# Patient Record
Sex: MALE | Race: BLACK OR AFRICAN AMERICAN | Employment: OTHER | URBAN - METROPOLITAN AREA
[De-identification: names, ages, dates, MRNs, and addresses within clinical notes are randomized per-mention and may not be internally consistent; named-entity substitution may affect disease eponyms.]

---

## 2017-02-13 ENCOUNTER — OFFICE VISIT (OUTPATIENT)
Dept: HEMATOLOGY | Age: 67
End: 2017-02-13

## 2017-02-13 VITALS
HEIGHT: 71 IN | WEIGHT: 238.4 LBS | SYSTOLIC BLOOD PRESSURE: 138 MMHG | HEART RATE: 82 BPM | OXYGEN SATURATION: 94 % | RESPIRATION RATE: 19 BRPM | DIASTOLIC BLOOD PRESSURE: 87 MMHG | TEMPERATURE: 98.6 F | BODY MASS INDEX: 33.38 KG/M2

## 2017-02-13 DIAGNOSIS — B18.2 CHRONIC HEPATITIS C WITHOUT HEPATIC COMA (HCC): Primary | ICD-10-CM

## 2017-02-13 NOTE — MR AVS SNAPSHOT
Visit Information Date & Time Provider Department Dept. Phone Encounter #  
 2/13/2017  3:00 PM Cristela Gutierrez NP [facility] [id number] Follow-up Instructions Return in about 1 year (around 2/13/2018) for Fibroscan. Your Appointments 2/16/2017  9:10 AM  
ROUTINE CARE with Pat Sam MD  
[facility] Internal Medicine [facility]) Appt Note: follow up [address] [facility] [address]  
[phone]  
  
   
 [address] [facility] [address]  
  
    
 2/17/2017  9:15 AM  
ROUTINE CARE with Luis Pavon [address] [place] [address]) Appt Note: 4 month follow up for htn prostate cancer [facility] [address] [facility]  
[phone]  
  
   
 [facility] [address] [facility] [address] [address]  
  
    
 2/21/2017  9:15 AM  
3 MONTH with Prema Cody MD  
[facility]) Appt Note: $0CP REM  
 [address] [facility]  
[phone] [address] [facility] Upcoming Health Maintenance Date Due DTaP/Tdap/Td series (1 - Tdap) 4/18/1970 GLAUCOMA SCREENING Q2Y 4/18/2014 Pneumococcal 65+ High/Highest Risk (2 of 2 - PCV13) 9/3/2015 MEDICARE YEARLY EXAM 9/14/2017 COLONOSCOPY 8/31/2020 Allergies as of 2/13/2017  Review Complete On: 2/13/2017 By: Cristela Gutierrez NP No Known Allergies Current Immunizations  Reviewed on 9/5/2014 Name Date H1N1 FLU VACCINE 2/22/2010 Influenza Vaccine Split 2/22/2010 Pneumococcal Polysaccharide (PPSV-23) 9/3/2014 12:06 PM  
  
 Not reviewed this visit You Were Diagnosed With   
  
 Codes Comments Chronic hepatitis C without hepatic coma (HCC)    -  Primary ICD-10-CM: B18.2 ICD-9-CM: 070.54 Vitals BP Pulse Temp Resp Height(growth percentile) Weight(growth percentile) 138/87 (BP 1 Location: Left arm, BP Patient Position: Sitting) 82 98.6 °F (37 °C) (Tympanic) 19 5' 11\" (1.803 m) 238 lb 6.4 oz (108.1 kg) SpO2 BMI Smoking Status 94% 33.25 kg/m2 Current Every Day Smoker BMI and BSA Data Body Mass Index Body Surface Area  
 33.25 kg/m 2 2.33 m 2 Preferred Pharmacy Pharmacy Name Phone [phone] [facility] [phone] Your Updated Medication List  
  
   
This list is accurate as of: 2/13/17  3:12 PM.  Always use your most recent med list.  
  
  
  
  
 ALEVE 220 mg tablet Generic drug:  naproxen sodium Take 220 mg by mouth as needed. aspirin delayed-release 81 mg tablet Take  by mouth daily. lisinopril 20 mg tablet Commonly known as:  Marilynne Shows Take 1 Tab by mouth daily. MULTIVITAMIN PO Take  by mouth daily. OMEGA 3 PO  
take  by mouth. rosuvastatin 5 mg tablet Commonly known as:  CRESTOR Take 1 Tab by mouth nightly. VITAMIN D3 2,000 unit Tab Generic drug:  cholecalciferol (vitamin D3) Take  by mouth. We Performed the Following CBC W/O DIFF [32224 CPT(R)] HCV RNA BY COLETTE QL,RFLX TO QT [52198 CPT(R)] METABOLIC PANEL, COMPREHENSIVE [68374 CPT(R)] Follow-up Instructions Return in about 1 year (around 2/13/2018) for Fibroscan. Introducing [facility]! Dheeraj Jean introduces [employer] patient portal. Now you can access parts of your medical record, email your doctor's office, and request medication refills online. 1. In your internet browser, go to [email or web address] 2. Click on the First Time User? Click Here link in the Sign In box. You will see the New Member Sign Up page. 3. Enter your [employer] Access Code exactly as it appears below.  You will not need to use this code after youve completed the sign-up process. If you do not sign up before the expiration date, you must request a new code. · [employer] Access Code: [email or web address] Expires: 5/14/2017  3:12 PM 
 
4. Enter the last four digits of your Social Security Number (xxxx) and Date of Birth (mm/dd/yyyy) as indicated and click Submit. You will be taken to the next sign-up page. 5. Create a [employer] ID. This will be your [employer] login ID and cannot be changed, so think of one that is secure and easy to remember. 6. Create a [employer] password. You can change your password at any time. 7. Enter your Password Reset Question and Answer. This can be used at a later time if you forget your password. 8. Enter your e-mail address. You will receive e-mail notification when new information is available in [address] 9. Click Sign Up. You can now view and download portions of your medical record. 10. Click the Download Summary menu link to download a portable copy of your medical information. If you have questions, please visit the Frequently Asked Questions section of the [employer] website. Remember, [employer] is NOT to be used for urgent needs. For medical emergencies, dial 911. Now available from your iPhone and Android! Please provide this summary of care documentation to your next provider. Your primary care clinician is listed as Omero LOYOLA. If you have any questions after today's visit, please call [phone].

## 2017-02-14 LAB
ALBUMIN SERPL-MCNC: 4.5 G/DL (ref 3.6–4.8)
ALBUMIN/GLOB SERPL: 1.4 {RATIO} (ref 1.1–2.5)
ALP SERPL-CCNC: 64 IU/L (ref 39–117)
ALT SERPL-CCNC: 16 IU/L (ref 0–44)
AST SERPL-CCNC: 21 IU/L (ref 0–40)
BILIRUB SERPL-MCNC: 0.8 MG/DL (ref 0–1.2)
BUN SERPL-MCNC: 15 MG/DL (ref 8–27)
BUN/CREAT SERPL: 19 (ref 10–22)
CALCIUM SERPL-MCNC: 9.9 MG/DL (ref 8.6–10.2)
CHLORIDE SERPL-SCNC: 98 MMOL/L (ref 96–106)
CO2 SERPL-SCNC: 23 MMOL/L (ref 18–29)
CREAT SERPL-MCNC: 0.77 MG/DL (ref 0.76–1.27)
ERYTHROCYTE [DISTWIDTH] IN BLOOD BY AUTOMATED COUNT: 14.3 % (ref 12.3–15.4)
GLOBULIN SER CALC-MCNC: 3.3 G/DL (ref 1.5–4.5)
GLUCOSE SERPL-MCNC: 83 MG/DL (ref 65–99)
HCT VFR BLD AUTO: 48.7 % (ref 37.5–51)
HGB BLD-MCNC: 17 G/DL (ref 12.6–17.7)
MCH RBC QN AUTO: 33.7 PG (ref 26.6–33)
MCHC RBC AUTO-ENTMCNC: 34.9 G/DL (ref 31.5–35.7)
MCV RBC AUTO: 96 FL (ref 79–97)
PLATELET # BLD AUTO: 190 X10E3/UL (ref 150–379)
POTASSIUM SERPL-SCNC: 4.3 MMOL/L (ref 3.5–5.2)
PROT SERPL-MCNC: 7.8 G/DL (ref 6–8.5)
RBC # BLD AUTO: 5.05 X10E6/UL (ref 4.14–5.8)
SODIUM SERPL-SCNC: 140 MMOL/L (ref 134–144)
WBC # BLD AUTO: 6 X10E3/UL (ref 3.4–10.8)

## 2017-02-14 NOTE — PROGRESS NOTES
93 Larissa Patten MD, Debby Fraire, YADIRA Wilson MD, MD Kaylin Snow, ELLEN Sullivan NP        [address] [facility], [address] Bharathi Gallegos  22.     [phone]     FAX: [address], [address] [facility], [address]     [phone]     FAX: [phone]     Patient Care Team:  Vira Jacques MD as PCP - Sriram Davis MD (Infectious Diseases)  Cecile Vick MD as Surgeon (General Surgery)  Mortimer Peaches, MD (Gastroenterology)  David Wen MD (Hepatology)  Cristela Comer NP (Nurse Practitioner)  Cherelle Bonilla MD (Cardiology)  Wolf Reyes MD (Cardiology)     Patient Active Problem List   Diagnosis Code    Chronic hepatitis C ([facility] B18.2    HIV positive ([facility] Z21    Weight loss, non-intentional R63.4    Prostate cancer ([facility] C61    Substance abuse F19.10    HTN (hypertension) I10    Colon polyp K63.5    Advanced care planning/counseling discussion Z71.89    Tobacco use Z72.0    PAD (peripheral artery disease) ([facility] I73.9    Dyslipidemia E78.5       Katerina Conroy returns to the [address] for management of chronic HCV and HIV co-infection. The active problem list, all pertinent past medical history, medications, radiologic findings and laboratory findings related to the liver disorder were reviewed with the patient. Ultrasound of the liver was performed in 12/2014. This suggests chronic liver disease. A 0.8 x 0.7 lesion was identified in the right lobe. MRI of the liver was performed in 1/2015 to further evaluate this area. This suggested changes consistent with chronic liver disease. No liver mass lesion was identified. The patient underwent a liver biopsy in 3/2015.  This demonstrates severe hepatitis with bridging fibrosis. Patient has completed 12 weeks of HCV treatment with Harvoni (5/29/2015-8/25/2015). He is a sustained virologic responder to treatment, or cured. Patient is co-infected with HIV. He is currently not on anti-retroviral therapy because his virus remains suppressed. The patient complains of weight gain and fatigue today but otherwise feels well. He has been diagnosed with prostate cancer and is currently undergoing radiation therapy for 7 weeks. He is concerned that this may be harming his liver and would like to discuss this today. The patient completes all daily activities without any functional limitations. The patient has not experienced arthralgias, myalgias, problems concentrating, swelling of the abdomen, swelling of the lower extremities, hematemesis, or hematochezia. ALLERGIES  No Known Allergies    MEDICATIONS  Current Outpatient Prescriptions   Medication Sig    rosuvastatin (CRESTOR) 5 mg tablet Take 1 Tab by mouth nightly.  lisinopril (PRINIVIL, ZESTRIL) 20 mg tablet Take 1 Tab by mouth daily.  naproxen sodium (ALEVE) 220 mg tablet Take 220 mg by mouth as needed.  cholecalciferol, vitamin D3, (VITAMIN D3) 2,000 unit tab Take  by mouth.  aspirin delayed-release 81 mg tablet Take  by mouth daily.  MULTIVITAMINS (MULTIVITAMIN PO) Take  by mouth daily.  OMEGA-3 FATTY ACIDS (OMEGA 3 PO) take  by mouth. No current facility-administered medications for this visit. REVIEW OF SYSTEMS NOT RELATED TO LIVER DISEASE:  Constitution systems: Negative for fever, chills, weight gain, weight loss. Eyes: Negative for diplopia, visual changes, eye pain. ENT: Negative for sore throat, painful swallowing. Respiratory: Negative for cough, hemoptysis, dyspnea. Cardiology: Negative for chest pain, palpitations. GI:  Negative for constipation or diarrhea. : Negative for urinary frequency, dysuria and hematuria.    Skin: Negative for rash.  Hematology: Negative for easy bruising, bleeding from gums or nose. Musculo-skelatal: Negative for back pain, muscle pain, weakness. Neurologic: Negative for headaches, dizziness, vertigo, memory problems. Psychology: Negative for anxiety, depression. FAMILY HISTORY:  The father  of alcohol cirrhosis. The mother  of CVA. There are no other persons in the family with HCV or liver disease. SOCIAL HISTORY:  The patient is . The spouse has been tested for HCV and is positive. She was treated and achieved SVR. The patient has 3 children, and 7 grandchildren. The patient stopped using tobacco products in 2014. The patient has never consumed significant amounts of alcohol. The patient used to work in [employer]. PHYSICAL EXAMINATION:  Visit Vitals    /87 (BP 1 Location: Left arm, BP Patient Position: Sitting)    Pulse 82    Temp 98.6 °F (37 °C) (Tympanic)    Resp 19    Ht 5' 11\" (1.803 m)    Wt 238 lb 6.4 oz (108.1 kg)    SpO2 94%    BMI 33.25 kg/m2     General: No acute distress. Eyes: Sclera anicteric. ENT: No oral lesions. Thyroid normal.  Nodes: No adenopathy. Skin: No spider angiomata. No jaundice. No palmar erythema. Respiratory: Lungs clear to auscultation. Cardiovascular: Regular heart rate. No murmurs. No JVD. Abdomen: Soft non-tender. Liver size normal to percussion/palpation. Spleen not palpable. No obvious ascites. Extremities: No edema. No muscle wasting. No gross arthritic changes. Neurologic: Alert and oriented. Cranial nerves grossly intact. No asterixsis.     LABORATORY STUDIES:  [address] & Units 2017 10/4/2016 2016   WBC 3.4 - 10.8 x10E3/uL 6.0 6.2    ANC 1.4 - 7.0 x10E3/uL  3.4    HGB 12.6 - 17.7 g/dL 17.0 16.6     - 379 x10E3/uL 190 225    INR 0.8 - 1.2      AST 0 - 40 IU/L 21 21 18   ALT 0 - 44 IU/L 16 16 15   Alk Phos 39 - 117 IU/L 64 78 71   Bili, Total 0.0 - 1.2 mg/dL 0.8 0.7 0.5   Bili, Direct 0.00 - 0.40 mg/dL      Albumin 3.6 - 4.8 g/dL 4.5 4.9 (H) 4.5   BUN 8 - 27 mg/dL 15 10 10   Creat 0.76 - 1.27 mg/dL 0.77 0.84 0.77   Na 134 - 144 mmol/L 140 143 140   K 3.5 - 5.2 mmol/L 4.3 4.2 4.5   Cl 96 - 106 mmol/L 98 100 100   CO2 18 - 29 mmol/L 23 26 25   Glucose 65 - 99 mg/dL 83 94 90   Magnesium 1.6 - 2.4 mg/dL      Ammonia <32 UMOL/L      Virology Latest Ref Rng & Units   9/28/2016   HCV RNA, COLETTE, QL Negative   Negative     A CMP, CBC and HCV RNA were ordered today. Will review results when available. SEROLOGIES:  Serologies Latest Ref Rng 10/10/2014 9/1/2014 8/27/2014   Hep A Ab, Total Negative Positive (A)     Hep B Surface Ag Negative Negative     Hep B Core Ab, Total Negative Positive (A)     Hep C Genotype See Note 1a     HCV RT-PCR, Quant  [id number]  [id number]   HCV Log10    6.830   Ferritin 30 - 400 ng/mL 172     Iron % Saturation 15 - 55 % 22     C-ANCA Neg:<1:20 titer  <1:20    P-ANCA Neg:<1:20 titer  <1:20    ANCA Neg:<1:20 titer  <1:20      LIVER HISTOLOGY:  3/2015. Slides reviewed by MLS. Severe hepatitis with bridging fibrosis and possible cirrhosis. Knodell score (3,3,3,3), Bro score 4, Metavir score 3.  2/2017. FibroScan performed at [facility]. EkPa was 10.0. Suggested fibrosis level is F3. ENDOSCOPIC PROCEDURES:  Not available or performed    RADIOLOGY:  12/2014. Ultrasound of liver. Echogenic consistent with chronic liver disease. 0.8x0.7 cm lesion right lobe. No dilated bile ducts. No ascites. 1/2015. Dynamic MRI liver. Changes consistent with chronic liver disease. No liver mass lesions. No dilated bile ducts. No bile duct strictures. No ascites. OTHER TESTING:  Not available or performed    ASSESSMENT AND PLAN:  Chronic HCV with bridging fibrosis. This was confirmed with FibroScan today. Results were discussed in detail with patient. He has preserved liver function.  Will continue to monitor patient regularly. HIV co-infection. He has low levels of HIV RNA. He is not taking anti-HIV medications. This is being regularly monitored by his ID physician. HCV treatment. Completed 12 weeks of Harvoni treatment in August 2015. He is a sustained virologic responder to this treatment, or cured from this infection. Fibroscan. Patient's liver biopsy in March 2015 demonstrated bridging fibrosis. He has been HCV negative for over a year. FibroScan today demonstrated bridging fibrosis with no progression or regression. Will have patient return every year to reassess his fibrosis with a Fibroscan. Patient verbalized understanding of this plan. Prostate cancer. Currently receiving radiation therapy. Explained to patient that his liver disease is stable and has not worsened with his prostate treatment. He can continue any necessary treatment. The patient was directed to continue all current medications at the current dosages. There are no contraindications for the patient to take any medications that are necessary for treatment of other medical issues. The patient was counseled regarding alcohol consumption. The patient was counseled regarding use of illicit drugs. Vaccination for viral hepatitis A and B is not required. The patient has serologic evidence of prior exposure or vaccination with immunity. All of the above issues were discussed with the patient. All questions were answered. The patient expressed a clear understanding of the above. Follow-up Juan Antonio Levin 32 in 1 year with a FibroScan.     Kianna Robins NP  [facility] [place] [address]  Ph: [phone]  Fax: [phone]  Email: [email or web address]

## 2017-02-15 LAB — HCV RNA SERPL QL NAA+PROBE: NEGATIVE

## 2017-02-16 ENCOUNTER — OFFICE VISIT (OUTPATIENT)
Dept: INTERNAL MEDICINE CLINIC | Age: 67
End: 2017-02-16

## 2017-02-16 VITALS
BODY MASS INDEX: 33.6 KG/M2 | SYSTOLIC BLOOD PRESSURE: 116 MMHG | WEIGHT: 240 LBS | HEART RATE: 82 BPM | DIASTOLIC BLOOD PRESSURE: 77 MMHG | OXYGEN SATURATION: 96 % | HEIGHT: 71 IN | TEMPERATURE: 98.2 F

## 2017-02-16 DIAGNOSIS — B20 HIV DISEASE (HCC): Primary | ICD-10-CM

## 2017-02-16 RX ORDER — AZITHROMYCIN 250 MG/1
250 TABLET, FILM COATED ORAL SEE ADMIN INSTRUCTIONS
Qty: 6 TAB | Refills: 0 | Status: SHIPPED | OUTPATIENT
Start: 2017-02-16 | End: 2017-02-21

## 2017-02-16 NOTE — PATIENT INSTRUCTIONS
You are here for a follow up for HIV- will check your VL and cd4 today and depending on the results we will decide on treatment

## 2017-02-16 NOTE — MR AVS SNAPSHOT
Visit Information Date & Time Provider Department Dept. Phone Encounter #  
 2/16/2017  9:10 AM Galen Karla, [address] [facility] Internal Medicine [phone] [id number] Your Appointments 2/17/2017  9:15 AM  
ROUTINE CARE with Ken Bravo, [address] [place] [address],[address]) Appt Note: 4 month follow up for htn prostate cancer [address] [facility]  
[phone]  
  
   
 [address] [facility]  [address] [address]  
  
    
 2/21/2017  9:15 AM  
3 MONTH with John Aragon MD  
[facility] [address] [facility]) Appt Note: $0CP REM  
 [address] [facility]  
[phone] [address] Upcoming Health Maintenance Date Due DTaP/Tdap/Td series (1 - Tdap) 4/18/1970 GLAUCOMA SCREENING Q2Y 4/18/2014 Pneumococcal 65+ High/Highest Risk (2 of 2 - PCV13) 9/3/2015 MEDICARE YEARLY EXAM 9/14/2017 COLONOSCOPY 8/31/2020 Allergies as of 2/16/2017  Review Complete On: 2/16/2017 By: Khurram Bhatt LPN No Known Allergies Current Immunizations  Reviewed on 9/5/2014 Name Date H1N1 FLU VACCINE 2/22/2010 Influenza Vaccine Split 2/22/2010 Pneumococcal Polysaccharide (PPSV-23) 9/3/2014 12:06 PM  
  
 Not reviewed this visit You Were Diagnosed With   
  
 Codes Comments HIV disease ([facility]    -  Primary ICD-10-CM: B20 
ICD-9-CM: 364 Vitals BP Pulse Temp Height(growth percentile) Weight(growth percentile) SpO2  
 116/77 82 98.2 °F (36.8 °C) (Oral) 5' 11\" (1.803 m) 240 lb (108.9 kg) 96% BMI Smoking Status 33.47 kg/m2 Current Every Day Smoker BMI and BSA Data Body Mass Index Body Surface Area  
 33.47 kg/m 2 2.34 m 2 Preferred Pharmacy Pharmacy Name Phone [phone] [facility] [phone] Your Updated Medication List  
  
   
This list is accurate as of: 2/16/17  9:30 AM.  Always use your most recent med list.  
  
  
  
  
 ALEVE 220 mg tablet Generic drug:  naproxen sodium Take 220 mg by mouth as needed. aspirin delayed-release 81 mg tablet Take  by mouth daily. azithromycin 250 mg tablet Commonly known as:  Rosibel Sloane Take 1 Tab by mouth See Admin Instructions for 5 days. lisinopril 20 mg tablet Commonly known as:  Aundra Nyasia Take 1 Tab by mouth daily. MULTIVITAMIN PO Take  by mouth daily. OMEGA 3 PO  
take  by mouth. rosuvastatin 5 mg tablet Commonly known as:  CRESTOR Take 1 Tab by mouth nightly. VITAMIN D3 2,000 unit Tab Generic drug:  cholecalciferol (vitamin D3) Take  by mouth. Prescriptions Sent to Pharmacy Refills  
 azithromycin (ZITHROMAX) 250 mg tablet 0 Sig: Take 1 Tab by mouth See Admin Instructions for 5 days. Class: Normal  
 Pharmacy: [place] [address], [facility] #: [phone] Route: Oral  
  
We Performed the Following HIV-1 RNA QT BY PCR [37016 CPT(R)] LYMPHOCYTES, T-CELL SUBSETS PLUS CBC [[id number] Custom] Patient Instructions You are here for a follow up for HIV- will check your VL and cd4 today and depending on the results we will decide on treatment Please provide this summary of care documentation to your next provider. Your primary care clinician is listed as Evy LOYOLA. If you have any questions after today's visit, please call [phone].

## 2017-02-16 NOTE — PROGRESS NOTES
ID follow up    NAME:  Julian Garcia                       :                          MRN:   [id number]   Date/Time:  2017 8:51 AM  Subjective: Follow up visit- last seen oct 2016  Srikanth Reed is a 71 y.o. with a history of HEPC , HIV , IVDA   HIV was diagnosed in  and he is HAARt naive     LAst Vl 30 and CD4 802  from 10/4/2016   Has been treated for hepatitis c by Cristela whitfield and   with harvoni for three months (2015-2015) and he has sustained virological response. HAs been diagnosed with progressing prostate cancer-He had a repeat biopsy in 2016 which showed 3 areas in the left side with adenocarcinoma- has received radiation therapy   He is followed by .   He is followed by cardiology and diagnosed with peripheral arterial disease and will be getting angio some time in the future   He has gained 40 pounds iint he recent months   He stopped IVDA 1/12 year ago - came off the methadone 6 months ago    He now c/o cough with yellow sputum- smoker    Past Medical History   Diagnosis Date    BPH     Cancer ([address] 2009     prostate biopsy revealed cancer    Erectile dysfunction     Essential hypertension, benign     Hepatitis C 2009    HIV positive ([address] 2009    Ill-defined condition      PNEUMONIA    Memory disorder     Prostate CA ([facility] 2010      Past Surgical History   Procedure Laterality Date    Colonoscopy,remv lesn,snare  2015          Hx hernia repair       Left Inguinal Hernia Repair    Hx skin biopsy  11/16/15     left forearm/ upper arm biopsy       History   SH-smoker 4 /day  No alcohol  No cocaine in 1 1/2 year  No heroin 10 yrs  Meds reviewed     REVIEW OF SYSTEMS:     Const: negative fever, negative chills, has 40 pound weight gain  Eyes:  negative diplopia or visual changes, negative eye pain  ENT:  negative coryza, negative sore throat  : Thin stream, hesitancy  Skin:  negative for rash and pruritus  Heme: negative for easy bruising and gum/nose bleeding  MS: negative for myalgias, arthralgias, back pain and muscle weakness  Neurolo:negative for headaches, dizziness, vertigo, has some memory problems       Objective:   VITALS:    Visit Vitals    /77    Pulse 82    Temp 98.2 °F (36.8 °C) (Oral)    Ht 5' 11\" (1.803 m)    Wt 240 lb (108.9 kg)    SpO2 96%    BMI 33.47 kg/m2       PHYSICAL EXAM:   General:    Looks well  Head:   Normocephalic, without obvious abnormality, atraumatic. Eyes:   Conjunctivae clear, anicteric sclerae. Pupils are equal  Nose:  Nares normal. No drainage or sinus tenderness. Oral cavity- edentulous   Dentures top  Neck:  Supple, symmetrical,  no adenopathy, thyroid: non tender    no carotid bruit and no JVD. Back:    No CVA tenderness. Lungs:   Clear to auscultation bilaterally. No Wheezing or Rhonchi. No rales. Heart:   Regular rate and rhythm,  no murmur, rub or gallop. Abdomen:   Soft, non-tender,not distended. Bowel sounds normal. No masses  Extremities: Extremities normal, atraumatic, no cyanosis. No edema.  No clubbing  Skin:     Old scars   Lymph: Cervical, supraclavicular normal.  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 and very high Cd4-last Vl was 30   Will get labs today and decide whether he needs HAART  2) Hepc - Treated with Arcadio Huntley and has SVR  3) IVDA- heavy cocaine user- none since 1 year  4) probable COPD- due to smoking- s/p spirometry and followed by his PCP  5) Memory loss-  much better now - off cocaine and has been treated for HEPC which could have made it better-   6) Safe sex reinforced,   7) HTn- started on lisinopril- diet and exercise advised  8) Weight gain of 40 pounds- exercise and diet advised  9) peripheral arterial disease- followed by cardiology- started on statin  10) bronchitis in a smoker- will give z sigrid  LAbs ( HIV/Cd4)  today  Follow up depends on the lab results    Discussed the management with him

## 2017-02-16 NOTE — PROGRESS NOTES
Chief Complaint   Patient presents with    Follow Up Chronic Condition     HIV     Reviewed record  In preparation for visit and have obtained necessary documentation. 1. Have you been to the ER, urgent care clinic since your last visit? Hospitalized since your last visit?no      2. Have you seen or consulted any other health care providers outside of the [address] [place] [address] since your last visit? Include any pap smears or colon screening.   Urologist.    Used 2 patient I. D. 's

## 2017-02-17 ENCOUNTER — OFFICE VISIT (OUTPATIENT)
Dept: INTERNAL MEDICINE CLINIC | Age: 67
End: 2017-02-17

## 2017-02-17 VITALS
TEMPERATURE: 97.4 F | OXYGEN SATURATION: 95 % | SYSTOLIC BLOOD PRESSURE: 120 MMHG | HEIGHT: 71 IN | DIASTOLIC BLOOD PRESSURE: 79 MMHG | WEIGHT: 238 LBS | HEART RATE: 79 BPM | BODY MASS INDEX: 33.32 KG/M2

## 2017-02-17 DIAGNOSIS — I73.9 PAD (PERIPHERAL ARTERY DISEASE) (HCC): Primary | ICD-10-CM

## 2017-02-17 DIAGNOSIS — I10 ESSENTIAL HYPERTENSION: ICD-10-CM

## 2017-02-17 DIAGNOSIS — E78.00 PURE HYPERCHOLESTEROLEMIA: ICD-10-CM

## 2017-02-17 DIAGNOSIS — Z23 ENCOUNTER FOR IMMUNIZATION: ICD-10-CM

## 2017-02-17 LAB
BASOPHILS # BLD AUTO: 0 X10E3/UL (ref 0–0.2)
BASOPHILS NFR BLD AUTO: 0 %
CD3+CD4+ CELLS # BLD: 366 /UL (ref 359–1519)
CD3+CD4+ CELLS NFR BLD: 33.3 % (ref 30.8–58.5)
CD3+CD4+ CELLS/CD3+CD8+ CLL BLD: 1.02 % (ref 0.92–3.72)
CD3+CD8+ CELLS # BLD: 360 /UL (ref 109–897)
CD3+CD8+ CELLS NFR BLD: 32.7 % (ref 12–35.5)
EOSINOPHIL # BLD AUTO: 0.2 X10E3/UL (ref 0–0.4)
EOSINOPHIL NFR BLD AUTO: 2 %
ERYTHROCYTE [DISTWIDTH] IN BLOOD BY AUTOMATED COUNT: 14.3 % (ref 12.3–15.4)
HCT VFR BLD AUTO: 49 % (ref 37.5–51)
HGB BLD-MCNC: 16.9 G/DL (ref 12.6–17.7)
HIV1 RNA # SERPL NAA+PROBE: <20 COPIES/ML
HIV1 RNA SERPL NAA+PROBE-LOG#: NORMAL LOG10COPY/ML
IMM GRANULOCYTES # BLD: 0 X10E3/UL (ref 0–0.1)
IMM GRANULOCYTES NFR BLD: 0 %
LYMPHOCYTES # BLD AUTO: 1.1 X10E3/UL (ref 0.7–3.1)
LYMPHOCYTES NFR BLD AUTO: 16 %
MCH RBC QN AUTO: 33.2 PG (ref 26.6–33)
MCHC RBC AUTO-ENTMCNC: 34.5 G/DL (ref 31.5–35.7)
MCV RBC AUTO: 96 FL (ref 79–97)
MONOCYTES # BLD AUTO: 0.8 X10E3/UL (ref 0.1–0.9)
MONOCYTES NFR BLD AUTO: 12 %
NEUTROPHILS # BLD AUTO: 4.9 X10E3/UL (ref 1.4–7)
NEUTROPHILS NFR BLD AUTO: 70 %
PLATELET # BLD AUTO: 195 X10E3/UL (ref 150–379)
RBC # BLD AUTO: 5.09 X10E6/UL (ref 4.14–5.8)
WBC # BLD AUTO: 7 X10E3/UL (ref 3.4–10.8)

## 2017-02-17 NOTE — MR AVS SNAPSHOT
Visit Information Date & Time Provider Department Dept. Phone Encounter #  
 2/17/2017  9:15 AM Adelina Russell, [address] [phone] [id number] Follow-up Instructions Return in about 6 months (around 8/17/2017) for htn pad prostate cancer. Your Appointments 2/17/2017  9:15 AM  
ROUTINE CARE with Adelina Russell, [address] [facility] Appt Note: 4 month follow up for htn prostate cancer [facility] [address]  
[phone]  
  
   
 [facility] [address] [facility]  [address] [address]  
  
    
 2/21/2017  9:15 AM  
3 MONTH with Jose L Pace MD  
[facility]) Appt Note: $0CP REM  
 [address] [facility] [address]  
[phone] [address] [facility] [address]  
  
    
 8/17/2017  9:10 AM  
ROUTINE CARE with Patric Ramirez MD  
[facility]) Appt Note: 6 month f/u  
 [address] [facility] [address]  
[phone]  
  
   
 [address] [place] [address] [address] Upcoming Health Maintenance Date Due DTaP/Tdap/Td series (1 - Tdap) 4/18/1970 GLAUCOMA SCREENING Q2Y 4/18/2014 Pneumococcal 65+ High/Highest Risk (2 of 2 - PCV13) 9/3/2015 MEDICARE YEARLY EXAM 9/14/2017 COLONOSCOPY 8/31/2020 Allergies as of 2/17/2017  Review Complete On: 2/17/2017 By: Bryan Martínez LPN No Known Allergies Current Immunizations  Reviewed on 9/5/2014 Name Date H1N1 FLU VACCINE 2/22/2010 Influenza Vaccine Split 2/22/2010 Pneumococcal Conjugate (PCV-13)  Incomplete Pneumococcal Polysaccharide (PPSV-23) 9/3/2014 12:06 PM  
  
 Not reviewed this visit You Were Diagnosed With   
  
 Codes Comments PAD (peripheral artery disease) (HCC)    -  Primary ICD-10-CM: I73.9 ICD-9-CM: 443.9 Essential hypertension     ICD-10-CM: I10 
ICD-9-CM: 401.9 Pure hypercholesterolemia     ICD-10-CM: E78.00 ICD-9-CM: 272.0 Encounter for immunization     ICD-10-CM: K74 ICD-9-CM: V03.89 Vitals BP Pulse Temp Height(growth percentile) Weight(growth percentile) SpO2  
 120/79 (BP 1 Location: Left arm, BP Patient Position: Sitting) 79 97.4 °F (36.3 °C) (Oral) 5' 11\" (1.803 m) 238 lb (108 kg) 95% BMI Smoking Status 33.19 kg/m2 Current Every Day Smoker BMI and BSA Data Body Mass Index Body Surface Area  
 33.19 kg/m 2 2.33 m 2 Preferred Pharmacy Pharmacy Name Phone [phone] [facility] [phone] Your Updated Medication List  
  
   
This list is accurate as of: 2/17/17  9:13 AM.  Always use your most recent med list.  
  
  
  
  
 ALEVE 220 mg tablet Generic drug:  naproxen sodium Take 220 mg by mouth as needed. aspirin delayed-release 81 mg tablet Take  by mouth daily. azithromycin 250 mg tablet Commonly known as:  Sally Fine Take 1 Tab by mouth See Admin Instructions for 5 days. lisinopril 20 mg tablet Commonly known as:  Arlen Hicks Take 1 Tab by mouth daily. MULTIVITAMIN PO Take  by mouth daily. OMEGA 3 PO  
take  by mouth. rosuvastatin 5 mg tablet Commonly known as:  CRESTOR Take 1 Tab by mouth nightly. VITAMIN D3 2,000 unit Tab Generic drug:  cholecalciferol (vitamin D3) Take  by mouth. We Performed the Following LIPID PANEL [73369 CPT(R)] PNEUMOCOCCAL CONJ VACCINE 13 VALENT IM [id number] CPT(R)] REFERRAL TO CARDIOLOGY [QSI05 Custom] Comments:  
 Please evaluate patient for f/u PAD Follow-up Instructions Return in about 6 months (around 8/17/2017) for htn pad prostate cancer. Referral Information Referral ID Referred By Referred To [id number] Michelle Camacho MD   
   [address] [place] [address], [address] Phone: [phone] Fax: [phone] Visits Status Start Date End Date 1 New Request 2/17/17 2/17/18 If your referral has a status of pending review or denied, additional information will be sent to support the outcome of this decision. Please provide this summary of care documentation to your next provider. Your primary care clinician is listed as Mani LOYOLA. If you have any questions after today's visit, please call [phone].

## 2017-02-17 NOTE — PROGRESS NOTES
HISTORY OF PRESENT ILLNESS  Katerina Conroy is a 78 y.o. male. HPI     F/u HTN prostate cancer, HIV -HAART naive/ undetectable viral load, s/p tx hep C with SVR  Completed XRT for prostate cancer-Dr Rigo Mays. Has f/u  and Rad/onc  Saw [place] for CP--neg stress echo, no echo, did get GERALD c/w PAD right SFA 75% stenosis. Pt reports left>right  sxs per pt with tightness  Has claudication at about 5 blocks  Saw ID yesterday-lab pending viral load and Cd4  Smokes 1/2 ppd tobacco      Patient Active Problem List    Diagnosis Date Noted    Tobacco use 12/06/2016    PAD (peripheral artery disease) ([facility] [address] 12/06/2016    Dyslipidemia 12/06/2016    Advanced care planning/counseling discussion 09/13/2016    Colon polyp 09/18/2015    HTN (hypertension) 01/27/2011    Substance abuse 12/25/2010    Prostate cancer ([facility] 02/22/2010    Chronic hepatitis C ([facility] 06/30/2009    HIV positive ([facility] 06/30/2009    Weight loss, non-intentional 06/30/2009     Current Outpatient Prescriptions   Medication Sig Dispense Refill    azithromycin (ZITHROMAX) 250 mg tablet Take 1 Tab by mouth See Admin Instructions for 5 days. 6 Tab 0    rosuvastatin (CRESTOR) 5 mg tablet Take 1 Tab by mouth nightly. 30 Tab 4    lisinopril (PRINIVIL, ZESTRIL) 20 mg tablet Take 1 Tab by mouth daily. 30 Tab 6    cholecalciferol, vitamin D3, (VITAMIN D3) 2,000 unit tab Take  by mouth.  aspirin delayed-release 81 mg tablet Take  by mouth daily.  MULTIVITAMINS (MULTIVITAMIN PO) Take  by mouth daily.  OMEGA-3 FATTY ACIDS (OMEGA 3 PO) take  by mouth.  naproxen sodium (ALEVE) 220 mg tablet Take 220 mg by mouth as needed.        No Known Allergies   Lab Results  Component Value Date/Time   Glucose 83 02/13/2017 02:59 PM   Glucose (POC) 95 09/04/2014 12:18 PM   LDL, calculated 170 10/04/2016 10:08 AM   Creatinine 0.77 02/13/2017 02:59 PM      Lab Results  Component Value Date/Time   Cholesterol, total 232 10/04/2016 10:08 AM   HDL Cholesterol 45 10/04/2016 10:08 AM   LDL, calculated 170 10/04/2016 10:08 AM   Triglyceride 83 10/04/2016 10:08 AM   CHOL/HDL Ratio 4.0 08/24/2014 05:06 AM       Lab Results  Component Value Date/Time   GFR est  02/13/2017 02:59 PM   GFR est non-AA 94 02/13/2017 02:59 PM   Creatinine 0.77 02/13/2017 02:59 PM   BUN 15 02/13/2017 02:59 PM   Sodium 140 02/13/2017 02:59 PM   Potassium 4.3 02/13/2017 02:59 PM   Chloride 98 02/13/2017 02:59 PM   CO2 23 02/13/2017 02:59 PM         ROS    Physical Exam   Constitutional: He appears well-developed and well-nourished. No distress. Appears stated age   HENT:   Head: Normocephalic. Mouth/Throat: Oropharynx is clear and moist.   Cardiovascular: Normal rate, regular rhythm and normal heart sounds. Exam reveals no gallop and no friction rub. No murmur heard. Pulmonary/Chest: Effort normal and breath sounds normal. No respiratory distress. He has no wheezes. He has no rales. He exhibits no tenderness. Abdominal: Soft. Musculoskeletal: He exhibits no edema. Neurological: He is alert. Psychiatric: He has a normal mood and affect. Nursing note and vitals reviewed. ASSESSMENT and PLAN  Baron Hashimoto was seen today for hypertension and prostate cancer.     Diagnoses and all orders for this visit:    PAD (peripheral artery disease) ([facility] [facility]  -     REFERRAL TO CARDIOLOGY    Essential hypertension    Hep C   Treated with SVR    HIV   Has been undetectable, labs pending    Smoker   Needs to quit, mot yet motivated to quit   Pt will try Vapor    HLD   On crestor now, check lipids, goal LDL < 100    Preventive   prevnar 13 next visit if available  RTC 6 months  Follow-up Disposition: Not on File

## 2017-02-18 LAB
CHOLEST SERPL-MCNC: 155 MG/DL (ref 100–199)
HDLC SERPL-MCNC: 40 MG/DL
LDLC SERPL CALC-MCNC: 100 MG/DL (ref 0–99)
TRIGL SERPL-MCNC: 74 MG/DL (ref 0–149)
VLDLC SERPL CALC-MCNC: 15 MG/DL (ref 5–40)

## 2017-02-21 ENCOUNTER — OFFICE VISIT (OUTPATIENT)
Dept: CARDIOLOGY CLINIC | Age: 67
End: 2017-02-21

## 2017-02-21 VITALS
HEART RATE: 75 BPM | WEIGHT: 240 LBS | HEIGHT: 71 IN | DIASTOLIC BLOOD PRESSURE: 78 MMHG | SYSTOLIC BLOOD PRESSURE: 128 MMHG | RESPIRATION RATE: 18 BRPM | BODY MASS INDEX: 33.6 KG/M2 | OXYGEN SATURATION: 95 %

## 2017-02-21 DIAGNOSIS — E78.5 DYSLIPIDEMIA: ICD-10-CM

## 2017-02-21 DIAGNOSIS — I10 ESSENTIAL HYPERTENSION: Primary | ICD-10-CM

## 2017-02-21 DIAGNOSIS — Z72.0 TOBACCO USE: ICD-10-CM

## 2017-02-21 DIAGNOSIS — I73.9 PAD (PERIPHERAL ARTERY DISEASE) (HCC): ICD-10-CM

## 2017-02-21 NOTE — PROGRESS NOTES
Valery Morales NP  Subjective/HPI:     Sharon Gordon is a 78 y.o. male is here for routine f/u. The patient denies chest pain/ shortness of breath, orthopnea, PND, LE edema, palpitations, syncope, presyncope or fatigue. Patient reports previous symptoms of dyspnea on exertion and chest pain have resolved. Tolerating increase lisinopril well without cough or side effect. PCP Provider  Blanche Soto MD  Past Medical History   Diagnosis Date    BPH     Cancer [facility]) 11/2009     prostate biopsy revealed cancer    Erectile dysfunction     Essential hypertension, benign     Hepatitis C 6/30/2009    HIV positive ([facility] 6/30/2009    Ill-defined condition 9-2014     PNEUMONIA    Memory disorder     Prostate CA ([facility] 2/22/2010      Past Surgical History   Procedure Laterality Date    Colonoscopy,jarrod jameson,vianey  8/31/2015          Hx hernia repair       Left Inguinal Hernia Repair    Hx skin biopsy  11/16/15     left forearm/ upper arm biopsy      No Known Allergies   Family History   Problem Relation Age of Onset    Hypertension Mother     Mental Retardation Mother     Depression Mother     Anxiety Mother     Liver Disease Father     Lung Disease Father     Diabetes Maternal Grandmother     Hypertension Other     Stroke Other     Anxiety Other     Depression Other       Current Outpatient Prescriptions   Medication Sig    azithromycin (ZITHROMAX) 250 mg tablet Take 1 Tab by mouth See Admin Instructions for 5 days.  rosuvastatin (CRESTOR) 5 mg tablet Take 1 Tab by mouth nightly.  lisinopril (PRINIVIL, ZESTRIL) 20 mg tablet Take 1 Tab by mouth daily.  naproxen sodium (ALEVE) 220 mg tablet Take 220 mg by mouth as needed.  cholecalciferol, vitamin D3, (VITAMIN D3) 2,000 unit tab Take  by mouth.  aspirin delayed-release 81 mg tablet Take  by mouth daily.  MULTIVITAMINS (MULTIVITAMIN PO) Take  by mouth daily.  OMEGA-3 FATTY ACIDS (OMEGA 3 PO) take  by mouth.      No current facility-administered medications for this visit. Vitals:    02/21/17 0847 02/21/17 0852 02/21/17 0913   BP: 140/90 130/90 128/78   Pulse: 75     Resp: 18     SpO2: 95%     Weight: 240 lb (108.9 kg)     Height: 5' 11\" (1.803 m)       Social History     Social History    Marital status: LEGALLY      Spouse name: N/A    Number of children: N/A    Years of education: N/A     Occupational History    Not on file. Social History Main Topics    Smoking status: Current Every Day Smoker     Packs/day: 0.50     Years: 45.00     Types: Cigarettes    Smokeless tobacco: Never Used    Alcohol use No    Drug use: No      Comment: denies current use of heroin    Sexual activity: Not Currently     Other Topics Concern    Not on file     Social History Narrative       I have reviewed the nurses notes, vitals, problem list, allergy list, medical history, family, social history and medications. Review of Symptoms:    General: Pt denies excessive weight gain or loss. Pt is able to conduct ADL's  HEENT: Denies blurred vision, headaches, epistaxis and difficulty swallowing. Respiratory: Denies shortness of breath, CAMACHO, wheezing or stridor. Cardiovascular: Denies precordial pain, palpitations, edema or PND  Gastrointestinal: Denies poor appetite, indigestion, abdominal pain or blood in stool  Musculoskeletal: + left leg pain with walking (has seen Dr Raghav Flower and recommended angiography)   Neurologic: Denies tremor, paresthesias, or sensory motor disturbance  Skin: Denies rash, itching or texture change. Physical Exam:      General: Well developed, in no acute distress, cooperative and alert  HEENT: No carotid bruits, no JVD, trach is midline. Neck Supple, PEERL, EOM intact. Heart:  Normal S1/S2 negative S3 or S4.  Regular, no murmur, gallop or rub.   Respiratory: Clear bilaterally x 4, no wheezing or rales  Abdomen:   Soft, non-tender, no masses, bowel sounds are active.   Extremities:  No edema, normal cap refill, no cyanosis, atraumatic. Neuro: A&Ox3, speech clear, gait stable. Skin: Skin color is normal. No rashes or lesions. Non diaphoretic  Vascular: 2+ pulses symmetric in all extremities    Cardiographics    ECG: Sinus  Results for orders placed or performed during the hospital encounter of 08/23/14   EKG, 12 LEAD, INITIAL   Result Value Ref Range    Ventricular Rate 116 BPM    Atrial Rate 116 BPM    P-R Interval 176 ms    QRS Duration 110 ms    Q-T Interval 324 ms    QTC Calculation (Bezet) 450 ms    Calculated P Axis 46 degrees    Calculated R Axis 45 degrees    Calculated T Axis 49 degrees    Diagnosis       Sinus tachycardia  Nonspecific ST abnormality  When compared with ECG of 23-AUG-2014 19:49,  No significant change was found  Confirmed by Amber Dalal M.D., Teresa Crain ([id number]) on 8/29/2014 11:38:53 AM         Cardiology Labs:  Lab Results   Component Value Date/Time    Cholesterol, total 155 02/17/2017 09:20 AM    HDL Cholesterol 40 02/17/2017 09:20 AM    LDL, calculated 100 02/17/2017 09:20 AM    Triglyceride 74 02/17/2017 09:20 AM    CHOL/HDL Ratio 4.0 08/24/2014 05:06 AM       Lab Results   Component Value Date/Time    Sodium 140 02/13/2017 02:59 PM    Potassium 4.3 02/13/2017 02:59 PM    Chloride 98 02/13/2017 02:59 PM    CO2 23 02/13/2017 02:59 PM    Anion gap 4 09/08/2014 04:13 AM    Glucose 83 02/13/2017 02:59 PM    BUN 15 02/13/2017 02:59 PM    Creatinine 0.77 02/13/2017 02:59 PM    BUN/Creatinine ratio 19 02/13/2017 02:59 PM    GFR est  02/13/2017 02:59 PM    GFR est non-AA 94 02/13/2017 02:59 PM    Calcium 9.9 02/13/2017 02:59 PM    Bilirubin, total 0.8 02/13/2017 02:59 PM    AST (SGOT) 21 02/13/2017 02:59 PM    Alk.  phosphatase 64 02/13/2017 02:59 PM    Protein, total 7.8 02/13/2017 02:59 PM    Albumin 4.5 02/13/2017 02:59 PM    Globulin 5.8 09/07/2014 12:32 AM    A-G Ratio 1.4 02/13/2017 02:59 PM    ALT (SGPT) 16 02/13/2017 02:59 PM           Assessment:     Assessment:     Luis Lam was seen today for follow-up. Diagnoses and all orders for this visit:    Essential hypertension    Dyslipidemia  -     AMB POC EKG ROUTINE W/ 12 LEADS, INTER & REP    Tobacco use    PAD (peripheral artery disease) ([facility]        ICD-10-CM ICD-9-CM    1. Essential hypertension I10 401.9    2. Dyslipidemia E78.5 272.4 AMB POC EKG ROUTINE W/ 12 LEADS, INTER & REP   3. Tobacco use Z72.0 305.1    4. PAD (peripheral artery disease) ([facility]) I73.9 443.9      Orders Placed This Encounter    AMB POC EKG ROUTINE W/ 12 LEADS, INTER & REP     Order Specific Question:   Reason for Exam:     Answer:   routine        Plan:     Patient presents doing well and is stable from cardiac stand point. Continue current care and f/u in 12 months. 1.  Hypertension: Controlled continue increased dose of lisinopril  2. Hyperlipidemia controlled on statin therapy will repeat lipid panel and complete metabolic panel  3. Peripheral arterial disease: Has mild left leg pain, seen by Dr. Mario Michael and had discussed angiography and potential stenting patient prefers to hold off at this time. Louise Meza NP      [facility]    2/21/2017         Agree with note as outlined by  NP. I confirm findings in history and physical exam. No additional findings noted. Agree with plan as outlined above.      Yuriy Ford MD

## 2017-03-13 ENCOUNTER — OFFICE VISIT (OUTPATIENT)
Dept: INTERNAL MEDICINE CLINIC | Age: 67
End: 2017-03-13

## 2017-03-13 VITALS
WEIGHT: 236 LBS | SYSTOLIC BLOOD PRESSURE: 150 MMHG | BODY MASS INDEX: 33.04 KG/M2 | TEMPERATURE: 98.2 F | DIASTOLIC BLOOD PRESSURE: 78 MMHG | OXYGEN SATURATION: 98 % | HEART RATE: 69 BPM | HEIGHT: 71 IN

## 2017-03-13 DIAGNOSIS — K40.90 UNILATERAL INGUINAL HERNIA WITHOUT OBSTRUCTION OR GANGRENE, RECURRENCE NOT SPECIFIED: Primary | ICD-10-CM

## 2017-03-13 NOTE — PROGRESS NOTES
HISTORY OF PRESENT ILLNESS  Claire Dunn is a 78 y.o. male. HPI     C/o right groin lump x 1 week x 1 week-quarter sized  More noticeable when supine  Walking ok  No pain per pt  Had prior right  IHR.-1990's at [facility]        Patient Active Problem List    Diagnosis Date Noted    Tobacco use 12/06/2016    PAD (peripheral artery disease) ([facility] 12/06/2016    Dyslipidemia 12/06/2016    Advanced care planning/counseling discussion 09/13/2016    Colon polyp 09/18/2015    HTN (hypertension) 01/27/2011    Substance abuse 12/25/2010    Prostate cancer ([facility] 02/22/2010    [facility] 06/30/2009    HIV positive ([facility] 06/30/2009    Weight loss, non-intentional 06/30/2009     Current Outpatient Prescriptions   Medication Sig Dispense Refill    rosuvastatin (CRESTOR) 5 mg tablet Take 1 Tab by mouth nightly. 30 Tab 4    lisinopril (PRINIVIL, ZESTRIL) 20 mg tablet Take 1 Tab by mouth daily. 30 Tab 6    cholecalciferol, vitamin D3, (VITAMIN D3) 2,000 unit tab Take  by mouth.  aspirin delayed-release 81 mg tablet Take  by mouth daily.  MULTIVITAMINS (MULTIVITAMIN PO) Take  by mouth daily.  OMEGA-3 FATTY ACIDS (OMEGA 3 PO) take  by mouth.  naproxen sodium (ALEVE) 220 mg tablet Take 220 mg by mouth as needed. No Known Allergies        ROS    Physical Exam   Constitutional: He appears well-developed and well-nourished. No distress. Appears stated age   HENT:   Head: Normocephalic. Cardiovascular: Normal rate, regular rhythm and normal heart sounds. Pulmonary/Chest: Effort normal and breath sounds normal.   Abdominal: Soft. Right groin tenderness and swelling   Musculoskeletal: He exhibits no edema. Neurological: He is alert. Psychiatric: He has a normal mood and affect. Nursing note and vitals reviewed. ASSESSMENT and PLAN  Eamon Chow was seen today for pain lower quad.     Diagnoses and all orders for this visit:    Unilateral inguinal hernia without obstruction or gangrene, recurrence not specified  -     REFERRAL TO GENERAL SURGERY  -     US SCROTUM/TESTICLES; Future   ?  Recurrent hernia   Avoid heavy lifting or straining    Follow-up Disposition: Not on File

## 2017-03-13 NOTE — MR AVS SNAPSHOT
Visit Information Date & Time Provider Department Dept. Phone Encounter #  
 3/13/2017  1:30 PM Claudio Jorgito, [address] [phone] [id number] Your Appointments 8/17/2017  9:10 AM  
ROUTINE CARE with Prieto Gallardo MD  
[facility] Internal Medicine [facility]) Appt Note: 6 month f/u  
 [address] [facility] [address] [facility] [address]  
[phone]  
  
   
 [address] [address]  
  
    
 8/24/2017  9:30 AM  
ROUTINE CARE with Claudio Bull, [address] [facility] Appt Note: 6 month follow up for htn prostate cance; 6 month follow up for htn prostate cancer//r/s from 8/17/17  
 [address] [facility] [address] [address]  
[address] Upcoming Health Maintenance Date Due DTaP/Tdap/Td series (1 - Tdap) 4/18/1970 GLAUCOMA SCREENING Q2Y 4/18/2014 Pneumococcal 65+ High/Highest Risk (2 of 2 - PCV13) 9/3/2015 MEDICARE YEARLY EXAM 9/14/2017 COLONOSCOPY 8/31/2020 Allergies as of 3/13/2017  Review Complete On: 3/13/2017 By: Jean-Paul Rea LPN No Known Allergies Current Immunizations  Reviewed on 9/5/2014 Name Date H1N1 FLU VACCINE 2/22/2010 Influenza Vaccine Split 2/22/2010 Pneumococcal Polysaccharide (PPSV-23) 9/3/2014 12:06 PM  
  
 Not reviewed this visit You Were Diagnosed With   
  
 Codes Comments Unilateral inguinal hernia without obstruction or gangrene, recurrence not specified    -  Primary ICD-10-CM: K40.90 ICD-9-CM: 550.90 Vitals BP Pulse Temp Height(growth percentile) Weight(growth percentile) SpO2  
 150/78 (BP 1 Location: Left arm, BP Patient Position: Sitting) 69 98.2 °F (36.8 °C) (Oral) 5' 11\" (1.803 m) 236 lb (107 kg) 98% BMI Smoking Status 32.92 kg/m2 Current Every Day Smoker BMI and BSA Data Body Mass Index Body Surface Area  
 32.92 kg/m 2 2.32 m 2 Preferred Pharmacy Pharmacy Name Phone [facility] [address] [phone] Your Updated Medication List  
  
   
This list is accurate as of: 3/13/17  1:53 PM.  Always use your most recent med list.  
  
  
  
  
 ALEVE 220 mg tablet Generic drug:  naproxen sodium Take 220 mg by mouth as needed. aspirin delayed-release 81 mg tablet Take  by mouth daily. lisinopril 20 mg tablet Commonly known as:  Natacha November Take 1 Tab by mouth daily. MULTIVITAMIN PO Take  by mouth daily. OMEGA 3 PO  
take  by mouth. rosuvastatin 5 mg tablet Commonly known as:  CRESTOR Take 1 Tab by mouth nightly. VITAMIN D3 2,000 unit Tab Generic drug:  cholecalciferol (vitamin D3) Take  by mouth. We Performed the Following REFERRAL TO GENERAL SURGERY [REF27 Custom] Comments:  
 Please evaluate patient for rigth groin swelling-? Hernia To-Do List   
 03/14/2017 Imaging:  US SCROTUM/TESTICLES Referral Information Referral ID Referred By Referred To  
  
 [id number] [facility], [address] Pati Shah MD   
   [address] [place] [address] Phone: [phone] Fax: [phone] Visits Status Start Date End Date 1 New Request 3/13/17 3/13/18 If your referral has a status of pending review or denied, additional information will be sent to support the outcome of this decision. Please provide this summary of care documentation to your next provider. Your primary care clinician is listed as Antonio LOYOLA. If you have any questions after today's visit, please call [phone].

## 2017-03-17 ENCOUNTER — HOSPITAL ENCOUNTER (OUTPATIENT)
Dept: ULTRASOUND IMAGING | Age: 67
Discharge: HOME | End: 2017-03-17
Attending: INTERNAL MEDICINE
Payer: COMMERCIAL

## 2017-03-17 DIAGNOSIS — K40.90 UNILATERAL INGUINAL HERNIA WITHOUT OBSTRUCTION OR GANGRENE, RECURRENCE NOT SPECIFIED: ICD-10-CM

## 2017-03-17 PROCEDURE — 76882 US LMTD JT/FCL EVL NVASC XTR: CPT

## 2017-03-17 PROCEDURE — 76870 US EXAM SCROTUM: CPT

## 2017-03-27 ENCOUNTER — OFFICE VISIT (OUTPATIENT)
Dept: SURGERY | Age: 67
End: 2017-03-27

## 2017-03-27 VITALS
HEIGHT: 71 IN | RESPIRATION RATE: 20 BRPM | HEART RATE: 70 BPM | OXYGEN SATURATION: 97 % | DIASTOLIC BLOOD PRESSURE: 80 MMHG | BODY MASS INDEX: 32.69 KG/M2 | SYSTOLIC BLOOD PRESSURE: 137 MMHG | WEIGHT: 233.5 LBS

## 2017-03-27 DIAGNOSIS — K40.91 RECURRENT RIGHT INGUINAL HERNIA: Primary | ICD-10-CM

## 2017-03-27 NOTE — PROGRESS NOTES
HISTORY OF PRESENT ILLNESS  Srikanth Marin is a 78 y.o. male. HPI Comments:     History of RIH repair  Currently with [facility]    Finished radiation for prostate CA last month    Right groin discomfort  No nausea    BMs constipation every 3-4 days        ____________________________________________________________________________  Patient presents with:  Possible Hernia: Pt seen at the uest of Dr. Samra Ortega to evaluate [facility]. /80 (BP 1 Location: Right arm, BP Patient Position: Sitting)  Pulse 70  Resp 20  Ht 5' 11\" (1.803 m)  Wt 105.9 kg (233 lb 8 oz)  SpO2 97%  BMI 32.57 kg/m2  Past Medical History:  No date: BPH  11/2009: Cancer ([facility] [address] [facility]      Comment: prostate biopsy revealed cancer  No date: Erectile dysfunction  No date: Essential hypertension, benign  6/30/2009: Hepatitis C  6/30/2009: HIV positive ([facility] [facility]  No date: Hypercholesterolemia  9-2014: Ill-defined condition      Comment: PNEUMONIA  No date: Memory disorder  2/22/2010: Prostate CA ([facility]  Past Surgical History:  8/31/2015: Maryln Landau      Comment:    No date: HX HERNIA REPAIR      Comment: Left Inguinal Hernia Repair  11/16/15: HX SKIN BIOPSY      Comment: left forearm/ upper arm biopsy   Social History    Marital status: LEGALLY    Spouse name:                       Years of education:                 Number of children:               Social History Main Topics    Smoking status: Current Every Day Smoker                                                     Packs/day: 0.50      Years: 45.00          Types: Cigarettes    Smokeless status: Never Used                        Alcohol use: No              Drug use:  Yes                Special: Heroin, Prescription       Comment: denies current use of heroin    Sexual activity: Not Currently          Review of patient's family history indicates:    Hypertension                   Mother                    Mental Retardation             Mother                    Depression Mother                    Anxiety                        Mother                    Liver Disease                  Father                    Lung Disease                   Father                    Diabetes                       Maternal Grandmother      Hypertension                   Other                     Stroke                         Other                     Anxiety                        Other                     Depression                     Other                     Current Outpatient Prescriptions:  rosuvastatin (CRESTOR) 5 mg tablet, Take 1 Tab by mouth nightly. lisinopril (PRINIVIL, ZESTRIL) 20 mg tablet, Take 1 Tab by mouth daily. naproxen sodium (ALEVE) 220 mg tablet, Take 220 mg by mouth as needed. cholecalciferol, vitamin D3, (VITAMIN D3) 2,000 unit tab, Take  by mouth. aspirin delayed-release 81 mg tablet, Take  by mouth daily. MULTIVITAMINS (MULTIVITAMIN PO), Take  by mouth daily. OMEGA-3 FATTY ACIDS (OMEGA 3 PO), take  by mouth. No current facility-administered medications for this visit. Allergies: No Known Allergies  _____________________________________________________________________________          Possible Hernia   The history is provided by the patient and relative. This is a recurrent problem. The current episode started more than 2 days ago. The problem occurs constantly. The problem has been gradually worsening. Pertinent negatives include no chest pain, no abdominal pain, no headaches and no shortness of breath. The symptoms are aggravated by exertion. The symptoms are relieved by rest. The treatment provided no relief. Review of Systems   Constitutional: Negative for chills, fever and weight loss. HENT: Negative for ear pain. Eyes: Negative for pain. Respiratory: Negative for shortness of breath. Cardiovascular: Negative for chest pain. Gastrointestinal: Negative for abdominal pain and blood in stool.    Genitourinary: Negative for hematuria. Musculoskeletal: Negative for joint pain. Skin: Negative for rash. Neurological: Negative for dizziness, focal weakness, seizures and headaches. Endo/Heme/Allergies: Does not bruise/bleed easily. Psychiatric/Behavioral: The patient does not have insomnia. Physical Exam   Abdominal: A hernia is present. Hernia confirmed positive in the right inguinal area. Hernia confirmed negative in the left inguinal area. Genitourinary:         Lymphadenopathy:        Right: No inguinal adenopathy present. Left: No inguinal adenopathy present. ASSESSMENT and PLAN    ICD-10-CM ICD-9-CM    1. Recurrent right inguinal hernia K40.91 550.91        Nurys Zapien. is having symptoms. I have recommended to him that we proceed with surgery. I had an extensive discussion with him regarding the risks, benefits, and alternatives of proceeding with a Laparoscopic right possible bilateral Inguinal Hernia Repair with Mesh, Robot Assisted. Risks,benefits, and alternatives were discussed including the risk of anesthesia, bleeding, infection, including mesh infection, chronic orchialgia, neuralgia, other pain syndromes, testicular ischemia, conversion to open, injury to bowel, and recurrence were discussed. I reviewed with Patrick Macario Sr. his increased risk of bleeding secondary to Asprin use. I have asked him to discontinue the Asprin for 2 days prior to surgery to reduce this risk. He is in agreement to proceed. All questions were answered. Patrick Macario Sr. will undergo preoperative evaluation and will proceed provided he is medically stable. We will schedule him at his earliest convenience.

## 2017-03-27 NOTE — LETTER
3/27/2017 3:38 PM 
 
Mr. Karolyn East. 
[facility] [id number] Surgery Instruction Sheet You have been scheduled for surgery on surgery 5/2/17  At 7;30am  at [address] [place] [address]. Please report to the Surgery Center at 6;00am, this is approximately 2 hours prior to your surgery time. The Surgery Center is located on the [address] [place] [address] side of the [facility], just next to the Emergency Room. Reserved parking is available and  parking if lot is full. You will need to have a Pre-op Visit prior to your surgery. Report to the Surgery Center on 4/25/17 at 11:00am.  Bring a list of medications and your insurance cards with you. You may eat/drink prior to this visit. Call your physician immediately if you notice a change in your health between the time you saw your physician and the day of surgery. If you take a blood thinner, please let us know. Call your ordering Doctor to make sure you can stop taking it prior to your surgery. STOP YOUR ASPIRIN 2 DAYS PRIOR TO SURGERY. DO NOT TAKE  IBUPROFEN, ADVIL, MOTRIN, ALEVE, EXCEDRIN, BC POWDER, GOODIES, FISH OIL OR ANY MEDICATION CONTAINING ASPIRIN 5 DAYS PRIOR TO YOUR SURGERY. MAY TAKE TYLENOL. Eat a light dinner the evening before your surgery. DO NOT EAT OR DRINK ANYTHING AFTER MIDNIGHT THE NIGHT BEFORE YOUR SURGERY. This includes water, chewing gum, lifesavers, etc.  The Pre op nurse will check with you about any medication that you may need to take the morning of surgery. Shower with a new bar of anti-bacterial soap (Dial, Safeguard) or solution given to you by Pre-op, the night before surgery. Do not use lotion, powder, deodorant on the skin after showering.   Wear loose, comfortable clothing the day of surgery and bring a container to store your contacts, eyeglasses, dentures, hearing aid, etc.  Do not bring money, valuables, jewelry, etc. to the hospital.   
 
 If you are having outpatient surgery, someone must come with you the morning of surgery to drive you home. You can not drive for 24 hours after any anesthesia. Sometimes it is necessary to stay overnight and leave the next morning. This is still considered outpatient for most insurance deductibles. Someone will still need to drive you home. If you have questions or concerns, please feel free to call /Leonardo at [phone]. If you need to cancel your surgery, please call as soon as possible. Sincerely, Daisha Turner MD

## 2017-03-27 NOTE — MR AVS SNAPSHOT
Visit Information Date & Time Provider Department Dept. Phone Encounter #  
 3/27/2017  3:10 PM Emre Castaneda MD [facility] [id number] Your Appointments 5/18/2017  1:00 PM  
POST OP 10 MIN with Emre Castaneda MD  
[facility] [address] Rufino Coreas [facility] [place] ([facility]) Appt Note: post/op davinci RIHR with mesh on 5/2/17.  
 [address], [address], [address] [address]  
[address], [address], [address] [address]  
  
    
 8/17/2017  9:10 AM  
ROUTINE CARE with Elina Oakes MD  
[facility] Internal Medicine [facility]) Appt Note: 6 month f/u  
 [address] [facility] [address]  
[phone]  
  
   
 [address] [address]  
  
    
 8/24/2017  9:30 AM  
ROUTINE CARE with Tacos Carson, [address] [place] [address] Avenue,[address]Th Floor [facility]) Appt Note: 6 month follow up for htn prostate cance; 6 month follow up for htn prostate cancer//r/s from 8/17/17  
 [address] [address]  
[address] [facility] [address] [place] [address] Upcoming Health Maintenance Date Due DTaP/Tdap/Td series (1 - Tdap) 4/18/1970 GLAUCOMA SCREENING Q2Y 4/18/2014 Pneumococcal 65+ High/Highest Risk (2 of 2 - PCV13) 9/3/2015 MEDICARE YEARLY EXAM 9/14/2017 COLONOSCOPY 8/31/2020 Allergies as of 3/27/2017  Review Complete On: 3/27/2017 By: Rosemarie Barroso LPN No Known Allergies Current Immunizations  Reviewed on 9/5/2014 Name Date H1N1 FLU VACCINE 2/22/2010 Influenza Vaccine Split 2/22/2010 Pneumococcal Polysaccharide (PPSV-23) 9/3/2014 12:06 PM  
  
 Not reviewed this visit Vitals  BP Pulse Resp Height(growth percentile) Weight(growth percentile) SpO2  
 137/80 (BP 1 Location: Right arm, BP Patient Position: Sitting) 70 20 5' 11\" (1.803 m) 233 lb 8 oz (105.9 kg) 97% BMI Smoking Status 32.57 kg/m2 Current Every Day Smoker BMI and BSA Data Body Mass Index Body Surface Area 32.57 kg/m 2 2.3 m 2 Preferred Pharmacy Pharmacy Name Phone [phone] [phone] [facility] [phone] Your Updated Medication List  
  
   
This list is accurate as of: 3/27/17  3:42 PM.  Always use your most recent med list.  
  
  
  
  
 ALEVE 220 mg tablet Generic drug:  naproxen sodium Take 220 mg by mouth as needed. aspirin delayed-release 81 mg tablet Take  by mouth daily. lisinopril 20 mg tablet Commonly known as:  Dacia Reasons Take 1 Tab by mouth daily. MULTIVITAMIN PO Take  by mouth daily. OMEGA 3 PO  
take  by mouth. rosuvastatin 5 mg tablet Commonly known as:  CRESTOR Take 1 Tab by mouth nightly. VITAMIN D3 2,000 unit Tab Generic drug:  cholecalciferol (vitamin D3) Take  by mouth. To-Do List   
 04/25/2017 11:00 AM  
  Appointment with [facility] ROOM P3 at [address] [place] [address] ([phone]) Please provide this summary of care documentation to your next provider. Your primary care clinician is listed as Chuyita LOYOLA. If you have any questions after today's visit, please call [phone].

## 2017-04-25 ENCOUNTER — TELEPHONE (OUTPATIENT)
Dept: SURGERY | Age: 67
End: 2017-04-25

## 2017-04-25 ENCOUNTER — HOSPITAL ENCOUNTER (OUTPATIENT)
Dept: GENERAL RADIOLOGY | Age: 67
Discharge: HOME | End: 2017-04-25
Attending: SURGERY
Payer: COMMERCIAL

## 2017-04-25 ENCOUNTER — HOSPITAL ENCOUNTER (OUTPATIENT)
Dept: PREADMISSION TESTING | Age: 67
Discharge: HOME | End: 2017-04-25
Attending: SURGERY
Payer: COMMERCIAL

## 2017-04-25 VITALS
HEART RATE: 78 BPM | WEIGHT: 231.92 LBS | OXYGEN SATURATION: 95 % | BODY MASS INDEX: 32.47 KG/M2 | DIASTOLIC BLOOD PRESSURE: 65 MMHG | RESPIRATION RATE: 20 BRPM | TEMPERATURE: 98.5 F | HEIGHT: 71 IN | SYSTOLIC BLOOD PRESSURE: 135 MMHG

## 2017-04-25 LAB
ANION GAP BLD CALC-SCNC: 7 MMOL/L (ref 5–15)
APPEARANCE UR: ABNORMAL
BACTERIA URNS QL MICRO: NEGATIVE /HPF
BILIRUB UR QL: NEGATIVE
BUN SERPL-MCNC: 8 MG/DL (ref 6–20)
BUN/CREAT SERPL: 10 (ref 12–20)
CALCIUM SERPL-MCNC: 9.1 MG/DL (ref 8.5–10.1)
CHLORIDE SERPL-SCNC: 106 MMOL/L (ref 97–108)
CO2 SERPL-SCNC: 28 MMOL/L (ref 21–32)
COLOR UR: ABNORMAL
CREAT SERPL-MCNC: 0.79 MG/DL (ref 0.7–1.3)
EPITH CASTS URNS QL MICRO: ABNORMAL /LPF
ERYTHROCYTE [DISTWIDTH] IN BLOOD BY AUTOMATED COUNT: 13.1 % (ref 11.5–14.5)
GLUCOSE SERPL-MCNC: 95 MG/DL (ref 65–100)
GLUCOSE UR STRIP.AUTO-MCNC: NEGATIVE MG/DL
HCT VFR BLD AUTO: 42.6 % (ref 36.6–50.3)
HGB BLD-MCNC: 14.9 G/DL (ref 12.1–17)
HGB UR QL STRIP: NEGATIVE
HYALINE CASTS URNS QL MICRO: ABNORMAL /LPF (ref 0–5)
KETONES UR QL STRIP.AUTO: NEGATIVE MG/DL
LEUKOCYTE ESTERASE UR QL STRIP.AUTO: NEGATIVE
MCH RBC QN AUTO: 32.3 PG (ref 26–34)
MCHC RBC AUTO-ENTMCNC: 35 G/DL (ref 30–36.5)
MCV RBC AUTO: 92.4 FL (ref 80–99)
NITRITE UR QL STRIP.AUTO: NEGATIVE
PH UR STRIP: 6 [PH] (ref 5–8)
PLATELET # BLD AUTO: 179 K/UL (ref 150–400)
POTASSIUM SERPL-SCNC: 3.2 MMOL/L (ref 3.5–5.1)
PROT UR STRIP-MCNC: NEGATIVE MG/DL
RBC # BLD AUTO: 4.61 M/UL (ref 4.1–5.7)
RBC #/AREA URNS HPF: ABNORMAL /HPF (ref 0–5)
SODIUM SERPL-SCNC: 141 MMOL/L (ref 136–145)
SP GR UR REFRACTOMETRY: 1.02 (ref 1–1.03)
UA: UC IF INDICATED,UAUC: ABNORMAL
UROBILINOGEN UR QL STRIP.AUTO: 1 EU/DL (ref 0.2–1)
WBC # BLD AUTO: 5.4 K/UL (ref 4.1–11.1)
WBC URNS QL MICRO: ABNORMAL /HPF (ref 0–4)

## 2017-04-25 PROCEDURE — 36415 COLL VENOUS BLD VENIPUNCTURE: CPT | Performed by: SURGERY

## 2017-04-25 PROCEDURE — 81001 URINALYSIS AUTO W/SCOPE: CPT | Performed by: SURGERY

## 2017-04-25 PROCEDURE — 85027 COMPLETE CBC AUTOMATED: CPT | Performed by: SURGERY

## 2017-04-25 PROCEDURE — 71020 XR CHEST PA LAT: CPT

## 2017-04-25 PROCEDURE — 80048 BASIC METABOLIC PNL TOTAL CA: CPT | Performed by: SURGERY

## 2017-04-25 RX ORDER — CEFAZOLIN SODIUM IN 0.9 % NACL 2 G/100 ML
2 PLASTIC BAG, INJECTION (ML) INTRAVENOUS ONCE
Status: CANCELLED | OUTPATIENT
Start: 2017-05-02 | End: 2017-05-02

## 2017-04-25 RX ORDER — SODIUM CHLORIDE, SODIUM LACTATE, POTASSIUM CHLORIDE, CALCIUM CHLORIDE 600; 310; 30; 20 MG/100ML; MG/100ML; MG/100ML; MG/100ML
25 INJECTION, SOLUTION INTRAVENOUS CONTINUOUS
Status: CANCELLED | OUTPATIENT
Start: 2017-05-02

## 2017-04-25 NOTE — PERIOP NOTES
[facility]  Preoperative Instructions        Surgery Date 05/02/17          Time of Arrival 0545  Contact # [phone] cell    1. On the day of your surgery, please report to the Surgical Services Registration Desk and sign in at your designated time. The Surgery Center is located to the right of the Emergency Room. 2. You must have someone with you to drive you home. You should not drive a car for 24 hours following surgery. Please make arrangements for a friend or family member to stay with you for the first 24 hours after your surgery. 3. Do not have anything to eat or drink (including water, gum, mints, coffee, juice) after midnight       . This may not apply to medications prescribed by your physician. Please note special instructions, if applicable. If you are currently taking Plavix, Coumadin, or other blood-thinning agents, contact your surgeon for instructions. 4. We recommend you do not drink any alcoholic beverages for 24 hours before and after your surgery. 5. Have a list of all current medications, including vitamins, herbal supplements and any other over the counter medications. Stop all Aspirin and non-steroidal anti-inflammatory drugs (I.e. Advil, Aleve), as directed by your surgeon's office. Stop all vitamins and herbal supplements seven days prior to your surgery. 6. Wear comfortable clothes. Wear glasses instead of contacts. Do not bring any money or jewelry. Please bring picture ID, insurance card, and any prearranged co-payment or hospital payment. Do not wear make-up, particularly mascara the morning of your surgery. Do not wear nail polish, particularly if you are having foot /hand surgery. Wear your hair loose or down, no ponytails, buns, magi pins or clips. All body piercings must be removed.   Please shower with antibacterial soap for three consecutive days before and on the morning of surgery, but do not apply any lotions, powders or deodorants after the shower on the day of surgery. Please use a fresh towels after each shower. Please sleep in clean clothes and change bed linens the night before surgery. Please do not shave for 48 hours prior to surgery. Shaving of the face is acceptable. 7. You should understand that if you do not follow these instructions your surgery may be cancelled. If your physical condition changes (I.e. fever, cold or flu) please contact your surgeon as soon as possible. 8. It is important that you be on time. If a situation occurs where you may be late, please call [phone] (OR Holding Area). 9. If you have any questions and or problems, please call [phone] (Pre-admission Testing). 10. Your surgery time may be subject to change. You will receive a phone call the evening prior if your time changes. 11.  If having outpatient surgery, you must have someone to drive you here, stay with you during the duration of your stay, and to drive you home at time of discharge. Special Instructions:    MEDICATIONS TO TAKE THE MORNING OF SURGERY WITH A SIP OF WATER:none      I understand a pre-operative phone call will be made to verify my surgery time. In the event that I am not available, I give permission for a message to be left on my answering service and/or with another person?   yes         ___________________      __________   _________    (Signature of Patient)             (Witness)                (Date and Time)

## 2017-04-26 ENCOUNTER — TELEPHONE (OUTPATIENT)
Dept: SURGERY | Age: 67
End: 2017-04-26

## 2017-04-26 ENCOUNTER — TELEPHONE (OUTPATIENT)
Dept: INTERNAL MEDICINE CLINIC | Age: 67
End: 2017-04-26

## 2017-04-26 RX ORDER — POTASSIUM CHLORIDE 20 MEQ/1
20 TABLET, EXTENDED RELEASE ORAL DAILY
Qty: 5 TAB | Refills: 0 | Status: SHIPPED | OUTPATIENT
Start: 2017-04-26 | End: 2017-05-09 | Stop reason: ALTCHOICE

## 2017-04-26 NOTE — TELEPHONE ENCOUNTER
Pt's sister(Siria Gaston) stated she left message yesterday requesting a Rx for pt's cough and also potassium level were low from his pre op blood work results, and would like to know if the doctor thinks the pt needs to be seen.  Best contact number [phone]      Message received & copied from [facility]

## 2017-04-26 NOTE — TELEPHONE ENCOUNTER
Sister, Emerson (HIPPA) states patient had CXR d/t complaints of chest congestion/cough. Denies pain, f/c, SOB. Cough is productive & worse at night. Only taking Claritin D for sxs. Surgeon suggested having Dr. Rodolfo Rainey look at CXR to decide if patient should take abx prior to surgery. I advised Emerson the impresson on xray was normal, however I will still run this past Dr. Rodolfo Rainey. Recommended Mucinex & Delsym qhs for cough until Dr. Rodolfo Rainey advises.

## 2017-04-26 NOTE — PERIOP NOTES
Received call from Tyrel Vogt in Dr Yahaira Ledesma and Dr Yahaira Ledesma has reviewed CXR and okay and script for potassium called into pt's pharmacy.

## 2017-04-26 NOTE — TELEPHONE ENCOUNTER
The patient's sister Yeni Angela is requesting a call back to discuss the doctor looking at the patient's x-ray results and prescribing an antibiotic.  [phone]         Message received & copied from [facility] after closing on 4/26/17

## 2017-04-26 NOTE — TELEPHONE ENCOUNTER
Informed pt potassium 3. 2. Medication called to [address]. Take as directed. Will recheck am of surgery. Important to start ASAP.

## 2017-04-27 RX ORDER — LEVOFLOXACIN 500 MG/1
500 TABLET, FILM COATED ORAL DAILY
Qty: 7 TAB | Refills: 0 | Status: SHIPPED | OUTPATIENT
Start: 2017-04-27 | End: 2017-05-09 | Stop reason: ALTCHOICE

## 2017-05-02 ENCOUNTER — ANESTHESIA EVENT (OUTPATIENT)
Dept: SURGERY | Age: 67
End: 2017-05-02
Payer: COMMERCIAL

## 2017-05-02 ENCOUNTER — ANESTHESIA (OUTPATIENT)
Dept: SURGERY | Age: 67
End: 2017-05-02
Payer: COMMERCIAL

## 2017-05-02 ENCOUNTER — HOSPITAL ENCOUNTER (OUTPATIENT)
Age: 67
Setting detail: OUTPATIENT SURGERY
Discharge: HOME | End: 2017-05-02
Attending: SURGERY | Admitting: SURGERY
Payer: COMMERCIAL

## 2017-05-02 VITALS
HEIGHT: 71 IN | DIASTOLIC BLOOD PRESSURE: 79 MMHG | RESPIRATION RATE: 16 BRPM | SYSTOLIC BLOOD PRESSURE: 145 MMHG | HEART RATE: 53 BPM | TEMPERATURE: 97.9 F | WEIGHT: 229.94 LBS | OXYGEN SATURATION: 95 % | BODY MASS INDEX: 32.19 KG/M2

## 2017-05-02 LAB
ANION GAP BLD CALC-SCNC: 18 MMOL/L (ref 5–15)
BUN BLD-MCNC: 14 MG/DL (ref 9–20)
CA-I BLD-MCNC: 1.23 MMOL/L (ref 1.12–1.32)
CHLORIDE BLD-SCNC: 102 MMOL/L (ref 98–107)
CO2 BLD-SCNC: 26 MMOL/L (ref 21–32)
CREAT BLD-MCNC: 0.8 MG/DL (ref 0.6–1.3)
GLUCOSE BLD-MCNC: 98 MG/DL (ref 65–100)
HCT VFR BLD CALC: 47 % (ref 36.6–50.3)
HGB BLD-MCNC: 16 GM/DL (ref 12.1–17)
POTASSIUM BLD-SCNC: 4 MMOL/L (ref 3.5–5.1)
SERVICE CMNT-IMP: ABNORMAL
SODIUM BLD-SCNC: 141 MMOL/L (ref 136–145)

## 2017-05-02 PROCEDURE — 74011250636 HC RX REV CODE- 250/636

## 2017-05-02 PROCEDURE — 77030018836 HC SOL IRR NACL ICUM -A: Performed by: SURGERY

## 2017-05-02 PROCEDURE — 77030010507 HC ADH SKN DERMBND J&J -B: Performed by: SURGERY

## 2017-05-02 PROCEDURE — 77030033067 HC SUT PDO STRATFX SPIR J&J -B: Performed by: SURGERY

## 2017-05-02 PROCEDURE — 77030016151 HC PROTCTR LNS DFOG COVD -B: Performed by: SURGERY

## 2017-05-02 PROCEDURE — 74011250636 HC RX REV CODE- 250/636: Performed by: SURGERY

## 2017-05-02 PROCEDURE — 77030002933 HC SUT MCRYL J&J -A: Performed by: SURGERY

## 2017-05-02 PROCEDURE — 77030019908 HC STETH ESOPH SIMS -A: Performed by: ANESTHESIOLOGY

## 2017-05-02 PROCEDURE — C1781 MESH (IMPLANTABLE): HCPCS | Performed by: SURGERY

## 2017-05-02 PROCEDURE — 76210000022 HC REC RM PH II 1.5 TO 2 HR: Performed by: SURGERY

## 2017-05-02 PROCEDURE — 74011250636 HC RX REV CODE- 250/636: Performed by: ANESTHESIOLOGY

## 2017-05-02 PROCEDURE — 76010000933 HC OR TIME 0.5 TO 1HR INTENSV - TIER 2: Performed by: SURGERY

## 2017-05-02 PROCEDURE — 77030008684 HC TU ET CUF COVD -B: Performed by: ANESTHESIOLOGY

## 2017-05-02 PROCEDURE — 77030018673: Performed by: SURGERY

## 2017-05-02 PROCEDURE — 77030008517 HC TBNG INSUF ENDO STOR -B: Performed by: SURGERY

## 2017-05-02 PROCEDURE — 77030035488 HC SEAL UNIV DISP INTU -C: Performed by: SURGERY

## 2017-05-02 PROCEDURE — 77030032490 HC SLV COMPR SCD KNE COVD -B: Performed by: SURGERY

## 2017-05-02 PROCEDURE — 77030020782 HC GWN BAIR PAWS FLX 3M -B

## 2017-05-02 PROCEDURE — 74011000250 HC RX REV CODE- 250: Performed by: SURGERY

## 2017-05-02 PROCEDURE — 76060000032 HC ANESTHESIA 0.5 TO 1 HR: Performed by: SURGERY

## 2017-05-02 PROCEDURE — 74011000250 HC RX REV CODE- 250

## 2017-05-02 PROCEDURE — 76210000016 HC OR PH I REC 1 TO 1.5 HR: Performed by: SURGERY

## 2017-05-02 PROCEDURE — 74011250637 HC RX REV CODE- 250/637

## 2017-05-02 PROCEDURE — 80047 BASIC METABLC PNL IONIZED CA: CPT

## 2017-05-02 PROCEDURE — 77030011640 HC PAD GRND REM COVD -A: Performed by: SURGERY

## 2017-05-02 PROCEDURE — 77030035277 HC OBTRTR BLDELSS DISP INTU -B: Performed by: SURGERY

## 2017-05-02 DEVICE — MESH SLF-FLT PROGRIP RT -- PROGRIP: Type: IMPLANTABLE DEVICE | Site: GROIN | Status: FUNCTIONAL

## 2017-05-02 RX ORDER — MIDAZOLAM HYDROCHLORIDE 1 MG/ML
0.5 INJECTION, SOLUTION INTRAMUSCULAR; INTRAVENOUS
Status: DISCONTINUED | OUTPATIENT
Start: 2017-05-02 | End: 2017-05-02 | Stop reason: HOSPADM

## 2017-05-02 RX ORDER — FENTANYL CITRATE 50 UG/ML
50 INJECTION, SOLUTION INTRAMUSCULAR; INTRAVENOUS AS NEEDED
Status: DISCONTINUED | OUTPATIENT
Start: 2017-05-02 | End: 2017-05-02 | Stop reason: HOSPADM

## 2017-05-02 RX ORDER — ONDANSETRON 2 MG/ML
INJECTION INTRAMUSCULAR; INTRAVENOUS AS NEEDED
Status: DISCONTINUED | OUTPATIENT
Start: 2017-05-02 | End: 2017-05-02 | Stop reason: HOSPADM

## 2017-05-02 RX ORDER — HYDROMORPHONE HYDROCHLORIDE 1 MG/ML
0.5 INJECTION, SOLUTION INTRAMUSCULAR; INTRAVENOUS; SUBCUTANEOUS
Status: DISCONTINUED | OUTPATIENT
Start: 2017-05-02 | End: 2017-05-02 | Stop reason: HOSPADM

## 2017-05-02 RX ORDER — CEFAZOLIN SODIUM IN 0.9 % NACL 2 G/100 ML
2 PLASTIC BAG, INJECTION (ML) INTRAVENOUS ONCE
Status: COMPLETED | OUTPATIENT
Start: 2017-05-02 | End: 2017-05-02

## 2017-05-02 RX ORDER — OXYCODONE AND ACETAMINOPHEN 5; 325 MG/1; MG/1
TABLET ORAL
Status: COMPLETED
Start: 2017-05-02 | End: 2017-05-02

## 2017-05-02 RX ORDER — SODIUM CHLORIDE, SODIUM LACTATE, POTASSIUM CHLORIDE, CALCIUM CHLORIDE 600; 310; 30; 20 MG/100ML; MG/100ML; MG/100ML; MG/100ML
25 INJECTION, SOLUTION INTRAVENOUS CONTINUOUS
Status: DISCONTINUED | OUTPATIENT
Start: 2017-05-02 | End: 2017-05-02 | Stop reason: HOSPADM

## 2017-05-02 RX ORDER — GLYCOPYRROLATE 0.2 MG/ML
INJECTION INTRAMUSCULAR; INTRAVENOUS AS NEEDED
Status: DISCONTINUED | OUTPATIENT
Start: 2017-05-02 | End: 2017-05-02 | Stop reason: HOSPADM

## 2017-05-02 RX ORDER — FENTANYL CITRATE 50 UG/ML
25 INJECTION, SOLUTION INTRAMUSCULAR; INTRAVENOUS
Status: DISCONTINUED | OUTPATIENT
Start: 2017-05-02 | End: 2017-05-02 | Stop reason: HOSPADM

## 2017-05-02 RX ORDER — LIDOCAINE HYDROCHLORIDE 20 MG/ML
INJECTION, SOLUTION EPIDURAL; INFILTRATION; INTRACAUDAL; PERINEURAL AS NEEDED
Status: DISCONTINUED | OUTPATIENT
Start: 2017-05-02 | End: 2017-05-02 | Stop reason: HOSPADM

## 2017-05-02 RX ORDER — MIDAZOLAM HYDROCHLORIDE 1 MG/ML
1 INJECTION, SOLUTION INTRAMUSCULAR; INTRAVENOUS AS NEEDED
Status: DISCONTINUED | OUTPATIENT
Start: 2017-05-02 | End: 2017-05-02 | Stop reason: HOSPADM

## 2017-05-02 RX ORDER — MIDAZOLAM HYDROCHLORIDE 1 MG/ML
INJECTION, SOLUTION INTRAMUSCULAR; INTRAVENOUS AS NEEDED
Status: DISCONTINUED | OUTPATIENT
Start: 2017-05-02 | End: 2017-05-02 | Stop reason: HOSPADM

## 2017-05-02 RX ORDER — FENTANYL CITRATE 50 UG/ML
INJECTION, SOLUTION INTRAMUSCULAR; INTRAVENOUS AS NEEDED
Status: DISCONTINUED | OUTPATIENT
Start: 2017-05-02 | End: 2017-05-02 | Stop reason: HOSPADM

## 2017-05-02 RX ORDER — DIPHENHYDRAMINE HYDROCHLORIDE 50 MG/ML
12.5 INJECTION, SOLUTION INTRAMUSCULAR; INTRAVENOUS AS NEEDED
Status: DISCONTINUED | OUTPATIENT
Start: 2017-05-02 | End: 2017-05-02 | Stop reason: HOSPADM

## 2017-05-02 RX ORDER — PROPOFOL 10 MG/ML
INJECTION, EMULSION INTRAVENOUS AS NEEDED
Status: DISCONTINUED | OUTPATIENT
Start: 2017-05-02 | End: 2017-05-02 | Stop reason: HOSPADM

## 2017-05-02 RX ORDER — ROCURONIUM BROMIDE 10 MG/ML
INJECTION, SOLUTION INTRAVENOUS AS NEEDED
Status: DISCONTINUED | OUTPATIENT
Start: 2017-05-02 | End: 2017-05-02 | Stop reason: HOSPADM

## 2017-05-02 RX ORDER — SUCCINYLCHOLINE CHLORIDE 20 MG/ML
INJECTION INTRAMUSCULAR; INTRAVENOUS AS NEEDED
Status: DISCONTINUED | OUTPATIENT
Start: 2017-05-02 | End: 2017-05-02 | Stop reason: HOSPADM

## 2017-05-02 RX ORDER — HYDROCODONE BITARTRATE AND ACETAMINOPHEN 5; 325 MG/1; MG/1
1 TABLET ORAL AS NEEDED
Status: DISCONTINUED | OUTPATIENT
Start: 2017-05-02 | End: 2017-05-02 | Stop reason: HOSPADM

## 2017-05-02 RX ORDER — KETOROLAC TROMETHAMINE 30 MG/ML
INJECTION, SOLUTION INTRAMUSCULAR; INTRAVENOUS AS NEEDED
Status: DISCONTINUED | OUTPATIENT
Start: 2017-05-02 | End: 2017-05-02 | Stop reason: HOSPADM

## 2017-05-02 RX ORDER — DEXAMETHASONE SODIUM PHOSPHATE 4 MG/ML
INJECTION, SOLUTION INTRA-ARTICULAR; INTRALESIONAL; INTRAMUSCULAR; INTRAVENOUS; SOFT TISSUE AS NEEDED
Status: DISCONTINUED | OUTPATIENT
Start: 2017-05-02 | End: 2017-05-02 | Stop reason: HOSPADM

## 2017-05-02 RX ORDER — LIDOCAINE HYDROCHLORIDE 10 MG/ML
0.1 INJECTION, SOLUTION EPIDURAL; INFILTRATION; INTRACAUDAL; PERINEURAL AS NEEDED
Status: DISCONTINUED | OUTPATIENT
Start: 2017-05-02 | End: 2017-05-02 | Stop reason: HOSPADM

## 2017-05-02 RX ORDER — SODIUM CHLORIDE, SODIUM LACTATE, POTASSIUM CHLORIDE, CALCIUM CHLORIDE 600; 310; 30; 20 MG/100ML; MG/100ML; MG/100ML; MG/100ML
100 INJECTION, SOLUTION INTRAVENOUS CONTINUOUS
Status: DISCONTINUED | OUTPATIENT
Start: 2017-05-02 | End: 2017-05-02 | Stop reason: HOSPADM

## 2017-05-02 RX ORDER — BUPIVACAINE HYDROCHLORIDE AND EPINEPHRINE 2.5; 5 MG/ML; UG/ML
INJECTION, SOLUTION EPIDURAL; INFILTRATION; INTRACAUDAL; PERINEURAL AS NEEDED
Status: DISCONTINUED | OUTPATIENT
Start: 2017-05-02 | End: 2017-05-02 | Stop reason: HOSPADM

## 2017-05-02 RX ORDER — SODIUM CHLORIDE, SODIUM LACTATE, POTASSIUM CHLORIDE, CALCIUM CHLORIDE 600; 310; 30; 20 MG/100ML; MG/100ML; MG/100ML; MG/100ML
INJECTION, SOLUTION INTRAVENOUS
Status: DISCONTINUED | OUTPATIENT
Start: 2017-05-02 | End: 2017-05-02 | Stop reason: HOSPADM

## 2017-05-02 RX ORDER — ONDANSETRON 2 MG/ML
4 INJECTION INTRAMUSCULAR; INTRAVENOUS AS NEEDED
Status: DISCONTINUED | OUTPATIENT
Start: 2017-05-02 | End: 2017-05-02 | Stop reason: HOSPADM

## 2017-05-02 RX ORDER — OXYCODONE AND ACETAMINOPHEN 5; 325 MG/1; MG/1
1-2 TABLET ORAL
Qty: 40 TAB | Refills: 0 | Status: SHIPPED | OUTPATIENT
Start: 2017-05-02 | End: 2017-05-12 | Stop reason: SDUPTHER

## 2017-05-02 RX ORDER — DOCUSATE SODIUM 100 MG/1
100 CAPSULE, LIQUID FILLED ORAL 2 TIMES DAILY
Qty: 30 CAP | Refills: 2 | Status: SHIPPED | OUTPATIENT
Start: 2017-05-02 | End: 2017-05-16

## 2017-05-02 RX ORDER — NEOSTIGMINE METHYLSULFATE 1 MG/ML
INJECTION INTRAVENOUS AS NEEDED
Status: DISCONTINUED | OUTPATIENT
Start: 2017-05-02 | End: 2017-05-02 | Stop reason: HOSPADM

## 2017-05-02 RX ORDER — OXYCODONE AND ACETAMINOPHEN 5; 325 MG/1; MG/1
1 TABLET ORAL ONCE
Status: COMPLETED | OUTPATIENT
Start: 2017-05-02 | End: 2017-05-02

## 2017-05-02 RX ADMIN — CEFAZOLIN 2 G: 10 INJECTION, POWDER, FOR SOLUTION INTRAVENOUS; PARENTERAL at 08:10

## 2017-05-02 RX ADMIN — FENTANYL CITRATE 25 MCG: 50 INJECTION, SOLUTION INTRAMUSCULAR; INTRAVENOUS at 09:35

## 2017-05-02 RX ADMIN — DEXAMETHASONE SODIUM PHOSPHATE 6 MG: 4 INJECTION, SOLUTION INTRA-ARTICULAR; INTRALESIONAL; INTRAMUSCULAR; INTRAVENOUS; SOFT TISSUE at 08:04

## 2017-05-02 RX ADMIN — LIDOCAINE HYDROCHLORIDE 60 MG: 20 INJECTION, SOLUTION EPIDURAL; INFILTRATION; INTRACAUDAL; PERINEURAL at 08:04

## 2017-05-02 RX ADMIN — OXYCODONE AND ACETAMINOPHEN 1 TABLET: 5; 325 TABLET ORAL at 11:43

## 2017-05-02 RX ADMIN — GLYCOPYRROLATE 0.2 MG: 0.2 INJECTION INTRAMUSCULAR; INTRAVENOUS at 08:23

## 2017-05-02 RX ADMIN — ROCURONIUM BROMIDE 10 MG: 10 INJECTION, SOLUTION INTRAVENOUS at 08:04

## 2017-05-02 RX ADMIN — OXYCODONE HYDROCHLORIDE AND ACETAMINOPHEN 1 TABLET: 5; 325 TABLET ORAL at 11:43

## 2017-05-02 RX ADMIN — FENTANYL CITRATE 25 MCG: 50 INJECTION, SOLUTION INTRAMUSCULAR; INTRAVENOUS at 09:25

## 2017-05-02 RX ADMIN — SUCCINYLCHOLINE CHLORIDE 120 MG: 20 INJECTION INTRAMUSCULAR; INTRAVENOUS at 08:04

## 2017-05-02 RX ADMIN — NEOSTIGMINE METHYLSULFATE 3 MG: 1 INJECTION INTRAVENOUS at 08:44

## 2017-05-02 RX ADMIN — KETOROLAC TROMETHAMINE 30 MG: 30 INJECTION, SOLUTION INTRAMUSCULAR; INTRAVENOUS at 08:45

## 2017-05-02 RX ADMIN — SODIUM CHLORIDE, SODIUM LACTATE, POTASSIUM CHLORIDE, CALCIUM CHLORIDE: 600; 310; 30; 20 INJECTION, SOLUTION INTRAVENOUS at 07:48

## 2017-05-02 RX ADMIN — FENTANYL CITRATE 50 MCG: 50 INJECTION, SOLUTION INTRAMUSCULAR; INTRAVENOUS at 08:57

## 2017-05-02 RX ADMIN — GLYCOPYRROLATE 0.4 MG: 0.2 INJECTION INTRAMUSCULAR; INTRAVENOUS at 08:44

## 2017-05-02 RX ADMIN — FENTANYL CITRATE 100 MCG: 50 INJECTION, SOLUTION INTRAMUSCULAR; INTRAVENOUS at 08:04

## 2017-05-02 RX ADMIN — FENTANYL CITRATE 25 MCG: 50 INJECTION, SOLUTION INTRAMUSCULAR; INTRAVENOUS at 09:30

## 2017-05-02 RX ADMIN — MIDAZOLAM HYDROCHLORIDE 2 MG: 1 INJECTION, SOLUTION INTRAMUSCULAR; INTRAVENOUS at 07:55

## 2017-05-02 RX ADMIN — ONDANSETRON 4 MG: 2 INJECTION INTRAMUSCULAR; INTRAVENOUS at 08:04

## 2017-05-02 RX ADMIN — SODIUM CHLORIDE, SODIUM LACTATE, POTASSIUM CHLORIDE, AND CALCIUM CHLORIDE 25 ML/HR: 600; 310; 30; 20 INJECTION, SOLUTION INTRAVENOUS at 07:53

## 2017-05-02 RX ADMIN — PROPOFOL 150 MG: 10 INJECTION, EMULSION INTRAVENOUS at 08:04

## 2017-05-02 RX ADMIN — FENTANYL CITRATE 50 MCG: 50 INJECTION, SOLUTION INTRAMUSCULAR; INTRAVENOUS at 08:35

## 2017-05-02 RX ADMIN — ROCURONIUM BROMIDE 20 MG: 10 INJECTION, SOLUTION INTRAVENOUS at 08:10

## 2017-05-02 RX ADMIN — FENTANYL CITRATE 50 MCG: 50 INJECTION, SOLUTION INTRAMUSCULAR; INTRAVENOUS at 07:55

## 2017-05-02 RX ADMIN — GLYCOPYRROLATE 0.2 MG: 0.2 INJECTION INTRAMUSCULAR; INTRAVENOUS at 08:22

## 2017-05-02 RX ADMIN — FENTANYL CITRATE 25 MCG: 50 INJECTION, SOLUTION INTRAMUSCULAR; INTRAVENOUS at 09:20

## 2017-05-02 NOTE — PERIOP NOTES
Pt had multiple attempts at IV sticks unsuccessful by [facility], Matt Low, CRNA unable to find access, Dr. Edd Bains with anesthesia able to find access in [facility] and draw blood for POC CHEM 8.     OR nurse Nohelia Coronado in pt chart, unable to reconcile pt medication record on PTA medications notified nurse Nohelia Coronado who stated she would get out of pts chart, ten minutes later Nohelia Coroando is still in chart and I am unable to chart, notified Dr. Edd Bains due to they were attempting to take pt to OR, nurse Nohelia Coronado called Pre Op charge nurse phone and spoke to [place] and notified him that she is completely out of chart now. Went back in and now I am able to reconcile medications and finish charting on pt.  Pt sister back to room for a visit with pt prior to going to OR

## 2017-05-02 NOTE — IP AVS SNAPSHOT
Current Discharge Medication List  
  
START taking these medications Dose & Instructions Dispensing Information Comments Morning Noon Evening Bedtime  
 docusate sodium 100 mg capsule Commonly known as:  Alexandra Gutiérrez Your last dose was: Your next dose is:    
   
   
 Dose:  100 mg Take 1 Cap by mouth two (2) times a day for 14 days. Stop taking if you have diarrhea Quantity:  30 Cap Refills:  2  
     
   
   
   
  
 oxyCODONE-acetaminophen 5-325 mg per tablet Commonly known as:  PERCOCET Your last dose was: Your next dose is:    
   
   
 Dose:  1-2 Tab Take 1-2 Tabs by mouth every four (4) hours as needed for Pain. Max Daily Amount: 12 Tabs. Quantity:  40 Tab Refills:  0 CONTINUE these medications which have NOT CHANGED Dose & Instructions Dispensing Information Comments Morning Noon Evening Bedtime ALEVE 220 mg tablet Generic drug:  naproxen sodium Your last dose was: Your next dose is:    
   
   
 Dose:  220 mg Take 220 mg by mouth as needed. Refills:  0  
     
   
   
   
  
 aspirin delayed-release 81 mg tablet Your last dose was: Your next dose is: Take  by mouth daily. Refills:  0  
     
   
   
   
  
 levoFLOXacin 500 mg tablet Commonly known as:  Celeste Horta Your last dose was: Your next dose is:    
   
   
 Dose:  500 mg Take 1 Tab by mouth daily. Quantity:  7 Tab Refills:  0  
     
   
   
   
  
 lisinopril 20 mg tablet Commonly known as:  Dulcie Mcburney Your last dose was: Your next dose is:    
   
   
 Dose:  20 mg Take 1 Tab by mouth daily. Quantity:  30 Tab Refills:  6 MULTIVITAMIN PO Your last dose was: Your next dose is: Take  by mouth daily. Refills:  0 OMEGA 3 PO Your last dose was: Your next dose is:    
   
   
 Dose:  1 Tab Take 1 Tab by mouth daily. Refills:  0  
     
   
   
   
  
 potassium chloride 20 mEq tablet Commonly known as:  K-DUR, KLOR-CON Your last dose was: Your next dose is:    
   
   
 Dose:  20 mEq Take 1 Tab by mouth daily. Quantity:  5 Tab Refills:  0  
     
   
   
   
  
 rosuvastatin 5 mg tablet Commonly known as:  CRESTOR Your last dose was: Your next dose is:    
   
   
 Dose:  5 mg Take 1 Tab by mouth nightly. Quantity:  30 Tab Refills:  4 VITAMIN D3 2,000 unit Tab Generic drug:  cholecalciferol (vitamin D3) Your last dose was: Your next dose is: Take  by mouth. Refills:  0 Where to Get Your Medications These medications were sent to [address] [place] [address], [address] [place] [address] Phone:  [phone]  
  docusate sodium 100 mg capsule Information on where to get these meds will be given to you by the nurse or doctor. ! Ask your nurse or doctor about these medications  
  oxyCODONE-acetaminophen 5-325 mg per tablet

## 2017-05-02 NOTE — H&P
Pre-Op History and Physical    Subjective:     Aldo Euceda Sr. is a 75 y.o. male who is here for Procedure(s):  [address], POSSIBLE BILATERAL INGUINAL HERNIA REPAIR WITH MESH    Past Medical History:   Diagnosis Date    BPH     Cancer ([facility] [address] 11/2009    prostate biopsy revealed cancer    Erectile dysfunction     Essential hypertension, benign     Hepatitis C 6/30/2009    HIV positive ([facility] 6/30/2009    Hypercholesterolemia     Ill-defined condition 9-2014    PNEUMONIA/bronchitis hx    Memory disorder     Prostate CA ([facility] 2/22/2010      Past Surgical History:   Procedure Laterality Date    COLONOSCOPY,REMV LESN,SNARE  8/31/2015         HX HEENT      gum surgery-for denture    HX HERNIA REPAIR       Inguinal Hernia Repair    HX SKIN BIOPSY  11/16/15    left forearm/ upper arm biopsy      Family History   Problem Relation Age of Onset    Hypertension Mother     Mental Retardation Mother     Depression Mother     Anxiety Mother     Liver Disease Father     Lung Disease Father     Diabetes Maternal Grandmother     Hypertension Other     Stroke Other     Anxiety Other     Depression Other       Social History   Substance Use Topics    Smoking status: Current Every Day Smoker     Packs/day: 0.50     Years: 45.00     Types: Cigarettes    Smokeless tobacco: Never Used    Alcohol use No       Prior to Admission medications    Medication Sig Start Date End Date Taking? Authorizing Provider   levoFLOXacin (LEVAQUIN) 500 mg tablet Take 1 Tab by mouth daily. 4/27/17   Karlee Chance MD   potassium chloride (K-DUR, KLOR-CON) 20 mEq tablet Take 1 Tab by mouth daily. 4/26/17   Maureen Khan MD   rosuvastatin (CRESTOR) 5 mg tablet Take 1 Tab by mouth nightly. 12/6/16   Robbie Chung MD   lisinopril (PRINIVIL, ZESTRIL) 20 mg tablet Take 1 Tab by mouth daily.  11/18/16   Norma Jo NP   naproxen sodium (ALEVE) 220 mg tablet Take 220 mg by mouth as needed. Historical Provider   cholecalciferol, vitamin D3, (VITAMIN D3) 2,000 unit tab Take  by mouth. Historical Provider   aspirin delayed-release 81 mg tablet Take  by mouth daily. Historical Provider   MULTIVITAMINS (MULTIVITAMIN PO) Take  by mouth daily. 2/22/10   Historical Provider   OMEGA-3 FATTY ACIDS (OMEGA 3 PO) Take 1 Tab by mouth daily. Historical Provider     No Known Allergies     Review of Systems:  A comprehensive review of systems was negative except for that written in the History of Present Illness. Objective: Intake and Output:            Physical Exam:   Lungs: clear to auscultation bilaterally  Heart: regular rate and rhythm  Abdomen: soft, non-tender. No masses,  no organomegaly        Data Review:   No results found for this or any previous visit (from the past 24 hour(s)). Assessment:     Active Problems:    * No active hospital problems. *      Plan:     I had an extensive discussion with Farnaz Bull SrTolu regarding the risks, benefits, and alternatives of proceeding with a  Procedure(s):  DAVINIC LAPAROSCOPIC RIGHT INGUINAL HERNIA REPAIR WITH MESH, POSSIBLE BILATERAL INGUINAL HERNIA REPAIR WITH MESH. Risks, benefits, and alternatives of surgery including the risk of anesthesia, bleeding, infection, injury to bowel, recurrence, chronic pain, and the lack of symptomatic improvement were discussed and Farnaz Bull Sr. is in agreement to proceed. Site marked. Some difficulty getting IV access this morning. Labs pending.           Signed By: Román Stallworth MD     May 2, 2017

## 2017-05-02 NOTE — ANESTHESIA POSTPROCEDURE EVALUATION
Post-Anesthesia Evaluation and Assessment    Patient: Valerie Lopez Sr. MRN: [id number]  SSN: [id number]    YOB: 1950  Age: 75 y.o. Sex: male       Cardiovascular Function/Vital Signs  Visit Vitals    /69    Pulse (!) 54    Temp 36.4 °C (97.5 °F)    Resp 14    Ht 5' 11\" (1.803 m)    Wt 104.3 kg (229 lb 15 oz)    SpO2 92%    BMI 32.07 kg/m2       Patient is status post general anesthesia for Procedure(s):  [address] Nausea/Vomiting: None    Postoperative hydration reviewed and adequate. Pain:  Pain Scale 1: Numeric (0 - 10) (05/02/17 1000)  Pain Intensity 1: 2 (05/02/17 1000)   Managed    Neurological Status:   Neuro (WDL): Exceptions to WDL (05/02/17 0857)  Neuro  Neurologic State: Drowsy (05/02/17 0857)  Orientation Level: Oriented to person (05/02/17 0857)  Cognition: Follows commands (05/02/17 0857)  Speech: Clear (05/02/17 0857)  LUE Motor Response: Purposeful (05/02/17 0857)  LLE Motor Response: Purposeful (05/02/17 0857)  RUE Motor Response: Purposeful (05/02/17 0857)  RLE Motor Response: Purposeful (05/02/17 0857)   At baseline    Mental Status and Level of Consciousness: Arousable    Pulmonary Status:   O2 Device: Room air (05/02/17 1000)   Adequate oxygenation and airway patent    Complications related to anesthesia: None    Post-anesthesia assessment completed.  No concerns    Signed By: Tressa Chua MD     May 2, 2017

## 2017-05-02 NOTE — DISCHARGE INSTRUCTIONS
Dr. Pratik Velazquez Discharge Instructions for:  Gennaro Browning MRN: [id number] : 1950    Admitted: 2017  Discharged: 2017     ** ok to restart Aspirin on 2017 **    What to do at [address]: Resume your usual diet    Recommended activity: Lift less than 10 lbs for 4 weeks. Follow-up with Dr. Sb Perez in 2-3 weeks. Call [phone] for an appointment. Inguinal Hernia Repair: What to Expect at [address] are likely to have pain for the next few days. You may also feel like you have the flu, and you may have a low fever and feel tired and nauseated. This is common. You should feel better after a few days and will probably feel much better in 7 days. For several weeks you may feel twinges or pulling in the hernia repair when you move. You may have some bruising on the scrotum and along the penis. This is normal.    Men will need to wear a jockstrap or briefs, not boxers, for scrotal support for several days after a groin (inguinal) hernia repair. Crystalsoldex bicycle shorts may provide good support. This care sheet gives you a general idea about how long it will take for you to recover. But each person recovers at a different pace. Follow the steps below to get better as quickly as possible. How can you care for yourself at home? Activity  Rest when you feel tired. Getting enough sleep will help you recover. Sleep with your head up by using three or four pillows. You can also try to sleep with your head up in a recliner chair. Try to walk each day. Start by walking a little more than you did the day before. Bit by bit, increase the amount you walk. Walking boosts blood flow and helps prevent pneumonia and constipation. Put ice or a cold pack on the area of your hernia repair for 10 to 15 minutes at a time. Try to do this every 1 to 2 hours for the first 24 hours (when you are awake) or until the swelling goes down.  Put a thin cloth between the ice and your skin. Avoid strenuous activities, such as biking, jogging, weight lifting, or aerobic exercise, until your doctor says it is okay. Avoid lifting anything that would make you strain. This may include heavy grocery bags and milk containers, a heavy briefcase or backpack, cat litter or dog food bags, a child, or a vacuum . You may drive when you are no longer taking pain medicine and can quickly move your foot from the gas pedal to the brake. You must also be able to sit comfortably for a long period of time, even if you do not plan to go far. You might get caught in traffic. Most people are able to return to work within 1 to 2 weeks after surgery. You may shower. Pat the cut (incision) dry. Do not take a bath for 2 weeks. You can have sex again when it feels comfortable to do so. Diet  You can eat your normal diet. If your stomach is upset, try bland, low-fat foods like plain rice, broiled chicken, toast, and yogurt. Drink plenty of fluids (unless your doctor tells you not to). You may notice that your bowel movements are not regular right after your surgery. This is common. Avoid constipation and straining with bowel movements. You may want to take a fiber supplement every day. If you have not had a bowel movement after a couple of days, ask your doctor about taking a mild laxative. Medicines  Take pain medicines exactly as directed. If the doctor gave you a prescription medicine for pain, take it as prescribed. If you are not taking a prescription pain medicine, take an over-the-counter medicine such as acetaminophen (Tylenol), ibuprofen (Advil, Motrin), or naproxen (Aleve). Read and follow all instructions on the label. Do not take two or more pain medicines at the same time unless the doctor told you to. Many pain medicines have acetaminophen, which is Tylenol. Too much acetaminophen (Tylenol) can be harmful. If your doctor prescribed antibiotics, take them as directed.  Do not stop taking them just because you feel better. You need to take the full course of antibiotics. If you think your pain medicine is making you sick to your stomach: Take your medicine after meals (unless your doctor has told you not to). Ask your doctor for a different pain medicine. Incision care  If you have strips of tape on the cut (incision) the doctor made, leave the tape on for a week or until it falls off. If you have staples closing the cut, you will need to visit your doctor in 1 to 2 weeks to have them removed. Wash the area daily with warm, soapy water and pat it dry. Follow-up care is a key part of your treatment and safety. Be sure to make and go to all appointments, and call your doctor if you are having problems. Its also a good idea to know your test results and keep a list of the medicines you take. When should you call for help? Call 911 anytime you think you may need emergency care. For example, call if:  You pass out (lose consciousness). You have sudden chest pain and shortness of breath, or you cough up blood. You have severe pain in your belly. Call your doctor now or seek immediate medical care if:  You are sick to your stomach and cannot keep fluids down. You have signs of a blood clot, such as:  Pain in your calf, back of the knee, thigh, or groin. Redness and swelling in your leg or groin. You have trouble passing urine or stool, especially if you have mild pain or swelling in your lower belly. You have hot flashes, sweating, flushing, or a fast or pounding heartbeat. Bright red blood has soaked through the bandage over your incision. Watch closely for any changes in your health, and be sure to contact your doctor if:  Your swelling is getting worse. Your swelling is not going down.       DISCHARGE SUMMARY from Nurse    The following personal items are in your possession at time of discharge:    Dental Appliances: Uppers, Lowers  Visual Aid: Glasses, At home     Home Medications: None  Jewelry: None  Clothing: Other (comment) (street clothes)  Other Valuables: Wallet, Keys  Personal Items Sent to Safe: declined          PATIENT INSTRUCTIONS:    After general anesthesia or intravenous sedation, for 24 hours or while taking prescription Narcotics:  · Limit your activities  · Do not drive and operate hazardous machinery  · Do not make important personal or business decisions  · Do  not drink alcoholic beverages  · If you have not urinated within 8 hours after discharge, please contact your surgeon on call. Report the following to your surgeon:  · Excessive pain, swelling, redness or odor of or around the surgical area  · Temperature over 100.5  · Nausea and vomiting lasting longer than 4 hours or if unable to take medications  · Any signs of decreased circulation or nerve impairment to extremity: change in color, persistent  numbness, tingling, coldness or increase pain  · Any questions        What to do at Home:    *  Please give a list of your current medications to your Primary Care Provider. *  Please update this list whenever your medications are discontinued, doses are      changed, or new medications (including over-the-counter products) are added. *  Please carry medication information at all times in case of emergency situations. These are general instructions for a healthy lifestyle:    No smoking/ No tobacco products/ Avoid exposure to second hand smoke    Surgeon General's Warning:  Quitting smoking now greatly reduces serious risk to your health.     Obesity, smoking, and sedentary lifestyle greatly increases your risk for illness    A healthy diet, regular physical exercise & weight monitoring are important for maintaining a healthy lifestyle    You may be retaining fluid if you have a history of heart failure or if you experience any of the following symptoms:  Weight gain of 3 pounds or more overnight or 5 pounds in a week, increased swelling in our hands or feet or shortness of breath while lying flat in bed. Please call your doctor as soon as you notice any of these symptoms; do not wait until your next office visit. Recognize signs and symptoms of STROKE:    F-face looks uneven    A-arms unable to move or move unevenly    S-speech slurred or non-existent    T-time-call 911 as soon as signs and symptoms begin-DO NOT go       Back to bed or wait to see if you get better-TIME IS BRAIN. Warning Signs of HEART ATTACK     Call 911 if you have these symptoms:   Chest discomfort. Most heart attacks involve discomfort in the center of the chest that lasts more than a few minutes, or that goes away and comes back. It can feel like uncomfortable pressure, squeezing, fullness, or pain.  Discomfort in other areas of the upper body. Symptoms can include pain or discomfort in one or both arms, the back, neck, jaw, or stomach.  Shortness of breath with or without chest discomfort.  Other signs may include breaking out in a cold sweat, nausea, or lightheadedness. Don't wait more than five minutes to call 911 - MINUTES MATTER! Fast action can save your life. Calling 911 is almost always the fastest way to get lifesaving treatment. Emergency Medical Services staff can begin treatment when they arrive -- up to an hour sooner than if someone gets to the hospital by car. The discharge information has been reviewed with the patient and caregiver. The patient and caregiver verbalized understanding. Discharge medications reviewed with the patient and caregiver and appropriate educational materials and side effects teaching were provided. A common side effect of anesthesia following surgery is nausea and/or vomiting. In order to decrease symptoms, it is wise to avoid foods that are high in fat, greasy foods, milk products, and spicy foods for the first 24 hours.     Acceptable foods for the first 24 hours following surgery include but are not limited to:     soup   broth    toast    crackers    applesauce    bananas    mashed potatoes,   soft or scrambled eggs   oatmeal    jello    It is important to eat when taking your pain medication. This will help to prevent nausea. If possible, please try to time your meals with your medications. It is very important to stay hydrated following surgery. Sip fluids frequently while awake. Avoid acidic drinks such as citrus juices and soda for 24 hours. Carbonated beverages may cause bloating and gas. Acceptable fluids include:    - water (flavor packets may add variety)  - coffee or tea (in moderation)  - Gatorade  - Tank-aid  - apple juice  - cranberry juice    You are encouraged to cough and deep breathe every hour when awake. This will help to prevent respiratory complications following anesthesia. You may want to hug a pillow when coughing and sneezing to add additional support to the surgical area and to decrease discomfort if you had abdominal or chest surgery. If you are discharged home with support stockings, you may remove them after 24 hours. Support stockings are used to help prevent blood clots in the legs following surgery. Please take time to review all of your Home Care Instructions and Medication Information sheets provided in your discharge packet. If you have any questions, please contact your surgeons office. Thank you. How to Care for Your Wound After Its Treated With  DERMABOND* Topical Skin Adhesive  DERMABOND* Topical Skin Adhesive (2-octyl cyanoacrylate) is a sterile, liquid skin adhesive  that holds wound edges together. The film will usually remain in place for 5 to 10 days, then  naturally fall off your skin.   The following will answer some of your questions and provide instructions for proper care for your  wound while it is healing:    CHECK WOUND APPEARANCE   Some swelling, redness, and pain are common with all wounds and normally will go away as the  wound heals. If swelling, redness, or pain increases or if the wound feels warm to the touch,  contact a doctor. Also contact a doctor if the wound edges reopen or separate. REPLACE BANDAGES   If your wound is bandaged, keep the bandage dry.  Replace the dressing daily until the adhesive film has fallen off or if the  bandage should become wet, unless otherwise instructed by your  physician.  When changing the dressing, do not place tape directly over the  DERMABOND adhesive film, because removing the tape later may also  remove the film. AVOID TOPICAL MEDICATIONS   Do not apply liquid or ointment medications or any other product to your wound while the  DERMABOND adhesive film is in place. These may loosen the film before your wound is healed. KEEP WOUND DRY AND PROTECTED   You may occasionally and briefly wet your wound in the shower or bath. Do not soak or scrub  your wound, do not swim, and avoid periods of heavy perspiration until the DERMABOND  adhesive has naturally fallen off. After showering or bathing, gently blot your wound dry with a  soft towel. If a protective dressing is being used, apply a fresh, dry bandage, being sure to keep  the tape off the DERMABOND adhesive film.  Apply a clean, dry bandage over the wound if necessary to protect it.  Protect your wound from injury until the skin has had sufficient time to heal.   Do not scratch, rub, or pick at the DERMABOND adhesive film. This may loosen the film before  your wound is healed.  Protect the wound from prolonged exposure to sunlight or tanning lamps while the film is in  place. If you have any questions or concerns about this product, please consult your doctor. *Trademark ©[employer] 2002    Oxycodone/Acetaminophen (By mouth)   Acetaminophen (c-adpo-g-MIN-oh-fen), Oxycodone Hydrochloride (ja-d-NZB-done keesha-droe-KLOR-radames)  Treats moderate to moderately severe pain. This medicine is a narcotic pain reliever.    Brand Name(s): Endocet, Percocet, Primlev, Roxicet, Xartemis XR   There may be other brand names for this medicine. When This Medicine Should Not Be Used: This medicine is not right for everyone. Do not use it if you had an allergic reaction to acetaminophen or oxycodone, or if you have serious breathing problems or paralytic ileus. How to Use This Medicine:   Capsule, Liquid, Tablet, Long Acting Tablet  · Your doctor will tell you how much medicine to use. Do not use more than directed. · An overdose can be dangerous. Follow directions carefully so you do not get too much medicine at one time. · Oral liquid: Measure the oral liquid medicine with a marked measuring spoon, oral syringe, or medicine cup. · Swallow the extended-release tablet whole. Do not crush, break, or chew it. Do not lick or wet the tablet before placing it in your mouth. Do not give this medicine through a feeding tube. · This medicine should come with a Medication Guide. Ask your pharmacist for a copy if you do not have one. · Missed dose: If you miss a dose of this medicine, skip the missed dose and go back to your regular dosing schedule. Do not double doses. · Store the medicine in a closed container at room temperature, away from heat, moisture, and direct light. Ask your pharmacist about the best way to dispose of medicine you do not use. Drugs and Foods to Avoid:   Ask your doctor or pharmacist before using any other medicine, including over-the-counter medicines, vitamins, and herbal products. · Do not use Xartemis XR if you are using or have used an MAO inhibitor in the past 14 days. · Some medicines can affect how this medicine works.  Tell your doctor if you are using any of the following:   ¨ Carbamazepine, erythromycin, ketoconazole, lamotrigine, mirtazapine, naltrexone, phenytoin, propranolol, rifampin, ritonavir, tramadol, trazodone, or zidovudine  ¨ Birth control pills  ¨ Diuretic (water pill)  ¨ Medicine to treat depression  ¨ Phenothiazine medicine  ¨ Triptan medicine to treat migraine headaches  · Do not drink alcohol while you are using this medicine. Acetaminophen can damage your liver, and alcohol can increase this risk. Do not take acetaminophen without asking your doctor if you have 3 or more drinks of alcohol every day. · Tell your doctor if you use anything else that makes you sleepy. Some examples are allergy medicine, narcotic pain medicine, and alcohol. Tell your doctor if you are using buprenorphine, butorphanol, nalbuphine, pentazocine, a benzodiazepine, or a muscle relaxer. Warnings While Using This Medicine:   · Tell your doctor if you are pregnant or breastfeeding, or if you have kidney disease, liver disease, heart disease, low blood pressure, breathing problems or lung disease (such as asthma, COPD), thyroid problems, [place] disease, pancreas or gallbladder problems, prostate problems, trouble urinating, or a stomach problems, or a history of head injury or brain damage, seizures, or alcohol or drug abuse. Tell your doctor if you are allergic to codeine. · This medicine may cause the following problems:  ¨ High risk of overdose, which can lead to death  ¨ Respiratory depression (serious breathing problem that can be life-threatening)  ¨ Liver problems  ¨ Serious skin reactions  ¨ Serotonin syndrome (when used with certain medicines)  · This medicine may make you dizzy or drowsy. Do not drive or do anything that could be dangerous until you know how this medicine affects you. Sit or lie down if you feel dizzy. Stand up carefully. · This medicine contains acetaminophen. Read the labels of all other medicines you are using to see if they also contain acetaminophen, or ask your doctor or pharmacist. Deepak Kumar not use more than 4 grams (4,000 milligrams) total of acetaminophen in one day. · This medicine can be habit-forming. Do not use more than your prescribed dose.  Call your doctor if you think your medicine is not working. · Do not stop using this medicine suddenly. Your doctor will need to slowly decrease your dose before you stop it completely. · This medicine could cause infertility. Talk with your doctor before using this medicine if you plan to have children. · This medicine may cause constipation, especially with long-term use. Ask your doctor if you should use a laxative to prevent and treat constipation. · Keep all medicine out of the reach of children. Never share your medicine with anyone. Possible Side Effects While Using This Medicine:   Call your doctor right away if you notice any of these side effects:  · Allergic reaction: Itching or hives, swelling in your face or hands, swelling or tingling in your mouth or throat, chest tightness, trouble breathing  · Anxiety, restlessness, fast heartbeat, fever, muscle spasms, twitching, diarrhea, seeing or hearing things that are not there  · Blistering, peeling, red skin rash  · Blue lips, fingernails, or skin  · Dark urine or pale stools, loss of appetite, stomach pain, yellow skin or eyes  · Extreme weakness, shallow breathing, uneven heartbeat, seizures, sweating, or cold or clammy skin  · Severe confusion, lightheadedness, dizziness, or fainting  · Severe constipation, nausea, or vomiting  · Trouble breathing or slow breathing  If you notice these less serious side effects, talk with your doctor:   · Headache  · Mild constipation, nausea, or vomiting  · Mild sleepiness or drowsiness  If you notice other side effects that you think are caused by this medicine, tell your doctor. Call your doctor for medical advice about side effects. You may report side effects to FDA at 5-652-FDA-5391  © 2017 [address] [employer] Information is for End User's use only and may not be sold, redistributed or otherwise used for commercial purposes. The above information is an  only.  It is not intended as medical advice for individual conditions or treatments. Talk to your doctor, nurse or pharmacist before following any medical regimen to see if it is safe and effective for you.

## 2017-05-02 NOTE — PERIOP NOTES
For dc home. Vswnl. Reports pain 7/10 at 1143. Med w/ 1 percocet per anesthesia orders at [phone]. Reporting pain 4/10 at this time which is tolerable per pt.  dsgs to abd d&i. Voided 125 cc concentrated, clear urine. Went over [employer] instructions w/pt and sister including Rx and f/up. Verbalized understanding. dc'd home.

## 2017-05-02 NOTE — IP AVS SNAPSHOT
[address] [facility] 
[phone] Patient: Stevan Li Sr. MRN: [id number] ZDR:[id number] You are allergic to the following No active allergies Recent Documentation Height Weight BMI Smoking Status 1.803 m 104.3 kg 32.07 kg/m2 Current Every Day Smoker Emergency Contacts Name Discharge Info Relation Home Work Mobile Siria Garcia  Other Relative [6] [phone] About your hospitalization You were admitted on:  May 2, 2017 You last received care in the:  [facility] PACU You were discharged on:  May 2, 2017 Unit phone number:  [phone] Why you were hospitalized Your primary diagnosis was:  Not on File Providers Seen During Your Hospitalizations Provider Role Specialty Primary office phone Graham Montalvo MD Attending Provider General Surgery [phone] Your Primary Care Physician (PCP) Primary Care Physician Office Phone [facility] [phone] Follow-up Information Follow up With Details Comments Contact Info Tom Fraser MD   [address] [facility] 
[phone] Your Appointments Thursday May 18, 2017  1:00 PM EDT  
POST OP 10 MIN with Graham Montalvo MD  
[facility] [address]. Rufino Coreas [place] [address], [address] [place] [address]  
[phone] Current Discharge Medication List  
  
START taking these medications Dose & Instructions Dispensing Information Comments Morning Noon Evening Bedtime  
 docusate sodium 100 mg capsule Commonly known as:  Gianluca Cost Your last dose was: Your next dose is:    
   
   
 Dose:  100 mg Take 1 Cap by mouth two (2) times a day for 14 days. Stop taking if you have diarrhea Quantity:  30 Cap Refills:  2  
     
   
   
   
  
 oxyCODONE-acetaminophen 5-325 mg per tablet Commonly known as:  PERCOCET Your last dose was: Your next dose is:    
   
   
 Dose:  1-2 Tab Take 1-2 Tabs by mouth every four (4) hours as needed for Pain. Max Daily Amount: 12 Tabs. Quantity:  40 Tab Refills:  0 CONTINUE these medications which have NOT CHANGED Dose & Instructions Dispensing Information Comments Morning Noon Evening Bedtime ALEVE 220 mg tablet Generic drug:  naproxen sodium Your last dose was: Your next dose is:    
   
   
 Dose:  220 mg Take 220 mg by mouth as needed. Refills:  0  
     
   
   
   
  
 aspirin delayed-release 81 mg tablet Your last dose was: Your next dose is: Take  by mouth daily. Refills:  0  
     
   
   
   
  
 levoFLOXacin 500 mg tablet Commonly known as:  Gerardine Bliss Your last dose was: Your next dose is:    
   
   
 Dose:  500 mg Take 1 Tab by mouth daily. Quantity:  7 Tab Refills:  0  
     
   
   
   
  
 lisinopril 20 mg tablet Commonly known as:  Sydna Lies Your last dose was: Your next dose is:    
   
   
 Dose:  20 mg Take 1 Tab by mouth daily. Quantity:  30 Tab Refills:  6 MULTIVITAMIN PO Your last dose was: Your next dose is: Take  by mouth daily. Refills:  0 OMEGA 3 PO Your last dose was: Your next dose is:    
   
   
 Dose:  1 Tab Take 1 Tab by mouth daily. Refills:  0  
     
   
   
   
  
 potassium chloride 20 mEq tablet Commonly known as:  K-DUR, KLOR-CON Your last dose was: Your next dose is:    
   
   
 Dose:  20 mEq Take 1 Tab by mouth daily. Quantity:  5 Tab Refills:  0  
     
   
   
   
  
 rosuvastatin 5 mg tablet Commonly known as:  CRESTOR  
   
 Your last dose was: Your next dose is:    
   
   
 Dose:  5 mg Take 1 Tab by mouth nightly. Quantity:  30 Tab Refills:  4 VITAMIN D3 2,000 unit Tab Generic drug:  cholecalciferol (vitamin D3) Your last dose was: Your next dose is: Take  by mouth. Refills:  0 Where to Get Your Medications These medications were sent to [address] [place] [address], [address], [place] [address] Phone:  [phone]  
  docusate sodium 100 mg capsule Information on where to get these meds will be given to you by the nurse or doctor. ! Ask your nurse or doctor about these medications  
  oxyCODONE-acetaminophen 5-325 mg per tablet Discharge Instructions Dr. Johanna Grubbs Discharge Instructions for:  Ariel Sandoval MRN: [id number] : 1950 Admitted: 2017  Discharged: 2017 ** ok to restart Aspirin on 2017 ** What to do at [facility] Recommended diet: Resume your usual diet Recommended activity: Lift less than 10 lbs for 4 weeks. Follow-up with Dr. Rhianna Reyna in 2-3 weeks. Call [phone] for an appointment. Inguinal Hernia Repair: What to Expect at Home Your Recovery You are likely to have pain for the next few days. You may also feel like you have the flu, and you may have a low fever and feel tired and nauseated. This is common. You should feel better after a few days and will probably feel much better in 7 days. For several weeks you may feel twinges or pulling in the hernia repair when you move. You may have some bruising on the scrotum and along the penis. This is normal. 
 
Men will need to wear a jockstrap or briefs, not boxers, for scrotal support for several days after a groin (inguinal) hernia repair. [employer] bicycle shorts may provide good support. This care sheet gives you a general idea about how long it will take for you to recover. But each person recovers at a different pace. Follow the steps below to get better as quickly as possible. How can you care for yourself at home? Activity Rest when you feel tired. Getting enough sleep will help you recover. Sleep with your head up by using three or four pillows. You can also try to sleep with your head up in a recliner chair. Try to walk each day. Start by walking a little more than you did the day before. Bit by bit, increase the amount you walk. Walking boosts blood flow and helps prevent pneumonia and constipation. Put ice or a cold pack on the area of your hernia repair for 10 to 15 minutes at a time. Try to do this every 1 to 2 hours for the first 24 hours (when you are awake) or until the swelling goes down. Put a thin cloth between the ice and your skin. Avoid strenuous activities, such as biking, jogging, weight lifting, or aerobic exercise, until your doctor says it is okay. Avoid lifting anything that would make you strain. This may include heavy grocery bags and milk containers, a heavy briefcase or backpack, cat litter or dog food bags, a child, or a vacuum . You may drive when you are no longer taking pain medicine and can quickly move your foot from the gas pedal to the brake. You must also be able to sit comfortably for a long period of time, even if you do not plan to go far. You might get caught in traffic. Most people are able to return to work within 1 to 2 weeks after surgery. You may shower. Pat the cut (incision) dry. Do not take a bath for 2 weeks. You can have sex again when it feels comfortable to do so. Diet You can eat your normal diet. If your stomach is upset, try bland, low-fat foods like plain rice, broiled chicken, toast, and yogurt. Drink plenty of fluids (unless your doctor tells you not to). You may notice that your bowel movements are not regular right after your surgery. This is common. Avoid constipation and straining with bowel movements. You may want to take a fiber supplement every day. If you have not had a bowel movement after a couple of days, ask your doctor about taking a mild laxative. Medicines Take pain medicines exactly as directed. If the doctor gave you a prescription medicine for pain, take it as prescribed. If you are not taking a prescription pain medicine, take an over-the-counter medicine such as acetaminophen (Tylenol), ibuprofen (Advil, Motrin), or naproxen (Aleve). Read and follow all instructions on the label. Do not take two or more pain medicines at the same time unless the doctor told you to. Many pain medicines have acetaminophen, which is Tylenol. Too much acetaminophen (Tylenol) can be harmful. If your doctor prescribed antibiotics, take them as directed. Do not stop taking them just because you feel better. You need to take the full course of antibiotics. If you think your pain medicine is making you sick to your stomach: Take your medicine after meals (unless your doctor has told you not to). Ask your doctor for a different pain medicine. Incision care If you have strips of tape on the cut (incision) the doctor made, leave the tape on for a week or until it falls off. If you have staples closing the cut, you will need to visit your doctor in 1 to 2 weeks to have them removed. Wash the area daily with warm, soapy water and pat it dry. Follow-up care is a key part of your treatment and safety. Be sure to make and go to all appointments, and call your doctor if you are having problems. Its also a good idea to know your test results and keep a list of the medicines you take. When should you call for help? Call 911 anytime you think you may need emergency care. For example, call if: You pass out (lose consciousness). You have sudden chest pain and shortness of breath, or you cough up blood. You have severe pain in your belly. Call your doctor now or seek immediate medical care if: 
You are sick to your stomach and cannot keep fluids down. You have signs of a blood clot, such as: 
Pain in your calf, back of the knee, thigh, or groin. Redness and swelling in your leg or groin. You have trouble passing urine or stool, especially if you have mild pain or swelling in your lower belly. You have hot flashes, sweating, flushing, or a fast or pounding heartbeat. Bright red blood has soaked through the bandage over your incision. Watch closely for any changes in your health, and be sure to contact your doctor if: 
Your swelling is getting worse. Your swelling is not going down. DISCHARGE SUMMARY from Nurse The following personal items are in your possession at time of discharge: 
 
Dental Appliances: Uppers, Lowers Visual Aid: Glasses, At home Home Medications: None Jewelry: None Clothing: Other (comment) (street clothes) Other Valuables: Jewel Valenzuela Personal Items Sent to Safe: declined PATIENT INSTRUCTIONS: 
 
After general anesthesia or intravenous sedation, for 24 hours or while taking prescription Narcotics: · Limit your activities · Do not drive and operate hazardous machinery · Do not make important personal or business decisions · Do  not drink alcoholic beverages · If you have not urinated within 8 hours after discharge, please contact your surgeon on call. Report the following to your surgeon: 
· Excessive pain, swelling, redness or odor of or around the surgical area · Temperature over 100.5 · Nausea and vomiting lasting longer than 4 hours or if unable to take medications · Any signs of decreased circulation or nerve impairment to extremity: change in color, persistent  numbness, tingling, coldness or increase pain · Any questions What to do at Home: *  Please give a list of your current medications to your Primary Care Provider. *  Please update this list whenever your medications are discontinued, doses are 
    changed, or new medications (including over-the-counter products) are added. *  Please carry medication information at all times in case of emergency situations. These are general instructions for a healthy lifestyle: No smoking/ No tobacco products/ Avoid exposure to second hand smoke Surgeon General's Warning:  Quitting smoking now greatly reduces serious risk to your health. Obesity, smoking, and sedentary lifestyle greatly increases your risk for illness A healthy diet, regular physical exercise & weight monitoring are important for maintaining a healthy lifestyle You may be retaining fluid if you have a history of heart failure or if you experience any of the following symptoms:  Weight gain of 3 pounds or more overnight or 5 pounds in a week, increased swelling in our hands or feet or shortness of breath while lying flat in bed. Please call your doctor as soon as you notice any of these symptoms; do not wait until your next office visit. Recognize signs and symptoms of STROKE: 
 
F-face looks uneven A-arms unable to move or move unevenly S-speech slurred or non-existent T-time-call 911 as soon as signs and symptoms begin-DO NOT go Back to bed or wait to see if you get better-TIME IS BRAIN. Warning Signs of HEART ATTACK Call 911 if you have these symptoms: 
? Chest discomfort. Most heart attacks involve discomfort in the center of the chest that lasts more than a few minutes, or that goes away and comes back. It can feel like uncomfortable pressure, squeezing, fullness, or pain. ? Discomfort in other areas of the upper body. Symptoms can include pain or discomfort in one or both arms, the back, neck, jaw, or stomach. ? Shortness of breath with or without chest discomfort. ? Other signs may include breaking out in a cold sweat, nausea, or lightheadedness. Don't wait more than five minutes to call [address]! Fast action can save your life. Calling 911 is almost always the fastest way to get lifesaving treatment. Emergency Medical Services staff can begin treatment when they arrive  up to an hour sooner than if someone gets to the hospital by car. The discharge information has been reviewed with the patient and caregiver. The patient and caregiver verbalized understanding. Discharge medications reviewed with the patient and caregiver and appropriate educational materials and side effects teaching were provided. A common side effect of anesthesia following surgery is nausea and/or vomiting. In order to decrease symptoms, it is wise to avoid foods that are high in fat, greasy foods, milk products, and spicy foods for the first 24 hours. Acceptable foods for the first 24 hours following surgery include but are not limited to: 
 
? soup 
? broth 
?  toast  
? crackers ? applesauce 
? bananas  
? mashed potatoes, 
? soft or scrambled eggs 
? oatmeal 
?  jello It is important to eat when taking your pain medication. This will help to prevent nausea. If possible, please try to time your meals with your medications. It is very important to stay hydrated following surgery. Sip fluids frequently while awake. Avoid acidic drinks such as citrus juices and soda for 24 hours. Carbonated beverages may cause bloating and gas. Acceptable fluids include: 
 
? water (flavor packets may add variety) ? coffee or tea (in moderation) ? Gatorade ? Lesta Mitten ? apple juice 
? cranberry juice You are encouraged to cough and deep breathe every hour when awake. This will help to prevent respiratory complications following anesthesia.  You may want to hug a pillow when coughing and sneezing to add additional support to the surgical area and to decrease discomfort if you had abdominal or chest surgery. If you are discharged home with support stockings, you may remove them after 24 hours. Support stockings are used to help prevent blood clots in the legs following surgery. Please take time to review all of your Home Care Instructions and Medication Information sheets provided in your discharge packet. If you have any questions, please contact your surgeons office. Thank you. How to Care for Your Wound After Its Treated With DERMABOND* Topical Skin Adhesive DERMABOND* Topical Skin Adhesive (2-octyl cyanoacrylate) is a sterile, liquid skin adhesive 
that holds wound edges together. The film will usually remain in place for 5 to 10 days, then 
naturally fall off your skin. The following will answer some of your questions and provide instructions for proper care for your 
wound while it is healing: CHECK WOUND APPEARANCE 
 Some swelling, redness, and pain are common with all wounds and normally will go away as the 
wound heals. If swelling, redness, or pain increases or if the wound feels warm to the touch, 
contact a doctor. Also contact a doctor if the wound edges reopen or separate. REPLACE BANDAGES 
 If your wound is bandaged, keep the bandage dry.  Replace the dressing daily until the adhesive film has fallen off or if the 
bandage should become wet, unless otherwise instructed by your 
physician.  When changing the dressing, do not place tape directly over the DERMABOND adhesive film, because removing the tape later may also 
remove the film. AVOID TOPICAL MEDICATIONS  Do not apply liquid or ointment medications or any other product to your wound while the DERMABOND adhesive film is in place. These may loosen the film before your wound is healed. KEEP WOUND DRY AND PROTECTED  You may occasionally and briefly wet your wound in the shower or bath.  Do not soak or scrub 
your wound, do not swim, and avoid periods of heavy perspiration until the DERMABOND 
adhesive has naturally fallen off. After showering or bathing, gently blot your wound dry with a 
soft towel. If a protective dressing is being used, apply a fresh, dry bandage, being sure to keep 
the tape off the DERMABOND adhesive film.  Apply a clean, dry bandage over the wound if necessary to protect it.  Protect your wound from injury until the skin has had sufficient time to heal. 
 Do not scratch, rub, or pick at the DERMABOND adhesive film. This may loosen the film before 
your wound is healed.  Protect the wound from prolonged exposure to sunlight or tanning lamps while the film is in 
place. If you have any questions or concerns about this product, please consult your doctor. *[employer] ©[employer] 2002 Oxycodone/Acetaminophen (By mouth) Acetaminophen (a-wcbc-x-MIN-oh-fen), Oxycodone Hydrochloride (xw-w-OCF-done keesha-droe-KLOR-radames) Treats moderate to moderately severe pain. This medicine is a narcotic pain reliever. Brand Name(s): Endocet, Percocet, Primlev, Roxicet, Xartemis XR There may be other brand names for this medicine. When This Medicine Should Not Be Used: This medicine is not right for everyone. Do not use it if you had an allergic reaction to acetaminophen or oxycodone, or if you have serious breathing problems or paralytic ileus. How to Use This Medicine:  
Capsule, Liquid, Tablet, Long Acting Tablet · Your doctor will tell you how much medicine to use. Do not use more than directed. · An overdose can be dangerous. Follow directions carefully so you do not get too much medicine at one time. · Oral liquid: Measure the oral liquid medicine with a marked measuring spoon, oral syringe, or medicine cup. · Swallow the extended-release tablet whole. Do not crush, break, or chew it. Do not lick or wet the tablet before placing it in your mouth. Do not give this medicine through a feeding tube. · This medicine should come with a Medication Guide. Ask your pharmacist for a copy if you do not have one. · Missed dose: If you miss a dose of this medicine, skip the missed dose and go back to your regular dosing schedule. Do not double doses. · Store the medicine in a closed container at room temperature, away from heat, moisture, and direct light. Ask your pharmacist about the best way to dispose of medicine you do not use. Drugs and Foods to Avoid: Ask your doctor or pharmacist before using any other medicine, including over-the-counter medicines, vitamins, and herbal products. · Do not use Xartemis XR if you are using or have used an MAO inhibitor in the past 14 days. · Some medicines can affect how this medicine works. Tell your doctor if you are using any of the following: ¨ Carbamazepine, erythromycin, ketoconazole, lamotrigine, mirtazapine, naltrexone, phenytoin, propranolol, rifampin, ritonavir, tramadol, trazodone, or zidovudine ¨ Birth control pills ¨ Diuretic (water pill) ¨ Medicine to treat depression ¨ Phenothiazine medicine ¨ Triptan medicine to treat migraine headaches · Do not drink alcohol while you are using this medicine. Acetaminophen can damage your liver, and alcohol can increase this risk. Do not take acetaminophen without asking your doctor if you have 3 or more drinks of alcohol every day. · Tell your doctor if you use anything else that makes you sleepy. Some examples are allergy medicine, narcotic pain medicine, and alcohol. Tell your doctor if you are using buprenorphine, butorphanol, nalbuphine, pentazocine, a benzodiazepine, or a muscle relaxer. Warnings While Using This Medicine: · Tell your doctor if you are pregnant or breastfeeding, or if you have kidney disease, liver disease, heart disease, low blood pressure, breathing problems or lung disease (such as asthma, COPD), thyroid problems, Jackson disease, pancreas or gallbladder problems, prostate problems, trouble urinating, or a stomach problems, or a history of head injury or brain damage, seizures, or alcohol or drug abuse. Tell your doctor if you are allergic to codeine. · This medicine may cause the following problems: 
¨ High risk of overdose, which can lead to death ¨ Respiratory depression (serious breathing problem that can be life-threatening) ¨ Liver problems ¨ Serious skin reactions ¨ Serotonin syndrome (when used with certain medicines) · This medicine may make you dizzy or drowsy. Do not drive or do anything that could be dangerous until you know how this medicine affects you. Sit or lie down if you feel dizzy. Stand up carefully. · This medicine contains acetaminophen. Read the labels of all other medicines you are using to see if they also contain acetaminophen, or ask your doctor or pharmacist. Britney Flanneryevelyn not use more than 4 grams (4,000 milligrams) total of acetaminophen in one day. · This medicine can be habit-forming. Do not use more than your prescribed dose. Call your doctor if you think your medicine is not working. · Do not stop using this medicine suddenly. Your doctor will need to slowly decrease your dose before you stop it completely. · This medicine could cause infertility. Talk with your doctor before using this medicine if you plan to have children. · This medicine may cause constipation, especially with long-term use. Ask your doctor if you should use a laxative to prevent and treat constipation. · Keep all medicine out of the reach of children. Never share your medicine with anyone. Possible Side Effects While Using This Medicine:  
Call your doctor right away if you notice any of these side effects: · Allergic reaction: Itching or hives, swelling in your face or hands, swelling or tingling in your mouth or throat, chest tightness, trouble breathing · Anxiety, restlessness, fast heartbeat, fever, muscle spasms, twitching, diarrhea, seeing or hearing things that are not there · Blistering, peeling, red skin rash · Blue lips, fingernails, or skin · Dark urine or pale stools, loss of appetite, stomach pain, yellow skin or eyes · Extreme weakness, shallow breathing, uneven heartbeat, seizures, sweating, or cold or clammy skin · Severe confusion, lightheadedness, dizziness, or fainting · Severe constipation, nausea, or vomiting · Trouble breathing or slow breathing If you notice these less serious side effects, talk with your doctor:  
· Headache · Mild constipation, nausea, or vomiting · Mild sleepiness or drowsiness If you notice other side effects that you think are caused by this medicine, tell your doctor. Call your doctor for medical advice about side effects. You may report side effects to FDA at 9-819-FDA-1177 © 2017 [address]  Information is for End User's use only and may not be sold, redistributed or otherwise used for commercial purposes. The above information is an  only. It is not intended as medical advice for individual conditions or treatments. Talk to your doctor, nurse or pharmacist before following any medical regimen to see if it is safe and effective for you. Discharge Orders None General Information Please provide this summary of care documentation to your next provider. Patient Signature:  ____________________________________________________________ Date:  ____________________________________________________________  
  
Rambo Nathan Provider Signature:  ____________________________________________________________ Date:  ____________________________________________________________

## 2017-05-02 NOTE — OP NOTES
OPERATIVE REPORT      PREOPERATIVE DIAGNOSES:    Right recurrent inguinal hernia. POSTOPERATIVE DIAGNOSES:   moderate RIGHT direct inguinal hernia      OPERATIVE PROCEDURE:  1. Laparoscopic repair of recurrent right inguinal hernia with mesh, robot assisted. SURGEON:  Elana Booth MD    ANESTHESIA:  General plus local.    SPECIMEN:  None. COMPLICATIONS:  None. ESTIMATED BLOOD LOSS:  Minimal.    INDICATION FOR PROCEDURE: Darian Arreaga Sr. is a 75 y.o. male presented to the office with groin pain. On exam, he was noted to have a hernia. He is brought to the operating room for repair. Risks, benefits and alternatives were discussed. Consent form was placed in the chart. DESCRIPTION OF PROCEDURE: The patient was brought to operating room number 9, with consent form signed and on the chart and the site of surgery marked. After satisfactory induction of general anesthesia, the patient was kept in the supine position, bilateral arms tucked to his sides, with appropriate padding. The patient's abdomen was prepped and draped sterilely, including use of Ioban. After appropriate time-out was held, the skin was infused with local anesthetic, an incision was made on the Left abdomen and an 8 mm optical entry trocar was placed intra-abdominally under visualization, and pneumoperitoneum was achieved. The abdomen was explored, with findings noted above. An 8 mm robotic port was placed in the Right side and an 8 mm robotic port placed at the umbilicus. The patient was placed in Trendelenburg position, the robot was docked and robotic instruments were inserted. The abdomen was explored, with findings noted as above. The peritoneum was incised, and the peritoneal flap was created on the Right. This was carried down towards Ollie's ligament medially and the abdominal side wall laterally. Appropriate landmarks were identified. The incarcerated fat and bladder were reduced from the hernia.   The cord structures were dissected free of the peritoneum, and the peritoneum was taken down beyond the splaying of the cord. Once dissection was completed, a Right-sided self-fixating mesh was  inserted and set into position. It was noted to be lying appropriately and in good position. Hemostasis was ensured. A 2-0 barbed absorbable suture was used to reapproximate the peritoneum. Once completed, the ports were removed sequentially under visualization, and the pneumoperitoneum was allowed to escape. All skin incisions were closed with subcuticular Monocryl and Dermabond. The patient remained stable during my presence in the operating room.       Chad Dunham MD

## 2017-05-02 NOTE — PERIOP NOTES
Handoff Report from Operating Room to PACU    Report received from Cecelia Hayden RN and KASSI Stevens CRNA regarding Rd Noun Sr. .      Surgeon(s):  Deirdre Islas MD  And Procedure(s) (LRB):  DAVINCI LAPAROSCOPIC RIGHT INGUINAL HERNIA REPAIR WITH MESH (Right)  confirmed   with dressings discussed. Anesthesia type, drugs, patient history, complications, estimated blood loss, vital signs, intake and output, and last pain medication, lines, reversal medications and temperature were reviewed.

## 2017-05-02 NOTE — ANESTHESIA PREPROCEDURE EVALUATION
Anesthetic History   No history of anesthetic complications            Review of Systems / Medical History  Patient summary reviewed, nursing notes reviewed and pertinent labs reviewed    Pulmonary          Smoker         Neuro/Psych   Within defined limits           Cardiovascular    Hypertension          Hyperlipidemia    Exercise tolerance: >4 METS  Comments: Normal stress echo 11/2016   GI/Hepatic/Renal           Liver disease     Endo/Other  Within defined limits           Other Findings   Comments: HIV +  Hep C+           Physical Exam    Airway  Mallampati: II  TM Distance: 4 - 6 cm  Neck ROM: normal range of motion   Mouth opening: Normal     Cardiovascular  Regular rate and rhythm,  S1 and S2 normal,  no murmur, click, rub, or gallop             Dental    Dentition: Full lower dentures, Edentulous and Full upper dentures     Pulmonary  Breath sounds clear to auscultation               Abdominal  GI exam deferred       Other Findings            Anesthetic Plan    ASA: 3  Anesthesia type: general    Monitoring Plan: BIS      Induction: Intravenous  Anesthetic plan and risks discussed with: Patient

## 2017-05-09 ENCOUNTER — OFFICE VISIT (OUTPATIENT)
Dept: INTERNAL MEDICINE CLINIC | Age: 67
End: 2017-05-09

## 2017-05-09 VITALS
HEIGHT: 71 IN | OXYGEN SATURATION: 97 % | WEIGHT: 231 LBS | TEMPERATURE: 97 F | BODY MASS INDEX: 32.34 KG/M2 | DIASTOLIC BLOOD PRESSURE: 72 MMHG | HEART RATE: 76 BPM | SYSTOLIC BLOOD PRESSURE: 144 MMHG

## 2017-05-09 DIAGNOSIS — E78.00 PURE HYPERCHOLESTEROLEMIA: ICD-10-CM

## 2017-05-09 DIAGNOSIS — B36.9 FUNGAL RASH OF TORSO: Primary | ICD-10-CM

## 2017-05-09 DIAGNOSIS — I73.9 PAD (PERIPHERAL ARTERY DISEASE) (HCC): ICD-10-CM

## 2017-05-09 RX ORDER — ROSUVASTATIN CALCIUM 5 MG/1
5 TABLET, COATED ORAL
Qty: 30 TAB | Refills: 6 | Status: SHIPPED | OUTPATIENT
Start: 2017-05-09 | End: 2018-04-13 | Stop reason: SDUPTHER

## 2017-05-09 RX ORDER — LANOLIN ALCOHOL/MO/W.PET/CERES
500 CREAM (GRAM) TOPICAL DAILY
COMMUNITY
End: 2019-08-13

## 2017-05-09 RX ORDER — NYSTATIN 100000 U/G
CREAM TOPICAL 2 TIMES DAILY
Qty: 15 G | Refills: 1 | Status: SHIPPED | OUTPATIENT
Start: 2017-05-09 | End: 2018-09-25

## 2017-05-09 NOTE — PROGRESS NOTES
HISTORY OF PRESENT ILLNESS  Farrah Rascon is a 75 y.o. male. HPI   C/o rash in right axilla x 1 month, not itchy  Has appeared to decrease in area last few days  Pt stopped crestor thinking it may be a drug rash  Had PAD with abnl GERALD left leg and has claudication sxs at about 4 blocks  Had recent right inguinal hernia repair    Patient Active Problem List    Diagnosis Date Noted    Tobacco use 12/06/2016    PAD (peripheral artery disease) ([facility] 12/06/2016    Dyslipidemia 12/06/2016    Advanced care planning/counseling discussion 09/13/2016    Colon polyp 09/18/2015    HTN (hypertension) 01/27/2011    Substance abuse 12/25/2010    Prostate cancer ([facility] 02/22/2010    Chronic hepatitis C ([facility] 06/30/2009    HIV positive ([facility] 06/30/2009    Weight loss, non-intentional 06/30/2009     Current Outpatient Prescriptions   Medication Sig Dispense Refill    cyanocobalamin (VITAMIN B-12) 500 mcg tablet Take 500 mcg by mouth daily.  nystatin (MYCOSTATIN) topical cream Apply  to affected area two (2) times a day. Appy to right axilla rash 15 g 1    rosuvastatin (CRESTOR) 5 mg tablet Take 1 Tab by mouth nightly. 30 Tab 6    oxyCODONE-acetaminophen (PERCOCET) 5-325 mg per tablet Take 1-2 Tabs by mouth every four (4) hours as needed for Pain. Max Daily Amount: 12 Tabs. 40 Tab 0    docusate sodium (COLACE) 100 mg capsule Take 1 Cap by mouth two (2) times a day for 14 days. Stop taking if you have diarrhea 30 Cap 2    lisinopril (PRINIVIL, ZESTRIL) 20 mg tablet Take 1 Tab by mouth daily. 30 Tab 6    cholecalciferol, vitamin D3, (VITAMIN D3) 2,000 unit tab Take  by mouth.  aspirin delayed-release 81 mg tablet Take  by mouth daily.  MULTIVITAMINS (MULTIVITAMIN PO) Take  by mouth daily.  OMEGA-3 FATTY ACIDS (OMEGA 3 PO) Take 1 Tab by mouth daily.  naproxen sodium (ALEVE) 220 mg tablet Take 220 mg by mouth as needed.        No Known Allergies        ROS    Physical Exam   Constitutional: He appears well-developed and well-nourished. No distress. Appears stated age   HENT:   Head: Normocephalic. Cardiovascular: Normal rate, regular rhythm and normal heart sounds. Pulmonary/Chest: Effort normal and breath sounds normal.   Abdominal: Soft. Musculoskeletal: He exhibits no edema. Neurological: He is alert. Skin:   Dark macular rash right axilla   Psychiatric: He has a normal mood and affect. Nursing note and vitals reviewed. ASSESSMENT and PLAN  Leila Mendoza was seen today for rash. Diagnoses and all orders for this visit:    Fungal rash of torso   Right axilla--apply nystatin bid, try to keep the area dry  PAD (peripheral artery disease) (HCC)   Stable cladication left leg at 4 blocks  Pure hypercholesterolemia   Restart crestor  Other orders  -     nystatin (MYCOSTATIN) topical cream; Apply  to affected area two (2) times a day. Appy to right axilla rash  -     rosuvastatin (CRESTOR) 5 mg tablet; Take 1 Tab by mouth nightly. Follow-up Disposition:  Return in about 3 months (around 8/9/2017) for f/u HLD htn'.

## 2017-05-09 NOTE — MR AVS SNAPSHOT
Visit Information Date & Time Provider Department Dept. Phone Encounter #  
 5/9/2017  3:00 PM Lul Pantoja, [address] [phone] [id number] Follow-up Instructions Return in about 3 months (around 8/9/2017) for f/u HLD htn'. Your Appointments 5/18/2017  1:00 PM  
POST OP 10 MIN with Rony Dia MD  
[facility] Dr[facility] Rufino Coreas [facility] ([address]) Appt Note: post/op davinci RIHR with mesh on 5/2/17.  
 [address] [facility], [address] [facility], [address] [address]  
[address] [facility], [address] [facility] [address] [address]  
  
    
 8/17/2017  9:10 AM  
ROUTINE CARE with Caron Vail MD  
[facility] Internal Medicine [address]) Appt Note: 6 month f/u  
 [address] [facility] [address]  
[phone]  
  
   
 [address] [facility] [address] [address]  
  
    
 8/24/2017  9:30 AM  
ROUTINE CARE with Lul Pantoja, [address] [place] [address],4Th Floor [address] [facility]) Appt Note: 6 month follow up for htn prostate cance; 6 month follow up for htn prostate cancer//r/s from 8/17/17  
 [facility] [address] [address]  
[address] Upcoming Health Maintenance Date Due DTaP/Tdap/Td series (1 - Tdap) 4/18/1970 GLAUCOMA SCREENING Q2Y 4/18/2014 Pneumococcal 65+ High/Highest Risk (2 of 2 - PCV13) 9/3/2015 INFLUENZA AGE 9 TO ADULT 8/1/2017 MEDICARE YEARLY EXAM 9/14/2017 COLONOSCOPY 8/31/2020 Allergies as of 5/9/2017  Review Complete On: 5/9/2017 By: Janeen Mac LPN No Known Allergies Current Immunizations  Reviewed on 9/5/2014 Name Date H1N1 FLU VACCINE 2/22/2010 Influenza Vaccine Split 2/22/2010 Pneumococcal Polysaccharide (PPSV-23) 9/3/2014 12:06 PM  
  
 Not reviewed this visit You Were Diagnosed With   
  
 Codes Comments Fungal rash of torso    -  Primary ICD-10-CM: B36.9 ICD-9-CM: 111.9 Vitals BP Pulse Temp Height(growth percentile) Weight(growth percentile) SpO2  
 144/72 (BP 1 Location: Left arm, BP Patient Position: Sitting) 76 97 °F (36.1 °C) (Oral) 5' 11\" (1.803 m) 231 lb (104.8 kg) 97% BMI Smoking Status 32.22 kg/m2 Current Every Day Smoker BMI and BSA Data Body Mass Index Body Surface Area  
 32.22 kg/m 2 2.29 m 2 Preferred Pharmacy Pharmacy Name Phone [id number] - [address] [phone] Your Updated Medication List  
  
   
This list is accurate as of: 5/9/17  3:25 PM.  Always use your most recent med list.  
  
  
  
  
 ALEVE 220 mg tablet Generic drug:  naproxen sodium Take 220 mg by mouth as needed. aspirin delayed-release 81 mg tablet Take  by mouth daily. docusate sodium 100 mg capsule Commonly known as:  Leticia Bloomingdale Take 1 Cap by mouth two (2) times a day for 14 days. Stop taking if you have diarrhea  
  
 lisinopril 20 mg tablet Commonly known as:  Rexanne  Take 1 Tab by mouth daily. MULTIVITAMIN PO Take  by mouth daily. nystatin topical cream  
Commonly known as:  MYCOSTATIN Apply  to affected area two (2) times a day. Appy to right axilla rash OMEGA 3 PO Take 1 Tab by mouth daily. oxyCODONE-acetaminophen 5-325 mg per tablet Commonly known as:  PERCOCET Take 1-2 Tabs by mouth every four (4) hours as needed for Pain. Max Daily Amount: 12 Tabs. rosuvastatin 5 mg tablet Commonly known as:  CRESTOR Take 1 Tab by mouth nightly. VITAMIN B-12 500 mcg tablet Generic drug:  cyanocobalamin Take 500 mcg by mouth daily. VITAMIN D3 2,000 unit Tab Generic drug:  cholecalciferol (vitamin D3) Take  by mouth. Prescriptions Sent to Pharmacy  Refills  
 nystatin (MYCOSTATIN) topical cream 1  
 Sig: Apply  to affected area two (2) times a day. Appy to right axilla rash Class: Normal  
 Pharmacy: [address] Ph #: [phone] Route: Topical  
 rosuvastatin (CRESTOR) 5 mg tablet 6 Sig: Take 1 Tab by mouth nightly. Class: Normal  
 Pharmacy: [address] Ph #: [phone] Route: Oral  
  
Follow-up Instructions Return in about 3 months (around 8/9/2017) for f/u HLD htn'. Please provide this summary of care documentation to your next provider. Your primary care clinician is listed as Martin LOYOLA. If you have any questions after today's visit, please call [phone].

## 2017-05-12 ENCOUNTER — TELEPHONE (OUTPATIENT)
Dept: SURGERY | Age: 67
End: 2017-05-12

## 2017-05-12 RX ORDER — OXYCODONE AND ACETAMINOPHEN 5; 325 MG/1; MG/1
1 TABLET ORAL
Qty: 30 TAB | Refills: 0 | Status: SHIPPED | OUTPATIENT
Start: 2017-05-12 | End: 2017-05-18 | Stop reason: ALTCHOICE

## 2017-05-18 ENCOUNTER — OFFICE VISIT (OUTPATIENT)
Dept: SURGERY | Age: 67
End: 2017-05-18

## 2017-05-18 VITALS
WEIGHT: 226.5 LBS | BODY MASS INDEX: 31.71 KG/M2 | OXYGEN SATURATION: 97 % | HEART RATE: 79 BPM | HEIGHT: 71 IN | RESPIRATION RATE: 20 BRPM | DIASTOLIC BLOOD PRESSURE: 78 MMHG | SYSTOLIC BLOOD PRESSURE: 138 MMHG

## 2017-05-18 DIAGNOSIS — Z09 POSTOPERATIVE EXAMINATION: Primary | ICD-10-CM

## 2017-05-18 RX ORDER — DOCUSATE SODIUM 100 MG/1
100 CAPSULE, LIQUID FILLED ORAL 2 TIMES DAILY
Qty: 30 CAP | Refills: 2 | Status: SHIPPED | OUTPATIENT
Start: 2017-05-18 | End: 2017-06-01

## 2017-05-18 RX ORDER — DOCUSATE SODIUM 100 MG/1
100 CAPSULE, LIQUID FILLED ORAL
COMMUNITY
End: 2019-08-13

## 2017-05-18 RX ORDER — OXYCODONE AND ACETAMINOPHEN 5; 325 MG/1; MG/1
1 TABLET ORAL
Qty: 30 TAB | Refills: 0 | Status: SHIPPED | OUTPATIENT
Start: 2017-05-18 | End: 2017-08-30

## 2017-05-18 NOTE — PROGRESS NOTES
Chief Complaint   Patient presents with    Surgical Follow-up     Post/op Victoria danielchristianoeliezer [facility] with mesh on 5/2/17. Tolerating PO  Pain controlled  Taking moderate pain meds  some relief of preoperative symptoms  Does ok with BMs with stool softener, but stopped them    Was doing well until he mowed the lawn and lifted lawn bags  Sore right groin        Physical Exam:   Abdominal exam: soft  non-distended  Non- tender.   Wound: clean, dry, no drainage    Doing well  Don't mow the grass if it hurts  This will be the last refill of percocet  Cont the stool softeners and fiber daily    Continue restricted activity for a total of 4 weeks  Follow-up: prn Gordy Rubinstein MD FACS

## 2017-05-18 NOTE — MR AVS SNAPSHOT
Visit Information Date & Time Provider Department Dept. Phone Encounter #  
 5/18/2017  1:00 PM Yani Rivera MD [facility] [id number] Your Appointments 8/17/2017  9:10 AM  
ROUTINE CARE with Anisa Bautista MD  
[facility] Internal Medicine [address]) Appt Note: 6 month f/u  
 [address] [facility] [address]  
[phone]  
  
   
 [address] [facility] [address] [place] [address]  
  
    
 8/24/2017  9:30 AM  
ROUTINE CARE with Colton Harvey, [address]) Appt Note: 6 month follow up for htn prostate cance; 6 month follow up for htn prostate cancer//r/s from 8/17/17  
 [address] [address]  
[address] [place] [address] Upcoming Health Maintenance Date Due DTaP/Tdap/Td series (1 - Tdap) 4/18/1970 GLAUCOMA SCREENING Q2Y 4/18/2014 Pneumococcal 65+ High/Highest Risk (2 of 2 - PCV13) 9/3/2015 INFLUENZA AGE 9 TO ADULT 8/1/2017 MEDICARE YEARLY EXAM 9/14/2017 COLONOSCOPY 8/31/2020 Allergies as of 5/18/2017  Review Complete On: 5/18/2017 By: Yani Rivera MD  
 No Known Allergies Current Immunizations  Reviewed on 9/5/2014 Name Date H1N1 FLU VACCINE 2/22/2010 Influenza Vaccine Split 2/22/2010 Pneumococcal Polysaccharide (PPSV-23) 9/3/2014 12:06 PM  
  
 Not reviewed this visit Vitals BP Pulse Resp Height(growth percentile) Weight(growth percentile) SpO2  
 138/78 (BP 1 Location: Right arm, BP Patient Position: Sitting) 79 20 5' 11\" (1.803 m) 226 lb 8 oz (102.7 kg) 97% BMI Smoking Status 31.59 kg/m2 Current Every Day Smoker BMI and BSA Data Body Mass Index Body Surface Area  
 31.59 kg/m 2 2.27 m 2 Preferred Pharmacy Pharmacy Name Phone  [facility] - [facility] [address] [phone] Your Updated Medication List  
  
   
This list is accurate as of: 5/18/17  1:50 PM.  Always use your most recent med list.  
  
  
  
  
 ALEVE 220 mg tablet Generic drug:  naproxen sodium Take 220 mg by mouth as needed. aspirin delayed-release 81 mg tablet Take  by mouth daily. * COLACE 100 mg capsule Generic drug:  docusate sodium Take 100 mg by mouth two (2) times a day. * docusate sodium 100 mg capsule Commonly known as:  Major Carl Take 1 Cap by mouth two (2) times a day for 14 days. Stop taking if you have diarrhea  
  
 lisinopril 20 mg tablet Commonly known as:  Shirlie Holes Take 1 Tab by mouth daily. MULTIVITAMIN PO Take  by mouth daily. nystatin topical cream  
Commonly known as:  MYCOSTATIN Apply  to affected area two (2) times a day. Appy to right axilla rash OMEGA 3 PO Take 1 Tab by mouth daily. oxyCODONE-acetaminophen 5-325 mg per tablet Commonly known as:  PERCOCET Take 1 Tab by mouth every four (4) hours as needed for Pain. Max Daily Amount: 6 Tabs. rosuvastatin 5 mg tablet Commonly known as:  CRESTOR Take 1 Tab by mouth nightly. VITAMIN B-12 500 mcg tablet Generic drug:  cyanocobalamin Take 500 mcg by mouth daily. VITAMIN D3 2,000 unit Tab Generic drug:  cholecalciferol (vitamin D3) Take  by mouth. * Notice: This list has 2 medication(s) that are the same as other medications prescribed for you. Read the directions carefully, and ask your doctor or other care provider to review them with you. Prescriptions Printed Refills  
 oxyCODONE-acetaminophen (PERCOCET) 5-325 mg per tablet 0 Sig: Take 1 Tab by mouth every four (4) hours as needed for Pain. Max Daily Amount: 6 Tabs. Class: Print Route: Oral  
  
Prescriptions Sent to Pharmacy  Refills  
 docusate sodium (COLACE) 100 mg capsule 2  
 Sig: Take 1 Cap by mouth two (2) times a day for 14 days. Stop taking if you have diarrhea Class: Normal  
 Pharmacy: [facility], [address] [facility] #: [phone] Route: Oral  
  
 Please provide this summary of care documentation to your next provider. Your primary care clinician is listed as Bernardino LOYOLA. If you have any questions after today's visit, please call [phone].

## 2017-08-30 ENCOUNTER — OFFICE VISIT (OUTPATIENT)
Dept: INTERNAL MEDICINE CLINIC | Age: 67
End: 2017-08-30

## 2017-08-30 VITALS
SYSTOLIC BLOOD PRESSURE: 120 MMHG | DIASTOLIC BLOOD PRESSURE: 70 MMHG | HEART RATE: 61 BPM | BODY MASS INDEX: 30.38 KG/M2 | TEMPERATURE: 97.8 F | HEIGHT: 71 IN | WEIGHT: 217 LBS | OXYGEN SATURATION: 97 %

## 2017-08-30 DIAGNOSIS — Z23 ENCOUNTER FOR IMMUNIZATION: Primary | ICD-10-CM

## 2017-08-30 DIAGNOSIS — E78.5 DYSLIPIDEMIA: ICD-10-CM

## 2017-08-30 DIAGNOSIS — C61 PROSTATE CANCER (HCC): ICD-10-CM

## 2017-08-30 DIAGNOSIS — I10 ESSENTIAL HYPERTENSION: ICD-10-CM

## 2017-08-30 NOTE — MR AVS SNAPSHOT
Visit Information Date & Time Provider Department Dept. Phone Encounter #  
 8/30/2017  9:45 AM Nasreen Pool, [address] [phone] [id number] Follow-up Instructions Return in about 6 months (around 2/28/2018) for htn hld labs. Your Appointments 9/7/2017 11:40 AM  
ROUTINE CARE with Bhavna Hager MD  
[facility] Internal Medicine [facility]) Appt Note: follow up [address] [facility] [address]  
[phone]  
  
   
 [address] [facility] [address] [facility] Upcoming Health Maintenance Date Due DTaP/Tdap/Td series (1 - Tdap) 4/18/1970 GLAUCOMA SCREENING Q2Y 4/18/2014 Pneumococcal 65+ High/Highest Risk (2 of 2 - PCV13) 9/3/2015 INFLUENZA AGE 9 TO ADULT 8/1/2017 MEDICARE YEARLY EXAM 9/14/2017 COLONOSCOPY 8/31/2020 Allergies as of 8/30/2017  Review Complete On: 8/30/2017 By: Dimitris Rojas LPN No Known Allergies Current Immunizations  Reviewed on 9/5/2014 Name Date H1N1 FLU VACCINE 2/22/2010 Influenza Vaccine Split 2/22/2010 Pneumococcal Polysaccharide (PPSV-23) 9/3/2014 12:06 PM  
  
 Not reviewed this visit Vitals BP Pulse Temp Height(growth percentile) Weight(growth percentile) SpO2  
 120/70 (BP 1 Location: Left arm, BP Patient Position: Sitting) 61 97.8 °F (36.6 °C) (Oral) 5' 11\" (1.803 m) 217 lb (98.4 kg) 97% BMI Smoking Status 30.27 kg/m2 Current Every Day Smoker BMI and BSA Data Body Mass Index Body Surface Area  
 30.27 kg/m 2 2.22 m 2 Preferred Pharmacy Pharmacy Name Phone [facility] - [address] [facility] [phone] Your Updated Medication List  
  
   
This list is accurate as of: 8/30/17 10:36 AM.  Always use your most recent med list.  
  
  
  
  
 ALEVE 220 mg tablet Generic drug:  naproxen sodium Take 220 mg by mouth as needed. aspirin delayed-release 81 mg tablet Take  by mouth daily. COLACE 100 mg capsule Generic drug:  docusate sodium Take 100 mg by mouth two (2) times a day. lisinopril 20 mg tablet Commonly known as:  Mechele Livings Take 1 Tab by mouth daily. MULTIVITAMIN PO Take  by mouth daily. nystatin topical cream  
Commonly known as:  MYCOSTATIN Apply  to affected area two (2) times a day. Appy to right axilla rash OMEGA 3 PO Take 1 Tab by mouth daily. rosuvastatin 5 mg tablet Commonly known as:  CRESTOR Take 1 Tab by mouth nightly. VITAMIN B-12 500 mcg tablet Generic drug:  cyanocobalamin Take 500 mcg by mouth daily. VITAMIN D3 2,000 unit Tab Generic drug:  cholecalciferol (vitamin D3) Take  by mouth. Follow-up Instructions Return in about 6 months (around 2/28/2018) for htn hld labs. Please provide this summary of care documentation to your next provider. Your primary care clinician is listed as Orlin LOYOLA. If you have any questions after today's visit, please call [phone].

## 2017-08-30 NOTE — PROGRESS NOTES
HISTORY OF PRESENT ILLNESS  Ksenia Ivory is a 75 y.o. male. HPI     F/u HTN and HLD  Restarted saroj CROWE MD --and Rad/onc  S/p rad therapy x 7 weeks ended this year--Dr Waldo Silverman  PSA dropped 8 to about 0.2 per pt  S/p treatment for Hep c -will get f/u [facility] specialist  Working on construction about 6 hrs per day  Had PAD with abnl GERALD left leg and has claudication sxs at about 4 blocks  Had recent right inguinal hernia repair    Patient Active Problem List    Diagnosis Date Noted    Tobacco use 12/06/2016    PAD (peripheral artery disease) ([facility] 12/06/2016    Dyslipidemia 12/06/2016    Advanced care planning/counseling discussion 09/13/2016    Colon polyp 09/18/2015    HTN (hypertension) 01/27/2011    Substance abuse 12/25/2010    [facility] 02/22/2010    Chronic hepatitis C ([facility] 06/30/2009    HIV positive ([facility] 06/30/2009    Weight loss, non-intentional 06/30/2009     Current Outpatient Prescriptions   Medication Sig Dispense Refill    cyanocobalamin (VITAMIN B-12) 500 mcg tablet Take 500 mcg by mouth daily.  rosuvastatin (CRESTOR) 5 mg tablet Take 1 Tab by mouth nightly. 30 Tab 6    lisinopril (PRINIVIL, ZESTRIL) 20 mg tablet Take 1 Tab by mouth daily. 30 Tab 6    cholecalciferol, vitamin D3, (VITAMIN D3) 2,000 unit tab Take  by mouth.  aspirin delayed-release 81 mg tablet Take  by mouth daily.  MULTIVITAMINS (MULTIVITAMIN PO) Take  by mouth daily.  OMEGA-3 FATTY ACIDS (OMEGA 3 PO) Take 1 Tab by mouth daily.  docusate sodium (COLACE) 100 mg capsule Take 100 mg by mouth two (2) times a day.  oxyCODONE-acetaminophen (PERCOCET) 5-325 mg per tablet Take 1 Tab by mouth every four (4) hours as needed for Pain. Max Daily Amount: 6 Tabs. 30 Tab 0    nystatin (MYCOSTATIN) topical cream Apply  to affected area two (2) times a day. Appy to right axilla rash 15 g 1    naproxen sodium (ALEVE) 220 mg tablet Take 220 mg by mouth as needed.        No Known Allergies   Lab Results  Component Value Date/Time   WBC 5.4 04/25/2017 02:27 PM   HGB 14.9 04/25/2017 02:27 PM   Hemoglobin (POC) 16.0 05/02/2017 07:50 AM   HCT 42.6 04/25/2017 02:27 PM   Hematocrit (POC) 47 05/02/2017 07:50 AM   PLATELET 145 72/58/4135 02:27 PM   MCV 92.4 04/25/2017 02:27 PM     Lab Results  Component Value Date/Time   Glucose 95 04/25/2017 02:27 PM   Glucose (POC) 98 05/02/2017 07:50 AM   Glucose (POC) 95 09/04/2014 12:18 PM   LDL, calculated 100 02/17/2017 09:20 AM   Creatinine (POC) 0.8 05/02/2017 07:50 AM   Creatinine 0.79 04/25/2017 02:27 PM      Lab Results  Component Value Date/Time   Cholesterol, total 155 02/17/2017 09:20 AM   HDL Cholesterol 40 02/17/2017 09:20 AM   LDL, calculated 100 02/17/2017 09:20 AM   Triglyceride 74 02/17/2017 09:20 AM   CHOL/HDL Ratio 4.0 08/24/2014 05:06 AM   Lab Results  Component Value Date/Time   GFR est non-AA >60 04/25/2017 02:27 PM   GFRNA, POC >60 05/02/2017 07:50 AM   GFR est AA >60 04/25/2017 02:27 PM   GFRAA, POC >60 05/02/2017 07:50 AM   Creatinine 0.79 04/25/2017 02:27 PM   Creatinine (POC) 0.8 05/02/2017 07:50 AM   BUN 8 04/25/2017 02:27 PM   BUN (POC) 14 05/02/2017 07:50 AM   Sodium 141 04/25/2017 02:27 PM   Sodium (POC) 141 05/02/2017 07:50 AM   Potassium 3.2 04/25/2017 02:27 PM   Potassium (POC) 4.0 05/02/2017 07:50 AM   Chloride 106 04/25/2017 02:27 PM   Chloride (POC) 102 05/02/2017 07:50 AM   CO2 28 04/25/2017 02:27 PM   Magnesium 1.7 09/07/2014 12:32 AM   Phosphorus 3.3 09/03/2014 04:47 AM              ROS    Physical Exam   Constitutional: He appears well-developed and well-nourished. No distress. Appears stated age   HENT:   Head: Normocephalic. Cardiovascular: Normal rate, regular rhythm and normal heart sounds. Exam reveals no gallop and no friction rub. No murmur heard. Pulmonary/Chest: Effort normal and breath sounds normal. He has no wheezes. He has no rales. He exhibits no tenderness. Abdominal: Soft.    Musculoskeletal: He exhibits no edema. Neurological: He is alert. Psychiatric: He has a normal mood and affect. Nursing note and vitals reviewed. ASSESSMENT and PLAN  Diagnoses and all orders for this visit:    1. Encounter for immunization  -     Pneumococcal conjugate 13 valent, IM (PREVNAR-13)    2. Prostate cancer ([facility]   Good response to treatment so far--f/u  and rad/onc  3. Essential hypertension   controlled  4. Dyslipidemia   Continue statin tx  5. HIV   F/u ID MD next month  Follow-up Disposition:  Return in about 6 months (around 2/28/2018) for htn hld labs.

## 2017-08-30 NOTE — PROGRESS NOTES
Patient is here today for follow up 6 month visit  for hypertension and prostate cancer. No new   Complaints.

## 2017-10-06 DIAGNOSIS — I10 ESSENTIAL HYPERTENSION: ICD-10-CM

## 2017-10-06 RX ORDER — LISINOPRIL 20 MG/1
20 TABLET ORAL DAILY
Qty: 90 TAB | Refills: 3 | Status: SHIPPED | OUTPATIENT
Start: 2017-10-06 | End: 2017-10-06 | Stop reason: SDUPTHER

## 2017-10-06 RX ORDER — LISINOPRIL 20 MG/1
20 TABLET ORAL DAILY
Qty: 90 TAB | Refills: 3 | Status: SHIPPED | OUTPATIENT
Start: 2017-10-06 | End: 2020-01-05

## 2017-10-06 NOTE — TELEPHONE ENCOUNTER
Elodia Kay states pt is out of medication and they already gave him emergency doses. She states she thought Po Joyce was requesting. Can this be done today?  #[phone]

## 2017-10-06 NOTE — TELEPHONE ENCOUNTER
Sonam from [address] [place] [address] is requesting refill on lisinopril 20 mg for 90 day supply sent to [phone].                  Message copied/pasted from [facility]

## 2017-10-13 ENCOUNTER — OFFICE VISIT (OUTPATIENT)
Dept: INTERNAL MEDICINE CLINIC | Age: 67
End: 2017-10-13

## 2017-10-13 VITALS
TEMPERATURE: 97.8 F | DIASTOLIC BLOOD PRESSURE: 61 MMHG | HEART RATE: 68 BPM | OXYGEN SATURATION: 98 % | WEIGHT: 212 LBS | SYSTOLIC BLOOD PRESSURE: 97 MMHG | HEIGHT: 71 IN | BODY MASS INDEX: 29.68 KG/M2

## 2017-10-13 DIAGNOSIS — J01.90 ACUTE NON-RECURRENT SINUSITIS, UNSPECIFIED LOCATION: Primary | ICD-10-CM

## 2017-10-13 DIAGNOSIS — B18.2 CHRONIC HEPATITIS C WITHOUT HEPATIC COMA (HCC): ICD-10-CM

## 2017-10-13 RX ORDER — CODEINE PHOSPHATE AND GUAIFENESIN 10; 100 MG/5ML; MG/5ML
5 SOLUTION ORAL
Qty: 100 ML | Refills: 0 | Status: SHIPPED | OUTPATIENT
Start: 2017-10-13 | End: 2018-03-06 | Stop reason: ALTCHOICE

## 2017-10-13 RX ORDER — AMOXICILLIN AND CLAVULANATE POTASSIUM 875; 125 MG/1; MG/1
1 TABLET, FILM COATED ORAL EVERY 12 HOURS
Qty: 14 TAB | Refills: 0 | Status: SHIPPED | OUTPATIENT
Start: 2017-10-13 | End: 2018-03-06 | Stop reason: ALTCHOICE

## 2017-10-13 NOTE — MR AVS SNAPSHOT
Visit Information Date & Time Provider Department Dept. Phone Encounter #  
 10/13/2017  8:30 AM Nevaeh Adair [address] [phone] [id number] Your Appointments 10/16/2017  9:30 AM  
Follow Up with LELEN Avila Ace ([facility]) Appt Note: f/up  lw 10/12/17 [address] [facility] [phone] [facility] [address]  
[phone]  
  
   
 [address] [facility] [address] [facility] [address]  
  
    
 10/19/2017  9:10 AM  
ROUTINE CARE with Peter Brooks MD  
[facility]) Appt Note: Routine visit [address] [facility] [address] [facility] [address]  
[phone]  
  
   
 [address] [place] [address] [address]  
  
    
 3/2/2018  9:45 AM  
ROUTINE CARE with Radha Lentz [facility] [address] [facility]) Appt Note: 6 month follow up  
 [address] [address]  
[address] [place] [address] Upcoming Health Maintenance Date Due DTaP/Tdap/Td series (1 - Tdap) 4/18/1970 GLAUCOMA SCREENING Q2Y 4/18/2014 INFLUENZA AGE 9 TO ADULT 8/1/2017 MEDICARE YEARLY EXAM 9/14/2017 COLONOSCOPY 8/31/2020 Allergies as of 10/13/2017  Review Complete On: 10/13/2017 By: Nevaeh Adair MD  
 No Known Allergies Current Immunizations  Reviewed on 9/5/2014 Name Date H1N1 FLU VACCINE 2/22/2010 Influenza Vaccine Split 2/22/2010 Pneumococcal Conjugate (PCV-13) 8/30/2017 Pneumococcal Polysaccharide (PPSV-23) 9/3/2014 12:06 PM  
  
 Not reviewed this visit You Were Diagnosed With   
  
 Codes Comments Acute non-recurrent sinusitis, unspecified location    -  Primary ICD-10-CM: J01.90 ICD-9-CM: 461.9 Chronic hepatitis C without hepatic coma (HCC)     ICD-10-CM: B18.2 ICD-9-CM: 070.54 Vitals BP Pulse Temp Height(growth percentile) Weight(growth percentile) SpO2 97/61 (BP 1 Location: Left arm, BP Patient Position: Sitting) 68 97.8 °F (36.6 °C) (Oral) 5' 11\" (1.803 m) 212 lb (96.2 kg) 98% BMI Smoking Status 29.57 kg/m2 Current Every Day Smoker Vitals History BMI and BSA Data Body Mass Index Body Surface Area  
 29.57 kg/m 2 2.2 m 2 Preferred Pharmacy Pharmacy Name [address] Your Updated Medication List  
  
   
This list is accurate as of: 10/13/17  9:08 AM.  Always use your most recent med list.  
  
  
  
  
 ALEVE 220 mg tablet Generic drug:  naproxen sodium Take 220 mg by mouth as needed. amoxicillin-clavulanate 875-125 mg per tablet Commonly known as:  AUGMENTIN Take 1 Tab by mouth every twelve (12) hours. aspirin delayed-release 81 mg tablet Take  by mouth daily. COLACE 100 mg capsule Generic drug:  docusate sodium Take 100 mg by mouth two (2) times a day. guaiFENesin-codeine 100-10 mg/5 mL solution Commonly known as:  ROBITUSSIN AC Take 5 mL by mouth three (3) times daily as needed for Cough. Max Daily Amount: 15 mL. lisinopril 20 mg tablet Commonly known as:  Milad Benavides Take 1 Tab by mouth daily. MULTIVITAMIN PO Take  by mouth daily. nystatin topical cream  
Commonly known as:  MYCOSTATIN Apply  to affected area two (2) times a day. Appy to right axilla rash OMEGA 3 PO Take 1 Tab by mouth daily. rosuvastatin 5 mg tablet Commonly known as:  CRESTOR Take 1 Tab by mouth nightly. VITAMIN B-12 500 mcg tablet Generic drug:  cyanocobalamin Take 500 mcg by mouth daily. VITAMIN D3 2,000 unit Tab Generic drug:  cholecalciferol (vitamin D3) Take  by mouth. Prescriptions Printed  Refills  
 guaiFENesin-codeine (ROBITUSSIN AC) 100-10 mg/5 mL solution 0  
 Sig: Take 5 mL by mouth three (3) times daily as needed for Cough. Max Daily Amount: 15 mL. Class: Print Route: Oral  
  
Prescriptions Sent to Pharmacy Refills  
 amoxicillin-clavulanate (AUGMENTIN) 875-125 mg per tablet 0 Sig: Take 1 Tab by mouth every twelve (12) hours. Class: Normal  
 Pharmacy: [address], [address] [place] [address] Ph #: [phone] Route: Oral  
  
 Please provide this summary of care documentation to your next provider. Your primary care clinician is listed as Melody LOYOLA. If you have any questions after today's visit, please call [phone].

## 2017-10-13 NOTE — PROGRESS NOTES
HISTORY OF PRESENT ILLNESS  Emma Brewer is a 75 y.o. male. HPI   C/o feeling sick x 1 wek  Sinus congestion with thick drainage  Now coughing up yellow phlegm  No sore throat or ear pain      Patient Active Problem List   Diagnosis Code    Chronic hepatitis C ([facility] B18.2    HIV positive ([facility] Z21    Weight loss, non-intentional R63.4    Prostate cancer ([facility] C61    Substance abuse F19.10    HTN (hypertension) I10    Colon polyp K63.5    Advanced care planning/counseling discussion Z71.89    Tobacco use Z72.0    PAD (peripheral artery disease) ([facility] I73.9    Dyslipidemia E78.5     Current Outpatient Prescriptions   Medication Sig Dispense Refill    amoxicillin-clavulanate (AUGMENTIN) 875-125 mg per tablet Take 1 Tab by mouth every twelve (12) hours. 14 Tab 0    guaiFENesin-codeine (ROBITUSSIN AC) 100-10 mg/5 mL solution Take 5 mL by mouth three (3) times daily as needed for Cough. Max Daily Amount: 15 mL. 100 mL 0    lisinopril (PRINIVIL, ZESTRIL) 20 mg tablet Take 1 Tab by mouth daily. 90 Tab 3    cyanocobalamin (VITAMIN B-12) 500 mcg tablet Take 500 mcg by mouth daily.  rosuvastatin (CRESTOR) 5 mg tablet Take 1 Tab by mouth nightly. 30 Tab 6    naproxen sodium (ALEVE) 220 mg tablet Take 220 mg by mouth as needed.  cholecalciferol, vitamin D3, (VITAMIN D3) 2,000 unit tab Take  by mouth.  aspirin delayed-release 81 mg tablet Take  by mouth daily.  MULTIVITAMINS (MULTIVITAMIN PO) Take  by mouth daily.  OMEGA-3 FATTY ACIDS (OMEGA 3 PO) Take 1 Tab by mouth daily.  docusate sodium (COLACE) 100 mg capsule Take 100 mg by mouth two (2) times a day.  nystatin (MYCOSTATIN) topical cream Apply  to affected area two (2) times a day.  Appy to right axilla rash 15 g 1     No Known Allergies   Lab Results  Component Value Date/Time   Glucose 95 04/25/2017 02:27 PM   Glucose (POC) 98 05/02/2017 07:50 AM   Glucose (POC) 95 09/04/2014 12:18 PM   LDL, calculated 100 02/17/2017 09:20 AM   Creatinine (POC) 0.8 05/02/2017 07:50 AM   Creatinine 0.79 04/25/2017 02:27 PM      Lab Results  Component Value Date/Time   Cholesterol, total 155 02/17/2017 09:20 AM   HDL Cholesterol 40 02/17/2017 09:20 AM   LDL, calculated 100 02/17/2017 09:20 AM   Triglyceride 74 02/17/2017 09:20 AM   CHOL/HDL Ratio 4.0 08/24/2014 05:06 AM     Lab Results  Component Value Date/Time   GFR est non-AA >60 04/25/2017 02:27 PM   GFRNA, POC >60 05/02/2017 07:50 AM   GFR est AA >60 04/25/2017 02:27 PM   GFRAA, POC >60 05/02/2017 07:50 AM   Creatinine 0.79 04/25/2017 02:27 PM   Creatinine (POC) 0.8 05/02/2017 07:50 AM   BUN 8 04/25/2017 02:27 PM   BUN (POC) 14 05/02/2017 07:50 AM   Sodium 141 04/25/2017 02:27 PM   Sodium (POC) 141 05/02/2017 07:50 AM   Potassium 3.2 04/25/2017 02:27 PM   Potassium (POC) 4.0 05/02/2017 07:50 AM   Chloride 106 04/25/2017 02:27 PM   Chloride (POC) 102 05/02/2017 07:50 AM   CO2 28 04/25/2017 02:27 PM   Magnesium 1.7 09/07/2014 12:32 AM   Phosphorus 3.3 09/03/2014 04:47 AM          ROS    Physical Exam   Constitutional: He appears well-developed and well-nourished. No distress. Appears stated age, voice is hoarse and deep   HENT:   Head: Normocephalic. Slight TTP paranasal area   Cardiovascular: Normal rate, regular rhythm and normal heart sounds. Exam reveals no gallop and no friction rub. No murmur heard. Pulmonary/Chest: Effort normal and breath sounds normal. No respiratory distress. He has no wheezes. He has no rales. He exhibits no tenderness. Abdominal: Soft. Musculoskeletal: He exhibits no edema. Neurological: He is alert. Psychiatric: He has a normal mood and affect. Nursing note and vitals reviewed. ASSESSMENT and PLAN  Diagnoses and all orders for this visit:    1. Acute non-recurrent sinusitis, unspecified location   augmentin x 7d    Khurram ac for cough 100ml  Other orders  -     amoxicillin-clavulanate (AUGMENTIN) 875-125 mg per tablet;  Take 1 Tab by mouth every twelve (12) hours. -     guaiFENesin-codeine (ROBITUSSIN AC) 100-10 mg/5 mL solution; Take 5 mL by mouth three (3) times daily as needed for Cough. Max Daily Amount: 15 mL.       Follow-up Disposition: Not on File

## 2017-10-16 ENCOUNTER — OFFICE VISIT (OUTPATIENT)
Dept: HEMATOLOGY | Age: 67
End: 2017-10-16

## 2017-10-16 VITALS
TEMPERATURE: 98.5 F | OXYGEN SATURATION: 98 % | DIASTOLIC BLOOD PRESSURE: 76 MMHG | HEART RATE: 75 BPM | BODY MASS INDEX: 30.07 KG/M2 | SYSTOLIC BLOOD PRESSURE: 129 MMHG | WEIGHT: 215.6 LBS

## 2017-10-16 DIAGNOSIS — B18.2 CHRONIC HEPATITIS C WITHOUT HEPATIC COMA (HCC): Primary | ICD-10-CM

## 2017-10-16 LAB
ERYTHROCYTE [DISTWIDTH] IN BLOOD BY AUTOMATED COUNT: 12.9 % (ref 12.3–15.4)
HCT VFR BLD AUTO: 43.7 % (ref 37.5–51)
HGB BLD-MCNC: 15.3 G/DL (ref 12.6–17.7)
MCH RBC QN AUTO: 32.5 PG (ref 26.6–33)
MCHC RBC AUTO-ENTMCNC: 35 G/DL (ref 31.5–35.7)
MCV RBC AUTO: 93 FL (ref 79–97)
PLATELET # BLD AUTO: 224 X10E3/UL (ref 150–379)
RBC # BLD AUTO: 4.71 X10E6/UL (ref 4.14–5.8)
WBC # BLD AUTO: 5.3 X10E3/UL (ref 3.4–10.8)

## 2017-10-16 NOTE — PROGRESS NOTES
[address] MD, Gaby [place], [place] [place], [place]       Akhil Joyce, YADIRA Headley, [facility]   ELLEN Botello NP        at [address], [address] [facility], [address]     [phone]     FAX: [phone]    at [facility], [address] [place] [address], [address]     [phone]     FAX: [phone]     Patient Care Team:  Setph Kendrick MD as PCP - Caesar Yepez MD (Infectious Diseases)  Bib Valenzuela MD as Surgeon (General Surgery)  Jessee López MD (Gastroenterology)  Compa Rosario MD (Hepatology)  April Bing Khan NP (Nurse Practitioner)  [address], MD (Cardiology)  Milady Moralez MD (Cardiology)     Patient Active Problem List   Diagnosis Code    Chronic hepatitis C ([facility] B18.2    HIV positive ([facility] Z21    Weight loss, non-intentional R63.4    Prostate cancer ([address] C61    Substance abuse F19.10    HTN (hypertension) I10    Colon polyp K63.5    Advanced care planning/counseling discussion Z71.89    Tobacco use Z72.0    PAD (peripheral artery disease) ([address] I73.9    Dyslipidemia E78.5       Prieto Shikha. returns to the [address] for management of chronic HCV and HIV co-infection. The active problem list, all pertinent past medical history, medications, radiologic findings and laboratory findings related to the liver disorder were reviewed with the patient. Ultrasound of the liver was performed in 12/2014. This suggests chronic liver disease. A 0.8 x 0.7 lesion was identified in the right lobe. MRI of the liver was performed in 1/2015 to further evaluate this area suggested changes consistent with chronic liver disease. No liver mass lesion was identified. The patient underwent a liver biopsy in 3/2015.  This demonstrates severe hepatitis with bridging fibrosis. Patient has completed 12 weeks of HCV treatment with Harvoni (5/29/2015-8/25/2015). He is a sustained virologic responder to treatment, or cured. Patient is co-infected with HIV. He is currently not on anti-retroviral therapy because his virus remains suppressed. Patient has no new physical complaints today. Patient recently completed radiation therapy for prostate cancer. He tolerated this well. The patient completes all daily activities without any functional limitations. The patient has not experienced arthralgias, myalgias, problems concentrating, swelling of the abdomen, swelling of the lower extremities, hematemesis, or hematochezia. ALLERGIES  No Known Allergies    MEDICATIONS  Current Outpatient Prescriptions   Medication Sig    amoxicillin-clavulanate (AUGMENTIN) 875-125 mg per tablet Take 1 Tab by mouth every twelve (12) hours.  guaiFENesin-codeine (ROBITUSSIN AC) 100-10 mg/5 mL solution Take 5 mL by mouth three (3) times daily as needed for Cough. Max Daily Amount: 15 mL.  lisinopril (PRINIVIL, ZESTRIL) 20 mg tablet Take 1 Tab by mouth daily.  docusate sodium (COLACE) 100 mg capsule Take 100 mg by mouth two (2) times a day.  cyanocobalamin (VITAMIN B-12) 500 mcg tablet Take 500 mcg by mouth daily.  nystatin (MYCOSTATIN) topical cream Apply  to affected area two (2) times a day. Appy to right axilla rash    rosuvastatin (CRESTOR) 5 mg tablet Take 1 Tab by mouth nightly.  naproxen sodium (ALEVE) 220 mg tablet Take 220 mg by mouth as needed.  cholecalciferol, vitamin D3, (VITAMIN D3) 2,000 unit tab Take  by mouth.  aspirin delayed-release 81 mg tablet Take  by mouth daily.  MULTIVITAMINS (MULTIVITAMIN PO) Take  by mouth daily.  OMEGA-3 FATTY ACIDS (OMEGA 3 PO) Take 1 Tab by mouth daily. No current facility-administered medications for this visit.       REVIEW OF SYSTEMS NOT RELATED TO LIVER DISEASE:  Constitution systems: Negative for fever, chills, weight gain, weight loss. Eyes: Negative for diplopia, visual changes, eye pain. ENT: Negative for sore throat, painful swallowing. Respiratory: Negative for cough, hemoptysis, dyspnea. Cardiology: Negative for chest pain, palpitations. GI:  Negative for constipation or diarrhea. : Negative for urinary frequency, dysuria and hematuria. Skin: Negative for rash. Hematology: Negative for easy bruising, bleeding from gums or nose. Musculo-skelatal: Negative for back pain, muscle pain, weakness. Neurologic: Negative for headaches, dizziness, vertigo, memory problems. Psychology: Negative for anxiety, depression. FAMILY HISTORY:  The father  of alcohol cirrhosis. The mother  of CVA. There are no other persons in the family with HCV or liver disease. SOCIAL HISTORY:  The patient is . The spouse has been tested for HCV and is positive. She was treated and achieved SVR. The patient has 3 children, and 7 grandchildren. The patient stopped using tobacco products in 2014. The patient has never consumed significant amounts of alcohol. The patient used to work in [employer]. PHYSICAL EXAMINATION:  Visit Vitals    /76    Pulse 75    Temp 98.5 °F (36.9 °C) (Oral)    Wt 215 lb 9.6 oz (97.8 kg)    SpO2 98%    BMI 30.07 kg/m2     General: No acute distress. Eyes: Sclera anicteric. ENT: No oral lesions. Thyroid normal.  Nodes: No adenopathy. Skin: No spider angiomata. No jaundice. No palmar erythema. Respiratory: Lungs clear to auscultation. Cardiovascular: Regular heart rate. No murmurs. No JVD. Abdomen: Soft non-tender. Liver size normal to percussion/palpation. Spleen not palpable. No obvious ascites. Extremities: No edema. No muscle wasting. No gross arthritic changes. Neurologic: Alert and oriented. Cranial nerves grossly intact. No asterixsis.     LABORATORY STUDIES:  [facility] [place] of [address] [place] [address] & Units 10/16/2017 5/2/2017 4/25/2017   WBC 3.4 - 10.8 x10E3/uL 5.3  5.4   ANC 1.4 - 7.0 x10E3/uL      HGB 12.6 - 17.7 g/dL 15.3  14.9   HGB 12.1 - 17.0 GM/DL  16.0    HGB (iSTAT) 12.1 - 17.0 GM/DL  16.0     - 379 x10E3/uL 224  179   INR 0.8 - 1.2      AST 0 - 40 IU/L 28     ALT 0 - 44 IU/L 22     Alk Phos 39 - 117 IU/L 67     Bili, Total 0.0 - 1.2 mg/dL 0.7     Bili, Direct 0.00 - 0.40 mg/dL      Albumin 3.6 - 4.8 g/dL 4.6     BUN 8 - 27 mg/dL 14  8   BUN (iSTAT) 9 - 20 MG/DL  14    Creat 0.76 - 1.27 mg/dL 0.78  0.79   Creat (iSTAT) 0.6 - 1.3 MG/DL  0.8    Na 134 - 144 mmol/L 140  141   K 3.5 - 5.2 mmol/L 4.1  3.2 (L)   Cl 96 - 106 mmol/L 99  106   CO2 18 - 29 mmol/L 22  28   Glucose 65 - 99 mg/dL 91  95     A CMP, CBC and HCV RNA were ordered today. Will review results when available. SEROLOGIES:  Serologies Latest Ref Rng 10/10/2014 9/1/2014 8/27/2014   Hep A Ab, Total Negative Positive (A)     Hep B Surface Ag Negative Negative     Hep B Core Ab, Total Negative Positive (A)     Hep C Genotype See Note 1a     HCV RT-PCR, Quant  [id number]  [id number]   HCV Log10    6.830   Ferritin 30 - 400 ng/mL 172     Iron % Saturation 15 - 55 % 22     C-ANCA Neg:<1:20 titer  <1:20    P-ANCA Neg:<1:20 titer  <1:20    ANCA Neg:<1:20 titer  <1:20      LIVER HISTOLOGY:  3/2015. Slides reviewed by MLS. Severe hepatitis with bridging fibrosis and possible cirrhosis. Knodell score (3,3,3,3), Bro score 4, Metavir score 3.  2/2017. [facility]. EkPa was 10.0. Suggested fibrosis level is F3. ENDOSCOPIC PROCEDURES:  Not available or performed    RADIOLOGY:  12/2014. Ultrasound of liver. Echogenic consistent with chronic liver disease. 0.8x0.7 cm lesion right lobe. No dilated bile ducts. No ascites. 1/2015. Dynamic MRI liver. Changes consistent with chronic liver disease. No liver mass lesions. No dilated bile ducts. No bile duct strictures. No ascites.     OTHER TESTING:  Not available or performed    ASSESSMENT AND PLAN:  Chronic HCV with bridging fibrosis. He has preserved liver function. Will continue to monitor patient regularly. HIV co-infection. He has low levels of HIV RNA. He is not taking anti-HIV medications. This is being regularly monitored by his ID physician. HCV treatment. Completed 12 weeks of Harvoni treatment in August 2015. He is a sustained virologic responder to this treatment, or cured from this infection. Fibroscan. Patient's liver biopsy in March 2015 demonstrated bridging fibrosis. He has been HCV negative for over a year. FibroScan today demonstrated bridging fibrosis with no progression or regression. Will have patient return every year to reassess his fibrosis with a Fibroscan. Patient verbalized understanding of this plan. [address] surveillance. Ordered an abdominal US to rule out [address] since he has advanced liver fibrosis. Will review results when available. The patient was directed to continue all current medications at the current dosages. There are no contraindications for the patient to take any medications that are necessary for treatment of other medical issues. The patient was counseled regarding alcohol consumption. The patient was counseled regarding use of illicit drugs. Vaccination for viral hepatitis A and B is not required. The patient has serologic evidence of prior exposure or vaccination with immunity. All of the above issues were discussed with the patient. All questions were answered. The patient expressed a clear understanding of the above. Follow-up [address] in 1 year with a FibroScan.     Amy Evans NP  Liver [place] [address]  Ph: [phone]  Fax: [phone]  Email: [email or web address]

## 2017-10-16 NOTE — MR AVS SNAPSHOT
Visit Information Date & Time Provider Department Dept. Phone Encounter #  
 10/16/2017  9:30 AM Cristela Martinez, [address] [facility] 219 [id number] Your Appointments 10/19/2017  9:10 AM  
ROUTINE CARE with Des Dotson MD  
[facility] Internal Medicine [address]) Appt Note: Routine visit [address] [place] [address]  
[phone]  
  
   
 [address] [address]  
  
    
 3/2/2018  9:45 AM  
ROUTINE CARE with Omero Inna, [address] [facility] [address]) Appt Note: 6 month follow up  
 [facility] [address] [address]  
[address] [facility] [address] Upcoming Health Maintenance Date Due DTaP/Tdap/Td series (1 - Tdap) 4/18/1970 GLAUCOMA SCREENING Q2Y 4/18/2014 INFLUENZA AGE 9 TO ADULT 8/1/2017 MEDICARE YEARLY EXAM 9/14/2017 COLONOSCOPY 8/31/2020 Allergies as of 10/16/2017  Review Complete On: 10/16/2017 By: Cristela Levin NP No Known Allergies Current Immunizations  Reviewed on 9/5/2014 Name Date H1N1 FLU VACCINE 2/22/2010 Influenza Vaccine Split 2/22/2010 Pneumococcal Conjugate (PCV-13) 8/30/2017 Pneumococcal Polysaccharide (PPSV-23) 9/3/2014 12:06 PM  
  
 Not reviewed this visit You Were Diagnosed With   
  
 Codes Comments Chronic hepatitis C without hepatic coma (HCC)    -  Primary ICD-10-CM: B18.2 ICD-9-CM: 070.54 Vitals BP Pulse Temp Weight(growth percentile) SpO2 BMI  
 129/76 75 98.5 °F (36.9 °C) (Oral) 215 lb 9.6 oz (97.8 kg) 98% 30.07 kg/m2 Smoking Status Current Every Day Smoker BMI and BSA Data Body Mass Index Body Surface Area 30.07 kg/m 2 2.21 m 2 Preferred Pharmacy Pharmacy Name Phone [address] Your Updated Medication List  
  
   
This list is accurate as of: 10/16/17  9:33 AM.  Always use your most recent med list.  
  
  
  
  
 ALEVE 220 mg tablet Generic drug:  naproxen sodium Take 220 mg by mouth as needed. amoxicillin-clavulanate 875-125 mg per tablet Commonly known as:  AUGMENTIN Take 1 Tab by mouth every twelve (12) hours. aspirin delayed-release 81 mg tablet Take  by mouth daily. COLACE 100 mg capsule Generic drug:  docusate sodium Take 100 mg by mouth two (2) times a day. guaiFENesin-codeine 100-10 mg/5 mL solution Commonly known as:  ROBITUSSIN AC Take 5 mL by mouth three (3) times daily as needed for Cough. Max Daily Amount: 15 mL. lisinopril 20 mg tablet Commonly known as:  Lesa [place] Take 1 Tab by mouth daily. MULTIVITAMIN PO Take  by mouth daily. nystatin topical cream  
Commonly known as:  MYCOSTATIN Apply  to affected area two (2) times a day. Appy to right axilla rash OMEGA 3 PO Take 1 Tab by mouth daily. rosuvastatin 5 mg tablet Commonly known as:  CRESTOR Take 1 Tab by mouth nightly. VITAMIN B-12 500 mcg tablet Generic drug:  cyanocobalamin Take 500 mcg by mouth daily. VITAMIN D3 2,000 unit Tab Generic drug:  cholecalciferol (vitamin D3) Take  by mouth. We Performed the Following CBC W/O DIFF [85489 CPT(R)] HCV RNA BY COLETTE QL,RFLX TO QT [02672 CPT(R)] METABOLIC PANEL, COMPREHENSIVE [54586 CPT(R)] To-Do List   
 10/16/2017 Imaging:  US ABD LTD Patient Instructions FIBROSCAN PATIENT INFORMATION What is Fibroscan:? 
 
Fibroscan is an ultrasound device that measures liver stiffness by sending a pulse of vibrations through the liver.   This translated into an immediate result that can help your healthcare team determine the level of damage to the liver as well as monitor the condition of various liver diseases over time. Fibroscan is helpful in the evaluation of the following conditions: 
 
Chronic Hepatitis C Chronic Hepatitis B Fatty Liver Disease Alcohol Liver Disease Chronic Cholestatic Liver Diseases What happens During the Scan? Patients receiving this exam lie flat on an examination table and raise the right arm above the head. The skin over the right lower rib cage is exposed and the examiner locates the correct area to be scanned. The prove of the scanner is placed directly on the patient and triggered to start. This fells like a gentle flick against the skin and should not be uncomfortable. At least ten (10) readings are taken and the average is calculated to score the amount of liver stiffness or scar tissue. The exam should take 10-20 minutes. What do I need to do to prepare for the scan? Please do not eat or drink anything 2-4 hours  Before your Fibroscan. You should continue taking any prescribed medication and can take small sips of water or clear fluid to do so,  But avoid drinking large amounts of fluid. Please dress comfortably in clothes that will allow for easy access to the right side of the abdomen. Women are discouraged from wearing a dress on the day of the exam. 
 
Are there any special precautions? Patients who are pregnant or have an implantable device (for example, pacemaker or defibrillator) should not have this exam performed. Patients with a significant amount of fat tissue in the area the probe is pressed may be unable to have test performed. Please provide this summary of care documentation to your next provider. Your primary care clinician is listed as Oscar LOYOLA. If you have any questions after today's visit, please call [phone].

## 2017-10-17 LAB
ALBUMIN SERPL-MCNC: 4.6 G/DL (ref 3.6–4.8)
ALBUMIN/GLOB SERPL: 1.3 {RATIO} (ref 1.2–2.2)
ALP SERPL-CCNC: 67 IU/L (ref 39–117)
ALT SERPL-CCNC: 22 IU/L (ref 0–44)
AST SERPL-CCNC: 28 IU/L (ref 0–40)
BILIRUB SERPL-MCNC: 0.7 MG/DL (ref 0–1.2)
BUN SERPL-MCNC: 14 MG/DL (ref 8–27)
BUN/CREAT SERPL: 18 (ref 10–24)
CALCIUM SERPL-MCNC: 9.5 MG/DL (ref 8.6–10.2)
CHLORIDE SERPL-SCNC: 99 MMOL/L (ref 96–106)
CO2 SERPL-SCNC: 22 MMOL/L (ref 18–29)
CREAT SERPL-MCNC: 0.78 MG/DL (ref 0.76–1.27)
GLOBULIN SER CALC-MCNC: 3.5 G/DL (ref 1.5–4.5)
GLUCOSE SERPL-MCNC: 91 MG/DL (ref 65–99)
HCV RNA SERPL QL NAA+PROBE: NEGATIVE
POTASSIUM SERPL-SCNC: 4.1 MMOL/L (ref 3.5–5.2)
PROT SERPL-MCNC: 8.1 G/DL (ref 6–8.5)
SODIUM SERPL-SCNC: 140 MMOL/L (ref 134–144)

## 2017-10-19 ENCOUNTER — HOSPITAL ENCOUNTER (OUTPATIENT)
Dept: LAB | Age: 67
Discharge: HOME | End: 2017-10-19
Payer: MEDICARE

## 2017-10-19 ENCOUNTER — OFFICE VISIT (OUTPATIENT)
Dept: INTERNAL MEDICINE CLINIC | Age: 67
End: 2017-10-19

## 2017-10-19 VITALS
OXYGEN SATURATION: 98 % | TEMPERATURE: 96.1 F | RESPIRATION RATE: 20 BRPM | SYSTOLIC BLOOD PRESSURE: 111 MMHG | WEIGHT: 212 LBS | BODY MASS INDEX: 29.68 KG/M2 | HEART RATE: 67 BPM | HEIGHT: 71 IN | DIASTOLIC BLOOD PRESSURE: 68 MMHG

## 2017-10-19 DIAGNOSIS — B20 HIV (HUMAN IMMUNODEFICIENCY VIRUS INFECTION) (HCC): Primary | ICD-10-CM

## 2017-10-19 PROCEDURE — 36415 COLL VENOUS BLD VENIPUNCTURE: CPT

## 2017-10-19 PROCEDURE — 87536 HIV-1 QUANT&REVRSE TRNSCRPJ: CPT

## 2017-10-19 PROCEDURE — 86592 SYPHILIS TEST NON-TREP QUAL: CPT

## 2017-10-19 PROCEDURE — 86361 T CELL ABSOLUTE COUNT: CPT

## 2017-10-19 NOTE — PROGRESS NOTES
Chief Complaint   Patient presents with    HIV     F/U     1. Have you been to the ER, urgent care clinic since your last visit? Hospitalized since your last visit? Yes--[facility] hernia surgery    2. Have you seen or consulted any other health care providers outside of the [address] [place] [address] since your last visit? Include any pap smears or colon screening. No       Works in construction part time.

## 2017-10-19 NOTE — PROGRESS NOTES
ID follow up    NAME:  Minerva Durán Sr.                      :   1950                       MRN:   [id number]   Date/Time:  10/19/2017 8:51 AM  Subjective: Follow up visit- last 2017  Renee Hinds is a 71 y.o. with a history of HEPC , HIV , IVDA   HIV was diagnosed in  and he is HAARt naive   Last seen 2017  LAst Vl 30 and CD4 366 ( 33%)   from 2017  Drop in Absolute number of cd4 from 800- 38% to 366 ( 33%)  ? Due to radiation  ? Progression - HIV Vl though is still undetectable    Had   Clari Oiler with mesh on 17. Treated for hepatitis c by April nahid and   with harvoni for three months (2015-2015) and he has sustained virological response. HAs been diagnosed with progressing prostate cancer-He had a repeat biopsy in 2016 which showed 3 areas in the left side with adenocarcinoma- has received radiation therapy   He is followed by .   He is followed by cardiology and diagnosed with peripheral arterial disease and will be getting angio some time in the future     He stopped IVDA  year ago - came off the methadone 12 months ago    He now c/o cough with yellow sputum- smoker and has been given augmentin by his PCP    Past Medical History:   Diagnosis Date    BPH     Cancer ([address] 2009    prostate biopsy revealed cancer    Erectile dysfunction     Essential hypertension, benign     Hepatitis C 2009    HIV positive ([address] 2009    Hypercholesterolemia     Ill-defined condition     PNEUMONIA/bronchitis hx    Memory disorder     Prostate CA ([address] 2010      Past Surgical History:   Procedure Laterality Date    COLONOSCOPY,REMV LESN,SNARE  2015         HX HEENT      gum surgery-for denture    HX HERNIA REPAIR       Inguinal Hernia Repair    HX HERNIA REPAIR  2017    Davinci recurrent RIHR with mesh    HX SKIN BIOPSY  11/16/15    left forearm/ upper arm biopsy       History   SH-smoker 4 /day  No alcohol  No cocaine in 1 1/2 year  No heroin 10 yrs  Meds reviewed     REVIEW OF SYSTEMS:     Const: negative fever, negative chills, has 40 pound weight gain  Eyes:  negative diplopia or visual changes, negative eye pain  ENT:  negative coryza, negative sore throat  : Thin stream, hesitancy  Skin:  negative for rash and pruritus  Heme: negative for easy bruising and gum/nose bleeding  MS: negative for myalgias, arthralgias, back pain and muscle weakness  Neurolo:negative for headaches, dizziness, vertigo, has some memory problems       Objective:   VITALS:    Visit Vitals    /68 (BP 1 Location: Right arm, BP Patient Position: Sitting)    Pulse 67    Temp 96.1 °F (35.6 °C) (Oral)    Resp 20    Ht 5' 11\" (1.803 m)    Wt 212 lb (96.2 kg)    SpO2 98%    BMI 29.57 kg/m2       PHYSICAL EXAM:   General:    Looks well  Head:   Normocephalic, without obvious abnormality, atraumatic. Eyes:   Conjunctivae clear, anicteric sclerae. Pupils are equal  Nose:  Nares normal. No drainage or sinus tenderness. Oral cavity- edentulous   Dentures top  Neck:  Supple, symmetrical,  no adenopathy, thyroid: non tender    no carotid bruit and no JVD. Back:    No CVA tenderness. Lungs:   Clear to auscultation bilaterally. No Wheezing or Rhonchi. No rales. Heart:   Regular rate and rhythm,  no murmur, rub or gallop. Abdomen:   Soft, non-tender,not distended. Bowel sounds normal. No masses  Extremities: Extremities normal, atraumatic, no cyanosis. No edema.  No clubbing  Skin:     Old scars   Lymph: Cervical, supraclavicular normal.  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 but   Cd4 dropping 366 ( 33%)  Will get labs today and decide whether to start HAART    Prostate C - followed by urology- s/p radiation     Hepc - Treated with [employer] and has SVR     IVDA- heavy cocaine user- none since 2 years     COPD- smoker- s/p spirometry and followed by his PCP     Memory loss-  much better now - off cocaine and has been treated for HEPC which could have made it better-     , HTn- on lisinopril- diet and exercise advised     peripheral arterial disease- followed by cardiology- started on statin  LAbs ( HIV/Cd4)  today   Safe sex reinforced  Follow up depends on the lab results    Discussed the management with him

## 2017-10-21 LAB
BASOPHILS # BLD AUTO: 0 X10E3/UL (ref 0–0.2)
BASOPHILS NFR BLD AUTO: 0 %
CD3+CD4+ CELLS # BLD: 522 /UL (ref 359–1519)
CD3+CD4+ CELLS NFR BLD: 34.8 % (ref 30.8–58.5)
EOSINOPHIL # BLD AUTO: 0.1 X10E3/UL (ref 0–0.4)
EOSINOPHIL NFR BLD AUTO: 3 %
ERYTHROCYTE [DISTWIDTH] IN BLOOD BY AUTOMATED COUNT: 13.3 % (ref 12.3–15.4)
HCT VFR BLD AUTO: 44.4 % (ref 37.5–51)
HGB BLD-MCNC: 15.3 G/DL (ref 12.6–17.7)
HIV1 RNA # SERPL NAA+PROBE: <20 COPIES/ML
HIV1 RNA SERPL NAA+PROBE-LOG#: NORMAL LOG10COPY/ML
IMM GRANULOCYTES # BLD: 0 X10E3/UL (ref 0–0.1)
IMM GRANULOCYTES NFR BLD: 0 %
LYMPHOCYTES # BLD AUTO: 1.5 X10E3/UL (ref 0.7–3.1)
LYMPHOCYTES NFR BLD AUTO: 29 %
MCH RBC QN AUTO: 32.2 PG (ref 26.6–33)
MCHC RBC AUTO-ENTMCNC: 34.5 G/DL (ref 31.5–35.7)
MCV RBC AUTO: 94 FL (ref 79–97)
MONOCYTES # BLD AUTO: 0.6 X10E3/UL (ref 0.1–0.9)
MONOCYTES NFR BLD AUTO: 11 %
NEUTROPHILS # BLD AUTO: 3.1 X10E3/UL (ref 1.4–7)
NEUTROPHILS NFR BLD AUTO: 57 %
PLATELET # BLD AUTO: 213 X10E3/UL (ref 150–379)
RBC # BLD AUTO: 4.75 X10E6/UL (ref 4.14–5.8)
RPR SER QL: NON REACTIVE
WBC # BLD AUTO: 5.4 X10E3/UL (ref 3.4–10.8)

## 2017-11-06 ENCOUNTER — HOSPITAL ENCOUNTER (OUTPATIENT)
Dept: ULTRASOUND IMAGING | Age: 67
Discharge: HOME | End: 2017-11-06
Attending: NURSE PRACTITIONER
Payer: MEDICARE

## 2017-11-06 DIAGNOSIS — B18.2 CHRONIC HEPATITIS C WITHOUT HEPATIC COMA (HCC): ICD-10-CM

## 2017-11-06 PROCEDURE — 76705 ECHO EXAM OF ABDOMEN: CPT

## 2018-02-15 ENCOUNTER — OFFICE VISIT (OUTPATIENT)
Dept: HEMATOLOGY | Age: 68
End: 2018-02-15

## 2018-02-15 VITALS
BODY MASS INDEX: 29.68 KG/M2 | WEIGHT: 212 LBS | OXYGEN SATURATION: 99 % | RESPIRATION RATE: 22 BRPM | HEART RATE: 80 BPM | HEIGHT: 71 IN | TEMPERATURE: 97 F | SYSTOLIC BLOOD PRESSURE: 135 MMHG | DIASTOLIC BLOOD PRESSURE: 74 MMHG

## 2018-02-15 DIAGNOSIS — B18.2 CHRONIC HEPATITIS C WITHOUT HEPATIC COMA (HCC): Primary | ICD-10-CM

## 2018-02-15 NOTE — PROGRESS NOTES
[address] E[address] Ricci MD, [address], [facility], [place]       ELLEN Smith PA-C Solon Quin, EMILYP-BC   ELLEN Garcia NP        at [place] [id number] [facility], [address]     [phone]     FAX: [phone]    at [address], [address]     [phone]     FAX: [phone]     Patient Care Team:  Matthew Palacios MD as PCP - Pernell Morales MD (Infectious Diseases)  Melissa Ochoa MD as Surgeon (General Surgery)  Burak Draper MD (Gastroenterology)  Samia Keys MD (Hepatology)  Cristela Plaza NP (Nurse Practitioner)  Thierry Castañeda MD (Cardiology)  Buddy Chowdary MD (Cardiology)     Patient Active Problem List   Diagnosis Code    Chronic hepatitis C ([facility] [place] [facility] B18.2    HIV positive ([facility] Z21    Weight loss, non-intentional R63.4    Prostate cancer ([facility] C61    Substance abuse F19.10    HTN (hypertension) I10    Colon polyp K63.5    Advanced care planning/counseling discussion Z71.89    Tobacco use Z72.0    PAD (peripheral artery disease) ([facility] [facility] I73.9    Dyslipidemia E78.5       Raymon Nohemi. returns to the [address] for management of chronic HCV and HIV co-infection. The active problem list, all pertinent past medical history, medications, radiologic findings and laboratory findings related to the liver disorder were reviewed with the patient. Ultrasound of the liver was performed in 12/2014. This suggests chronic liver disease. A 0.8 x 0.7 lesion was identified in the right lobe. MRI of the liver was performed in 1/2015 to further evaluate this area suggested changes consistent with chronic liver disease. No liver mass lesion was identified. The patient underwent a liver biopsy in 3/2015.  This demonstrates severe hepatitis with bridging fibrosis. Patient presents to the clinic today for a Fibroscan to reassess liver fibrosis or scarring after successful HCV treatment. Patient has completed 12 weeks of HCV treatment with Harvoni (5/29/2015-8/25/2015). He is a sustained virologic responder to treatment, or cured. Patient is co-infected with HIV. He is currently not on anti-retroviral therapy because his virus remains suppressed. Patient has no new physical complaints today. He continues to have ongoing fatigue but otherwise feels well. The patient completes all daily activities without any functional limitations. The patient has not experienced arthralgias, myalgias, problems concentrating, swelling of the abdomen, swelling of the lower extremities, hematemesis, or hematochezia. ALLERGIES  No Known Allergies    MEDICATIONS  Current Outpatient Prescriptions   Medication Sig    amoxicillin-clavulanate (AUGMENTIN) 875-125 mg per tablet Take 1 Tab by mouth every twelve (12) hours.  guaiFENesin-codeine (ROBITUSSIN AC) 100-10 mg/5 mL solution Take 5 mL by mouth three (3) times daily as needed for Cough. Max Daily Amount: 15 mL.  lisinopril (PRINIVIL, ZESTRIL) 20 mg tablet Take 1 Tab by mouth daily.  docusate sodium (COLACE) 100 mg capsule Take 100 mg by mouth two (2) times a day.  cyanocobalamin (VITAMIN B-12) 500 mcg tablet Take 500 mcg by mouth daily.  nystatin (MYCOSTATIN) topical cream Apply  to affected area two (2) times a day. Appy to right axilla rash    rosuvastatin (CRESTOR) 5 mg tablet Take 1 Tab by mouth nightly.  naproxen sodium (ALEVE) 220 mg tablet Take 220 mg by mouth as needed.  cholecalciferol, vitamin D3, (VITAMIN D3) 2,000 unit tab Take  by mouth.  aspirin delayed-release 81 mg tablet Take  by mouth daily.  MULTIVITAMINS (MULTIVITAMIN PO) Take  by mouth daily.  OMEGA-3 FATTY ACIDS (OMEGA 3 PO) Take 1 Tab by mouth daily. No current facility-administered medications for this visit. REVIEW OF SYSTEMS NOT RELATED TO LIVER DISEASE:  Constitution systems: Negative for fever, chills, weight gain, weight loss. Eyes: Negative for diplopia, visual changes, eye pain. ENT: Negative for sore throat, painful swallowing. Respiratory: Negative for cough, hemoptysis, dyspnea. Cardiology: Negative for chest pain, palpitations. GI:  Negative for constipation or diarrhea. : Negative for urinary frequency, dysuria and hematuria. Skin: Negative for rash. Hematology: Negative for easy bruising, bleeding from gums or nose. Musculo-skelatal: Negative for back pain, muscle pain, weakness. Neurologic: Negative for headaches, dizziness, vertigo, memory problems. Psychology: Negative for anxiety, depression. FAMILY HISTORY:  The father  of alcohol cirrhosis. The mother  of CVA. There are no other persons in the family with HCV or liver disease. SOCIAL HISTORY:  The patient is . The spouse has been tested for HCV and is positive. She was treated and achieved SVR. The patient has 3 children, and 7 grandchildren. The patient stopped using tobacco products in 2014. The patient has never consumed significant amounts of alcohol. The patient used to work in [employer]. PHYSICAL EXAMINATION:  Visit Vitals    /74    Pulse 80    Temp 97 °F (36.1 °C)    Resp 22    Ht 5' 11\" (1.803 m)    Wt 212 lb (96.2 kg)    SpO2 99%    BMI 29.57 kg/m2     General: No acute distress. Eyes: Sclera anicteric. ENT: No oral lesions. Thyroid normal.  Nodes: No adenopathy. Skin: No spider angiomata. No jaundice. No palmar erythema. Respiratory: Lungs clear to auscultation. Cardiovascular: Regular heart rate. No murmurs. No JVD. Abdomen: Soft non-tender. Liver size normal to percussion/palpation. Spleen not palpable. No obvious ascites. Extremities: No edema. No muscle wasting. No gross arthritic changes. Neurologic: Alert and oriented.  Cranial nerves grossly intact. No asterixsis. LABORATORY STUDIES:  [facility] [place] [facility] 10/16/2017 5/2/2017 4/25/2017   WBC 3.4 - 10.8 x10E3/uL 5.3  5.4   ANC 1.4 - 7.0 x10E3/uL      HGB 12.6 - 17.7 g/dL 15.3  14.9   HGB 12.1 - 17.0 GM/DL  16.0    HGB (iSTAT) 12.1 - 17.0 GM/DL  16.0     - 379 x10E3/uL 224  179   INR 0.8 - 1.2      AST 0 - 40 IU/L 28     ALT 0 - 44 IU/L 22     Alk Phos 39 - 117 IU/L 67     Bili, Total 0.0 - 1.2 mg/dL 0.7     Bili, Direct 0.00 - 0.40 mg/dL      Albumin 3.6 - 4.8 g/dL 4.6     BUN 8 - 27 mg/dL 14  8   BUN (iSTAT) 9 - 20 MG/DL  14    Creat 0.76 - 1.27 mg/dL 0.78  0.79   Creat (iSTAT) 0.6 - 1.3 MG/DL  0.8    Na 134 - 144 mmol/L 140  141   K 3.5 - 5.2 mmol/L 4.1  3.2 (L)   Cl 96 - 106 mmol/L 99  106   CO2 18 - 29 mmol/L 22  28   Glucose 65 - 99 mg/dL 91  95     A CMP, CBC and HCV RNA were ordered today. Will review results when available. SEROLOGIES:  Serologies Latest Ref Rn 10/10/2014 9/1/2014 8/27/2014   Hep A Ab, Total Negative Positive (A)     Hep B Surface Ag Negative Negative     Hep B Core Ab, Total Negative Positive (A)     Hep C Genotype See Note 1a     HCV RT-PCR, Quant  [id number]  [id number]   HCV Log10    6.830   Ferritin 30 - 400 ng/mL 172     Iron % Saturation 15 - 55 % 22     C-ANCA Neg:<1:20 titer  <1:20    P-ANCA Neg:<1:20 titer  <1:20    ANCA Neg:<1:20 titer  <1:20      LIVER HISTOLOGY:  3/2015. Slides reviewed by MLS. Severe hepatitis with bridging fibrosis and possible cirrhosis. Knodell score (3,3,3,3), Bro score 4, Metavir score 3.  2/2017. FibroScan performed at [address]. EkPa was 10.0. Suggested fibrosis level is F3.  2/2018. FibroScan performed at [address]. EkPa was 11.7. Suggested fibrosis level is F3. ENDOSCOPIC PROCEDURES:  Not available or performed    RADIOLOGY:  12/2014. Ultrasound of liver. Echogenic consistent with chronic liver disease. 0.8x0.7 cm lesion right lobe.  No dilated bile ducts. No ascites. 1/2015. Dynamic MRI liver. Changes consistent with chronic liver disease. No liver mass lesions. No dilated bile ducts. No bile duct strictures. No ascites. 11/2017. Ultrasound of liver. Cirrhotic morphology of the liver is again demonstrated. 12 mm echogenic nodule right hepatic lobe. This does not appear to reside in the same location as the prior smaller hyperechoic nodule. This may represent a hemangioma. OTHER TESTING:  Not available or performed    ASSESSMENT AND PLAN:  Chronic HCV with bridging fibrosis. This was confirmed with FibroScan today. Results were discussed in detail with patient. He has preserved liver function. Will continue to monitor patient regularly. HIV co-infection. He has low levels of HIV RNA. He is not taking anti-HIV medications. This is being regularly monitored by his ID physician. HCV treatment. Completed 12 weeks of Harvoni treatment in August 2015. He is cured. Fibroscan. Patient's liver biopsy in March 2015 demonstrated bridging fibrosis. He has been HCV negative for over a year. FibroScan today demonstrated bridging fibrosis with no progression or regression. Will have patient return every year to reassess his fibrosis with a Fibroscan. Patient verbalized understanding of this plan. [address] surveillance. Currently up to date. Ordered another US to rule out [address] in May 2018. The patient was directed to continue all current medications at the current dosages. There are no contraindications for the patient to take any medications that are necessary for treatment of other medical issues. The patient was counseled regarding alcohol consumption. The patient was counseled regarding use of illicit drugs. Vaccination for viral hepatitis A and B is not required. The patient has serologic evidence of prior exposure or vaccination with immunity. All of the above issues were discussed with the patient. All questions were answered.  The patient expressed a clear understanding of the above. Follow-up Juan [address] Levin [address] in 1 year with a FibroScan.     Aixa Dugan NP  Liver [place] [address]  Ph: [phone]  Fax: [phone]  Email: [email or web address]

## 2018-02-15 NOTE — MR AVS SNAPSHOT
[address] [facility] [phone] [address] [place] [address] 
[phone] Patient: Mahamed Zambrano Sr. MRN: [id number] VFL:1/71/3041 Visit Information Date & Time Provider Department Dept. Phone Encounter #  
 2/15/2018  9:00 AM April CHANTELLE Garzon, [address] [facility] [id number] Your Appointments 3/2/2018  9:45 AM  
ROUTINE CARE with Krishna Herrera, [address] [place] [address],[address] Floor [facility] Appt Note: 6 month follow up  
 [address] [address]  
[address] [place] [address] [address] [place] [address] Upcoming Health Maintenance Date Due DTaP/Tdap/Td series (1 - Tdap) 4/18/1970 GLAUCOMA SCREENING Q2Y 4/18/2014 Influenza Age 5 to Adult 8/1/2017 MEDICARE YEARLY EXAM 9/14/2017 COLONOSCOPY 8/31/2020 Allergies as of 2/15/2018  Review Complete On: 10/19/2017 By: Jarvis Heard LPN No Known Allergies Current Immunizations  Reviewed on 9/5/2014 Name Date H1N1 FLU VACCINE 2/22/2010 Influenza Vaccine Split 2/22/2010 Pneumococcal Conjugate (PCV-13) 8/30/2017 Pneumococcal Polysaccharide (PPSV-23) 9/3/2014 12:06 PM  
  
 Not reviewed this visit You Were Diagnosed With   
  
 Codes Comments Chronic hepatitis C without hepatic coma (HCC)    -  Primary ICD-10-CM: B18.2 ICD-9-CM: 070.54 Vitals BP Pulse Temp Resp Height(growth percentile) Weight(growth percentile) 135/74 80 97 °F (36.1 °C) 22 5' 11\" (1.803 m) 212 lb (96.2 kg) SpO2 BMI Smoking Status 99% 29.57 kg/m2 Current Every Day Smoker Vitals History BMI and BSA Data Body Mass Index Body Surface Area  
 29.57 kg/m 2 2.2 m 2 Preferred Pharmacy Pharmacy Name Phone [facility], [address] [place] [address] [address] [facility] [phone] Your Updated Medication List  
  
   
 This list is accurate as of: 2/15/18  9:25 AM.  Always use your most recent med list.  
  
  
  
  
 ALEVE 220 mg tablet Generic drug:  naproxen sodium Take 220 mg by mouth as needed. amoxicillin-clavulanate 875-125 mg per tablet Commonly known as:  AUGMENTIN Take 1 Tab by mouth every twelve (12) hours. aspirin delayed-release 81 mg tablet Take  by mouth daily. COLACE 100 mg capsule Generic drug:  docusate sodium Take 100 mg by mouth two (2) times a day. guaiFENesin-codeine 100-10 mg/5 mL solution Commonly known as:  ROBITUSSIN AC Take 5 mL by mouth three (3) times daily as needed for Cough. Max Daily Amount: 15 mL. lisinopril 20 mg tablet Commonly known as:  Rennis Douse Take 1 Tab by mouth daily. MULTIVITAMIN PO Take  by mouth daily. nystatin topical cream  
Commonly known as:  MYCOSTATIN Apply  to affected area two (2) times a day. Appy to right axilla rash OMEGA 3 PO Take 1 Tab by mouth daily. rosuvastatin 5 mg tablet Commonly known as:  CRESTOR Take 1 Tab by mouth nightly. VITAMIN B-12 500 mcg tablet Generic drug:  cyanocobalamin Take 500 mcg by mouth daily. VITAMIN D3 2,000 unit Tab Generic drug:  cholecalciferol (vitamin D3) Take  by mouth. We Performed the Following LIVER ELASTOGRAPHY W/O IMAG W/I&R [61277 CPT(R)] To-Do List   
 02/15/2018 Imaging:  US ABD LTD   
  
 02/13/2019 10:30 AM  
  Appointment with Cristela Plaza NP at [place] [address] ([phone]) Please provide this summary of care documentation to your next provider. Your primary care clinician is listed as Alise LOYOLA. If you have any questions after today's visit, please call [phone].

## 2018-02-15 NOTE — PROGRESS NOTES
1. Have you been to the ER, urgent care clinic since your last visit? Hospitalized since your last visit?no    2. Have you seen or consulted any other health care providers outside of the [address] [place] [address] since your last visit? Include any pap smears or colon screening.  No

## 2018-02-27 ENCOUNTER — HOSPITAL ENCOUNTER (OUTPATIENT)
Dept: ULTRASOUND IMAGING | Age: 68
Discharge: HOME | End: 2018-02-27
Attending: NURSE PRACTITIONER
Payer: MEDICARE

## 2018-02-27 DIAGNOSIS — B18.2 CHRONIC HEPATITIS C WITHOUT HEPATIC COMA (HCC): Primary | ICD-10-CM

## 2018-02-27 DIAGNOSIS — R16.0 LIVER MASS: ICD-10-CM

## 2018-02-27 DIAGNOSIS — B18.2 CHRONIC HEPATITIS C WITHOUT HEPATIC COMA (HCC): ICD-10-CM

## 2018-02-27 PROCEDURE — 76705 ECHO EXAM OF ABDOMEN: CPT

## 2018-03-06 ENCOUNTER — OFFICE VISIT (OUTPATIENT)
Dept: INTERNAL MEDICINE CLINIC | Age: 68
End: 2018-03-06

## 2018-03-06 VITALS
HEART RATE: 64 BPM | DIASTOLIC BLOOD PRESSURE: 70 MMHG | TEMPERATURE: 97.7 F | OXYGEN SATURATION: 98 % | BODY MASS INDEX: 30.24 KG/M2 | WEIGHT: 216 LBS | SYSTOLIC BLOOD PRESSURE: 121 MMHG | HEIGHT: 71 IN

## 2018-03-06 DIAGNOSIS — F40.240 CLAUSTROPHOBIA: Primary | ICD-10-CM

## 2018-03-06 DIAGNOSIS — I73.9 PAD (PERIPHERAL ARTERY DISEASE) (HCC): ICD-10-CM

## 2018-03-06 DIAGNOSIS — Z23 ENCOUNTER FOR IMMUNIZATION: ICD-10-CM

## 2018-03-06 DIAGNOSIS — Z00.00 MEDICARE ANNUAL WELLNESS VISIT, SUBSEQUENT: ICD-10-CM

## 2018-03-06 DIAGNOSIS — F17.200 SMOKER: ICD-10-CM

## 2018-03-06 DIAGNOSIS — C61 PROSTATE CANCER (HCC): ICD-10-CM

## 2018-03-06 DIAGNOSIS — I10 ESSENTIAL HYPERTENSION: Primary | ICD-10-CM

## 2018-03-06 DIAGNOSIS — G47.00 INSOMNIA, UNSPECIFIED TYPE: ICD-10-CM

## 2018-03-06 DIAGNOSIS — E78.5 DYSLIPIDEMIA: ICD-10-CM

## 2018-03-06 DIAGNOSIS — Z21 HIV POSITIVE (HCC): ICD-10-CM

## 2018-03-06 RX ORDER — VARENICLINE TARTRATE 1 MG/1
1 TABLET, FILM COATED ORAL 2 TIMES DAILY
Qty: 60 TAB | Refills: 1 | Status: SHIPPED | OUTPATIENT
Start: 2018-03-06 | End: 2018-06-04

## 2018-03-06 RX ORDER — DIAZEPAM 5 MG/1
TABLET ORAL
Qty: 2 TAB | Refills: 0 | OUTPATIENT
Start: 2018-03-06 | End: 2018-04-02 | Stop reason: SDUPTHER

## 2018-03-06 RX ORDER — VARENICLINE TARTRATE 25 MG
0.5 KIT ORAL
Qty: 1 DOSE PACK | Refills: 0 | Status: SHIPPED | OUTPATIENT
Start: 2018-03-06 | End: 2018-09-04

## 2018-03-06 NOTE — MR AVS SNAPSHOT
[address] [place] [address] 
[phone] Patient: Mandy Dorsey Sr. MRN: [id number] TBP:1/64/7573 Visit Information Date & Time Provider Department Dept. Phone Encounter #  
 3/6/2018  9:45 AM Mickey De La Rosa, [address] [place] [address],[address] [phone] [id number] Upcoming Health Maintenance Date Due DTaP/Tdap/Td series (1 - Tdap) 4/18/1970 GLAUCOMA SCREENING Q2Y 4/18/2014 Influenza Age 5 to Adult 8/1/2017 MEDICARE YEARLY EXAM 9/14/2017 COLONOSCOPY 8/31/2020 Allergies as of 3/6/2018  Review Complete On: 3/6/2018 By: Maria Davalos LPN No Known Allergies Current Immunizations  Reviewed on 9/5/2014 Name Date H1N1 FLU VACCINE 2/22/2010 Influenza High Dose Vaccine PF  Incomplete Influenza Vaccine Split 2/22/2010 Pneumococcal Conjugate (PCV-13) 8/30/2017 Pneumococcal Polysaccharide (PPSV-23) 9/3/2014 12:06 PM  
  
 Not reviewed this visit You Were Diagnosed With   
  
 Codes Comments Essential hypertension    -  Primary ICD-10-CM: I10 
ICD-9-CM: 401.9 Dyslipidemia     ICD-10-CM: E78.5 ICD-9-CM: 272.4 PAD (peripheral artery disease) (HCC)     ICD-10-CM: I73.9 ICD-9-CM: 443.9 HIV positive ([facility]     ICD-10-CM: M41 ICD-9-CM: V08 Encounter for immunization     ICD-10-CM: W13 ICD-9-CM: V03.89 Prostate cancer [facility])     ICD-10-CM: R23 ICD-9-CM: 134 Smoker     ICD-10-CM: G02.550 ICD-9-CM: 305.1 Vitals BP Pulse Temp Height(growth percentile) Weight(growth percentile) SpO2  
 121/70 (BP 1 Location: Left arm, BP Patient Position: Sitting) 64 97.7 °F (36.5 °C) (Oral) 5' 11\" (1.803 m) 216 lb (98 kg) 98% BMI Smoking Status 30.13 kg/m2 Current Every Day Smoker BMI and BSA Data Body Mass Index Body Surface Area  
 30.13 kg/m 2 2.22 m 2 Preferred Pharmacy Pharmacy Name Phone [facility], [address] [place] [address] [address] [facility] [phone] Your Updated Medication List  
  
   
This list is accurate as of 3/6/18 11:07 AM.  Always use your most recent med list.  
  
  
  
  
 ALEVE 220 mg tablet Generic drug:  naproxen sodium Take 220 mg by mouth as needed. aspirin delayed-release 81 mg tablet Take  by mouth daily. COLACE 100 mg capsule Generic drug:  docusate sodium Take 100 mg by mouth two (2) times a day. diazePAM 5 mg tablet Commonly known as:  VALIUM Take 1-2 tabs by mouth 30-45 minutes before MRI  
  
 lisinopril 20 mg tablet Commonly known as:  Leonila Currie Take 1 Tab by mouth daily. MULTIVITAMIN PO Take  by mouth daily. nystatin topical cream  
Commonly known as:  MYCOSTATIN Apply  to affected area two (2) times a day. Appy to right axilla rash OMEGA 3 PO Take 1 Tab by mouth daily. rosuvastatin 5 mg tablet Commonly known as:  CRESTOR Take 1 Tab by mouth nightly. * varenicline 0.5 mg (11)- 1 mg (42) Dspk Commonly known as:  CHANTIX STARTER CHRISTOPHER Take 0.5 mg by mouth two (2) times daily (after meals). * varenicline 1 mg tablet Commonly known as:  Trevon Castañeda Take 1 Tab by mouth two (2) times a day for 90 days. VITAMIN B-12 500 mcg tablet Generic drug:  cyanocobalamin Take 500 mcg by mouth daily. VITAMIN D3 2,000 unit Tab Generic drug:  cholecalciferol (vitamin D3) Take  by mouth. * Notice: This list has 2 medication(s) that are the same as other medications prescribed for you. Read the directions carefully, and ask your doctor or other care provider to review them with you. Prescriptions Sent to Pharmacy Refills  
 varenicline (CHANTIX STARTER CHRISTOPHER) 0.5 mg (11)- 1 mg (42) DsPk 0 Sig: Take 0.5 mg by mouth two (2) times daily (after meals).   
 Class: Normal  
 Pharmacy: [facility] [id number], [address] Ph #: [phone] Route: Oral  
 varenicline (CHANTIX) 1 mg tablet 1 Sig: Take 1 Tab by mouth two (2) times a day for 90 days. Class: Normal  
 Pharmacy: [facility] [id number], [address] Ph #: [phone] Route: Oral  
  
We Performed the Following INFLUENZA VIRUS VACCINE, HIGH DOSE SEASONAL, PRESERVATIVE FREE [78872 CPT(R)] LIPID PANEL [00323 CPT(R)] IA IMMUNIZ ADMIN,1 SINGLE/COMB VAC/TOXOID [id number] CPT(R)] REFERRAL TO UROLOGY [NYZ309 Custom] To-Do List   
 03/17/2018 8:15 AM  
  Appointment with [facility] MRI 1 at [facility] MRI Department ([phone]) **NPO Nothing to eat or drink 4-6 hours prior to your exam.**  1. Please bring a list or a bag of your current medications to your appointment 2. Please be sure to remove ALL hair clips, pins, extensions, etc., prior to arriving for your MRI procedure. 3. If you have any medical implants or devices, please bring associated medical card with you. 4. Bring any non [facility] films or CDs pertaining to the area being imaged with you on the day of appointment. 5. A written order with a valid diagnosis and Physicians signature is required for all scheduled tests. 6. Check in at registration 30min before your appointment time unless you were instructed to do otherwise. 02/13/2019 10:30 AM  
  Appointment with Cristela Ennis NP at [place] [address] ([phone]) Referral Information Referral ID Referred By Referred To  
  
 [id number] [facility] [address]   
   [place], [address] Visits Status Start Date End Date 1 New Request 3/6/18 3/6/19 If your referral has a status of pending review or denied, additional information will be sent to support the outcome of this decision. Patient Instructions Medicare Wellness Visit, Male The best way to live healthy is to have a healthy lifestyle by eating a well-balanced diet, exercising regularly, limiting alcohol and stopping smoking. Regular physical exams and screening tests are another way to keep healthy. Preventive exams provided by your health care provider can find health problems before they become diseases or illnesses. Preventive services including immunizations, screening tests, monitoring and exams can help you take care of your own health. All people over age 72 should have a pneumovax  and and a prevnar shot to prevent pneumonia. These are once in a lifetime unless you and your provider decide differently. All people over 65 should have a yearly flu shot and a tetanus vaccine every 10 years. Screening for diabetes mellitus with a blood sugar test should be done every year. Glaucoma is a disease of the eye due to increased ocular pressure that can lead to blindness and it should be done every year by an eye professional. 
 
Cardiovascular screening tests that check for elevated lipids (fatty part of blood) which can lead to heart disease and strokes should be done every 5 years. Colorectal screening that evaluates for blood or polyps in your colon should be done yearly as a stool test or every five years as a flexible sigmoidoscope or every 10 years as a colonoscopy up to age 76. Men up to age 76 may need a screening blood test for prostate cancer at certain intervals, depending on their personal and family history. This decision is between the patient and his provider. If you have been a smoker or had family history of abdominal aortic aneurysms, you and your provider may decide to schedule an ultrasound test of your aorta. Hepatitis C screening is also recommended for anyone born between [birth year] through [address]. A shingles vaccine is also recommended once in a lifetime after age 61. Your Medicare Wellness Exam is recommended annually. Here is a list of your current Health Maintenance items with a due date: 
Health Maintenance Due Topic Date Due  
 DTaP/Tdap/Td  (1 - Tdap) 04/18/1970  Glaucoma Screening   04/18/2014  Flu Vaccine  08/01/2017 [facility] Annual Well Visit  09/14/2017 Introducing [facility]! Dear Chuy [place]: Thank you for requesting a [employer] account. Our records indicate that you have previously registered for a [employer] account but its currently inactive. Please call our [employer] support line at [phone]. Additional Information If you have questions, please visit the Frequently Asked Questions section of the [employer] website at [email or web address] Remember, [employer] is NOT to be used for urgent needs. For medical emergencies, dial 911. Now available from your iPhone and Android! Please provide this summary of care documentation to your next provider. Your primary care clinician is listed as Aundrea LOYOLA. If you have any questions after today's visit, please call [phone].

## 2018-03-06 NOTE — PROGRESS NOTES
HISTORY OF PRESENT ILLNESS  Pooja Puentes is a 75 y.o. male. HPI     F/u HTN and HLD, hx prostate cancer and PAD and medicare wellness  Sees ID and Hepatologist  cd4 56 HIV RNA< 21 , hep c RNA neg  Has enlarging liver lesion in US-MRI ordered  Smoker 1/2 ppd , wants to try chantix again  No methadone, heroin or cocaine per pt  Works in construction  Takes naps in the day, awakes often at night , some nocturia  Needs f/u AMRITA DASH for hx prostate cancer    Last visit:  Restarted crestor  Anshul CROWE MD --and Rad/onc  S/p rad therapy x 7 weeks ended this year--Dr Rossana Carpenter  PSA dropped 8 to about 0.2 per pt  S/p treatment for Hep c -will get f/u [facility] specialist  Working on construction about 6 hrs per day  Had PAD with abnl GERALD left leg and has claudication sxs at about 4 blocks  Had recent right inguinal hernia repair   hiv suppressed -no treatmnet  Hep c -COLETTE negative last year    Patient Active Problem List    Diagnosis Date Noted    Tobacco use 12/06/2016    PAD [facility]peripheral artery disease) ([facility] 12/06/2016    Dyslipidemia 12/06/2016    Advanced care planning/counseling discussion 09/13/2016    Colon polyp 09/18/2015    HTN (hypertension) 01/27/2011    Substance abuse 12/25/2010    Prostate cancer ([facility] 02/22/2010    [facility] 06/30/2009    HIV positive ([facility] 06/30/2009    Weight loss, non-intentional 06/30/2009     Current Outpatient Prescriptions   Medication Sig Dispense Refill    amoxicillin-clavulanate (AUGMENTIN) 875-125 mg per tablet Take 1 Tab by mouth every twelve (12) hours. 14 Tab 0    guaiFENesin-codeine (ROBITUSSIN AC) 100-10 mg/5 mL solution Take 5 mL by mouth three (3) times daily as needed for Cough. Max Daily Amount: 15 mL. 100 mL 0    lisinopril (PRINIVIL, ZESTRIL) 20 mg tablet Take 1 Tab by mouth daily. 90 Tab 3    docusate sodium (COLACE) 100 mg capsule Take 100 mg by mouth two (2) times a day.       cyanocobalamin (VITAMIN B-12) 500 mcg tablet Take 500 mcg by mouth daily.      nystatin (MYCOSTATIN) topical cream Apply  to affected area two (2) times a day. Appy to right axilla rash 15 g 1    rosuvastatin (CRESTOR) 5 mg tablet Take 1 Tab by mouth nightly. 30 Tab 6    naproxen sodium (ALEVE) 220 mg tablet Take 220 mg by mouth as needed.  cholecalciferol, vitamin D3, (VITAMIN D3) 2,000 unit tab Take  by mouth.  aspirin delayed-release 81 mg tablet Take  by mouth daily.  MULTIVITAMINS (MULTIVITAMIN PO) Take  by mouth daily.  OMEGA-3 FATTY ACIDS (OMEGA 3 PO) Take 1 Tab by mouth daily. No Known Allergies   Lab Results  Component Value Date/Time   Glucose 91 10/16/2017 12:00 AM   Glucose (POC) 98 05/02/2017 07:50 AM   Glucose (POC) 95 09/04/2014 12:18 PM   LDL, calculated 100 (H) 02/17/2017 09:20 AM   Creatinine (POC) 0.8 05/02/2017 07:50 AM   Creatinine 0.78 10/16/2017 12:00 AM      Lab Results  Component Value Date/Time   Cholesterol, total 155 02/17/2017 09:20 AM   HDL Cholesterol 40 02/17/2017 09:20 AM   LDL, calculated 100 (H) 02/17/2017 09:20 AM   Triglyceride 74 02/17/2017 09:20 AM   CHOL/HDL Ratio 4.0 08/24/2014 05:06 AM     Lab Results  Component Value Date/Time   GFR est non-AA 93 10/16/2017 12:00 AM   GFRNA, POC >60 05/02/2017 07:50 AM   GFR est  10/16/2017 12:00 AM   GFRAA, POC >60 05/02/2017 07:50 AM   Creatinine 0.78 10/16/2017 12:00 AM   Creatinine (POC) 0.8 05/02/2017 07:50 AM   BUN 14 10/16/2017 12:00 AM   BUN (POC) 14 05/02/2017 07:50 AM   Sodium 140 10/16/2017 12:00 AM   Sodium (POC) 141 05/02/2017 07:50 AM   Potassium 4.1 10/16/2017 12:00 AM   Potassium (POC) 4.0 05/02/2017 07:50 AM   Chloride 99 10/16/2017 12:00 AM   Chloride (POC) 102 05/02/2017 07:50 AM   CO2 22 10/16/2017 12:00 AM   Magnesium 1.7 09/07/2014 12:32 AM   Phosphorus 3.3 09/03/2014 04:47 AM        ROS    Physical Exam   Constitutional: He appears well-developed and well-nourished. No distress. Appears stated age   HENT:   Head: Normocephalic.    Cardiovascular: Normal rate, regular rhythm and normal heart sounds. Exam reveals no gallop and no friction rub. No murmur heard. Pulmonary/Chest: Effort normal and breath sounds normal. No respiratory distress. He has no wheezes. He has no rales. He exhibits no tenderness. Abdominal: Soft. Musculoskeletal: He exhibits no edema. Neurological: He is alert. Psychiatric: He has a normal mood and affect. Nursing note and vitals reviewed. ASSESSMENT and PLAN  Diagnoses and all orders for this visit:    1. Essential hypertension    2. Dyslipidemia  -     LIPID PANEL    3. PAD (peripheral artery disease) ([facility]    4. HIV positive ([facility]    5. Encounter for immunization  -     ME IMMUNIZ ADMIN,1 SINGLE/COMB VAC/TOXOID  -     Influenza virus vaccine (FLUZONE HIGH-DOSE) 65 years and older    6. Prostate cancer ([facility]  -     REFERRAL TO UROLOGY    7. Smoker    8. Insomnia, unspecified type    9. Medicare annual wellness visit, subsequent    Other orders  -     varenicline (CHANTIX STARTER CHRISTOPHER) 0.5 mg (11)- 1 mg (42) DsPk; Take 0.5 mg by mouth two (2) times daily (after meals). -     varenicline (CHANTIX) 1 mg tablet; Take 1 Tab by mouth two (2) times a day for 90 days. Follow-up Disposition:  Return in about 6 months (around 9/6/2018) for htn hld smoker. This is a Subsequent Medicare Annual Wellness Exam (AWV) (Performed 12 months after IPPE or effective date of Medicare Part B enrollment)    I have reviewed the patient's medical history in detail and updated the computerized patient record.      History     Past Medical History:   Diagnosis Date    BPH     Cancer ([facility] 11/2009    prostate biopsy revealed cancer    Erectile dysfunction     Essential hypertension, benign     Hepatitis C 6/30/2009    HIV positive ([facility] 6/30/2009    Hypercholesterolemia     Ill-defined condition 9-2014    PNEUMONIA/bronchitis hx    Memory disorder     Prostate CA ([facility] 2/22/2010      Past Surgical History:   Procedure Laterality Date    Therman Genre  8/31/2015         HX HEENT      gum surgery-for denture    HX HERNIA REPAIR       Inguinal Hernia Repair    HX HERNIA REPAIR  05/02/2017    Davinci recurrent RIHR with mesh    HX SKIN BIOPSY  11/16/15    left forearm/ upper arm biopsy      Current Outpatient Prescriptions   Medication Sig Dispense Refill    varenicline (CHANTIX STARTER CHRISTOPHER) 0.5 mg (11)- 1 mg (42) DsPk Take 0.5 mg by mouth two (2) times daily (after meals). 1 Dose Pack 0    varenicline (CHANTIX) 1 mg tablet Take 1 Tab by mouth two (2) times a day for 90 days. 60 Tab 1    lisinopril (PRINIVIL, ZESTRIL) 20 mg tablet Take 1 Tab by mouth daily. 90 Tab 3    docusate sodium (COLACE) 100 mg capsule Take 100 mg by mouth two (2) times a day.  cyanocobalamin (VITAMIN B-12) 500 mcg tablet Take 500 mcg by mouth daily.  rosuvastatin (CRESTOR) 5 mg tablet Take 1 Tab by mouth nightly. 30 Tab 6    aspirin delayed-release 81 mg tablet Take  by mouth daily.  MULTIVITAMINS (MULTIVITAMIN PO) Take  by mouth daily.  OMEGA-3 FATTY ACIDS (OMEGA 3 PO) Take 1 Tab by mouth daily.  diazePAM (VALIUM) 5 mg tablet Take 1-2 tabs by mouth 30-45 minutes before MRI 2 Tab 0    nystatin (MYCOSTATIN) topical cream Apply  to affected area two (2) times a day. Appy to right axilla rash 15 g 1    naproxen sodium (ALEVE) 220 mg tablet Take 220 mg by mouth as needed.  cholecalciferol, vitamin D3, (VITAMIN D3) 2,000 unit tab Take  by mouth.        No Known Allergies  Family History   Problem Relation Age of Onset    Hypertension Mother     Mental Retardation Mother     Depression Mother     Anxiety Mother     Liver Disease Father     Lung Disease Father     Diabetes Maternal Grandmother     Hypertension Other     Stroke Other     Anxiety Other     Depression Other      Social History   Substance Use Topics    Smoking status: Current Every Day Smoker     Packs/day: 0.50     Years: 45.00 Types: Cigarettes    Smokeless tobacco: Never Used    Alcohol use No     Patient Active Problem List   Diagnosis Code    Chronic hepatitis C ([facility] B18.2    HIV positive ([facility] Z21    Weight loss, non-intentional R63.4    Prostate cancer ([facility] C61    Substance abuse F19.10    HTN (hypertension) I10    Colon polyp K63.5    Advanced care planning/counseling discussion Z71.89    Tobacco use Z72.0    PAD (peripheral artery disease) ([facility] I73.9    Dyslipidemia E78.5       Depression Risk Factor Screening:     PHQ over the last two weeks 3/6/2018   PHQ Not Done -   Little interest or pleasure in doing things Not at all   Feeling down, depressed or hopeless Not at all   Total Score PHQ 2 0     Alcohol Risk Factor Screening: You do not drink alcohol or very rarely. Functional Ability and Level of Safety:   Hearing Loss  Hearing is good. Activities of Daily Living  The home contains: no safety equipment. Patient does total self care    Fall Risk  Fall Risk Assessment, last 12 mths 3/6/2018   Able to walk? Yes   Fall in past 12 months? No       Abuse Screen  Patient is not abused    Cognitive Screening   Evaluation of Cognitive Function:  Has your family/caregiver stated any concerns about your memory: no  Normal    Patient Care Team   Patient Care Team:  Josep Duncan MD as PCP - Rosita oCrnelius MD (Infectious Diseases)  Jeanna Ybarra MD as Surgeon (General Surgery)  Maryam Thomas MD (Gastroenterology)  Eliana Yang MD (Hepatology)  Cristela Harvey, ELLEN (Nurse Practitioner)  Paulo Ramirez MD (Cardiology)  Kaylee Esquivel MD (Cardiology)    Assessment/Plan   Education and counseling provided:  Are appropriate based on today's review and evaluation  End-of-Life planning (with patient's consent)  Screening for glaucoma  tdap     Diagnoses and all orders for this visit:    1. Essential hypertension   Good control  2.  Dyslipidemia  -     LIPID PANEL   Continue statin  3. PAD (peripheral artery disease) ([facility]    4. HIV positive ([facility]   undetectable RNA-Dr Montemayor  5. Encounter for immunization  -     [place] IMMUNIZ ADMIN,1 SINGLE/COMB VAC/TOXOID  -     Influenza virus vaccine (FLUZONE HIGH-DOSE) 65 years and older    6. Prostate cancer ([facility]  -     REFERRAL TO UROLOGY    7. Smoker   rx chantix  8. Sleep disturbance   Try otc melatonin,  stop napping-discusssed    9. Liver lesion   MRI per hepatoligist  Other orders  -     varenicline (CHANTIX STARTER CHRISTOPHER) 0.5 mg (11)- 1 mg (42) DsPk; Take 0.5 mg by mouth two (2) times daily (after meals). -     varenicline (CHANTIX) 1 mg tablet; Take 1 Tab by mouth two (2) times a day for 90 days.         Health Maintenance Due   Topic Date Due    DTaP/Tdap/Td series (1 - Tdap) 04/18/1970    GLAUCOMA SCREENING Q2Y  04/18/2014    Influenza Age 5 to Adult  08/01/2017    MEDICARE YEARLY EXAM  09/14/2017

## 2018-03-06 NOTE — PATIENT INSTRUCTIONS

## 2018-03-20 ENCOUNTER — APPOINTMENT (OUTPATIENT)
Dept: INTERNAL MEDICINE CLINIC | Age: 68
End: 2018-03-20

## 2018-03-21 LAB
CHOLEST SERPL-MCNC: 175 MG/DL (ref 100–199)
HDLC SERPL-MCNC: 44 MG/DL
LDLC SERPL CALC-MCNC: 116 MG/DL (ref 0–99)
TRIGL SERPL-MCNC: 77 MG/DL (ref 0–149)
VLDLC SERPL CALC-MCNC: 15 MG/DL (ref 5–40)

## 2018-04-01 ENCOUNTER — HOSPITAL ENCOUNTER (OUTPATIENT)
Dept: MRI IMAGING | Age: 68
Discharge: HOME | End: 2018-04-01
Attending: NURSE PRACTITIONER

## 2018-04-01 DIAGNOSIS — B18.2 CHRONIC HEPATITIS C WITHOUT HEPATIC COMA (HCC): ICD-10-CM

## 2018-04-01 DIAGNOSIS — R16.0 LIVER MASS: ICD-10-CM

## 2018-04-02 DIAGNOSIS — F40.240 CLAUSTROPHOBIA: ICD-10-CM

## 2018-04-02 RX ORDER — DIAZEPAM 5 MG/1
TABLET ORAL
Qty: 2 TAB | Refills: 0 | OUTPATIENT
Start: 2018-04-02 | End: 2018-07-18 | Stop reason: SDUPTHER

## 2018-04-02 NOTE — TELEPHONE ENCOUNTER
Patient called in stating that he went for his MRI on 4/1/18, but they could not locate a vein to insert the dye and there was no nurse on staff. They have rescheduled his MRI for 4/4/18. Patient states that he needs a refill of:   Requested Prescriptions     Pending Prescriptions Disp Refills    diazePAM (VALIUM) 5 mg tablet 2 Tab 0     Sig: Take 1-2 tabs by mouth 30-45 minutes before MRI     For the MRI on 4/4/18 called in to the [facility] listed in his chart.

## 2018-04-04 ENCOUNTER — HOSPITAL ENCOUNTER (OUTPATIENT)
Dept: MRI IMAGING | Age: 68
Discharge: HOME | End: 2018-04-04
Attending: NURSE PRACTITIONER
Payer: MEDICAID

## 2018-04-04 VITALS — WEIGHT: 202 LBS | BODY MASS INDEX: 28.17 KG/M2

## 2018-04-04 LAB — CREAT BLD-MCNC: 0.6 MG/DL (ref 0.6–1.3)

## 2018-04-04 PROCEDURE — 82565 ASSAY OF CREATININE: CPT

## 2018-04-04 PROCEDURE — 74011000258 HC RX REV CODE- 258: Performed by: NURSE PRACTITIONER

## 2018-04-04 PROCEDURE — A9577 INJ MULTIHANCE: HCPCS | Performed by: NURSE PRACTITIONER

## 2018-04-04 PROCEDURE — 77030021566 MRI ABD W MRCP W WO CONT

## 2018-04-04 PROCEDURE — 74011250636 HC RX REV CODE- 250/636: Performed by: NURSE PRACTITIONER

## 2018-04-04 RX ADMIN — SODIUM CHLORIDE 100 ML: 900 INJECTION, SOLUTION INTRAVENOUS at 13:00

## 2018-04-04 RX ADMIN — GADOBENATE DIMEGLUMINE 20 ML: 529 INJECTION, SOLUTION INTRAVENOUS at 13:00

## 2018-04-13 ENCOUNTER — DOCUMENTATION ONLY (OUTPATIENT)
Dept: HEMATOLOGY | Age: 68
End: 2018-04-13

## 2018-04-13 RX ORDER — ROSUVASTATIN CALCIUM 5 MG/1
5 TABLET, COATED ORAL
Qty: 90 TAB | Refills: 3 | Status: SHIPPED | OUTPATIENT
Start: 2018-04-13 | End: 2019-05-08 | Stop reason: SDUPTHER

## 2018-04-13 NOTE — PROGRESS NOTES
Received a VM from pt's sister inquiring about MRI results. From the read and US, it appears new [facility] Spoke with April and she said she needs to review with MLS. Called pt back and advised him she knows the MRI has been completed but needs to review it with Dr. Miriam Greene and he has not been available this week. Advised she will call him as soon as she meets with him to discuss. Johanne Vasquez NP  9:48 AM

## 2018-04-13 NOTE — TELEPHONE ENCOUNTER
Mark Barrett, Sister, is requesting a refill for Crestor, and would like the Rx sent to [facility] DR BANDAR BAILEY (on file). Best contact number [phone].        Message received & copied from [facility]

## 2018-04-17 DIAGNOSIS — R16.0 LIVER MASS: ICD-10-CM

## 2018-04-17 DIAGNOSIS — B18.2 CHRONIC HEPATITIS C WITHOUT HEPATIC COMA (HCC): Primary | ICD-10-CM

## 2018-07-18 DIAGNOSIS — F40.240 CLAUSTROPHOBIA: ICD-10-CM

## 2018-07-18 RX ORDER — DIAZEPAM 5 MG/1
TABLET ORAL
Qty: 2 TAB | Refills: 0 | OUTPATIENT
Start: 2018-07-18 | End: 2018-09-25

## 2018-07-21 ENCOUNTER — HOSPITAL ENCOUNTER (OUTPATIENT)
Dept: MRI IMAGING | Age: 68
Discharge: HOME | End: 2018-07-21
Attending: NURSE PRACTITIONER
Payer: MEDICARE

## 2018-07-21 DIAGNOSIS — R16.0 LIVER MASS: ICD-10-CM

## 2018-07-21 DIAGNOSIS — B18.2 CHRONIC HEPATITIS C WITHOUT HEPATIC COMA (HCC): ICD-10-CM

## 2018-07-21 PROCEDURE — 74011000258 HC RX REV CODE- 258: Performed by: NURSE PRACTITIONER

## 2018-07-21 PROCEDURE — 74011250636 HC RX REV CODE- 250/636: Performed by: NURSE PRACTITIONER

## 2018-07-21 PROCEDURE — A9575 INJ GADOTERATE MEGLUMI 0.1ML: HCPCS | Performed by: NURSE PRACTITIONER

## 2018-07-21 PROCEDURE — 74183 MRI ABD W/O CNTR FLWD CNTR: CPT

## 2018-07-21 RX ORDER — GADOTERATE MEGLUMINE 376.9 MG/ML
11 INJECTION INTRAVENOUS
Status: COMPLETED | OUTPATIENT
Start: 2018-07-21 | End: 2018-07-21

## 2018-07-21 RX ORDER — SODIUM CHLORIDE 0.9 % (FLUSH) 0.9 %
10 SYRINGE (ML) INJECTION
Status: COMPLETED | OUTPATIENT
Start: 2018-07-21 | End: 2018-07-21

## 2018-07-21 RX ADMIN — GADOTERATE MEGLUMINE 11 ML: 376.9 INJECTION INTRAVENOUS at 09:00

## 2018-07-21 RX ADMIN — SODIUM CHLORIDE 100 ML: 900 INJECTION, SOLUTION INTRAVENOUS at 09:00

## 2018-07-21 RX ADMIN — Medication 10 ML: at 09:00

## 2018-07-23 DIAGNOSIS — R16.0 LIVER MASS: Primary | ICD-10-CM

## 2018-07-23 DIAGNOSIS — B18.2 CHRONIC HEPATITIS C WITHOUT HEPATIC COMA (HCC): ICD-10-CM

## 2018-07-23 NOTE — PROGRESS NOTES
Discussed the results with patient's sister. Repeat imaging (triple phase CT) in 3 months.     April

## 2018-09-02 NOTE — PROGRESS NOTES
HISTORY OF PRESENT ILLNESS  Matt Gomez is a 70 y.o. male. HPI   F/u HTN and HLD, hx prostate cancer and PAD   Had recent MRCP for right liver lesion--stable appearance , repeat in 3 mos per hepatologist  Had  f/u prostate cancer--PSA was repeated-Dr Argentina Arreaga 0.2  Had Hep C s/p Harvoni x 3 months 2015-cured  HIV Hep C coinfection-per ID Dr Belinda Aleman ( Hiv VL undetectable) and hepatolgist      C/o constipation x 4 days and sees blood on tissue after straining  Last colonoscopy 2015--plyps? But path negative for polyp-Dr mcdowell      Left calf pain when walking 5 blocks or uphill  Saw cardiologist in the past for this symtpom--Dr Rubalcava but did not get angio,stable sxs now    Request screening chest CT for lung cancer--1ppd smoker x 30-40 yrs , now 1/2ppd    Smokes< 1/2 ppd, could not taolerate chantix which caused sob sxs        Last OV  Sees ID and Hepatologist  cd4 56 HIV RNA< 21 , hep c RNA neg  Has enlarging liver lesion in US-MRI ordered  Smoker 1/2 ppd , wants to try chantix again  No methadone, heroin or cocaine per pt  Works in construction  Takes naps in the day, awakes often at night , some nocturia  Needs f/u  MD for hx prostate cancer       Patient Active Problem List    Diagnosis Date Noted    Tobacco use 12/06/2016    PAD (peripheral artery disease) ([facility] 12/06/2016    Dyslipidemia 12/06/2016    Advanced care planning/counseling discussion 09/13/2016    Colon polyp 09/18/2015    HTN (hypertension) 01/27/2011    Substance abuse 12/25/2010    Prostate cancer ([facility] 02/22/2010    Chronic hepatitis C ([facility] 06/30/2009    HIV positive ([facility] 06/30/2009    Weight loss, non-intentional 06/30/2009     Current Outpatient Prescriptions   Medication Sig Dispense Refill    diazePAM (VALIUM) 5 mg tablet Take 1-2 tabs by mouth 30-45 minutes before MRI 2 Tab 0    rosuvastatin (CRESTOR) 5 mg tablet Take 1 Tab by mouth nightly.  90 Tab 3    varenicline (CHANTIX STARTER CHRISTOPHER) 0.5 mg (11)- 1 mg (42) DsPk Take 0.5 mg by mouth two (2) times daily (after meals). 1 Dose Pack 0    lisinopril (PRINIVIL, ZESTRIL) 20 mg tablet Take 1 Tab by mouth daily. 90 Tab 3    docusate sodium (COLACE) 100 mg capsule Take 100 mg by mouth two (2) times a day.  cyanocobalamin (VITAMIN B-12) 500 mcg tablet Take 500 mcg by mouth daily.  nystatin (MYCOSTATIN) topical cream Apply  to affected area two (2) times a day. Appy to right axilla rash 15 g 1    naproxen sodium (ALEVE) 220 mg tablet Take 220 mg by mouth as needed.  cholecalciferol, vitamin D3, (VITAMIN D3) 2,000 unit tab Take  by mouth.  aspirin delayed-release 81 mg tablet Take  by mouth daily.  MULTIVITAMINS (MULTIVITAMIN PO) Take  by mouth daily.  OMEGA-3 FATTY ACIDS (OMEGA 3 PO) Take 1 Tab by mouth daily.        No Known Allergies   Lab Results  Component Value Date/Time   Glucose 91 10/16/2017 12:00 AM   Glucose (POC) 98 05/02/2017 07:50 AM   Glucose (POC) 95 09/04/2014 12:18 PM   LDL, calculated 116 (H) 03/20/2018 12:11 PM   Creatinine (POC) 0.6 04/04/2018 12:05 PM   Creatinine 0.78 10/16/2017 12:00 AM      Lab Results  Component Value Date/Time   Cholesterol, total 175 03/20/2018 12:11 PM   HDL Cholesterol 44 03/20/2018 12:11 PM   LDL, calculated 116 (H) 03/20/2018 12:11 PM   Triglyceride 77 03/20/2018 12:11 PM   CHOL/HDL Ratio 4.0 08/24/2014 05:06 AM     Lab Results  Component Value Date/Time   GFR est non-AA 93 10/16/2017 12:00 AM   GFRNA, POC >60 04/04/2018 12:05 PM   GFR est  10/16/2017 12:00 AM   GFRAA, POC >60 04/04/2018 12:05 PM   Creatinine 0.78 10/16/2017 12:00 AM   Creatinine (POC) 0.6 04/04/2018 12:05 PM   BUN 14 10/16/2017 12:00 AM   BUN (POC) 14 05/02/2017 07:50 AM   Sodium 140 10/16/2017 12:00 AM   Sodium (POC) 141 05/02/2017 07:50 AM   Potassium 4.1 10/16/2017 12:00 AM   Potassium (POC) 4.0 05/02/2017 07:50 AM   Chloride 99 10/16/2017 12:00 AM   Chloride (POC) 102 05/02/2017 07:50 AM   CO2 22 10/16/2017 12:00 AM Magnesium 1.7 09/07/2014 12:32 AM   Phosphorus 3.3 09/03/2014 04:47 AM        ROS    Physical Exam   Constitutional: He appears well-developed and well-nourished. No distress. Appears stated age   HENT:   Head: Normocephalic. Cardiovascular: Normal rate, regular rhythm and normal heart sounds. Exam reveals no gallop and no friction rub. No murmur heard. Pulmonary/Chest: Effort normal and breath sounds normal. No respiratory distress. He has no wheezes. He has no rales. He exhibits no tenderness. Abdominal: Soft. Musculoskeletal: He exhibits no edema. Neurological: He is alert. Psychiatric: He has a normal mood and affect. Nursing note and vitals reviewed. ASSESSMENT and PLAN  Diagnoses and all orders for this visit:    1. Essential hypertension  -     CBC W/O DIFF  -     METABOLIC PANEL, COMPREHENSIVE   controlled  2. Dyslipidemia  -     CBC W/O DIFF  -     METABOLIC PANEL, COMPREHENSIVE   Continue crestor  3. Chronic hepatitis C without hepatic coma ([facility]    4. HIV positive ([facility]   F/u ID MD  5. Liver lesion, right lobe   F/u hepatologist  6. Pain of left calf  -     Gm Card [facility]    7. PAD (peripheral artery disease) ([facility]    8. Hematochezia  -     Berta Mom [facility]    9. Encounter for immunization  -     Influenza Vaccine Inactivated (IIV) (FLUAD), Subunit, Adjuvanted, IM (89308)    10. Encounter for screening for lung cancer  -     CT LOW DOSE LUNG CANCER SCREENING; Future      Follow-up Disposition:  Return in about 1 month (around 10/4/2018) for results.

## 2018-09-04 ENCOUNTER — OFFICE VISIT (OUTPATIENT)
Dept: INTERNAL MEDICINE CLINIC | Age: 68
End: 2018-09-04

## 2018-09-04 VITALS
WEIGHT: 213 LBS | HEART RATE: 62 BPM | TEMPERATURE: 98.2 F | DIASTOLIC BLOOD PRESSURE: 76 MMHG | SYSTOLIC BLOOD PRESSURE: 133 MMHG | OXYGEN SATURATION: 98 % | HEIGHT: 71 IN | BODY MASS INDEX: 29.82 KG/M2

## 2018-09-04 DIAGNOSIS — K92.1 HEMATOCHEZIA: ICD-10-CM

## 2018-09-04 DIAGNOSIS — Z12.2 ENCOUNTER FOR SCREENING FOR LUNG CANCER: ICD-10-CM

## 2018-09-04 DIAGNOSIS — K76.9 LIVER LESION, RIGHT LOBE: ICD-10-CM

## 2018-09-04 DIAGNOSIS — M79.662 PAIN OF LEFT CALF: ICD-10-CM

## 2018-09-04 DIAGNOSIS — B18.2 CHRONIC HEPATITIS C WITHOUT HEPATIC COMA (HCC): ICD-10-CM

## 2018-09-04 DIAGNOSIS — E78.5 DYSLIPIDEMIA: ICD-10-CM

## 2018-09-04 DIAGNOSIS — Z23 ENCOUNTER FOR IMMUNIZATION: ICD-10-CM

## 2018-09-04 DIAGNOSIS — I73.9 PAD (PERIPHERAL ARTERY DISEASE) (HCC): ICD-10-CM

## 2018-09-04 DIAGNOSIS — Z21 HIV POSITIVE (HCC): ICD-10-CM

## 2018-09-04 DIAGNOSIS — I10 ESSENTIAL HYPERTENSION: Primary | ICD-10-CM

## 2018-09-04 RX ORDER — ZINC GLUCONATE 10 MG
LOZENGE ORAL
COMMUNITY
End: 2020-02-18

## 2018-09-04 NOTE — MR AVS SNAPSHOT
[address] [place] [id number] Patient: Minerva Durán Sr. MRN: [id number] SIJ:[birth year] Visit Information Date & Time Provider Department Dept. Phone Encounter #  
 9/4/2018  8:30 AM Marily Vegas, [address] [place] [address],[address] [phone] [id number] Follow-up Instructions Return in about 1 month (around 10/4/2018) for results. Upcoming Health Maintenance Date Due DTaP/Tdap/Td series (1 - Tdap) 4/18/1970 GLAUCOMA SCREENING Q2Y 4/18/2014 Influenza Age 5 to Adult 8/1/2018 MEDICARE YEARLY EXAM 3/7/2019 COLONOSCOPY 8/31/2020 Allergies as of 9/4/2018  Review Complete On: 9/4/2018 By: Vandana Vazquez LPN No Known Allergies Current Immunizations  Reviewed on 9/5/2014 Name Date H1N1 FLU VACCINE 2/22/2010 Influenza High Dose Vaccine PF 3/6/2018 Influenza Vaccine (Tri) Adjuvanted  Incomplete Influenza Vaccine Split 2/22/2010 Pneumococcal Conjugate (PCV-13) 8/30/2017 Pneumococcal Polysaccharide (PPSV-23) 9/3/2014 12:06 PM  
  
 Not reviewed this visit You Were Diagnosed With   
  
 Codes Comments Essential hypertension    -  Primary ICD-10-CM: I10 
ICD-9-CM: 401.9 Dyslipidemia     ICD-10-CM: E78.5 ICD-9-CM: 272.4 Chronic hepatitis C without hepatic coma (HCC)     ICD-10-CM: B18.2 ICD-9-CM: 070.54 HIV positive ([facility]     ICD-10-CM: A90 ICD-9-CM: V08 Liver lesion, right lobe     ICD-10-CM: K76.89 
ICD-9-CM: 573.8 Pain of left calf     ICD-10-CM: B90.416 ICD-9-CM: 729.5 PAD (peripheral artery disease) (HCC)     ICD-10-CM: I73.9 ICD-9-CM: 443.9 Hematochezia     ICD-10-CM: K92.1 ICD-9-CM: 578.1 Encounter for immunization     ICD-10-CM: E82 ICD-9-CM: V03.89 Encounter for screening for lung cancer     ICD-10-CM: Z12.2 ICD-9-CM: V76.0 Vitals BP Pulse Temp Height(growth percentile) Weight(growth percentile) SpO2 133/76 (BP 1 Location: Left arm, BP Patient Position: Sitting) 62 98.2 °F (36.8 °C) (Oral) 5' 11\" (1.803 m) 213 lb (96.6 kg) 98% BMI Smoking Status 29.71 kg/m2 Current Every Day Smoker BMI and BSA Data Body Mass Index Body Surface Area  
 29.71 kg/m 2 2.2 m 2 Preferred Pharmacy Pharmacy Name Phone [facility], [address] [place] [address] Street [facility] H[facility] [phone] Your Updated Medication List  
  
   
This list is accurate as of 9/4/18  9:09 AM.  Always use your most recent med list.  
  
  
  
  
 ALEVE 220 mg tablet Generic drug:  naproxen sodium Take 220 mg by mouth as needed. aspirin delayed-release 81 mg tablet Take  by mouth daily. COLACE 100 mg capsule Generic drug:  docusate sodium Take 100 mg by mouth two (2) times a day. diazePAM 5 mg tablet Commonly known as:  VALIUM Take 1-2 tabs by mouth 30-45 minutes before MRI  
  
 lisinopril 20 mg tablet Commonly known as:  Donnald Code Take 1 Tab by mouth daily. magnesium 250 mg Tab Take  by mouth. MULTIVITAMIN PO Take  by mouth daily. nystatin topical cream  
Commonly known as:  MYCOSTATIN Apply  to affected area two (2) times a day. Appy to right axilla rash OMEGA 3 PO Take 1 Tab by mouth daily. rosuvastatin 5 mg tablet Commonly known as:  CRESTOR Take 1 Tab by mouth nightly. VITAMIN B-12 500 mcg tablet Generic drug:  cyanocobalamin Take 500 mcg by mouth daily. VITAMIN D3 2,000 unit Tab Generic drug:  cholecalciferol (vitamin D3) Take  by mouth. We Performed the Following CBC W/O DIFF [49717 CPT(R)] INFLUENZA VACCINE INACTIVATED (IIV), SUBUNIT, ADJUVANTED, IM [id number] CPT(R)] METABOLIC PANEL, COMPREHENSIVE [10144 CPT(R)] REFERRAL TO CARDIOLOGY [FMG95 Custom] REFERRAL TO GASTROENTEROLOGY [ZLP26 Custom] Follow-up Instructions Return in about 1 month (around 10/4/2018) for results. To-Do List   
 09/11/2018 Imaging:  CT LOW DOSE LUNG CANCER SCREENING   
  
 10/25/2018 8:00 AM  
  Appointment with [facility] CT ER 1 at [facility] Bryce Rao ([phone]) CONTRAST STUDY: 1. The patient should not eat solid food four hours before the appointment but should be encouraged to drink clear liquids. 2.  If you have to drink oral contrast, please pick it up any weekday prior to your appointment, if you cannot please check in 2 hrs before appt time. 3.  The patient will require IV access for contrast administration. 4.  The patient should not take Ibuprofen (Advil, Motrin, etc.) and Naproxen Sodium (Aleve, etc.)  on the day of the exam. Stopping non-steroidal anti-inflammatory agents (NSAIDs) like Ibuprofen decreases the risk of kidney damage from the x-ray contrast (dye). 5.  Bring any non [facility] facility films/images pertaining to the area of interest with you on the day of appointment. 6.  Bring current lab work if available (within last 90 days CMP) ***If scheduled at [facility], iSTAT is not available, labs will need to be done before appointment*** 7. Check in at registration at least 30 minutes before appt time unless you were instructed to do otherwise. 02/13/2019 10:30 AM  
  Appointment with Cristela Ennis NP at [place] [address] ([phone]) Referral Information Referral ID Referred By Referred To  
  
 [id number] Matt Rocha MD   
   [address] Phone: [phone] Fax: [phone] Visits Status Start Date End Date 1 New Request 9/4/18 9/4/19 If your referral has a status of pending review or denied, additional information will be sent to support the outcome of this decision. Referral ID Referred By Referred To  
 [id number] NIR, [address] [facility] [address] [facility] Visits Status Start Date End Date 1 New Request 9/4/18 9/4/19 If your referral has a status of pending review or denied, additional information will be sent to support the outcome of this decision. Introducing [facility]! Leida Bhavin introduces SquadMail patient portal. Now you can access parts of your medical record, email your doctor's office, and request medication refills online. 1. In your internet browser, go to [email or web address] 2. Click on the First Time User? Click Here link in the Sign In box. You will see the New Member Sign Up page. 3. Enter your SquadMail Access Code exactly as it appears below. You will not need to use this code after youve completed the sign-up process. If you do not sign up before the expiration date, you must request a new code. · SquadMail Access Code: [email or web address] Expires: 10/18/2018 11:22 AM 
 
4. Enter the last four digits of your Social Security Number (xxxx) and Date of Birth (mm/dd/yyyy) as indicated and click Submit. You will be taken to the next sign-up page. 5. Create a SquadMail ID. This will be your SquadMail login ID and cannot be changed, so think of one that is secure and easy to remember. 6. Create a SquadMail password. You can change your password at any time. 7. Enter your Password Reset Question and Answer. This can be used at a later time if you forget your password. 8. Enter your e-mail address. You will receive e-mail notification when new information is available in [address] 9. Click Sign Up. You can now view and download portions of your medical record. 10. Click the Download Summary menu link to download a portable copy of your medical information. If you have questions, please visit the Frequently Asked Questions section of the SquadMail website. Remember, SquadMail is NOT to be used for urgent needs. For medical emergencies, dial 911. Now available from your iPhone and Android! Please provide this summary of care documentation to your next provider. Your primary care clinician is listed as Jaylyn LOYOLA. If you have any questions after today's visit, please call [phone].

## 2018-09-04 NOTE — PROGRESS NOTES
Chief Complaint   Patient presents with    Hypertension     6 month follow up    Cholesterol Problem     6 month follow up    Labs     6 month follow up    Constipation     talk about blood in stools (Bright red in color)    Leg Pain     left leg pain (cramp)    Medication Evaluation     chantix    Advice Only     discuss test to be perform     Abdominal Pain     RLQ ? surgery from hernia

## 2018-09-05 LAB
ALBUMIN SERPL-MCNC: 4.2 G/DL (ref 3.6–4.8)
ALBUMIN/GLOB SERPL: 1.4 {RATIO} (ref 1.2–2.2)
ALP SERPL-CCNC: 64 IU/L (ref 39–117)
ALT SERPL-CCNC: 17 IU/L (ref 0–44)
AST SERPL-CCNC: 20 IU/L (ref 0–40)
BILIRUB SERPL-MCNC: 0.4 MG/DL (ref 0–1.2)
BUN SERPL-MCNC: 12 MG/DL (ref 8–27)
BUN/CREAT SERPL: 17 (ref 10–24)
CALCIUM SERPL-MCNC: 9.3 MG/DL (ref 8.6–10.2)
CHLORIDE SERPL-SCNC: 101 MMOL/L (ref 96–106)
CO2 SERPL-SCNC: 24 MMOL/L (ref 20–29)
CREAT SERPL-MCNC: 0.71 MG/DL (ref 0.76–1.27)
ERYTHROCYTE [DISTWIDTH] IN BLOOD BY AUTOMATED COUNT: 14.3 % (ref 12.3–15.4)
GLOBULIN SER CALC-MCNC: 2.9 G/DL (ref 1.5–4.5)
GLUCOSE SERPL-MCNC: 100 MG/DL (ref 65–99)
HCT VFR BLD AUTO: 39.9 % (ref 37.5–51)
HGB BLD-MCNC: 13.8 G/DL (ref 13–17.7)
MCH RBC QN AUTO: 32.7 PG (ref 26.6–33)
MCHC RBC AUTO-ENTMCNC: 34.6 G/DL (ref 31.5–35.7)
MCV RBC AUTO: 95 FL (ref 79–97)
PLATELET # BLD AUTO: 167 X10E3/UL (ref 150–379)
POTASSIUM SERPL-SCNC: 3.9 MMOL/L (ref 3.5–5.2)
PROT SERPL-MCNC: 7.1 G/DL (ref 6–8.5)
RBC # BLD AUTO: 4.22 X10E6/UL (ref 4.14–5.8)
SODIUM SERPL-SCNC: 140 MMOL/L (ref 134–144)
WBC # BLD AUTO: 5.3 X10E3/UL (ref 3.4–10.8)

## 2018-09-18 ENCOUNTER — HOSPITAL ENCOUNTER (OUTPATIENT)
Dept: CT IMAGING | Age: 68
Discharge: HOME | End: 2018-09-18
Attending: INTERNAL MEDICINE
Payer: MEDICARE

## 2018-09-18 DIAGNOSIS — Z12.2 ENCOUNTER FOR SCREENING FOR LUNG CANCER: ICD-10-CM

## 2018-09-18 PROCEDURE — G0297 LDCT FOR LUNG CA SCREEN: HCPCS

## 2018-09-18 NOTE — PROGRESS NOTES
Sylvia or Katelyn Lala, tell pt on screening lung cancer CT - 4 mm lung nodule vs lymph node right lung--repeat CT is advised in 6 mos , however I would like for patient to see pulmonary MD for this , refer to Dr Aminata Talbot or Bossman Zaldivar please . Has appearance for COPD . Also has mod-severe CAD in some coronary arteries and should discuss with cardiologist this month Dr Beverly Rahman.

## 2018-09-21 NOTE — PROGRESS NOTES
I spoke with patient using two Identifiers name and date of birth. Patient was advised to follow up with his cardiologist concerning his  CAD per DR. Francie Joyce. Patient was informed about the findings of his  CT scan of lungs. I told patient that we will make an appointment with  Pulmonary doctor on Monday once done we will give him a call.

## 2018-09-24 NOTE — PROGRESS NOTES
Patient has been notified of his recent appointment with Internal Medicine, Pulmonary Disease. Date of appointment is 10/23/18 @ 8:00 am. Patient is to be seen 8:30 am. Patient was instructed   To bring insurance card and medications. I will faxed his recent CT scan of his lung. Patient was advised to  call their office for sooner appointment. The phone was given to patient to reach out.

## 2018-09-25 ENCOUNTER — TELEPHONE (OUTPATIENT)
Dept: INTERNAL MEDICINE CLINIC | Age: 68
End: 2018-09-25

## 2018-09-25 ENCOUNTER — TELEPHONE (OUTPATIENT)
Dept: CARDIOLOGY CLINIC | Age: 68
End: 2018-09-25

## 2018-09-25 ENCOUNTER — CLINICAL SUPPORT (OUTPATIENT)
Dept: CARDIOLOGY CLINIC | Age: 68
End: 2018-09-25

## 2018-09-25 ENCOUNTER — OFFICE VISIT (OUTPATIENT)
Dept: CARDIOLOGY CLINIC | Age: 68
End: 2018-09-25

## 2018-09-25 VITALS
OXYGEN SATURATION: 96 % | BODY MASS INDEX: 29.46 KG/M2 | DIASTOLIC BLOOD PRESSURE: 88 MMHG | RESPIRATION RATE: 18 BRPM | HEART RATE: 63 BPM | SYSTOLIC BLOOD PRESSURE: 128 MMHG | WEIGHT: 210.4 LBS | HEIGHT: 71 IN

## 2018-09-25 DIAGNOSIS — E78.5 DYSLIPIDEMIA: ICD-10-CM

## 2018-09-25 DIAGNOSIS — I73.9 PAD (PERIPHERAL ARTERY DISEASE) (HCC): Primary | ICD-10-CM

## 2018-09-25 DIAGNOSIS — I10 ESSENTIAL HYPERTENSION: ICD-10-CM

## 2018-09-25 DIAGNOSIS — Z72.0 TOBACCO USE: ICD-10-CM

## 2018-09-25 RX ORDER — ASPIRIN 81 MG/1
TABLET ORAL DAILY
COMMUNITY
End: 2020-02-18

## 2018-09-25 RX ORDER — CILOSTAZOL 50 MG/1
50 TABLET ORAL
Qty: 60 TAB | Refills: 4 | Status: SHIPPED | OUTPATIENT
Start: 2018-09-25 | End: 2019-05-19 | Stop reason: SDUPTHER

## 2018-09-25 NOTE — MR AVS SNAPSHOT
Dalton Fernandes [address] [facility] 
[phone] Patient: Vivienne Cristina Sr. MRN: [id number] DIK:9/85/6203 Visit Information Date & Time Provider Department Dept. Phone Encounter #  
 9/25/2018  2:30 PM Estella Richardson, [address] [facility] [phone] [id number] Follow-up Instructions Return in about 3 months (around 12/25/2018). Routing History Follow-up and Disposition History Your Appointments 10/5/2018  8:45 AM  
ROUTINE CARE with Claudio Bull, [address] [facility]) Appt Note: 1 mon F/U for results. [facility] [address]  
  
   
 [facility] [address]  [address] [id number]  
  
    
 1/8/2019  1:30 PM  
ESTABLISHED PATIENT with Estella Richardson MD  
[facility]) Appt Note: 3 month f/u  
 [address] [facility]  
[phone] [address] [facility] Upcoming Health Maintenance Date Due DTaP/Tdap/Td series (1 - Tdap) 4/18/1970 Shingrix Vaccine Age 50> (1 of 2) 4/18/1999 GLAUCOMA SCREENING Q2Y 4/18/2014 COLONOSCOPY 8/31/2020 Allergies as of 9/25/2018  Review Complete On: 9/25/2018 By: Estella Richardson MD  
 No Known Allergies Current Immunizations  Reviewed on 9/5/2014 Name Date H1N1 FLU VACCINE 2/22/2010 Influenza High Dose Vaccine PF 3/6/2018 Influenza Vaccine (Tri) Adjuvanted 9/4/2018 Influenza Vaccine Split 2/22/2010 Pneumococcal Conjugate (PCV-13) 8/30/2017 Pneumococcal Polysaccharide (PPSV-23) 9/3/2014 12:06 PM  
  
 Not reviewed this visit You Were Diagnosed With   
  
 Codes Comments PAD (peripheral artery disease) (HCC)    -  Primary ICD-10-CM: I73.9 ICD-9-CM: 443.9  Essential hypertension     ICD-10-CM: I10 
 ICD-9-CM: 401.9 Dyslipidemia     ICD-10-CM: E78.5 ICD-9-CM: 272.4 Tobacco use     ICD-10-CM: Z72.0 ICD-9-CM: 305.1 Vitals BP Pulse Resp Height(growth percentile) Weight(growth percentile) SpO2  
 128/88 (BP 1 Location: Right arm, BP Patient Position: Sitting) 63 18 5' 11\" (1.803 m) 210 lb 6.4 oz (95.4 kg) 96% BMI Smoking Status 29.34 kg/m2 Current Every Day Smoker Vitals History BMI and BSA Data Body Mass Index Body Surface Area  
 29.34 kg/m 2 2.19 m 2 Preferred Pharmacy Pharmacy Name Phone [address] [facility], [address] [place] [address] Street [facility] [place] H[address] [facility] [phone] Your Updated Medication List  
  
   
This list is accurate as of 9/25/18  3:05 PM.  Always use your most recent med list.  
  
  
  
  
 ALEVE 220 mg tablet Generic drug:  naproxen sodium Take 220 mg by mouth as needed. aspirin delayed-release 81 mg tablet Take  by mouth daily. cilostazol 50 mg tablet Commonly known as:  PLETAL Take 1 Tab by mouth Before breakfast and dinner. Indications: INTERMITTENT CLAUDICATION  
  
 COLACE 100 mg capsule Generic drug:  docusate sodium Take 100 mg by mouth two (2) times daily as needed. lisinopril 20 mg tablet Commonly known as:  Donna Loss Take 1 Tab by mouth daily. magnesium 250 mg Tab Take  by mouth. MULTIVITAMIN PO Take  by mouth daily. OMEGA 3 PO Take 1 Tab by mouth daily. rosuvastatin 5 mg tablet Commonly known as:  CRESTOR Take 1 Tab by mouth nightly. VITAMIN B-12 500 mcg tablet Generic drug:  cyanocobalamin Take 500 mcg by mouth daily. Prescriptions Sent to Pharmacy Refills  
 cilostazol (PLETAL) 50 mg tablet 4 Sig: Take 1 Tab by mouth Before breakfast and dinner. Indications: INTERMITTENT CLAUDICATION  Class: Normal  
 Pharmacy: [facility] [id number] [place], [place] - [address] [place] [address] #: [phone] Route: Oral  
  
Follow-up Instructions Return in about 3 months (around 12/25/2018). To-Do List   
 09/25/2018 Imaging:  ANKLE BRACHIAL INDEX   
  
 10/25/2018 8:00 AM  
  Appointment with [facility] CT ER 1 at [facility] Bryce Rao ([phone]) CONTRAST STUDY: 1. The patient should not eat solid food four hours before the appointment but should be encouraged to drink clear liquids. 2.  If you have to drink oral contrast, please pick it up any weekday prior to your appointment, if you cannot please check in 2 hrs before appt time. 3.  The patient will require IV access for contrast administration. 4.  The patient should not take Ibuprofen (Advil, Motrin, etc.) and Naproxen Sodium (Aleve, etc.)  on the day of the exam. Stopping non-steroidal anti-inflammatory agents (NSAIDs) like Ibuprofen decreases the risk of kidney damage from the x-ray contrast (dye). 5.  Bring any non [facility] facility films/images pertaining to the area of interest with you on the day of appointment. 6.  Bring current lab work if available (within last 90 days CMP) 7. Check in at registration at least 30 minutes before appt time unless you were instructed to do otherwise. 02/13/2019 10:30 AM  
  Appointment with Cristela Ennis NP at [place] [address] ([phone]) [facility]! Dear John Hardy: Thank you for requesting a Social Median account. Our records indicate that you already have an active Social Median account. You can access your account anytime at [email or web address] Did you know that you can access your hospital and ER discharge instructions at any time in Social Median? You can also review all of your test results from your hospital stay or ER visit. Additional Information If you have questions, please visit the Frequently Asked Questions section of the Social Median website at [email or web address] Remember, [employer] is NOT to be used for urgent needs. For medical emergencies, dial 911. Now available from your iPhone and Android! Please provide this summary of care documentation to your next provider. Your primary care clinician is listed as Marcelle Cowden LEE. If you have any questions after today's visit, please call [phone].

## 2018-09-25 NOTE — PROGRESS NOTES
1. Have you been to the ER, urgent care clinic since your last visit? Hospitalized since your last visit? No.    2. Have you seen or consulted any other health care providers outside of the [address] [place] [address] since your last visit? Include any pap smears or colon screening.    Has appt with Pulmonary Dr. Donald Owen Complaint   Patient presents with    Other     last seen in 2016-vascular consult-lf leg calf pain, burning during walking, discoloration

## 2018-09-25 NOTE — PROGRESS NOTES
[address] [place] [address], [address] [facility]  [phone]     Subjective:      Chadwick Weaver is a 70 y.o. male is here for routine f/u. Reports left calf pain after walking 5-6 blocks. The patient denies chest pain/ shortness of breath, orthopnea, PND, LE edema, palpitations, syncope, or presyncope.        Patient Active Problem List    Diagnosis Date Noted    Tobacco use 12/06/2016    PAD (peripheral artery disease) ([address] 12/06/2016    Dyslipidemia 12/06/2016    Advanced care planning/counseling discussion 09/13/2016    Colon polyp 09/18/2015    HTN (hypertension) 01/27/2011    Substance abuse 12/25/2010    Prostate cancer ([address] 02/22/2010    Chronic hepatitis C ([address] 06/30/2009    HIV positive ([facility] 06/30/2009    Weight loss, non-intentional 06/30/2009      Matt Ibarra MD  Past Medical History:   Diagnosis Date    BPH     Cancer [facility]) 11/2009    prostate biopsy revealed cancer    Erectile dysfunction     Essential hypertension, benign     Hepatitis C 6/30/2009    HIV positive ([address] 6/30/2009    Hypercholesterolemia     Ill-defined condition 9-2014    PNEUMONIA/bronchitis hx    Memory disorder     Prostate CA ([address] 2/22/2010      Past Surgical History:   Procedure Laterality Date    COLONOSCOPY,JOSÉ ANTONIO MATA,SNARE  8/31/2015         HX HEENT      gum surgery-for denture    HX HERNIA REPAIR       Inguinal Hernia Repair    HX HERNIA REPAIR  05/02/2017    Davinci recurrent RIHR with mesh    HX SKIN BIOPSY  11/16/15    left forearm/ upper arm biopsy      No Known Allergies   Family History   Problem Relation Age of Onset    Hypertension Mother     Mental Retardation Mother     Depression Mother     Anxiety Mother     Liver Disease Father     Lung Disease Father     Diabetes Maternal Grandmother     Hypertension Other     Stroke Other     Anxiety Other     Depression Other       Social History     Social History    Marital status: LEGALLY      Spouse name: N/A    Number of children: N/A    Years of education: N/A     Occupational History    Not on file. Social History Main Topics    Smoking status: Current Every Day Smoker     Packs/day: 1.00     Years: 45.00     Types: Cigarettes    Smokeless tobacco: Never Used      Comment: 1 pack daily    Alcohol use No    Drug use: Yes     Special: Heroin, Prescription, Cocaine      Comment: none in past 20 years per pt on 5/2/17    Sexual activity: Yes     Partners: Female      Comment: radu has 3 children     Other Topics Concern    Not on file     Social History Narrative      Current Outpatient Prescriptions   Medication Sig    magnesium 250 mg tab Take  by mouth.  rosuvastatin (CRESTOR) 5 mg tablet Take 1 Tab by mouth nightly. (Patient taking differently: Take 5 mg by mouth nightly. Indications: every other day)    lisinopril (PRINIVIL, ZESTRIL) 20 mg tablet Take 1 Tab by mouth daily.  docusate sodium (COLACE) 100 mg capsule Take 100 mg by mouth two (2) times daily as needed.  cyanocobalamin (VITAMIN B-12) 500 mcg tablet Take 500 mcg by mouth daily.  naproxen sodium (ALEVE) 220 mg tablet Take 220 mg by mouth as needed.  MULTIVITAMINS (MULTIVITAMIN PO) Take  by mouth daily.  OMEGA-3 FATTY ACIDS (OMEGA 3 PO) Take 1 Tab by mouth daily.  diazePAM (VALIUM) 5 mg tablet Take 1-2 tabs by mouth 30-45 minutes before MRI    nystatin (MYCOSTATIN) topical cream Apply  to affected area two (2) times a day. Appy to right axilla rash    cholecalciferol, vitamin D3, (VITAMIN D3) 2,000 unit tab Take  by mouth.  aspirin delayed-release 81 mg tablet Take  by mouth daily. No current facility-administered medications for this visit. Review of Symptoms:  11 systems reviewed, negative other than as stated in the HPI    Physical ExamPhysical Exam:    Vitals:    09/25/18 1419   Weight: 210 lb 6.4 oz (95.4 kg)   Height: 5' 11\" (1.803 m)     Body mass index is 29.34 kg/(m^2).   General PE Gen:  NAD  Mental Status - Alert. General Appearance - Not in acute distress. Chest and Lung Exam   Inspection: Accessory muscles - No use of accessory muscles in breathing. Auscultation:   Breath sounds: - Normal.   Cardiovascular   Inspection: Jugular vein - Bilateral - Inspection Normal.   Palpation/Percussion:   Apical Impulse: - Normal.   Auscultation: Rhythm - Regular. Heart Sounds - S1 WNL and S2 WNL. No S3 or S4. Murmurs & Other Heart Sounds: Auscultation of the heart reveals - No Murmurs. Peripheral Vascular   Upper Extremity: Inspection - Bilateral - No Cyanotic nailbeds or Digital clubbing. Lower Extremity:   Palpation: Edema - Bilateral - No edema. Abdomen:   Soft, non-tender, bowel sounds are active.   Neuro: A&O times 3, CN and motor grossly WNL    Labs:   Lab Results   Component Value Date/Time    Cholesterol, total 175 03/20/2018 12:11 PM    Cholesterol, total 155 02/17/2017 09:20 AM    Cholesterol, total 232 (H) 10/04/2016 10:08 AM    Cholesterol, total 225 (H) 09/18/2015 11:21 AM    Cholesterol, total 121 08/24/2014 05:06 AM    HDL Cholesterol 44 03/20/2018 12:11 PM    HDL Cholesterol 40 02/17/2017 09:20 AM    HDL Cholesterol 45 10/04/2016 10:08 AM    HDL Cholesterol 75 09/18/2015 11:21 AM    HDL Cholesterol 30 08/24/2014 05:06 AM    LDL, calculated 116 (H) 03/20/2018 12:11 PM    LDL, calculated 100 (H) 02/17/2017 09:20 AM    LDL, calculated 170 (H) 10/04/2016 10:08 AM    LDL, calculated 122 (H) 09/18/2015 11:21 AM    LDL, calculated 69.4 08/24/2014 05:06 AM    Triglyceride 77 03/20/2018 12:11 PM    Triglyceride 74 02/17/2017 09:20 AM    Triglyceride 83 10/04/2016 10:08 AM    Triglyceride 141 09/18/2015 11:21 AM    Triglyceride 108 08/24/2014 05:06 AM    CHOL/HDL Ratio 4.0 08/24/2014 05:06 AM     Lab Results   Component Value Date/Time    CK 29 (L) 08/29/2014 12:12 AM     Lab Results   Component Value Date/Time    Sodium 140 09/04/2018 09:26 AM    Potassium 3.9 09/04/2018 09:26 AM Chloride 101 09/04/2018 09:26 AM    CO2 24 09/04/2018 09:26 AM    Anion gap 7 04/25/2017 02:27 PM    Glucose 100 (H) 09/04/2018 09:26 AM    BUN 12 09/04/2018 09:26 AM    Creatinine 0.71 (L) 09/04/2018 09:26 AM    BUN/Creatinine ratio 17 09/04/2018 09:26 AM    GFR est  09/04/2018 09:26 AM    GFR est non-AA 96 09/04/2018 09:26 AM    Calcium 9.3 09/04/2018 09:26 AM    Bilirubin, total 0.4 09/04/2018 09:26 AM    AST (SGOT) 20 09/04/2018 09:26 AM    Alk. phosphatase 64 09/04/2018 09:26 AM    Protein, total 7.1 09/04/2018 09:26 AM    Albumin 4.2 09/04/2018 09:26 AM    Globulin 5.8 (H) 09/07/2014 12:32 AM    A-G Ratio 1.4 09/04/2018 09:26 AM    ALT (SGPT) 17 09/04/2018 09:26 AM       EKG:       Assessment:     Assessment:      1. PAD (peripheral artery disease) ([address]    2. Essential hypertension    3. Dyslipidemia    4. Tobacco use        No orders of the defined types were placed in this encounter. Plan:     Pt presents for long f/u. Was last seen in 12/16 for PAD. 1.  PAD: Reports left calf pain after walking  5-6 blocks. GERALD study in 2016 showed moderate PAD resting and severe PAD post exercise left leg and normal resting /  Mild PAD post exercise R leg. Will repeat GERALD. Initiate Pletal 50 mg BID. 2. Current 1 PPD smoker: Smoking cessation. Lung CT 9/18 : screening lung cancer CT - 4 mm lung nodule vs lymph node right lung--was referred to see pulmonary and has an appointment with them this October. 3. HTN: Controlled with Lisinopril  4. Hyperlipidemia: 3/18 . On Crestor. Lipids and labs followed by PCP. 5.  CAD: NEEDS to f/u with Dr Danette Castellano. On ASA, statin    Continue current care and f/u in 3 months    Armani Other, NP       Pt seen and examined in details. Agree with NP A&P. Significant left SFA stenosis on last non invasive evaluation. Since LE claudication is mild to moderate will try pletal and f/u in 3 months. D/w pt.      Adriana Collins MD

## 2018-09-25 NOTE — TELEPHONE ENCOUNTER
Mrs. Alphonso Candelario, Pt sister, requesting a call back in regards to if pt has an appointment to see a lung doctor. Mrs. Do Postal best contact number is [phone].          Message received & copied from [facility]

## 2018-09-27 NOTE — TELEPHONE ENCOUNTER
Spoke with patients sister on HIPPA after 2 patient identifiers being note and advised Date of appointment is with pulmonary MD is 10/23/18 @ 8:00 am. Patient is to be seen 8:30 am. Patient was instructed   To bring insurance card and medications. I will faxed his recent CT scan of his lung. Patients sister was advised tocall their office for sooner appointment. . Patient expressed understanding and has no further questions at this time.

## 2018-09-28 ENCOUNTER — TELEPHONE (OUTPATIENT)
Dept: CARDIOLOGY CLINIC | Age: 68
End: 2018-09-28

## 2018-10-09 ENCOUNTER — OFFICE VISIT (OUTPATIENT)
Dept: CARDIOLOGY CLINIC | Age: 68
End: 2018-10-09

## 2018-10-09 VITALS
SYSTOLIC BLOOD PRESSURE: 118 MMHG | DIASTOLIC BLOOD PRESSURE: 80 MMHG | RESPIRATION RATE: 18 BRPM | WEIGHT: 217.3 LBS | OXYGEN SATURATION: 96 % | HEIGHT: 71 IN | HEART RATE: 74 BPM | BODY MASS INDEX: 30.42 KG/M2

## 2018-10-09 DIAGNOSIS — I73.9 PAD (PERIPHERAL ARTERY DISEASE) (HCC): ICD-10-CM

## 2018-10-09 DIAGNOSIS — R53.83 FATIGUE, UNSPECIFIED TYPE: ICD-10-CM

## 2018-10-09 DIAGNOSIS — R06.09 DOE (DYSPNEA ON EXERTION): ICD-10-CM

## 2018-10-09 DIAGNOSIS — E78.5 DYSLIPIDEMIA: ICD-10-CM

## 2018-10-09 DIAGNOSIS — I10 ESSENTIAL HYPERTENSION: Primary | ICD-10-CM

## 2018-10-09 DIAGNOSIS — Z72.0 TOBACCO USE: ICD-10-CM

## 2018-10-09 NOTE — PROGRESS NOTES
[address] [facility] [place] [facility]  [phone]     Subjective:      Nereida Barbour is a 70 y.o. male is here for annual f/u. Reports mild CAMACHO, decreased activity tolerance, lack of energy, occasional dizziness. The patient denies chest pain, orthopnea, PND, LE edema, palpitations, syncope, or presyncope.        Patient Active Problem List    Diagnosis Date Noted    Tobacco use 12/06/2016    PAD (peripheral artery disease) ([address] 12/06/2016    Dyslipidemia 12/06/2016    Advanced care planning/counseling discussion 09/13/2016    Colon polyp 09/18/2015    HTN (hypertension) 01/27/2011    Substance abuse ([facility] 12/25/2010    Prostate cancer ([address] 02/22/2010    Chronic hepatitis C ([address] 06/30/2009    HIV positive ([facility] 06/30/2009    Weight loss, non-intentional 06/30/2009      Shadia Wheat MD  Past Medical History:   Diagnosis Date    BPH     Cancer [facility]) 11/2009    prostate biopsy revealed cancer    Erectile dysfunction     Essential hypertension, benign     Hepatitis C 6/30/2009    HIV positive ([address] 6/30/2009    Hypercholesterolemia     Ill-defined condition 9-2014    PNEUMONIA/bronchitis hx    Memory disorder     Prostate CA ([address] 2/22/2010      Past Surgical History:   Procedure Laterality Date    COLONOSCOPY,JOSÉ ANTONIO MATA,SNARE  8/31/2015         HX HEENT      gum surgery-for denture    HX HERNIA REPAIR       Inguinal Hernia Repair    HX HERNIA REPAIR  05/02/2017    Davinci recurrent RIHR with mesh    HX SKIN BIOPSY  11/16/15    left forearm/ upper arm biopsy      No Known Allergies   Family History   Problem Relation Age of Onset    Hypertension Mother     Mental Retardation Mother     Depression Mother     Anxiety Mother     Liver Disease Father     Lung Disease Father     Diabetes Maternal Grandmother     Hypertension Other     Stroke Other     Anxiety Other     Depression Other       Social History     Social History    Marital status: LEGALLY      Spouse name: N/A    Number of children: N/A    Years of education: N/A     Occupational History    Not on file. Social History Main Topics    Smoking status: Current Every Day Smoker     Packs/day: 1.00     Years: 45.00     Types: Cigarettes    Smokeless tobacco: Never Used      Comment: 1 pack daily    Alcohol use No    Drug use: Yes     Special: Heroin, Prescription, Cocaine      Comment: none in past 20 years per pt on 5/2/17    Sexual activity: Yes     Partners: Female      Comment: radu has 3 children     Other Topics Concern    Not on file     Social History Narrative      Current Outpatient Prescriptions   Medication Sig    nicotine (NICODERM CQ) 21 mg/24 hr 1 Patch by TransDERmal route every twenty-four (24) hours for 30 days.  nicotine (NICODERM CQ) 14 mg/24 hr patch 1 Patch by TransDERmal route every twenty-four (24) hours for 14 days.  nicotine (NICODERM CQ) 7 mg/24 hr 1 Patch by TransDERmal route every twenty-four (24) hours for 14 days.  aspirin delayed-release 81 mg tablet Take  by mouth daily.  cilostazol (PLETAL) 50 mg tablet Take 1 Tab by mouth Before breakfast and dinner. Indications: INTERMITTENT CLAUDICATION    magnesium 250 mg tab Take  by mouth.  rosuvastatin (CRESTOR) 5 mg tablet Take 1 Tab by mouth nightly. (Patient taking differently: Take 5 mg by mouth nightly. Indications: every other day)    lisinopril (PRINIVIL, ZESTRIL) 20 mg tablet Take 1 Tab by mouth daily.  docusate sodium (COLACE) 100 mg capsule Take 100 mg by mouth two (2) times daily as needed.  cyanocobalamin (VITAMIN B-12) 500 mcg tablet Take 500 mcg by mouth daily.  naproxen sodium (ALEVE) 220 mg tablet Take 220 mg by mouth as needed.  MULTIVITAMINS (MULTIVITAMIN PO) Take  by mouth daily.  OMEGA-3 FATTY ACIDS (OMEGA 3 PO) Take 1 Tab by mouth daily. No current facility-administered medications for this visit.           Review of Symptoms:  11 systems reviewed, negative other than as stated in the HPI    Physical ExamPhysical Exam:    Vitals:    10/09/18 1154 10/09/18 1201   BP: 120/80 118/80   Pulse: 74    Resp: 18    SpO2: 96%    Weight: 217 lb 4.8 oz (98.6 kg)    Height: 5' 11\" (1.803 m)      Body mass index is 30.31 kg/(m^2). General PE   Gen:  NAD  Mental Status - Alert. General Appearance - Not in acute distress. Chest and Lung Exam   Inspection: Accessory muscles - No use of accessory muscles in breathing. Auscultation:   Breath sounds: - Normal.   Cardiovascular   Inspection: Jugular vein - Bilateral - Inspection Normal.   Palpation/Percussion:   Apical Impulse: - Normal.   Auscultation: Rhythm - Regular. Heart Sounds - S1 WNL and S2 WNL. No S3 or S4. Murmurs & Other Heart Sounds: Auscultation of the heart reveals - No Murmurs. Peripheral Vascular   Upper Extremity: Inspection - Bilateral - No Cyanotic nailbeds or Digital clubbing. Lower Extremity:   Palpation: Edema - Bilateral - No edema. Abdomen:   Soft, non-tender, bowel sounds are active.   Neuro: A&O times 3, CN and motor grossly WNL    Labs:   Lab Results   Component Value Date/Time    Cholesterol, total 175 03/20/2018 12:11 PM    Cholesterol, total 155 02/17/2017 09:20 AM    Cholesterol, total 232 (H) 10/04/2016 10:08 AM    Cholesterol, total 225 (H) 09/18/2015 11:21 AM    Cholesterol, total 121 08/24/2014 05:06 AM    HDL Cholesterol 44 03/20/2018 12:11 PM    HDL Cholesterol 40 02/17/2017 09:20 AM    HDL Cholesterol 45 10/04/2016 10:08 AM    HDL Cholesterol 75 09/18/2015 11:21 AM    HDL Cholesterol 30 08/24/2014 05:06 AM    LDL, calculated 116 (H) 03/20/2018 12:11 PM    LDL, calculated 100 (H) 02/17/2017 09:20 AM    LDL, calculated 170 (H) 10/04/2016 10:08 AM    LDL, calculated 122 (H) 09/18/2015 11:21 AM    LDL, calculated 69.4 08/24/2014 05:06 AM    Triglyceride 77 03/20/2018 12:11 PM    Triglyceride 74 02/17/2017 09:20 AM    Triglyceride 83 10/04/2016 10:08 AM    Triglyceride 141 09/18/2015 11:21 AM    Triglyceride 108 08/24/2014 05:06 AM    CHOL/HDL Ratio 4.0 08/24/2014 05:06 AM     Lab Results   Component Value Date/Time    CK 29 (L) 08/29/2014 12:12 AM     Lab Results   Component Value Date/Time    Sodium 140 09/04/2018 09:26 AM    Potassium 3.9 09/04/2018 09:26 AM    Chloride 101 09/04/2018 09:26 AM    CO2 24 09/04/2018 09:26 AM    Anion gap 7 04/25/2017 02:27 PM    Glucose 100 (H) 09/04/2018 09:26 AM    BUN 12 09/04/2018 09:26 AM    Creatinine 0.71 (L) 09/04/2018 09:26 AM    BUN/Creatinine ratio 17 09/04/2018 09:26 AM    GFR est  09/04/2018 09:26 AM    GFR est non-AA 96 09/04/2018 09:26 AM    Calcium 9.3 09/04/2018 09:26 AM    Bilirubin, total 0.4 09/04/2018 09:26 AM    AST (SGOT) 20 09/04/2018 09:26 AM    Alk. phosphatase 64 09/04/2018 09:26 AM    Protein, total 7.1 09/04/2018 09:26 AM    Albumin 4.2 09/04/2018 09:26 AM    Globulin 5.8 (H) 09/07/2014 12:32 AM    A-G Ratio 1.4 09/04/2018 09:26 AM    ALT (SGPT) 17 09/04/2018 09:26 AM       EKG:  NSR     Assessment:     Assessment:      1. Essential hypertension    2. Dyslipidemia    3. Tobacco use    4. PAD (peripheral artery disease) ([address]    5. CAMACHO (dyspnea on exertion)    6. Fatigue, unspecified type        Orders Placed This Encounter    AMB POC EKG ROUTINE W/ 12 LEADS, INTER & REP     Order Specific Question:   Reason for Exam:     Answer:   Vernon Hairston        Plan:     Patient presents doing well and is stable from cardiac stand point. Mild CAMACHO, decreased activity tolerance, lack of energy, occasional dizziness. Moderate/severe segmental coronary artery calcifications per CT lung 9/18  Normal stress echo in 2016  Will repeat echo and obtain lexiscan, carotid dopplers    Hypertension  Controlled with Lisinopril    Hyperlipidemia  3/18 . On crestor. Lipids and labs followed by PCP. Peripheral arterial disease: \"Reports left calf pain after walking  5-6 blocks.  GERALD study in 2016 showed moderate PAD resting and severe PAD post exercise left leg and normal resting /  Mild PAD post exercise R leg. Will repeat GERALD. Initiate Pletal 50 mg BID. \"  Followed by Dr Dusty Fields    Tobacco use  2 cigarettes a day. Trying to quit. congratulated  Lung CT 9/18 : screening lung cancer CT - 4 mm lung nodule vs lymph node right lung--was referred to see pulmonary and has an appointment with them this October. Continue current care and f/u when testing complete. Iwona Sanches NP       [facility]    10/9/2018         Patient seen, examined by me personally. Plan discussed as detailed. Agree with note as outlined by  NP. I confirm findings in history and physical exam. No additional findings noted. Agree with plan as outlined above.      Vicki Schultz MD

## 2018-10-09 NOTE — PROGRESS NOTES
1. Have you been to the ER, urgent care clinic since your last visit? Hospitalized since your last visit? NO    2. Have you seen or consulted any other health care providers outside of the [address] [place] [address] since your last visit? Include any pap smears or colon screening. NO    ANNUAL.  C/O EXTREME TIREDNESS

## 2018-10-09 NOTE — MR AVS SNAPSHOT
Dalton Fernandes [facility] [address] [facility] 
[phone] Patient: Aida Serra 
MRN: [id number] FPB:1[birth year]56[birth year]1026 Visit Information Date & Time Provider Department Dept. Phone Encounter #  
 10/9/2018 11:15 AM [address], MD [facility] [phone] [id number] Your Appointments 10/31/2018  8:00 AM  
ECHO CARDIOGRAMS 2D with [facility] [address] [place] [address]) Appt Note: Per Dr. Rhonda Santacruz [facility]  
[phone] [address] [facility]  
  
    
 10/31/2018  9:00 AM  
ULTRASOUND with ECHO, [place] [address] [place] [address]) Appt Note: Per Dr. Rhonda Santacrzu [facility]  
[phone] [address] [facility]  
  
    
 10/31/2018 10:00 AM  
NUCLEAR MEDICINE with NUCLEAR, [facility] [address] [place] [address]) Appt Note: Per Dr. Rhonda Santacruz [facility]  
[phone] [address] [facility]  
  
    
 1/8/2019  1:30 PM  
ESTABLISHED PATIENT with Ro French MD  
[facility] [address]) Appt Note: 3 month f/u  
 [address] [facility]  
[phone] [address] [facility] Upcoming Health Maintenance Date Due DTaP/Tdap/Td series (1 - Tdap) 4/18/1970 Shingrix Vaccine Age 50> (1 of 2) 4/18/1999 GLAUCOMA SCREENING Q2Y 4/18/2014 COLONOSCOPY 8/31/2020 Allergies as of 10/9/2018  Review Complete On: 10/9/2018 By: Cricket Kehr, LPN No Known Allergies Current Immunizations  Reviewed on 9/5/2014 Name Date H1N1 FLU VACCINE 2/22/2010 Influenza High Dose Vaccine PF 3/6/2018 Influenza Vaccine (Tri) Adjuvanted 9/4/2018 Influenza Vaccine Split 2/22/2010 Pneumococcal Conjugate (PCV-13) 8/30/2017 Pneumococcal Polysaccharide (PPSV-23) 9/3/2014 12:06 PM  
  
 Not reviewed this visit You Were Diagnosed With   
  
 Codes Comments Essential hypertension    -  Primary ICD-10-CM: I10 
ICD-9-CM: 401.9 Dyslipidemia     ICD-10-CM: E78.5 ICD-9-CM: 272.4 Tobacco use     ICD-10-CM: Z72.0 ICD-9-CM: 305.1 PAD (peripheral artery disease) (HCC)     ICD-10-CM: I73.9 ICD-9-CM: 443.9 CAMACHO (dyspnea on exertion)     ICD-10-CM: R06.09 
ICD-9-CM: 786.09 Fatigue, unspecified type     ICD-10-CM: R53.83 ICD-9-CM: 780.79 Vitals BP Pulse Resp Height(growth percentile) Weight(growth percentile) SpO2  
 118/80 (BP 1 Location: Right arm, BP Patient Position: Sitting) 74 18 5' 11\" (1.803 m) 217 lb 4.8 oz (98.6 kg) 96% BMI Smoking Status 30.31 kg/m2 Current Every Day Smoker Vitals History BMI and BSA Data Body Mass Index Body Surface Area  
 30.31 kg/m 2 2.22 m 2 Preferred Pharmacy Pharmacy Name Phone [facility] [address] [place] [address] [address] [place] [address] [phone] Your Updated Medication List  
  
   
This list is accurate as of 10/9/18 12:31 PM.  Always use your most recent med list.  
  
  
  
  
 ALEVE 220 mg tablet Generic drug:  naproxen sodium Take 220 mg by mouth as needed. aspirin delayed-release 81 mg tablet Take  by mouth daily. cilostazol 50 mg tablet Commonly known as:  PLETAL Take 1 Tab by mouth Before breakfast and dinner. Indications: INTERMITTENT CLAUDICATION  
  
 COLACE 100 mg capsule Generic drug:  docusate sodium Take 100 mg by mouth two (2) times daily as needed. lisinopril 20 mg tablet Commonly known as:  Gale Dan Take 1 Tab by mouth daily. magnesium 250 mg Tab Take  by mouth. MULTIVITAMIN PO Take  by mouth daily.   
  
 * nicotine 21 mg/24 hr  
 Commonly known as:  NICODERM CQ  
1 Patch by TransDERmal route every twenty-four (24) hours for 30 days. * nicotine 14 mg/24 hr patch Commonly known as:  NICODERM CQ  
1 Patch by TransDERmal route every twenty-four (24) hours for 14 days. * nicotine 7 mg/24 hr  
Commonly known as:  NICODERM CQ  
1 Patch by TransDERmal route every twenty-four (24) hours for 14 days. OMEGA 3 PO Take 1 Tab by mouth daily. rosuvastatin 5 mg tablet Commonly known as:  CRESTOR Take 1 Tab by mouth nightly. VITAMIN B-12 500 mcg tablet Generic drug:  cyanocobalamin Take 500 mcg by mouth daily. * Notice: This list has 3 medication(s) that are the same as other medications prescribed for you. Read the directions carefully, and ask your doctor or other care provider to review them with you. We Performed the Following AMB POC EKG ROUTINE W/ 12 LEADS, INTER & REP [94849 CPT(R)] To-Do List   
 10/09/2018 ECHO:  2D ECHO COMPLETE ADULT (TTE) W OR WO CONTR   
  
 10/09/2018 Imaging:  DUPLEX CAROTID BILATERAL   
  
 10/09/2018 ECG:  STRESS TEST LEXISCAN/CARDIOLITE   
  
 10/25/2018 8:00 AM  
  Appointment with [facility] CT ER 1 at [facility] [address] ([phone]) CONTRAST STUDY: 1. The patient should not eat solid food four hours before the appointment but should be encouraged to drink clear liquids. 2.  If you have to drink oral contrast, please pick it up any weekday prior to your appointment, if you cannot please check in 2 hrs before appt time. 3.  The patient will require IV access for contrast administration. 4.  The patient should not take Ibuprofen (Advil, Motrin, etc.) and Naproxen Sodium (Aleve, etc.)  on the day of the exam. Stopping non-steroidal anti-inflammatory agents (NSAIDs) like Ibuprofen decreases the risk of kidney damage from the x-ray contrast (dye).  5.  Bring any non [facility] facility films/images pertaining to the area of interest with you on the day of appointment. 6.  Bring current lab work if available (within last 90 days CMP) 7. Check in at registration at least 30 minutes before appt time unless you were instructed to do otherwise. 02/13/2019 10:30 AM  
  Appointment with Cristela Ennis NP at [place] [address] ([phone]) [facility] SERVICES! Dear Clyde Grant: Thank you for requesting a [employer] account. Our records indicate that you already have an active [employer] account. You can access your account anytime at [email or web address] Did you know that you can access your hospital and ER discharge instructions at any time in [employer]? You can also review all of your test results from your hospital stay or ER visit. Additional Information If you have questions, please visit the Frequently Asked Questions section of the [employer] website at [email or web address] Remember, [employer] is NOT to be used for urgent needs. For medical emergencies, dial 911. Now available from your iPhone and Android! Please provide this summary of care documentation to your next provider. Your primary care clinician is listed as Whit LOYOLA. If you have any questions after today's visit, please call [phone].

## 2018-10-10 ENCOUNTER — DOCUMENTATION ONLY (OUTPATIENT)
Dept: CARDIOLOGY CLINIC | Age: 68
End: 2018-10-10

## 2018-10-10 NOTE — PROGRESS NOTES
Faxed clearance note to [facility] @ [phone]. Pt is cleared for procedure, hold pletal and aspirin 7 days prior to procedure.

## 2018-10-31 ENCOUNTER — CLINICAL SUPPORT (OUTPATIENT)
Dept: CARDIOLOGY CLINIC | Age: 68
End: 2018-10-31

## 2018-10-31 ENCOUNTER — OFFICE VISIT (OUTPATIENT)
Dept: CARDIOLOGY CLINIC | Age: 68
End: 2018-10-31

## 2018-10-31 DIAGNOSIS — R06.09 DOE (DYSPNEA ON EXERTION): ICD-10-CM

## 2018-10-31 DIAGNOSIS — Z72.0 TOBACCO USE: ICD-10-CM

## 2018-10-31 DIAGNOSIS — R53.83 FATIGUE, UNSPECIFIED TYPE: ICD-10-CM

## 2018-10-31 DIAGNOSIS — I10 ESSENTIAL HYPERTENSION: ICD-10-CM

## 2018-10-31 DIAGNOSIS — I73.9 PAD (PERIPHERAL ARTERY DISEASE) (HCC): ICD-10-CM

## 2018-10-31 DIAGNOSIS — E78.5 DYSLIPIDEMIA: ICD-10-CM

## 2018-10-31 DIAGNOSIS — R06.09 DOE (DYSPNEA ON EXERTION): Primary | ICD-10-CM

## 2018-10-31 NOTE — PROGRESS NOTES
The procedure was explained to the patient, the patient expresses understanding of what will happen, and agrees to the procedure. ABDOMINAL PAIN

## 2018-10-31 NOTE — PROCEDURES
[facility]  *** FINAL REPORT ***    Name: Irvin Hsu  MRN: [id number]       Outpatient  : 1950  HIS Order #: [id number]  [address] [facility] Visit #: [id number]  Date: 31 Oct 2018    TYPE OF TEST: Cerebrovascular Duplex    REASON FOR TEST    Right Carotid:-             Proximal               Mid                 Distal  cm/s  Systolic  Diastolic  Systolic  Diastolic  Systolic  Diastolic  CCA:     50.5      19.0       82.0      19.0       68.0      17.0  Bulb:  ECA:     78.0  ICA:     58.0      21.0                            73.0       9.0  ICA/CCA:  0.9       1.2    ICA Stenosis: <50%    Right Vertebral:-  Finding: Antegrade  Sys:  Zina:    Right Subclavian: Normal    Left Carotid:-            Proximal                Mid                 Distal  cm/s  Systolic  Diastolic  Systolic  Diastolic  Systolic  Diastolic  CCA:     04.4      13.0       72.0      18.0       72.0      78.0  Bulb:  ECA:     52.0  ICA:     64.0      26.0                            56.0      28.0  ICA/CCA:  0.9       0.3    ICA Stenosis: <50%    Left Vertebral:-  Finding: Antegrade  Sys:  Zina:    Left Subclavian: Normal    INTERPRETATION/FINDINGS  1. Bilateral <50% stenosis of the internal carotid arteries. 2. No significant stenosis in the external carotid arteries  bilaterally. 3. Antegrade flow in both vertebral arteries. 4. Normal flow in both subclavian arteries. ADDITIONAL COMMENTS    I have personally reviewed the data relevant to the interpretation of  this  study. TECHNOLOGIST: Tarah Doherty RVT  Signed: 10/31/2018 09:59 AM    PHYSICIAN: Rodolfo King MD  Signed: 2018 05:19 PM

## 2018-11-12 ENCOUNTER — CLINICAL SUPPORT (OUTPATIENT)
Dept: CARDIOLOGY CLINIC | Age: 68
End: 2018-11-12

## 2018-11-12 DIAGNOSIS — E78.5 DYSLIPIDEMIA: ICD-10-CM

## 2018-11-12 DIAGNOSIS — R53.83 FATIGUE, UNSPECIFIED TYPE: ICD-10-CM

## 2018-11-12 DIAGNOSIS — I73.9 PAD (PERIPHERAL ARTERY DISEASE) (HCC): ICD-10-CM

## 2018-11-12 DIAGNOSIS — Z72.0 TOBACCO USE: ICD-10-CM

## 2018-11-12 DIAGNOSIS — R06.09 DOE (DYSPNEA ON EXERTION): ICD-10-CM

## 2018-11-12 DIAGNOSIS — I10 ESSENTIAL HYPERTENSION: ICD-10-CM

## 2018-11-15 ENCOUNTER — HOSPITAL ENCOUNTER (OUTPATIENT)
Dept: CT IMAGING | Age: 68
Discharge: HOME | End: 2018-11-15
Attending: NURSE PRACTITIONER
Payer: MEDICARE

## 2018-11-15 DIAGNOSIS — R16.0 LIVER MASS: ICD-10-CM

## 2018-11-15 DIAGNOSIS — B18.2 CHRONIC HEPATITIS C WITHOUT HEPATIC COMA (HCC): ICD-10-CM

## 2018-11-15 PROCEDURE — 74011000258 HC RX REV CODE- 258: Performed by: NURSE PRACTITIONER

## 2018-11-15 PROCEDURE — 74170 CT ABD WO CNTRST FLWD CNTRST: CPT

## 2018-11-15 PROCEDURE — 74011636320 HC RX REV CODE- 636/320: Performed by: NURSE PRACTITIONER

## 2018-11-15 RX ORDER — SODIUM CHLORIDE 0.9 % (FLUSH) 0.9 %
10 SYRINGE (ML) INJECTION
Status: COMPLETED | OUTPATIENT
Start: 2018-11-15 | End: 2018-11-15

## 2018-11-15 RX ADMIN — Medication 10 ML: at 08:25

## 2018-11-15 RX ADMIN — SODIUM CHLORIDE 100 ML: 900 INJECTION, SOLUTION INTRAVENOUS at 08:25

## 2018-11-15 RX ADMIN — IOPAMIDOL 100 ML: 755 INJECTION, SOLUTION INTRAVENOUS at 08:25

## 2018-11-25 DIAGNOSIS — I10 ESSENTIAL HYPERTENSION: ICD-10-CM

## 2018-11-26 RX ORDER — LISINOPRIL 20 MG/1
TABLET ORAL
Qty: 90 TAB | Refills: 3 | Status: SHIPPED | OUTPATIENT
Start: 2018-11-26 | End: 2019-01-22 | Stop reason: SDUPTHER

## 2019-01-05 NOTE — TELEPHONE ENCOUNTER
He had surgery 12 days ago and he is still having pain and the medication is gone. Can't sleep on either side and he has a hard time sleeping on his back. Need refill on pain medication.   Please call
Pt waiting for ride to pick-up script.  Will be here before 5
Requesting refill on Percocet, still having pain @ op/site. Tolerating diet, voiding , having bms. S/p post davinci recurrent RIHR with mesh on 5/2/17. FU appointment reminder.
Patient

## 2019-01-22 ENCOUNTER — OFFICE VISIT (OUTPATIENT)
Dept: CARDIOLOGY CLINIC | Age: 69
End: 2019-01-22

## 2019-01-22 VITALS
BODY MASS INDEX: 30.78 KG/M2 | SYSTOLIC BLOOD PRESSURE: 110 MMHG | OXYGEN SATURATION: 96 % | RESPIRATION RATE: 18 BRPM | WEIGHT: 219.9 LBS | DIASTOLIC BLOOD PRESSURE: 76 MMHG | HEART RATE: 91 BPM | HEIGHT: 71 IN

## 2019-01-22 DIAGNOSIS — I73.9 PAD (PERIPHERAL ARTERY DISEASE) (HCC): ICD-10-CM

## 2019-01-22 DIAGNOSIS — I10 ESSENTIAL HYPERTENSION: Primary | ICD-10-CM

## 2019-01-22 NOTE — PROGRESS NOTES
1/22/2019 2:13 PM      Subjective:     Mr. Antonio Corcoran is a 70 y.o. male who is here for f/u. He has a PMHx of CAD, HTN, HLD and PAD. At his last visit, he was started on Pletal 50 mg BID. He has only been taking 1 dose at bedtime. He has been doing exercises regularly. He has been able to walk 6-7 laps around gym (about 2 miles), uses the bicycle and also the leg press. His symptoms have improved with Pletal.  He no longer has leg cramping or pain with exertion. Visit Vitals  /76 (BP 1 Location: Right arm, BP Patient Position: Sitting)   Pulse 91   Resp 18   Ht 5' 11\" (1.803 m)   Wt 219 lb 14.4 oz (99.7 kg)   SpO2 96%   BMI 30.67 kg/m²     Current Outpatient Medications   Medication Sig    cilostazol (PLETAL) 50 mg tablet Take 1 Tab by mouth Before breakfast and dinner. Indications: INTERMITTENT CLAUDICATION    magnesium 250 mg tab Take  by mouth.  rosuvastatin (CRESTOR) 5 mg tablet Take 1 Tab by mouth nightly. (Patient taking differently: Take 5 mg by mouth nightly. Indications: every other day)    lisinopril (PRINIVIL, ZESTRIL) 20 mg tablet Take 1 Tab by mouth daily.  docusate sodium (COLACE) 100 mg capsule Take 100 mg by mouth two (2) times daily as needed.  cyanocobalamin (VITAMIN B-12) 500 mcg tablet Take 500 mcg by mouth daily.  naproxen sodium (ALEVE) 220 mg tablet Take 220 mg by mouth as needed.  MULTIVITAMINS (MULTIVITAMIN PO) Take  by mouth daily.  OMEGA-3 FATTY ACIDS (OMEGA 3 PO) Take 1 Tab by mouth daily.  aspirin delayed-release 81 mg tablet Take  by mouth daily. No current facility-administered medications for this visit.           Objective:      Visit Vitals  /76 (BP 1 Location: Right arm, BP Patient Position: Sitting)   Pulse 91   Resp 18   Ht 5' 11\" (1.803 m)   Wt 219 lb 14.4 oz (99.7 kg)   SpO2 96%   BMI 30.67 kg/m²       Data Review:   Lab Results   Component Value Date/Time    Cholesterol, total 175 03/20/2018 12:11 PM HDL Cholesterol 44 03/20/2018 12:11 PM    LDL, calculated 116 (H) 03/20/2018 12:11 PM    Triglyceride 77 03/20/2018 12:11 PM    CHOL/HDL Ratio 4.0 08/24/2014 05:06 AM       Lab Results   Component Value Date/Time    Sodium 140 09/04/2018 09:26 AM    Potassium 3.9 09/04/2018 09:26 AM    Chloride 101 09/04/2018 09:26 AM    CO2 24 09/04/2018 09:26 AM    Anion gap 7 04/25/2017 02:27 PM    Glucose 100 (H) 09/04/2018 09:26 AM    BUN 12 09/04/2018 09:26 AM    Creatinine 0.71 (L) 09/04/2018 09:26 AM    BUN/Creatinine ratio 17 09/04/2018 09:26 AM    GFR est  09/04/2018 09:26 AM    GFR est non-AA 96 09/04/2018 09:26 AM    Calcium 9.3 09/04/2018 09:26 AM    Bilirubin, total 0.4 09/04/2018 09:26 AM    AST (SGOT) 20 09/04/2018 09:26 AM    Alk.  phosphatase 64 09/04/2018 09:26 AM    Protein, total 7.1 09/04/2018 09:26 AM    Albumin 4.2 09/04/2018 09:26 AM    Globulin 5.8 (H) 09/07/2014 12:32 AM    A-G Ratio 1.4 09/04/2018 09:26 AM    ALT (SGPT) 17 09/04/2018 09:26 AM       Past Medical History:   Diagnosis Date    BPH     Cancer ([facility] 11/2009    prostate biopsy revealed cancer    Erectile dysfunction     Essential hypertension, benign     Hepatitis C 6/30/2009    HIV positive ([facility] [address] 6/30/2009    Hypercholesterolemia     Ill-defined condition 9-2014    PNEUMONIA/bronchitis hx    Memory disorder     Prostate CA ([facility] [address] 2/22/2010      Past Surgical History:   Procedure Laterality Date    COLONOSCOPY,REMV LESN,SNARE  8/31/2015         HX HEENT      gum surgery-for denture    HX HERNIA REPAIR       Inguinal Hernia Repair    HX HERNIA REPAIR  05/02/2017    Davinci recurrent RIHR with mesh    HX SKIN BIOPSY  11/16/15    left forearm/ upper arm biopsy      No Known Allergies   Family History   Problem Relation Age of Onset    Hypertension Mother     Mental Retardation Mother     Depression Mother     Anxiety Mother     Liver Disease Father     Lung Disease Father     Diabetes Maternal Grandmother     Hypertension Other     Stroke Other     Anxiety Other     Depression Other       Social History     Socioeconomic History    Marital status:      Spouse name: Not on file    Number of children: Not on file    Years of education: Not on file    Highest education level: Not on file   Social Needs    Financial resource strain: Not on file    Food insecurity - worry: Not on file    Food insecurity - inability: Not on file    Transportation needs - medical: Not on file   [employer] needs - non-medical: Not on file   Occupational History    Not on file   Tobacco Use    Smoking status: Current Every Day Smoker     Packs/day: 1.00     Years: 45.00     Pack years: 45.00     Types: Cigarettes    Smokeless tobacco: Never Used    Tobacco comment: using patch-9-10 cigarettes daily   Substance and Sexual Activity    Alcohol use: No    Drug use: Yes     Types: Heroin, Prescription, Cocaine     Comment: none in past 20 years per pt on 5/2/17    Sexual activity: Yes     Partners: Female     Comment: radu has 3 children   Other Topics Concern    Not on file   Social History Narrative    Not on file       Review of Systems   Review of Systems   Constitutional: Negative for chills, fever and weight loss. HENT: Negative for nosebleeds. Eyes: Negative for blurred vision and double vision. Respiratory: Negative for cough, shortness of breath and wheezing. Cardiovascular: Negative for chest pain, palpitations, orthopnea, leg swelling and PND. Gastrointestinal: Negative for abdominal pain, blood in stool, diarrhea, nausea and vomiting. Musculoskeletal: Negative for joint pain. Skin: Negative for rash. Neurological: Negative for dizziness, tingling and loss of consciousness. Endo/Heme/Allergies: Does not bruise/bleed easily.        Physical Exam   General: Well developed, in no acute distress, cooperative and alert  HEENT: No carotid bruits, no JVD, trach is midline.  Neck Supple, PEERL, EOM intact. Heart:  reg rate and rhythm; normal S1/S2; no murmurs, gallops or rubs.   Respiratory: Clear bilaterally x 4, no wheezing or rales  Abdomen:   Soft, non-tender, no distention, no masses. + BS.   Extremities:  Normal cap refill, no cyanosis, atraumatic. No edema. Neuro: A&Ox3, speech clear, gait stable. Skin: Skin color is normal. No rashes or lesions. Non diaphoretic  Vascular: Femoral pulses normal bilaterally; posterior tibial pulses normal bilaterally, DP pulses normal bilaterally    Assessment:       ICD-10-CM ICD-9-CM    1. Essential hypertension I10 401.9    2. PAD (peripheral artery disease) ([facility]) I73.9 443.9        Plan:     1. Essential hypertension  BP well controlled. Continue present anti-hypertensive therapy. 2. PAD (peripheral artery disease) ([facility])  Symptoms improved with Pletal with good exercise tolerance. Recommend increasing this to BID. No further workup indicated at this time. Will continue with medical management. Nabil Syed NP       Patient seen and examined by me with nurse practitioner. Mitali Elliott personally performed all components of the history, physical, and medical decision making and agree with the assessment and plan with minor modifications as noted. - on statin. - f/u with Dr. Dolores Wheat for cardiac care.      Kami Bo MD

## 2019-01-22 NOTE — PROGRESS NOTES
1. Have you been to the ER, urgent care clinic since your last visit? Hospitalized since your last visit? No.    2. Have you seen or consulted any other health care providers outside of the [facility] since your last visit? Include any pap smears or colon screening.    No.      Chief Complaint   Patient presents with    Other     3 month vascular consult-exercising more and cramping is better

## 2019-02-13 ENCOUNTER — OFFICE VISIT (OUTPATIENT)
Dept: HEMATOLOGY | Age: 69
End: 2019-02-13

## 2019-02-13 VITALS
WEIGHT: 220 LBS | TEMPERATURE: 97 F | OXYGEN SATURATION: 97 % | BODY MASS INDEX: 30.68 KG/M2 | SYSTOLIC BLOOD PRESSURE: 123 MMHG | DIASTOLIC BLOOD PRESSURE: 80 MMHG | HEART RATE: 76 BPM

## 2019-02-13 DIAGNOSIS — B18.2 CHRONIC HEPATITIS C WITHOUT HEPATIC COMA (HCC): Primary | ICD-10-CM

## 2019-02-13 DIAGNOSIS — Z21 HIV POSITIVE (HCC): ICD-10-CM

## 2019-02-13 LAB
ERYTHROCYTE [DISTWIDTH] IN BLOOD BY AUTOMATED COUNT: 13.9 % (ref 12.3–15.4)
HCT VFR BLD AUTO: 42.7 % (ref 37.5–51)
HGB BLD-MCNC: 15.1 G/DL (ref 13–17.7)
MCH RBC QN AUTO: 32.5 PG (ref 26.6–33)
MCHC RBC AUTO-ENTMCNC: 35.4 G/DL (ref 31.5–35.7)
MCV RBC AUTO: 92 FL (ref 79–97)
PLATELET # BLD AUTO: 176 X10E3/UL (ref 150–379)
RBC # BLD AUTO: 4.65 X10E6/UL (ref 4.14–5.8)
WBC # BLD AUTO: 5.1 X10E3/UL (ref 3.4–10.8)

## 2019-02-13 NOTE — PROGRESS NOTES
[address]ames, [address], [facility] [place] [place]       Cheryl Estimable, NP    YADIRA Collazo ACNP-BC   Soni Davison, ELLEN Sainz NP        at [address]     [address] [place] [address], [address] [address]     [phone]     FAX: [phone]    at [address], [address] [facility], [address]     [phone]     FAX: [phone]     Patient Care Team:  Wilfrid Higginbotham MD as PCP - Milagro French MD (Infectious Diseases)  Laurita Womack MD as Surgeon (General Surgery)  Cody Ernandez MD (Gastroenterology)  Manolo Fitzpatrick MD (Hepatology)  Cristela James G, NP (Nurse Practitioner)  Gerald Navarro MD (Cardiology)  Mellisa Minor MD (Cardiology)     Patient Active Problem List   Diagnosis Code    Chronic hepatitis C ([facility] B18.2    HIV positive ([address] Z21    Weight loss, non-intentional R63.4    Prostate cancer ([address] C61    Substance abuse ([address] F19.10    HTN (hypertension) I10    Colon polyp K63.5    Advanced care planning/counseling discussion Z71.89    Tobacco use Z72.0    PAD (peripheral artery disease) ([address] I73.9    Dyslipidemia E78.5       Nilesh Morales [facility] for management of chronic HCV and HIV co-infection. The active problem list, all pertinent past medical history, medications, radiologic findings and laboratory findings related to the liver disorder were reviewed with the patient. Ultrasound of the liver was performed in 12/2014. This suggests chronic liver disease. A 0.8 x 0.7 lesion was identified in the right lobe. MRI of the liver was performed in 1/2015 to further evaluate this area suggested changes consistent with chronic liver disease. No liver mass lesion was identified.  Most recent triple phase CT in 11/2018 demonstrated a stable small lesion with no evidence of HCC. The patient underwent a liver biopsy in 3/2015. This demonstrates severe hepatitis with bridging fibrosis. Patient presents to the clinic today for a Fibroscan to reassess liver fibrosis or scarring after successful HCV treatment. Patient has completed 12 weeks of HCV treatment with Harvoni (5/29/2015-8/25/2015). He is a sustained virologic responder to treatment, or cured. Patient is co-infected with HIV. He is currently not on anti-retroviral therapy because his virus remains suppressed. Patient has no new physical complaints today. He continues to have ongoing fatigue but otherwise feels well. The patient completes all daily activities without any functional limitations. The patient has not experienced arthralgias, myalgias, problems concentrating, swelling of the abdomen, swelling of the lower extremities, hematemesis, or hematochezia. ALLERGIES  No Known Allergies    MEDICATIONS  Current Outpatient Medications   Medication Sig    aspirin delayed-release 81 mg tablet Take  by mouth daily.  cilostazol (PLETAL) 50 mg tablet Take 1 Tab by mouth Before breakfast and dinner. Indications: INTERMITTENT CLAUDICATION    magnesium 250 mg tab Take  by mouth.  rosuvastatin (CRESTOR) 5 mg tablet Take 1 Tab by mouth nightly. (Patient taking differently: Take 5 mg by mouth nightly. Indications: every other day)    lisinopril (PRINIVIL, ZESTRIL) 20 mg tablet Take 1 Tab by mouth daily.  docusate sodium (COLACE) 100 mg capsule Take 100 mg by mouth two (2) times daily as needed.  cyanocobalamin (VITAMIN B-12) 500 mcg tablet Take 500 mcg by mouth daily.  naproxen sodium (ALEVE) 220 mg tablet Take 220 mg by mouth as needed.  MULTIVITAMINS (MULTIVITAMIN PO) Take  by mouth daily.  OMEGA-3 FATTY ACIDS (OMEGA 3 PO) Take 1 Tab by mouth daily. No current facility-administered medications for this visit.       REVIEW OF SYSTEMS NOT RELATED TO LIVER DISEASE:  Constitution systems: Negative for fever, chills, weight gain, weight loss. Eyes: Negative for diplopia, visual changes, eye pain. ENT: Negative for sore throat, painful swallowing. Respiratory: Negative for cough, hemoptysis, dyspnea. Cardiology: Negative for chest pain, palpitations. GI:  Negative for constipation or diarrhea. : Negative for urinary frequency, dysuria and hematuria. Skin: Negative for rash. Hematology: Negative for easy bruising, bleeding from gums or nose. Musculo-skelatal: Negative for back pain, muscle pain, weakness. Neurologic: Negative for headaches, dizziness, vertigo, memory problems. Psychology: Negative for anxiety, depression. FAMILY HISTORY:  The father  of alcohol cirrhosis. The mother  of CVA. There are no other persons in the family with HCV or liver disease. SOCIAL HISTORY:  The patient is . The spouse has been tested for HCV and is positive. She was treated and achieved SVR. The patient has 3 children, and 7 grandchildren. The patient stopped using tobacco products in 2014. The patient has never consumed significant amounts of alcohol. The patient used to work in [employer]. PHYSICAL EXAMINATION:  Visit Vitals  /80 (BP 1 Location: Right arm, BP Patient Position: Sitting)   Pulse 76   Temp 97 °F (36.1 °C) (Oral)   Wt 220 lb (99.8 kg)   SpO2 97%   BMI 30.68 kg/m²     General: No acute distress. Eyes: Sclera anicteric. ENT: No oral lesions. Thyroid normal.  Nodes: No adenopathy. Skin: No spider angiomata. No jaundice. No palmar erythema. Respiratory: Lungs clear to auscultation. Cardiovascular: Regular heart rate. No murmurs. No JVD. Abdomen: Soft non-tender. Liver size normal to percussion/palpation. Spleen not palpable. No obvious ascites. Extremities: No edema. No muscle wasting. No gross arthritic changes. Neurologic: Alert and oriented. Cranial nerves grossly intact.   No asterixsis. LABORATORY STUDIES:  [facility] [place] of [address] & Units 2/13/2019 9/4/2018   WBC 3.4 - 10.8 x10E3/uL 5.1 5.3   ANC 1.4 - 7.0 x10E3/uL     HGB 13.0 - 17.7 g/dL 15.1 13.8   HGB 12.1 - 17.0 GM/DL     HGB (iSTAT) 12.1 - 17.0 GM/DL      - 379 x10E3/uL 176 167   INR 0.8 - 1.2 1.1    AST 0 - 40 IU/L 21 20   ALT 0 - 44 IU/L 16 17   Alk Phos 39 - 117 IU/L 55 64   Bili, Total 0.0 - 1.2 mg/dL 0.8 0.4   Bili, Direct 0.00 - 0.40 mg/dL     Albumin 3.6 - 4.8 g/dL 4.8 4.2   BUN 8 - 27 mg/dL 14 12   BUN (iSTAT) 9 - 20 MG/DL     Creat 0.76 - 1.27 mg/dL 0.77 0.71 (L)   Creat (iSTAT) 0.6 - 1.3 mg/dL     Na 134 - 144 mmol/L 142 140   K 3.5 - 5.2 mmol/L 4.1 3.9   Cl 96 - 106 mmol/L 104 101   CO2 20 - 29 mmol/L 21 24   Glucose 65 - 99 mg/dL 91 100 (H)     A CMP, CBC and HCV RNA were ordered today. Will review results when available. SEROLOGIES:  Serologies Latest Ref Rng 10/10/2014 9/1/2014 8/27/2014   Hep A Ab, Total Negative Positive (A)     Hep B Surface Ag Negative Negative     Hep B Core Ab, Total Negative Positive (A)     Hep C Genotype See Note 1a     HCV RT-PCR, Quant  [id number]  [id number]   HCV Log10    6.830   Ferritin 30 - 400 ng/mL 172     Iron % Saturation 15 - 55 % 22     C-ANCA Neg:<1:20 titer  <1:20    P-ANCA Neg:<1:20 titer  <1:20    ANCA Neg:<1:20 titer  <1:20      LIVER HISTOLOGY:  3/2015. Slides reviewed by MLS. Severe hepatitis with bridging fibrosis and possible cirrhosis. Knodell score (3,3,3,3), Bro score 4, Metavir score 3.  2/2017. FibroScan performed at [facility]. EkPa was 10.0. Suggested fibrosis level is F3.  2/2018. FibroScan performed at [facility]. EkPa was 11.7. Suggested fibrosis level is F3.  2/2019. FibroScan performed at [facility]. EkPa was 12.2. Suggested fibrosis level is F3. CAP score is 361, suggestive of significant hepatic steatosis.     ENDOSCOPIC PROCEDURES:  Not available or performed    RADIOLOGY:  12/2014. Ultrasound of liver. Echogenic consistent with chronic liver disease. 0.8x0.7 cm lesion right lobe. No dilated bile ducts. No ascites. 1/2015. Dynamic MRI liver. Changes consistent with chronic liver disease. No liver mass lesions. No dilated bile ducts. No bile duct strictures. No ascites. 11/2017. Ultrasound of liver. Cirrhotic morphology of the liver is again demonstrated. 12 mm echogenic nodule right hepatic lobe. This does not appear to reside in the same location as the prior smaller hyperechoic nodule. This may represent a hemangioma. 11/2018. Triple phase CT. The central right lobe hepatic mass shown to be quite evident on ultrasound and echogenic but very subtle on MRI is very slightly hypoenhancing on CT and most evident on equilibrium phase. The margins are not clearly delineated so accurate measurement is difficult but the lesion is similarly sized compared to prior recent ultrasound measuring about 1.1 x 1.6 cm. No other hepatic lesions are seen in the liver redemonstrates subtle changes cirrhosis. OTHER TESTING:  Not available or performed    ASSESSMENT AND PLAN:  Chronic HCV with bridging fibrosis. This was confirmed with FibroScan again today. Results were discussed in detail with patient. He has preserved liver function. Will continue to monitor patient regularly. HIV co-infection. He has low levels of HIV RNA. He is not taking anti-HIV medications. This is being regularly monitored by his ID physician. HCV treatment. Completed 12 weeks of Harvoni treatment in August 2015. He is cured. Fibroscan. Patient's liver biopsy in March 2015 demonstrated bridging fibrosis. FibroScan continues to demonstrate bridging fibrosis with mild progression. CAP score is also consistent with fatty liver disease. Encouraged patient to follow a healthy diet and increase activity level. Will have patient return every year to reassess his fibrosis with a Fibroscan.  Patient verbalized understanding of this plan. [facility] [address] surveillance. Currently up to date. Small lesion has been seen on imaging. Unclear if this is [address] Will review AFP when available. Ordered an US to rule out [address] in May 2019. The patient was directed to continue all current medications at the current dosages. There are no contraindications for the patient to take any medications that are necessary for treatment of other medical issues. The patient was counseled regarding alcohol consumption. The patient was counseled regarding use of illicit drugs. Vaccination for viral hepatitis A and B is not required. The patient has serologic evidence of prior exposure or vaccination with immunity. All of the above issues were discussed with the patient. All questions were answered. The patient expressed a clear understanding of the above. [address] [address] in 6-8 months.     Daina Wilson NP  [place] [address], [address]  Ph: [phone]  Fax: [phone]

## 2019-02-13 NOTE — PROGRESS NOTES
1. Have you been to the ER, urgent care clinic since your last visit? Hospitalized since your last visit? yes, [facility]     2. Have you seen or consulted any other health care providers outside of the [address] [place] [address] since your last visit? Include any pap smears or colon screening. No   Chief Complaint   Patient presents with    Follow-up     fibrosacan      Visit Vitals  /80 (BP 1 Location: Right arm, BP Patient Position: Sitting)   Pulse 76   Temp 97 °F (36.1 °C) (Oral)   Wt 220 lb (99.8 kg)   SpO2 97%   BMI 30.68 kg/m²     3 most recent PHQ Screens 2/13/2019   PHQ Not Done -   Little interest or pleasure in doing things Not at all   Feeling down, depressed, irritable, or hopeless Not at all   Total Score PHQ 2 0     Learning Assessment 2/13/2019   PRIMARY LEARNER Patient   HIGHEST LEVEL OF EDUCATION - PRIMARY LEARNER  -   BARRIERS PRIMARY LEARNER NONE   CO-LEARNER CAREGIVER No   PRIMARY LANGUAGE ENGLISH   LEARNER PREFERENCE PRIMARY LISTENING   ANSWERED BY patient   RELATIONSHIP SELF     Abuse Screening Questionnaire 2/13/2019   Do you ever feel afraid of your partner? N   Are you in a relationship with someone who physically or mentally threatens you? N   Is it safe for you to go home? Y     Fall Risk Assessment, last 12 mths 2/13/2019   Able to walk? Yes   Fall in past 12 months?  No

## 2019-02-14 LAB
AFP L3 MFR SERPL: 7.2 % (ref 0–9.9)
AFP SERPL-MCNC: 8.7 NG/ML (ref 0–8)
ALBUMIN SERPL-MCNC: 4.8 G/DL (ref 3.6–4.8)
ALBUMIN/GLOB SERPL: 1.8 {RATIO} (ref 1.2–2.2)
ALP SERPL-CCNC: 55 IU/L (ref 39–117)
ALT SERPL-CCNC: 16 IU/L (ref 0–44)
AST SERPL-CCNC: 21 IU/L (ref 0–40)
BILIRUB SERPL-MCNC: 0.8 MG/DL (ref 0–1.2)
BUN SERPL-MCNC: 14 MG/DL (ref 8–27)
BUN/CREAT SERPL: 18 (ref 10–24)
CALCIUM SERPL-MCNC: 9.5 MG/DL (ref 8.6–10.2)
CHLORIDE SERPL-SCNC: 104 MMOL/L (ref 96–106)
CO2 SERPL-SCNC: 21 MMOL/L (ref 20–29)
CREAT SERPL-MCNC: 0.77 MG/DL (ref 0.76–1.27)
GLOBULIN SER CALC-MCNC: 2.7 G/DL (ref 1.5–4.5)
GLUCOSE SERPL-MCNC: 91 MG/DL (ref 65–99)
INR PPP: 1.1 (ref 0.8–1.2)
POTASSIUM SERPL-SCNC: 4.1 MMOL/L (ref 3.5–5.2)
PROT SERPL-MCNC: 7.5 G/DL (ref 6–8.5)
PROTHROMBIN TIME: 11.7 SEC (ref 9.1–12)
SODIUM SERPL-SCNC: 142 MMOL/L (ref 134–144)

## 2019-02-20 ENCOUNTER — HOSPITAL ENCOUNTER (EMERGENCY)
Age: 69
Discharge: HOME | End: 2019-02-20
Attending: EMERGENCY MEDICINE
Payer: MEDICAID

## 2019-02-20 ENCOUNTER — APPOINTMENT (OUTPATIENT)
Dept: GENERAL RADIOLOGY | Age: 69
End: 2019-02-20
Attending: EMERGENCY MEDICINE
Payer: MEDICAID

## 2019-02-20 VITALS
OXYGEN SATURATION: 95 % | RESPIRATION RATE: 18 BRPM | DIASTOLIC BLOOD PRESSURE: 83 MMHG | BODY MASS INDEX: 30.8 KG/M2 | HEART RATE: 97 BPM | WEIGHT: 220.02 LBS | HEIGHT: 71 IN | SYSTOLIC BLOOD PRESSURE: 131 MMHG | TEMPERATURE: 98.7 F

## 2019-02-20 DIAGNOSIS — M25.50 ARTHRALGIA, UNSPECIFIED JOINT: Primary | ICD-10-CM

## 2019-02-20 LAB
APPEARANCE UR: CLEAR
BACTERIA URNS QL MICRO: NEGATIVE /HPF
BILIRUB UR QL CFM: NEGATIVE
COLOR UR: ABNORMAL
EPITH CASTS URNS QL MICRO: ABNORMAL /LPF
FLUAV AG NPH QL IA: NEGATIVE
FLUBV AG NOSE QL IA: NEGATIVE
GLUCOSE UR STRIP.AUTO-MCNC: NEGATIVE MG/DL
HGB UR QL STRIP: NEGATIVE
HYALINE CASTS URNS QL MICRO: ABNORMAL /LPF (ref 0–5)
KETONES UR QL STRIP.AUTO: ABNORMAL MG/DL
LEUKOCYTE ESTERASE UR QL STRIP.AUTO: NEGATIVE
NITRITE UR QL STRIP.AUTO: NEGATIVE
PH UR STRIP: 5.5 [PH] (ref 5–8)
PROT UR STRIP-MCNC: ABNORMAL MG/DL
RBC #/AREA URNS HPF: ABNORMAL /HPF (ref 0–5)
SP GR UR REFRACTOMETRY: 1.02 (ref 1–1.03)
UROBILINOGEN UR QL STRIP.AUTO: 1 EU/DL (ref 0.2–1)
WBC URNS QL MICRO: ABNORMAL /HPF (ref 0–4)

## 2019-02-20 PROCEDURE — 74011250637 HC RX REV CODE- 250/637: Performed by: EMERGENCY MEDICINE

## 2019-02-20 PROCEDURE — 87804 INFLUENZA ASSAY W/OPTIC: CPT

## 2019-02-20 PROCEDURE — 71046 X-RAY EXAM CHEST 2 VIEWS: CPT

## 2019-02-20 PROCEDURE — 81001 URINALYSIS AUTO W/SCOPE: CPT

## 2019-02-20 PROCEDURE — 93005 ELECTROCARDIOGRAM TRACING: CPT

## 2019-02-20 PROCEDURE — 99284 EMERGENCY DEPT VISIT MOD MDM: CPT

## 2019-02-20 RX ORDER — IBUPROFEN 600 MG/1
600 TABLET ORAL
Status: COMPLETED | OUTPATIENT
Start: 2019-02-20 | End: 2019-02-20

## 2019-02-20 RX ORDER — IBUPROFEN 600 MG/1
TABLET ORAL
Status: DISPENSED
Start: 2019-02-20 | End: 2019-02-20

## 2019-02-20 RX ADMIN — IBUPROFEN 600 MG: 600 TABLET, FILM COATED ORAL at 08:36

## 2019-02-20 NOTE — ED PROVIDER NOTES
HPI   Pt reports that he has had body aches and chills for 1-2 days. Denies fever, headache, neck pain, visual changes, focal weakness or rash. Denies any  difficulty breathing, difficulty swallowing, SOB or chest pain. Denies any nausea, vomiting or diarrhea. Pt. Reports taking tylenol without relief. Old charts reviewed.       Past Medical History:   Diagnosis Date    BPH     [email or web address] 11/2009    prostate biopsy revealed cancer    Erectile dysfunction     Essential hypertension, benign     Hepatitis C 6/30/2009    HIV positive ([email or web address] 6/30/2009    Hypercholesterolemia     Ill-defined condition 9-2014    PNEUMONIA/bronchitis hx    Memory disorder     Prostate CA ([email or web address] 2/22/2010       Past Surgical History:   Procedure Laterality Date    COLONOSCOPY,REMV MARISSAN,SNARE  8/31/2015         HX HEENT      gum surgery-for denture    HX HERNIA REPAIR       Inguinal Hernia Repair    HX HERNIA REPAIR  05/02/2017    Davinci recurrent RIHR with mesh    HX SKIN BIOPSY  11/16/15    left forearm/ upper arm biopsy          Family History:   Problem Relation Age of Onset    Hypertension Mother     Mental Retardation Mother     Depression Mother     Anxiety Mother     Liver Disease Father     Lung Disease Father     Diabetes Maternal Grandmother     Hypertension Other     Stroke Other     Anxiety Other     Depression Other        Social History     Socioeconomic History    Marital status:      Spouse name: Not on file    Number of children: Not on file    Years of education: Not on file    Highest education level: Not on file   Social Needs    Financial resource strain: Not on file    Food insecurity - worry: Not on file    Food insecurity - inability: Not on file   Superpedestrian needs - medical: Not on file   Superpedestrian needs - non-medical: Not on file   Occupational History    Not on file   Tobacco Use    Smoking status: Current Every Day Smoker     Packs/day: 1.00     Years: 45.00 Pack years: 45.00     Types: Cigarettes    Smokeless tobacco: Never Used    Tobacco comment: using patch-9-10 cigarettes daily   Substance and Sexual Activity    Alcohol use: No    Drug use: No     Comment: none in past 20 years per pt on 5/2/17    Sexual activity: Yes     Partners: Female     Comment: radu has 3 children   Other Topics Concern    Not on file   Social History Narrative    Not on file         ALLERGIES: Patient has no known allergies. Review of Systems   Constitutional: Positive for chills. Negative for activity change, appetite change and fever. HENT: Positive for rhinorrhea. Negative for congestion and trouble swallowing. Respiratory: Positive for cough. Negative for shortness of breath. Cardiovascular: Negative for chest pain, palpitations and leg swelling. Gastrointestinal: Negative for abdominal pain, diarrhea, nausea and vomiting. Skin: Negative for rash. All other systems reviewed and are negative. Vitals:    02/20/19 0750 02/20/19 0817   BP:  109/71   Pulse: (!) 101 95   Resp:  20   Temp:  97.8 °F (36.6 °C)   SpO2: 95% 95%   Weight:  99.8 kg (220 lb 0.3 oz)   Height:  5' 11\" (1.803 m)            Physical Exam   Constitutional: He is oriented to person, place, and time. Elderly male; smoker   HENT:   Right Ear: External ear normal.   Left Ear: External ear normal.   Mouth/Throat: Oropharynx is clear and moist.   Eyes: Conjunctivae and EOM are normal. Pupils are equal, round, and reactive to light. Neck: Normal range of motion. Neck supple. Cardiovascular: Normal rate and regular rhythm. Pulmonary/Chest: Effort normal and breath sounds normal.   Abdominal: Soft. Bowel sounds are normal.   Musculoskeletal: Normal range of motion. Lymphadenopathy:     He has no cervical adenopathy. Neurological: He is alert and oriented to person, place, and time. Skin: Skin is warm and dry. No rash noted. Psychiatric: He has a normal mood and affect.  His behavior is normal.   Nursing note and vitals reviewed. MDM       Procedures    EKG reveals NSR with ventricular rate of 87; without ectopy. Reviewed by Dr. Emigdio Wolff and me. Marlene Christensen NP    Pt has been re-examined and denies any c/o pain or discomfort. Patient's results and plan of care  have been reviewed with him. Patient and/or family have verbally conveyed their understanding and agreement of the patient's signs, symptoms, diagnosis, treatment and prognosis and additionally agree to follow up as recommended or return to the Emergency Room should his condition change prior to follow-up. Discharge instructions have also been provided to the patient with some educational information regarding his diagnosis as well a list of reasons why he would want to return to the ER prior to his follow-up appointment should his condition change. Marlene Christensen NP

## 2019-02-20 NOTE — DISCHARGE INSTRUCTIONS
Patient Education        Joint Pain: Care Instructions  Your Care Instructions    Many people have small aches and pains from overuse or injury to muscles and joints. Joint injuries often happen during sports or recreation, work tasks, or projects around the home. An overuse injury can happen when you put too much stress on a joint or when you do an activity that stresses the joint over and over, such as using the computer or rowing a boat. You can take action at home to help your muscles and joints get better. You should feel better in 1 to 2 weeks, but it can take 3 months or more to heal completely. Follow-up care is a key part of your treatment and safety. Be sure to make and go to all appointments, and call your doctor if you are having problems. It's also a good idea to know your test results and keep a list of the medicines you take. How can you care for yourself at home? · Do not put weight on the injured joint for at least a day or two. · For the first day or two after an injury, do not take hot showers or baths, and do not use hot packs. The heat could make swelling worse. · Put ice or a cold pack on the sore joint for 10 to 20 minutes at a time. Try to do this every 1 to 2 hours for the next 3 days (when you are awake) or until the swelling goes down. Put a thin cloth between the ice and your skin. · Wrap the injury in an elastic bandage. Do not wrap it too tightly because this can cause more swelling. · Prop up the sore joint on a pillow when you ice it or anytime you sit or lie down during the next 3 days. Try to keep it above the level of your heart. This will help reduce swelling. · Take an over-the-counter pain medicine, such as acetaminophen (Tylenol), ibuprofen (Advil, Motrin), or naproxen (Aleve). Read and follow all instructions on the label. · After 1 or 2 days of rest, begin moving the joint gently.  While the joint is still healing, you can begin to exercise using activities that do not strain or hurt the painful joint. When should you call for help? Call your doctor now or seek immediate medical care if:    · You have signs of infection, such as:  ? Increased pain, swelling, warmth, and redness. ? Red streaks leading from the joint. ? A fever.    Watch closely for changes in your health, and be sure to contact your doctor if:    · Your movement or symptoms are not getting better after 1 to 2 weeks of home treatment. Where can you learn more? Go to http://sudheer-sparkle.info/. Enter P205 in the search box to learn more about \"Joint Pain: Care Instructions. \"  Current as of: September 20, 2018  Content Version: 11.9  © 1284-1905 [employer]. Care instructions adapted under license by [employer] (which disclaims liability or warranty for this information). If you have questions about a medical condition or this instruction, always ask your healthcare professional. [place] [address] any warranty or liability for your use of this information. Patient Education        Viral Infections: Care Instructions  Your Care Instructions    You don't feel well, but it's not clear what's causing it. You may have a viral infection. Viruses cause many illnesses, such as the common cold, influenza, fever, rashes, and the diarrhea, nausea, and vomiting that are often called \"stomach flu. \" You may wonder if antibiotic medicines could make you feel better. But antibiotics only treat infections caused by bacteria. They don't work on viruses. The good news is that viral infections usually aren't serious. Most will go away in a few days without medical treatment. In the meantime, there are a few things you can do to make yourself more comfortable. Follow-up care is a key part of your treatment and safety. Be sure to make and go to all appointments, and call your doctor if you are having problems.  It's also a good idea to know your test results and keep a list of the medicines you take. How can you care for yourself at home? · Get plenty of rest if you feel tired. · Take an over-the-counter pain medicine if needed, such as acetaminophen (Tylenol), ibuprofen (Advil, Motrin), or naproxen (Aleve). Read and follow all instructions on the label. · Be careful when taking over-the-counter cold or flu medicines and Tylenol at the same time. Many of these medicines have acetaminophen, which is Tylenol. Read the labels to make sure that you are not taking more than the recommended dose. Too much acetaminophen (Tylenol) can be harmful. · Drink plenty of fluids, enough so that your urine is light yellow or clear like water. If you have kidney, heart, or liver disease and have to limit fluids, talk with your doctor before you increase the amount of fluids you drink. · Stay home from work, school, and other public places while you have a fever. When should you call for help? Call 911 anytime you think you may need emergency care. For example, call if:    · You have severe trouble breathing.     · You passed out (lost consciousness).    Call your doctor now or seek immediate medical care if:    · You seem to be getting much sicker.     · You have a new or higher fever.     · You have blood in your stools.     · You have new belly pain, or your pain gets worse.     · You have a new rash.    Watch closely for changes in your health, and be sure to contact your doctor if:    · You start to get better and then get worse.     · You do not get better as expected. Where can you learn more? Go to http://sudheer-sparkle.info/. Enter G684 in the search box to learn more about \"Viral Infections: Care Instructions. \"  Current as of: July 30, 2018  Content Version: 11.9  © 2112-4375 [employer], [employer]. Care instructions adapted under license by [employer] (which disclaims liability or warranty for this information).  If you have questions about a medical condition or this instruction, always ask your healthcare professional. [place] [address] any warranty or liability for your use of this information.

## 2019-02-21 LAB
ATRIAL RATE: 87 BPM
CALCULATED P AXIS, ECG09: 60 DEGREES
CALCULATED R AXIS, ECG10: 37 DEGREES
CALCULATED T AXIS, ECG11: 48 DEGREES
DIAGNOSIS, 93000: NORMAL
P-R INTERVAL, ECG05: 178 MS
Q-T INTERVAL, ECG07: 358 MS
QRS DURATION, ECG06: 104 MS
QTC CALCULATION (BEZET), ECG08: 430 MS
VENTRICULAR RATE, ECG03: 87 BPM

## 2019-03-11 ENCOUNTER — TELEPHONE (OUTPATIENT)
Dept: INTERNAL MEDICINE CLINIC | Age: 69
End: 2019-03-11

## 2019-03-11 RX ORDER — DIPHENHYDRAMINE HCL 25 MG
4 CAPSULE ORAL AS NEEDED
Qty: 30 EACH | Refills: 1 | Status: SHIPPED | OUTPATIENT
Start: 2019-03-11 | End: 2019-04-10

## 2019-03-11 NOTE — TELEPHONE ENCOUNTER
Keith Jean-Baptiste pt sister is requesting prescription for nicotine gum for patient in order for insurance to cover it. Request it be sent to the [address] [facility] on [place]  Keith Jean-Baptiste best contact number [phone]       Message received & copied from [facility]

## 2019-05-01 NOTE — PROGRESS NOTES
HISTORY OF PRESENT ILLNESS  Sharon Gordon is a 78 y.o. male. HPI   F/u HTN and HLD, hx prostate cancer and PAD  and medicare wellness--------------  Sees Dr Sergio Dan for PAD--mild caludication symtpoms-pletal started,abnormal GERALD  Saw Dr Lonnie Araujo stress test--no ischemia on NST  Saw Pulm MD for 4mm right lung nodule--repeat CT in 1 yr, tobacco cesstion, PFT-Dr Keara Orantes. Has f/u this month  Had not seen Dr Shira Ashley in > 1 yr for hx HIV with undetectable VL  Hx hep c s/p harvoni with SVR   smokes 3/4 ppd-wants to try chantix again, took in the past which helpd  Saw AMRITA DASH s/p XRT for prostate cancer-had low level PSA this year    Last OV  Had recent MRCP for right liver lesion--stable appearance , repeat in 3 mos per hepatologist  Had  f/u prostate cancer--PSA was repeated-Dr Hanny Connell 0.2  Had Hep C s/p Harvoni x 3 months 2015-cured  HIV Hep C coinfection-per ID Dr Shira Ashley ( Hiv VL undetectable) and hepatolgist        C/o constipation x 4 days and sees blood on tissue after straining  Last colonoscopy 2015--plyps?  But path negative for polyp-Dr mcdowell        Left calf pain when walking 5 blocks or uphill  Saw cardiologist in the past for this symtpom--Dr Rubalcava but did not get angio,stable sxs now     Request screening chest CT for lung cancer--1ppd smoker x 30-40 yrs , now 1/2ppd     Smokes< 1/2 ppd, could not taolerate chantix which caused sob sxs              Patient Active Problem List    Diagnosis Date Noted    Substance abuse ([address] 12/25/2010     Priority: 2 - Two    Tobacco use 12/06/2016    PAD (peripheral artery disease) ([address] 12/06/2016    Dyslipidemia 12/06/2016    Advanced care planning/counseling discussion 09/13/2016    Colon polyp 09/18/2015    HTN (hypertension) 01/27/2011    Prostate cancer ([address] 02/22/2010    Chronic hepatitis C ([facility] 06/30/2009    HIV positive ([facility] 06/30/2009    Weight loss, non-intentional 06/30/2009     Current Outpatient Medications   Medication Sig Dispense Refill    aspirin delayed-release 81 mg tablet Take  by mouth daily.  cilostazol (PLETAL) 50 mg tablet Take 1 Tab by mouth Before breakfast and dinner. Indications: INTERMITTENT CLAUDICATION 60 Tab 4    magnesium 250 mg tab Take  by mouth.  rosuvastatin (CRESTOR) 5 mg tablet Take 1 Tab by mouth nightly. (Patient taking differently: Take 5 mg by mouth nightly. Indications: every other day) 90 Tab 3    lisinopril (PRINIVIL, ZESTRIL) 20 mg tablet Take 1 Tab by mouth daily. 90 Tab 3    docusate sodium (COLACE) 100 mg capsule Take 100 mg by mouth two (2) times daily as needed.  cyanocobalamin (VITAMIN B-12) 500 mcg tablet Take 500 mcg by mouth daily.  naproxen sodium (ALEVE) 220 mg tablet Take 220 mg by mouth as needed.  MULTIVITAMINS (MULTIVITAMIN PO) Take  by mouth daily.  OMEGA-3 FATTY ACIDS (OMEGA 3 PO) Take 1 Tab by mouth daily.        No Known Allergies  Social History     Tobacco Use    Smoking status: Current Every Day Smoker     Packs/day: 1.00     Years: 45.00     Pack years: 45.00     Types: Cigarettes    Smokeless tobacco: Never Used    Tobacco comment: using patch-9-10 cigarettes daily   Substance Use Topics    Alcohol use: No      Lab Results   Component Value Date/Time    WBC 5.1 02/13/2019 10:53 AM    HGB 15.1 02/13/2019 10:53 AM    Hemoglobin (POC) 16.0 05/02/2017 07:50 AM    HCT 42.7 02/13/2019 10:53 AM    Hematocrit (POC) 47 05/02/2017 07:50 AM    PLATELET 839 69/07/8430 10:53 AM    MCV 92 02/13/2019 10:53 AM     Lab Results   Component Value Date/Time    Glucose 91 02/13/2019 10:53 AM    Glucose (POC) 98 05/02/2017 07:50 AM    Glucose (POC) 95 09/04/2014 12:18 PM    LDL, calculated 116 (H) 03/20/2018 12:11 PM    Creatinine (POC) 0.6 04/04/2018 12:05 PM    Creatinine 0.77 02/13/2019 10:53 AM      Lab Results   Component Value Date/Time    Cholesterol, total 175 03/20/2018 12:11 PM    HDL Cholesterol 44 03/20/2018 12:11 PM    LDL, calculated 116 (H) 03/20/2018 12:11 PM    Triglyceride 77 03/20/2018 12:11 PM    CHOL/HDL Ratio 4.0 08/24/2014 05:06 AM     Lab Results   Component Value Date/Time    GFR est non-AA 93 02/13/2019 10:53 AM    GFRNA, POC >60 04/04/2018 12:05 PM    GFR est  02/13/2019 10:53 AM    GFRAA, POC >60 04/04/2018 12:05 PM    Creatinine 0.77 02/13/2019 10:53 AM    Creatinine (POC) 0.6 04/04/2018 12:05 PM    BUN 14 02/13/2019 10:53 AM    BUN (POC) 14 05/02/2017 07:50 AM    Sodium 142 02/13/2019 10:53 AM    Sodium (POC) 141 05/02/2017 07:50 AM    Potassium 4.1 02/13/2019 10:53 AM    Potassium (POC) 4.0 05/02/2017 07:50 AM    Chloride 104 02/13/2019 10:53 AM    Chloride (POC) 102 05/02/2017 07:50 AM    CO2 21 02/13/2019 10:53 AM    Magnesium 1.7 09/07/2014 12:32 AM    Phosphorus 3.3 09/03/2014 04:47 AM        ROS    Physical Exam   Constitutional: He appears well-developed and well-nourished. No distress. Appears stated age   HENT:   Head: Normocephalic. Cardiovascular: Normal rate, regular rhythm and normal heart sounds. Exam reveals no gallop. No murmur heard. Pulmonary/Chest: Effort normal and breath sounds normal. No respiratory distress. He has no wheezes. He has no rales. He exhibits no tenderness. Abdominal: Soft. Musculoskeletal: He exhibits no edema. Neurological: He is alert. Psychiatric: He has a normal mood and affect. Nursing note and vitals reviewed. ASSESSMENT and PLAN  Diagnoses and all orders for this visit:    1. HIV infection, unspecified symptom status ([facility]  -     REFERRAL TO INFECTIOUS DISEASE    2. Essential hypertension    3. Pure hypercholesterolemia  -     TSH 3RD GENERATION  -     LIPID PANEL    4. Cigarette nicotine dependence without complication    Other orders  -     varenicline (CHANTIX STARTER CHRISTOPHER) 0.5 mg (11)- 1 mg (42) DsPk; Take 0.5 mg by mouth two (2) times daily (after meals). -     varenicline (CHANTIX) 1 mg tablet; Take 1 Tab by mouth two (2) times a day for 90 days.            This is the Subsequent Medicare Annual Wellness Exam, performed 12 months or more after the Initial AWV or the last Subsequent AWV    I have reviewed the patient's medical history in detail and updated the computerized patient record. History     Past Medical History:   Diagnosis Date    BPH     Cancer ([facility] 11/2009    prostate biopsy revealed cancer    Erectile dysfunction     Essential hypertension, benign     Hepatitis C 6/30/2009    HIV positive ([facility] 6/30/2009    Hypercholesterolemia     Ill-defined condition 9-2014    PNEUMONIA/bronchitis hx    Memory disorder     Prostate CA ([facility] 2/22/2010      Past Surgical History:   Procedure Laterality Date    Liliane Price  8/31/2015         HX HEENT      gum surgery-for denture    HX HERNIA REPAIR       Inguinal Hernia Repair    HX HERNIA REPAIR  05/02/2017    Davinci recurrent RIHR with mesh    HX SKIN BIOPSY  11/16/15    left forearm/ upper arm biopsy      Current Outpatient Medications   Medication Sig Dispense Refill    varenicline (CHANTIX STARTER CHRISTOPHER) 0.5 mg (11)- 1 mg (42) DsPk Take 0.5 mg by mouth two (2) times daily (after meals). 1 Dose Pack 0    varenicline (CHANTIX) 1 mg tablet Take 1 Tab by mouth two (2) times a day for 90 days. 60 Tab 3    cilostazol (PLETAL) 50 mg tablet Take 1 Tab by mouth Before breakfast and dinner. Indications: INTERMITTENT CLAUDICATION 60 Tab 4    magnesium 250 mg tab Take  by mouth.  rosuvastatin (CRESTOR) 5 mg tablet Take 1 Tab by mouth nightly. (Patient taking differently: Take 5 mg by mouth nightly. Indications: every other day) 90 Tab 3    lisinopril (PRINIVIL, ZESTRIL) 20 mg tablet Take 1 Tab by mouth daily. 90 Tab 3    cyanocobalamin (VITAMIN B-12) 500 mcg tablet Take 500 mcg by mouth daily.  naproxen sodium (ALEVE) 220 mg tablet Take 220 mg by mouth as needed.  MULTIVITAMINS (MULTIVITAMIN PO) Take  by mouth daily.  aspirin delayed-release 81 mg tablet Take  by mouth daily.       docusate sodium (COLACE) 100 mg capsule Take 100 mg by mouth two (2) times daily as needed.  OMEGA-3 FATTY ACIDS (OMEGA 3 PO) Take 1 Tab by mouth daily. No Known Allergies  Family History   Problem Relation Age of Onset    Hypertension Mother     Mental Retardation Mother     Depression Mother    [address] [facility] Yousif Anxiety Mother     Liver Disease Father     Lung Disease Father     Diabetes Maternal Grandmother     Hypertension Other     Stroke Other     Anxiety Other     Depression Other      Social History     Tobacco Use    Smoking status: Current Every Day Smoker     Packs/day: 1.00     Years: 45.00     Pack years: 45.00     Types: Cigarettes    Smokeless tobacco: Never Used    Tobacco comment: using patch-9-10 cigarettes daily   Substance Use Topics    Alcohol use: No     Patient Active Problem List   Diagnosis Code    Chronic hepatitis C ([facility] B18.2    HIV positive ([facility] Z21    Weight loss, non-intentional R63.4    Prostate cancer ([facility] C61    Substance abuse ([facility] F19.10    HTN (hypertension) I10    Colon polyp K63.5    Advanced care planning/counseling discussion Z71.89    Tobacco use Z72.0    PAD (peripheral artery disease) ([address] I73.9    Dyslipidemia E78.5       Depression Risk Factor Screening:     3 most recent PHQ Screens 5/2/2019   PHQ Not Done -   Little interest or pleasure in doing things Not at all   Feeling down, depressed, irritable, or hopeless Not at all   Total Score PHQ 2 0     Alcohol Risk Factor Screening: You do not drink alcohol or very rarely. Functional Ability and Level of Safety:   Hearing Loss  Hearing is good. Activities of Daily Living  The home contains: no safety equipment. Patient does total self care    Fall Risk  Fall Risk Assessment, last 12 mths 5/2/2019   Able to walk? Yes   Fall in past 12 months?  No       Abuse Screen  Patient is not abused    Cognitive Screening   Evaluation of Cognitive Function:  Has your family/caregiver stated any concerns about your memory: no  Normal, Animal Naming test    Patient Care Team   Patient Care Team:  Kenyetta Wills MD as PCP - Sara Malik MD (Infectious Diseases)  Romaine Oconnor MD as Surgeon (General Surgery)  Reubin Epley, MD (Gastroenterology)  Gonzales Elise MD (Hepatology)  Cristela Blas, NP (Nurse Practitioner)  Quinton Vo MD (Cardiology)  Sameer Ward MD (Cardiology)    Assessment/Plan   Education and counseling provided:  Are appropriate based on today's review and evaluation  End-of-Life planning (with patient's consent)  tdap and shingrix recommended     Diagnoses and all orders for this visit:    1. HIV infection, unspecified symptom status ([facility]  -     REFERRAL TO INFECTIOUS DISEASE    2. Essential hypertension   controlled  3. Pure hypercholesterolemia  -     TSH 3RD GENERATION  -     LIPID PANEL   Continue statin  4. Cigarette nicotine dependence without complication   chantix discussion and rx    5. PAD   On pletal    6. Hx 4mm lung nodule   F/u pulmonary MD this month  Other orders  -     varenicline (CHANTIX STARTER CHRISTOPHER) 0.5 mg (11)- 1 mg (42) DsPk; Take 0.5 mg by mouth two (2) times daily (after meals). -     varenicline (CHANTIX) 1 mg tablet; Take 1 Tab by mouth two (2) times a day for 90 days.         Health Maintenance Due   Topic Date Due    DTaP/Tdap/Td series (1 - Tdap) 04/18/1970    Shingrix Vaccine Age 50> (1 of 2) 04/18/1999    GLAUCOMA SCREENING Q2Y  04/18/2014    AAA Screening 73-69 YO Male Smoking Patients  04/18/2014    MEDICARE YEARLY EXAM  03/07/2019

## 2019-05-02 ENCOUNTER — OFFICE VISIT (OUTPATIENT)
Dept: INTERNAL MEDICINE CLINIC | Age: 69
End: 2019-05-02

## 2019-05-02 VITALS
HEIGHT: 70 IN | TEMPERATURE: 97.3 F | SYSTOLIC BLOOD PRESSURE: 118 MMHG | RESPIRATION RATE: 16 BRPM | WEIGHT: 217 LBS | OXYGEN SATURATION: 98 % | BODY MASS INDEX: 31.07 KG/M2 | DIASTOLIC BLOOD PRESSURE: 65 MMHG | HEART RATE: 65 BPM

## 2019-05-02 DIAGNOSIS — B20 HIV INFECTION, UNSPECIFIED SYMPTOM STATUS (HCC): Primary | ICD-10-CM

## 2019-05-02 DIAGNOSIS — E78.00 PURE HYPERCHOLESTEROLEMIA: ICD-10-CM

## 2019-05-02 DIAGNOSIS — I10 ESSENTIAL HYPERTENSION: ICD-10-CM

## 2019-05-02 DIAGNOSIS — F17.210 CIGARETTE NICOTINE DEPENDENCE WITHOUT COMPLICATION: ICD-10-CM

## 2019-05-02 DIAGNOSIS — Z00.00 MEDICARE ANNUAL WELLNESS VISIT, SUBSEQUENT: ICD-10-CM

## 2019-05-02 RX ORDER — VARENICLINE TARTRATE 1 MG/1
1 TABLET, FILM COATED ORAL 2 TIMES DAILY
Qty: 60 TAB | Refills: 3 | Status: SHIPPED | OUTPATIENT
Start: 2019-05-02 | End: 2019-07-31

## 2019-05-02 RX ORDER — VARENICLINE TARTRATE 25 MG
0.5 KIT ORAL
Qty: 1 DOSE PACK | Refills: 0 | Status: SHIPPED | OUTPATIENT
Start: 2019-05-02 | End: 2019-08-13

## 2019-05-02 NOTE — PATIENT INSTRUCTIONS
Medicare Wellness Visit, Male    The best way to live healthy is to have a lifestyle where you eat a well-balanced diet, exercise regularly, limit alcohol use, and quit all forms of tobacco/nicotine, if applicable. Regular preventive services are another way to keep healthy. Preventive services (vaccines, screening tests, monitoring & exams) can help personalize your care plan, which helps you manage your own care. Screening tests can find health problems at the earliest stages, when they are easiest to treat. [facility] follows the current, evidence-based guidelines published by the [facility] Cornelius Jazmin ([facility]) when recommending preventive services for our patients. Because we follow these guidelines, sometimes recommendations change over time as research supports it. (For example, a prostate screening blood test is no longer routinely recommended for men with no symptoms.)  Of course, you and your doctor may decide to screen more often for some diseases, based on your risk and co-morbidities (chronic disease you are already diagnosed with). Preventive services for you include:  - Medicare offers their members a free annual wellness visit, which is time for you and your primary care provider to discuss and plan for your preventive service needs. Take advantage of this benefit every year!  -All adults over age 72 should receive the recommended pneumonia vaccines. Current USPSTF guidelines recommend a series of two vaccines for the best pneumonia protection.   -All adults should have a flu vaccine yearly and an ECG.  All adults age 61 and older should receive a shingles vaccine once in their lifetime.    -All adults age 38-68 who are overweight should have a diabetes screening test once every three years.   -Other screening tests & preventive services for persons with diabetes include: an eye exam to screen for diabetic retinopathy, a kidney function test, a foot exam, and stricter control over your cholesterol.   -Cardiovascular screening for adults with routine risk involves an electrocardiogram (ECG) at intervals determined by the provider.   -Colorectal cancer screening should be done for adults age 54-65 with no increased risk factors for colorectal cancer. There are a number of acceptable methods of screening for this type of cancer. Each test has its own benefits and drawbacks. Discuss with your provider what is most appropriate for you during your annual wellness visit. The different tests include: colonoscopy (considered the best screening method), a fecal occult blood test, a fecal DNA test, and sigmoidoscopy.  -All adults born between [facility] should be screened once for Hepatitis C.  -An Abdominal Aortic Aneurysm (AAA) Screening is recommended for men age 73-68 who has ever smoked in their lifetime.      Here is a list of your current Health Maintenance items (your personalized list of preventive services) with a due date:  Health Maintenance Due   Topic Date Due    DTaP/Tdap/Td  (1 - Tdap) 04/18/1970    Shingles Vaccine (1 of 2) 04/18/1999    Glaucoma Screening   04/18/2014    AAA Screening  04/18/2014    Annual Well Visit  03/07/2019

## 2019-05-02 NOTE — PROGRESS NOTES
Chief Complaint   Patient presents with   [facility]     annual    Labs     Fasting    Nicotine Dependence     requesting prescription

## 2019-05-03 LAB
CHOLEST SERPL-MCNC: 151 MG/DL (ref 100–199)
HDLC SERPL-MCNC: 43 MG/DL
LDLC SERPL CALC-MCNC: 98 MG/DL (ref 0–99)
TRIGL SERPL-MCNC: 52 MG/DL (ref 0–149)
TSH SERPL DL<=0.005 MIU/L-ACNC: 1 UIU/ML (ref 0.45–4.5)
VLDLC SERPL CALC-MCNC: 10 MG/DL (ref 5–40)

## 2019-05-08 RX ORDER — ROSUVASTATIN CALCIUM 5 MG/1
TABLET, COATED ORAL
Qty: 90 TAB | Refills: 3 | Status: SHIPPED | OUTPATIENT
Start: 2019-05-08 | End: 2019-05-22 | Stop reason: SDUPTHER

## 2019-05-20 ENCOUNTER — HOSPITAL ENCOUNTER (OUTPATIENT)
Dept: ULTRASOUND IMAGING | Age: 69
Discharge: HOME | End: 2019-05-20
Attending: NURSE PRACTITIONER
Payer: MEDICARE

## 2019-05-20 DIAGNOSIS — B18.2 CHRONIC HEPATITIS C WITHOUT HEPATIC COMA (HCC): ICD-10-CM

## 2019-05-20 PROCEDURE — 76705 ECHO EXAM OF ABDOMEN: CPT

## 2019-05-20 RX ORDER — CILOSTAZOL 50 MG/1
TABLET ORAL
Qty: 60 TAB | Refills: 4 | Status: SHIPPED | OUTPATIENT
Start: 2019-05-20 | End: 2019-11-25 | Stop reason: SDUPTHER

## 2019-05-23 RX ORDER — ROSUVASTATIN CALCIUM 5 MG/1
5 TABLET, COATED ORAL DAILY
Qty: 90 TAB | Refills: 3 | Status: SHIPPED | OUTPATIENT
Start: 2019-05-23 | End: 2020-11-10 | Stop reason: DRUGHIGH

## 2019-05-24 RX ORDER — CILOSTAZOL 50 MG/1
TABLET ORAL
Qty: 60 TAB | Refills: 4 | Status: CANCELLED | OUTPATIENT
Start: 2019-05-24

## 2019-05-24 NOTE — TELEPHONE ENCOUNTER
This looks like it was filled on 5/20/19, is this an old message? It came to me for some reason.     [place]

## 2019-07-18 ENCOUNTER — OFFICE VISIT (OUTPATIENT)
Dept: INTERNAL MEDICINE CLINIC | Age: 69
End: 2019-07-18

## 2019-07-18 VITALS
HEART RATE: 79 BPM | TEMPERATURE: 98 F | OXYGEN SATURATION: 99 % | HEIGHT: 70 IN | DIASTOLIC BLOOD PRESSURE: 84 MMHG | WEIGHT: 218 LBS | BODY MASS INDEX: 31.21 KG/M2 | RESPIRATION RATE: 18 BRPM | SYSTOLIC BLOOD PRESSURE: 122 MMHG

## 2019-07-18 DIAGNOSIS — B20 SYMPTOMATIC HIV INFECTION (HCC): Primary | ICD-10-CM

## 2019-07-18 NOTE — PROGRESS NOTES
Health Maintenance Due   Topic Date Due    DTaP/Tdap/Td series (1 - Tdap) 04/18/1970    Shingrix Vaccine Age 50> (1 of 2) 04/18/1999    GLAUCOMA SCREENING Q2Y  04/18/2014    AAA Screening 73-67 YO Male Smoking Patients  04/18/2014       Chief Complaint   Patient presents with    Hepatitis C    HIV       1. Have you been to the ER, urgent care clinic since your last visit? Hospitalized since your last visit? No    2. Have you seen or consulted any other health care providers outside of the [address] [place] [address] since your last visit? Include any pap smears or colon screening. No    3) Do you have an Advance Directive on file? no    4) Are you interested in receiving information on Advance Directives? NO      Patient is accompanied by self I have received verbal consent from Stephen Stokes De Lore Gifford to discuss any/all medical information while they are present in the room.

## 2019-07-18 NOTE — PROGRESS NOTES
ID follow up    NAME:  Elyse Whitmore                       :                          MRN:   [id number]   Date/Time:  2019 8:51 AM  Subjective: Follow up visit- last seen oct 2017  Theresa Roland is a 71 y.o. with a history of HEPC( treated in )  , HIV , h/o  IVDA , prostate Ca     HIV was diagnosed in  and he is HAARt naive   LAst Vl <20 and CD4 522 ( 38%)   from OCT 2017          Treated for hepatitis c by Cristela whitfield and   with harvoni for three months (2015-2015) and he has sustained virological response. Followed for a solitary lesion rt lobe- AFP 8.3 2019    progressing prostate cancer-- has received radiation therapy   He is followed by .     He is followed by cardiology and diagnosed with peripheral arterial disease and on rosuvastatin and pletal     He stopped IVDA 4 years ago - came off the methadone 3 years ago    HTN on lisinopril    Past Medical History:   Diagnosis Date    BPH     Cancer ([address] 2009    prostate biopsy revealed cancer    Erectile dysfunction     Essential hypertension, benign     Hepatitis C 2009    HIV positive ([address] 2009    Hypercholesterolemia     Ill-defined condition     PNEUMONIA/bronchitis hx    Memory disorder     Prostate CA ([address] 2010      Past Surgical History:   Procedure Laterality Date    COLONOSCOPY,REMV LESN,SNARE  2015         HX HEENT      gum surgery-for denture    HX HERNIA REPAIR       Inguinal Hernia Repair    HX HERNIA REPAIR  2017    Davinci recurrent RIHR with mesh    HX SKIN BIOPSY  11/16/15    left forearm/ upper arm biopsy       History   SH-smoker 4 /day  No alcohol  No cocaine in 3 1/2 year  No heroin 10 yrs  Meds reviewed     REVIEW OF SYSTEMS:     Const: negative fever, negative chills,   Eyes:  negative diplopia or visual changes, negative eye pain  ENT:  negative coryza, negative sore throat  : Thin stream, hesitancy  Skin:  negative for rash and pruritus  Heme: negative for easy bruising and gum/nose bleeding  MS: negative for myalgias, arthralgias, back pain and muscle weakness  Neurolo:negative for headaches, dizziness, vertigo, has some memory problems       Objective:   VITALS:    Visit Vitals  /84 (BP 1 Location: Left arm, BP Patient Position: Sitting)   Pulse 79   Temp 98 °F (36.7 °C) (Oral)   Resp 18   Ht 5' 10\" (1.778 m)   Wt 218 lb (98.9 kg)   SpO2 99%   BMI 31.28 kg/m²       PHYSICAL EXAM:   General:    Looks well  Head:   Normocephalic, without obvious abnormality, atraumatic. Eyes:   Conjunctivae clear, anicteric sclerae. Pupils are equal  Nose:  Nares normal. No drainage or sinus tenderness. Oral cavity- edentulous   Dentures top  Neck:  Supple, symmetrical,  no adenopathy, thyroid: non tender    no carotid bruit and no JVD. Back:    No CVA tenderness. Lungs:   Clear to auscultation bilaterally. No Wheezing or Rhonchi. No rales. Heart:   Regular rate and rhythm,  no murmur, rub or gallop. Abdomen:   Soft, non-tender,not distended. Bowel sounds normal. No masses  Extremities: Extremities normal, atraumatic, no cyanosis. No edema.  No clubbing  Skin:     Old scars   Lymph: Cervical, supraclavicular normal.  Neurologic: Grossly non-focal    Pertinent Labs  NA    Health maintenance  A) Vaccination  Flu  Prevnar-8/30/2017  Pneumovac-9/3/2014  TdaP  Zoster  Hepatitis A- ( Total positive)  Hepatitis B  HepC -positive - SVR    B) Labs  VL  Cd4  Quantiferon gold  RPR  CMV  Toxo  Genotype  [id number]  Lipid-MAy 2019  HBA1c    C)Prevention  Colonoscopy  Dental  opthal        Impression/Recommendation  71 yr male with h/o HIV, HEPC     1) HIV- diagnosed in 1989 patient is HAARt naive and is an Elite controller with undetectable viral load -last Cd4 522 ( 34.8%) in 2017    New data(  recent ADI) says that elite controllers will benefit from HAART as it preserves immune system and prevents immune exhaustion    Will get labs today and he can discuss with new provider regarding starting HAART    Prostate Cancer - followed by  urology- s/p radiation     Hepc - Treated with Leslee Seay and has SVR  followed by - last CT abdomen 11/15/18  . Very faint visualization of the liver in phase hyperenhancing lesion in the  central right lobe is not well visualized for measurement and again may reflect  well-differentiated and indolent hepatocellular carcinoma. Given the relative  long-term stability, radiographically, this lesion can be followed with serial  ultrasound and/or MRI.      2.  Cirrhosis with evidence of portal hypertension and gastric varices        IVDA- heavy cocaine user- none since 4 years     COPD- smoker-  followed by his PCP- chantix gave him side effects       , HTn- on lisinopril-      peripheral arterial disease- followed by cardiology-  on statin and cilostazol      LAbs ( HIV/Cd4)  today   Safe sex reinforced  Follow up depends on the lab results    Discussed the management with him   Pt aware of my departure  He would like to follow up with Melodie Cuenca

## 2019-07-18 NOTE — PATIENT INSTRUCTIONS
Today we will do your labs- You will benefit from starting treatment for HIV even if your virus is undetectable.   As I am relocating to [place] you can discuss with your new provider regarding HAART therapy    I will be relocating to [place]  Here are the providers in [facility] who can treat your condition  Please call their office to make an appt      [facility]  [phone](P) [phone](C)  [address]  [phone] (X) [phone] [facility] [address] [facility]  [phone](I) [phone] (F)  [address] [place] [address]

## 2019-07-21 LAB
ALBUMIN SERPL-MCNC: 4.5 G/DL (ref 3.5–4.8)
ALBUMIN/GLOB SERPL: 1.6 {RATIO} (ref 1.2–2.2)
ALP SERPL-CCNC: 59 IU/L (ref 39–117)
ALT SERPL-CCNC: 14 IU/L (ref 0–44)
AST SERPL-CCNC: 23 IU/L (ref 0–40)
BASOPHILS # BLD AUTO: 0 X10E3/UL (ref 0–0.2)
BASOPHILS NFR BLD AUTO: 1 %
BILIRUB SERPL-MCNC: 0.8 MG/DL (ref 0–1.2)
BUN SERPL-MCNC: 14 MG/DL (ref 8–27)
BUN/CREAT SERPL: 19 (ref 10–24)
CALCIUM SERPL-MCNC: 9.4 MG/DL (ref 8.6–10.2)
CD3+CD4+ CELLS # BLD: 532 /UL (ref 359–1519)
CD3+CD4+ CELLS NFR BLD: 31.3 % (ref 30.8–58.5)
CD3+CD4+ CELLS/CD3+CD8+ CLL BLD: 0.98 % (ref 0.92–3.72)
CD3+CD8+ CELLS # BLD: 541 /UL (ref 109–897)
CD3+CD8+ CELLS NFR BLD: 31.8 % (ref 12–35.5)
CHLORIDE SERPL-SCNC: 102 MMOL/L (ref 96–106)
CO2 SERPL-SCNC: 21 MMOL/L (ref 20–29)
CREAT SERPL-MCNC: 0.75 MG/DL (ref 0.76–1.27)
EOSINOPHIL # BLD AUTO: 0.1 X10E3/UL (ref 0–0.4)
EOSINOPHIL NFR BLD AUTO: 3 %
ERYTHROCYTE [DISTWIDTH] IN BLOOD BY AUTOMATED COUNT: 14 % (ref 12.3–15.4)
GLOBULIN SER CALC-MCNC: 2.8 G/DL (ref 1.5–4.5)
GLUCOSE SERPL-MCNC: 125 MG/DL (ref 65–99)
HCT VFR BLD AUTO: 41.9 % (ref 37.5–51)
HGB BLD-MCNC: 14.6 G/DL (ref 13–17.7)
HIV1 RNA # SERPL NAA+PROBE: <20 COPIES/ML
HIV1 RNA SERPL NAA+PROBE-LOG#: NORMAL LOG10COPY/ML
IMM GRANULOCYTES # BLD AUTO: 0 X10E3/UL (ref 0–0.1)
IMM GRANULOCYTES NFR BLD AUTO: 0 %
LYMPHOCYTES # BLD AUTO: 1.7 X10E3/UL (ref 0.7–3.1)
LYMPHOCYTES NFR BLD AUTO: 36 %
MCH RBC QN AUTO: 32.4 PG (ref 26.6–33)
MCHC RBC AUTO-ENTMCNC: 34.8 G/DL (ref 31.5–35.7)
MCV RBC AUTO: 93 FL (ref 79–97)
MONOCYTES # BLD AUTO: 0.4 X10E3/UL (ref 0.1–0.9)
MONOCYTES NFR BLD AUTO: 9 %
NEUTROPHILS # BLD AUTO: 2.5 X10E3/UL (ref 1.4–7)
NEUTROPHILS NFR BLD AUTO: 51 %
PLATELET # BLD AUTO: 202 X10E3/UL (ref 150–450)
POTASSIUM SERPL-SCNC: 4 MMOL/L (ref 3.5–5.2)
PROT SERPL-MCNC: 7.3 G/DL (ref 6–8.5)
RBC # BLD AUTO: 4.51 X10E6/UL (ref 4.14–5.8)
RPR SER QL: NON REACTIVE
SODIUM SERPL-SCNC: 139 MMOL/L (ref 134–144)
WBC # BLD AUTO: 4.8 X10E3/UL (ref 3.4–10.8)

## 2019-07-23 LAB
GAMMA INTERFERON BACKGROUND BLD IA-ACNC: 0.05 IU/ML
M TB IFN-G BLD-IMP: NEGATIVE
M TB IFN-G CD4+ BCKGRND COR BLD-ACNC: 0.23 IU/ML
MITOGEN IGNF BLD-ACNC: >10 IU/ML
QUANTIFERON INCUBATION, QF1T: NORMAL
QUANTIFERON TB2 AG: 0.08 IU/ML
SERVICE CMNT-IMP: NORMAL

## 2019-07-30 ENCOUNTER — TELEPHONE (OUTPATIENT)
Dept: FAMILY MEDICINE CLINIC | Age: 69
End: 2019-07-30

## 2019-07-30 NOTE — TELEPHONE ENCOUNTER
----- Message from Teresa Ballard MD sent at 7/18/2019 10:16 AM EDT -----  Hi  I am referring this patient for HIV care ( an elite controller on no HAARt so far but may need)  . He lives in [place]. I am doing labs today. Wound you be able to see him within the next 3 months.   Thanks

## 2019-08-13 ENCOUNTER — OFFICE VISIT (OUTPATIENT)
Dept: HEMATOLOGY | Age: 69
End: 2019-08-13

## 2019-08-13 VITALS
WEIGHT: 218.2 LBS | DIASTOLIC BLOOD PRESSURE: 70 MMHG | HEIGHT: 70 IN | SYSTOLIC BLOOD PRESSURE: 109 MMHG | HEART RATE: 77 BPM | OXYGEN SATURATION: 95 % | BODY MASS INDEX: 31.24 KG/M2 | TEMPERATURE: 97.4 F

## 2019-08-13 DIAGNOSIS — R97.8 OTHER ABNORMAL TUMOR MARKERS: ICD-10-CM

## 2019-08-13 DIAGNOSIS — K74.60 CIRRHOSIS OF LIVER WITHOUT ASCITES, UNSPECIFIED HEPATIC CIRRHOSIS TYPE (HCC): Primary | ICD-10-CM

## 2019-08-13 DIAGNOSIS — K76.9 LIVER LESION: ICD-10-CM

## 2019-08-13 NOTE — PROGRESS NOTES
Chief Complaint   Patient presents with    Follow-up     Visit Vitals  /70 (BP 1 Location: Left arm, BP Patient Position: Sitting)   Pulse 77   Temp 97.4 °F (36.3 °C) (Tympanic)   Ht 5' 10\" (1.778 m)   Wt 218 lb 3.2 oz (99 kg)   SpO2 95%   BMI 31.31 kg/m²     3 most recent PHQ Screens 7/18/2019   PHQ Not Done -   Little interest or pleasure in doing things Not at all   Feeling down, depressed, irritable, or hopeless Not at all   Total Score PHQ 2 0     Abuse Screening Questionnaire 5/2/2019   Do you ever feel afraid of your partner? N   Are you in a relationship with someone who physically or mentally threatens you? N   Is it safe for you to go home? Y     1. Have you been to the ER, urgent care clinic since your last visit? Hospitalized since your last visit? YES. [facility] FIRST. \"DIZZY SPELLS\". Possible Ear infection     2. Have you seen or consulted any other health care providers outside of the [address] [place] [address] since your last visit? Include any pap smears or colon screening.  No

## 2019-08-13 NOTE — PROGRESS NOTES
[address] [facility], Joellen DASH Heads, MD Elder Valente PA-C Windy Flurry, ACN-BC     Cristela Urrutia, BannerNP-BC   HEMAL Manuel-CORTNEY Crawford, [facility]       Valentin Davis [facility] 136    at [address], [address] [place] [address], [facility] 22.    [phone]    FAX: [address] [place] [address], [address]    [phone]    FAX: [phone]     Patient Care Team:  Jaren Soni MD as PCP - General  Juana Avila MD (Infectious Diseases)  Christian Rothman MD as Surgeon (General Surgery)  Hamilton Schirmer, MD (Gastroenterology)  Quin Gooden MD (Hepatology)  Roma Gomez, April G, NP (Nurse Practitioner)  Salbador Abdi MD (Cardiology)  Edmond Chavez MD (Cardiology)     Patient Active Problem List   Diagnosis Code    Chronic hepatitis C ([facility] B18.2    HIV positive ([facility] Z21    Weight loss, non-intentional R63.4    Prostate cancer ([address] C61    Substance abuse ([facility] F19.10    HTN (hypertension) I10    Colon polyp K63.5    Advanced care planning/counseling discussion Z71.89    Tobacco use Z72.0    PAD (peripheral artery disease) ([address] I73.9    Dyslipidemia E78.5       Lena Hadley returns to the [address] for management of chronic HCV and HIV co-infection. The active problem list, all pertinent past medical history, medications, radiologic findings and laboratory findings related to the liver disorder were reviewed with the patient. Ultrasound of the liver was performed in 12/2014. This suggests chronic liver disease. A 0.8 x 0.7 lesion was identified in the right lobe.  These lesion has been followed with different radiographic methods over the past several years and there is apparent growth of this lesion to 1.6 cm. It is not definitve that this represents the same lesion. AFP has been followed and while not rising, this is elevated. He has had most recent imaging with US in 5/2019. The patient underwent a liver biopsy in 3/2015. This demonstrates severe hepatitis with bridging fibrosis. Patient has had post-HCV clearance Fibroscan to reassess liver fibrosis or scarring after successful HCV treatment. This revealed a score of 12.2. Suggested fibrosis level is F3. CAP score is 361, suggestive of significant hepatic steatosis. Patient has completed 12 weeks of HCV treatment with Harvoni (5/29/2015-8/25/2015). He is a sustained virologic responder to treatment, or cured. Patient is co-infected with HIV. He is currently not on anti-retroviral therapy because his virus remains suppressed to undetected levels. He is otherwise feeling well. He has had an inguinal hernia repair and has continued to have some low abdominal discomfort, this is lessening. Patient was treated for prostate cancer 4-5 years ago and continues to have low PSA and no signs of recurrence. His greatest complaint at this time is of intermittent claudication symptoms effecting the LLE. He has elected a trial of medications and this has increased his walking distance. He is still smoking 3/4 PPD. Patient has no new physical complaints today. He continues to have ongoing fatigue but otherwise feels well. The patient completes all daily activities without any functional limitations. The patient has not experienced arthralgias, myalgias, problems concentrating, swelling of the abdomen, swelling of the lower extremities, hematemesis, or hematochezia. ALLERGIES  No Known Allergies    MEDICATIONS  Current Outpatient Medications   Medication Sig    rosuvastatin (CRESTOR) 5 mg tablet Take 1 Tab by mouth daily.  TAKE 1 TABLET BY MOUTH ONCE NIGHTLY    cilostazol (PLETAL) 50 mg tablet TAKE 1 TABLET BY MOUTH BEFORE BREAKFAST AND DINNER. INDICATIONS: INTERMITTENT CLAUDICATION    aspirin delayed-release 81 mg tablet Take  by mouth daily.  magnesium 250 mg tab Take  by mouth.  lisinopril (PRINIVIL, ZESTRIL) 20 mg tablet Take 1 Tab by mouth daily.  naproxen sodium (ALEVE) 220 mg tablet Take 220 mg by mouth as needed.  MULTIVITAMINS (MULTIVITAMIN PO) Take  by mouth daily.  varenicline (CHANTIX STARTER CHRISTOPHER) 0.5 mg (11)- 1 mg (42) DsPk Take 0.5 mg by mouth two (2) times daily (after meals).  docusate sodium (COLACE) 100 mg capsule Take 100 mg by mouth two (2) times daily as needed.  cyanocobalamin (VITAMIN B-12) 500 mcg tablet Take 500 mcg by mouth daily.  OMEGA-3 FATTY ACIDS (OMEGA 3 PO) Take 1 Tab by mouth daily. No current facility-administered medications for this visit. REVIEW OF SYSTEMS NOT RELATED TO LIVER DISEASE:  Constitution systems: Negative for fever, chills, weight gain, weight loss. Eyes: Negative for diplopia, visual changes, eye pain. ENT: Negative for sore throat, painful swallowing. Respiratory: Negative for cough, hemoptysis, dyspnea. Cardiology: Negative for chest pain, palpitations. GI:  Negative for constipation or diarrhea. : Negative for urinary frequency, dysuria and hematuria. Skin: Negative for rash. Hematology: Negative for easy bruising, bleeding from gums or nose. Musculo-skeletal: Negative for back pain. Positive for muscle pain, weakness of the LLE with walking distances. Neurologic: Negative for headaches, dizziness, vertigo, memory problems. Psychology: Negative for anxiety, depression. FAMILY HISTORY:  The father  of alcohol cirrhosis. The mother  of CVA. There are no other persons in the family with HCV or liver disease. SOCIAL HISTORY:  The patient is . The spouse has been tested for HCV and is positive. She was treated and achieved SVR.   The patient has 3 children, and 7 grandchildren. The patient stopped using tobacco products in 9/2014. Resumed in 2017 and is now smoking 3/4 PPD. The patient has never consumed significant amounts of alcohol. The patient used to work in contrstruction. PHYSICAL EXAMINATION:  Visit Vitals  /70 (BP 1 Location: Left arm, BP Patient Position: Sitting)   Pulse 77   Temp 97.4 °F (36.3 °C) (Tympanic)   Ht 5' 10\" (1.778 m)   Wt 218 lb 3.2 oz (99 kg)   SpO2 95%   BMI 31.31 kg/m²     General: No acute distress. Eyes: Sclera anicteric. ENT: No oral lesions. Thyroid normal.  Nodes: No adenopathy. Skin: No spider angiomata. No jaundice. No palmar erythema. Respiratory: Lungs clear to auscultation. Cardiovascular: Regular heart rate. No murmurs. No JVD. Abdomen: Soft non-tender. Liver size normal to percussion/palpation. Spleen not palpable. No obvious ascites. Extremities: No edema. No muscle wasting. No gross arthritic changes. Neurologic: Alert and oriented. Cranial nerves grossly intact. No asterixis.     LABORATORY STUDIES:  Liver [place] [address] Units 7/19/2019 2/13/2019 9/4/2018   WBC 3.4 - 10.8 x10E3/uL 4.8 5.1 5.3   ANC 1.4 - 7.0 x10E3/uL 2.5     HGB 13.0 - 17.7 g/dL 14.6 15.1 13.8   HGB 12.1 - 17.0 GM/DL      HGB (iSTAT) 12.1 - 17.0 GM/DL       - 450 x10E3/uL 202 176 167   INR 0.8 - 1.2  1.1    AST 0 - 40 IU/L 23 21 20   ALT 0 - 44 IU/L 14 16 17   Alk Phos 39 - 117 IU/L 59 55 64   Bili, Total 0.0 - 1.2 mg/dL 0.8 0.8 0.4   Bili, Direct 0.00 - 0.40 mg/dL      Albumin 3.5 - 4.8 g/dL 4.5 4.8 4.2   BUN 8 - 27 mg/dL 14 14 12   BUN (iSTAT) 9 - 20 MG/DL      Creat 0.76 - 1.27 mg/dL 0.75 (L) 0.77 0.71 (L)   Creat (iSTAT) 0.6 - 1.3 mg/dL      Na 134 - 144 mmol/L 139 142 140   K 3.5 - 5.2 mmol/L 4.0 4.1 3.9   Cl 96 - 106 mmol/L 102 104 101   CO2 20 - 29 mmol/L 21 21 24   Glucose 65 - 99 mg/dL 125 (H) 91 100 (H)     Cancer Screening Latest Ref Rng & Units 2/13/2019 8/25/2015   AFP, Serum 0.0 - 8.0 ng/mL 8.7 (H) 15.7 (H)   AFP-L3% 0.0 - 9.9 % 7.2 5.2   Additional lab values drawn at today's office visit are pending at the time of documentation. SEROLOGIES:  Serologies Latest Ref Rng 10/10/2014 9/1/2014 8/27/2014   Hep A Ab, Total Negative Positive (A)     Hep B Surface Ag Negative Negative     Hep B Core Ab, Total Negative Positive (A)     Hep C Genotype See Note 1a     HCV RT-PCR, Quant  [id number]  [id number]   HCV Log10    6.830   Ferritin 30 - 400 ng/mL 172     Iron % Saturation 15 - 55 % 22     C-ANCA Neg:<1:20 titer  <1:20    P-ANCA Neg:<1:20 titer  <1:20    ANCA Neg:<1:20 titer  <1:20      LIVER HISTOLOGY:  3/2015. Slides reviewed by MLS. Severe hepatitis with bridging fibrosis and possible cirrhosis. Knodell score (3,3,3,3), Bro score 4, Metavir score 3.  2/2017. FibroScan performed at [address]. EkPa was 10.0. Suggested fibrosis level is F3.  2/2018. FibroScan performed at [address]. EkPa was 11.7. Suggested fibrosis level is F3.  2/2019. FibroScan performed at [address]. EkPa was 12.2. Suggested fibrosis level is F3. CAP score is 361, suggestive of significant hepatic steatosis. ENDOSCOPIC PROCEDURES:  Not available or performed    RADIOLOGY:  12/2014. Ultrasound of liver. Echogenic consistent with chronic liver disease. 0.8x0.7 cm lesion right lobe. No dilated bile ducts. No ascites. 1/2015. Dynamic MRI liver. Changes consistent with chronic liver disease. No liver mass lesions. No dilated bile ducts. No bile duct strictures. No ascites. 11/2017. Ultrasound of liver. Cirrhotic morphology of the liver is again demonstrated. 12 mm echogenic nodule right hepatic lobe. This does not appear to reside in the same location as the prior smaller hyperechoic nodule. This may represent a hemangioma. 11/2018. Triple phase CT.  The central right lobe hepatic mass shown to be quite evident on ultrasound and echogenic but very subtle on MRI is very slightly hypoenhancing on CT and most evident on equilibrium phase. The margins are not clearly delineated so accurate measurement is difficult but the lesion is similarly sized compared to prior recent ultrasound measuring about 1.1 x 1.6 cm. No other hepatic lesions are seen in the liver redemonstrates subtle changes cirrhosis. 5/2019. Ultrasound of liver. Diffusely echogenic and heterogeneous. An echogenic lesion measures 1.5 x 2.0 x 1.9 cm, previously 1.6 x 1.1 cm on CT and 1.3 x 1.8 x 1.8 cm on ultrasound. OTHER TESTING:  Not available or performed    ASSESSMENT AND PLAN:  Chronic HCV with bridging fibrosis. This was confirmed with past FibroScan 2/2019. He has preserved liver function. Will continue to monitor patient regularly and continue to monitor for complications of advanced fibrosis/cirrhosis. HIV co-infection. He has low levels of HIV RNA. He is not taking anti-HIV medications. This is being regularly monitored by his ID physician. HCV treatment. Completed 12 weeks of Harvoni treatment in August 2015. He is cured. [facility] surveillance. Routine imaging has been done over the past several years. In reviewing his chart, I am concerning that there appears to be some interval growth of this finding and this will warrant additional imaging. Characteristics apart from change in size over time are not characteristic of HCC. I have drawn tumor markers and will order CT or MRI for the near future depending on these values and best directed imaging technique. I have explained this in detail with the patient. The patient was directed to continue all current medications at the current dosages. There are no contraindications for the patient to take any medications that are necessary for treatment of other medical issues. The patient was counseled regarding alcohol consumption. The patient was counseled regarding use of illicit drugs.       Vaccination for viral hepatitis A and B is not required. The patient has serologic evidence of prior exposure or vaccination with immunity. All of the above issues were discussed with the patient. All questions were answered. The patient expressed a clear understanding of the above. [address] [address] in 4-6 months with repeat imaging in the meantime pending review of tumor markers.      Abbey Crawford PA-C  Liver [place] [address], [address] [address].  [phone]

## 2019-08-14 LAB
AFP L3 MFR SERPL: 4.5 % (ref 0–9.9)
AFP SERPL-MCNC: 6.9 NG/ML (ref 0–8)
CANCER AG19-9 SERPL-ACNC: 6 U/ML (ref 0–35)
CEA SERPL-MCNC: 2.8 NG/ML (ref 0–4.7)

## 2019-08-15 DIAGNOSIS — K74.60 CIRRHOSIS OF LIVER WITHOUT ASCITES, UNSPECIFIED HEPATIC CIRRHOSIS TYPE (HCC): Primary | ICD-10-CM

## 2019-08-15 NOTE — PROGRESS NOTES
Called patient with continue normal tumor markers. Will continue to monitor the liver lesion for interval growth with CT in late 10/2019 or 11/2019. Order placed.

## 2019-08-19 ENCOUNTER — TELEPHONE (OUTPATIENT)
Dept: INTERNAL MEDICINE CLINIC | Age: 69
End: 2019-08-19

## 2019-08-19 NOTE — TELEPHONE ENCOUNTER
Called, spoke to pt. Two identifiers confirmed. Appointment scheduled for 8/20 @ 245 with Dr. Nadya Velazquez. Pt verbalized understanding of information discussed w/ no further questions at this time.

## 2019-08-19 NOTE — TELEPHONE ENCOUNTER
#[phone] pt has a bad cough and needs to be seen soon as he is prone to yearly pneumonia. Please call to schedule. Thanks.

## 2019-10-03 RX ORDER — IBUPROFEN 200 MG
TABLET ORAL
Qty: 30 PATCH | Refills: 0 | Status: SHIPPED | OUTPATIENT
Start: 2019-10-03 | End: 2019-10-29 | Stop reason: SDUPTHER

## 2019-10-17 ENCOUNTER — TELEPHONE (OUTPATIENT)
Dept: FAMILY MEDICINE CLINIC | Age: 69
End: 2019-10-17

## 2019-10-17 NOTE — TELEPHONE ENCOUNTER
----- Message from Jameson Oh sent at 10/9/2019 11:25 AM EDT -----  Regarding: Dr. Peggy Low General Message/Vendor Calls    Caller's first and last name: Donya Pathak       Reason for call: Regarding rescheduling his apt for October 9, 2019 01:00 PM.        Call back required yes/no and why: Yes, reschedule       Best contact number(s): [phone]      Details to clarify the request:      Jameson Oh

## 2019-10-28 PROBLEM — R91.1 LUNG NODULE: Status: ACTIVE | Noted: 2019-10-28

## 2019-10-28 NOTE — CALL BACK NOTE
[facility] Emergency Department Follow Up Call Record    Discharged to : Home/Family Home/Home Health/Skilled Facility/Rehab/Assisted Living/Other__Home_____  1) Did you receive your discharge instructions? Yes This writer spoke with Chance Basurto , who verified hi sDOB for HIPPA. Mr. Grace Porter reports having \"sniffles\" today. He will see hie PCP if needed. 2) Do you understand them? Yes         3) Are you able to follow them? Yes          If NO, what can I clarify for you? 4) Do you understand your diagnosis? Yes         5) Do you know which symptoms should prompt you to call the doctor? Yes     6) Were you able to fill and  any medications that were prescribed? Not applicable     7) You were prescribed __none_________for ____________________. Common side effects of this medication are____________________. This is not a complete list so please review the forms given from the pharmacy for a complete list.      8) Are there any questions about your medications? No            Have you scheduled any recommended doctors appointments (specialty, PCP) Encouraged follow up with PCP if symptoms persist.  If NO, what barriers are you encountering (transportation/lost contact info/cost/  didnt think necessary/no PCP  9) If discharged with Home Health, has the agency contacted you to schedule visit? Not applicable  10) Is there anyone available to help you at home (meals, errands, transportation    monitoring) (adult children, neighbors, private duty companions) Yes    11) Are you on a special diet? No         If YES, do you understand the requirements for this diet? Education provided? 12) If presented with cough, bronchitis, COPD, asthma, is it ok to ask that the   respiratory disease management educator call you? Not applicable      13)  A) If presented with fall, were you issued an assistive device in the ED    Are you using? Not applicable  B) If given RX for device, have you obtained? Not applicable       If NO, barriers? C) Therapist recommended:   Are you able to implement the suggestions? Not applicable        If NO, barriers to implementation? D) Are you having any difficulties with mobility inside your home?     (steps, bed, tub)No   If YES, ask if the [facility] PT can contact patient and good time and number?  14)  At the end of your discharge instructions, there is information about accessing [facility] SERVICES, have you had a chance to review those? Yes         Do you have any questions about signing up for this service? We encourage our patients to be active participants in their healthcare and this site is one of the ways to do that. It will allow you to access parts of your medical record, email your doctors office, schedule appointments, and request medications refills . 15) Are there any other questions that I can answer for you regarding    your Emergency department visit?  NO             Estimated Call Time:__12:28 PM  _________________ Date/Time:_______________ glucose

## 2019-10-28 NOTE — PROGRESS NOTES
HISTORY OF PRESENT ILLNESS  Ronda Wallace is a 78 y.o. male. HPI   F/u HTN and HLD, hx prostate cancer and PAD   [facility]  Mild COPD by PFT per pt-no inhalers  Will be seeing Dr Kalen Arredondo for ID HIV undetectable and hepc treated next month  Saw Dr Padmaja Booth recently---psa stable s/p XRT so far--PSA  Dr Sheryl Tejada appt 2 mos ago--due for repeat imaging right liver lesion?/    Smokes 1ppd tobacco, wants rx for nicotine patch  Last Tressa Corona MD last year for 4 mm right lung nodule and ? Filling defect of bronchus--normal bornchoscopy  To get yearly chest CT f/u nodule  PFT ordered for emphysema by CT. Sees Dr Jeremy Conroy for PAD--mild caludication symtpoms-pletal started,abnormal GERALD  Saw Dr Maxine Felder stress test--no ischemia on NST  Saw Puladilson DASH for 4mm right lung nodule--repeat CT in 1 yr, tobacco cesstion, PFT-Dr David Rosales. Has f/u this month  Had not seen Dr Lalo Lorenz in > 1 yr for hx HIV with undetectable VL  Hx hep c s/p harvoni with SVR   smokes 3/4 ppd-wants to try chantix again, took in the past which helpd  Saw AMRITA DASH s/p XRT for prostate cancer-had low level PSA this year   saw hepatologist for hep c with fibrosis and ?  Right liver lesion--due to repeat imagint  Last OV  Had recent MRCP for right liver lesion--stable appearance , repeat in 3 mos per hepatologist  Had  f/u prostate cancer--PSA was repeated-Dr Padmaja Booth 0.2  Had Hep C s/p Harvoni x 3 months 2015-cured  HIV Hep C coinfection-per ID Dr Lalo Lorenz ( Hiv VL undetectable) and hepatolgist       Patient Active Problem List    Diagnosis Date Noted    Substance abuse ([facility] 12/25/2010     Priority: 2 - Two    Tobacco use 12/06/2016    PAD (peripheral artery disease) ([facility] 12/06/2016    Dyslipidemia 12/06/2016    Advanced care planning/counseling discussion 09/13/2016    Colon polyp 09/18/2015    HTN (hypertension) 01/27/2011    [facility] 02/22/2010    Chronic hepatitis C ([facility] 06/30/2009    HIV positive ([facility] 06/30/2009    Weight loss, non-intentional 06/30/2009     Current Outpatient Medications   Medication Sig Dispense Refill    nicotine (NICODERM CQ) 21 mg/24 hr APPLY 1 PATCH TOPICALLY EVERY 24 HOURS 30 Patch 0    rosuvastatin (CRESTOR) 5 mg tablet Take 1 Tab by mouth daily. TAKE 1 TABLET BY MOUTH ONCE NIGHTLY 90 Tab 3    cilostazol (PLETAL) 50 mg tablet TAKE 1 TABLET BY MOUTH BEFORE BREAKFAST AND DINNER. INDICATIONS: INTERMITTENT CLAUDICATION 60 Tab 4    aspirin delayed-release 81 mg tablet Take  by mouth daily.  magnesium 250 mg tab Take  by mouth.  lisinopril (PRINIVIL, ZESTRIL) 20 mg tablet Take 1 Tab by mouth daily. 90 Tab 3    naproxen sodium (ALEVE) 220 mg tablet Take 220 mg by mouth as needed.  MULTIVITAMINS (MULTIVITAMIN PO) Take  by mouth daily. No Known Allergies   Lab Results   Component Value Date/Time    WBC 4.8 07/19/2019 10:15 AM    HGB 14.6 07/19/2019 10:15 AM    Hemoglobin (POC) 16.0 05/02/2017 07:50 AM    HCT 41.9 07/19/2019 10:15 AM    Hematocrit (POC) 47 05/02/2017 07:50 AM    PLATELET 233 73/99/2448 10:15 AM    MCV 93 07/19/2019 10:15 AM     Lab Results   Component Value Date/Time    Prostate Specific Ag 1.5 08/24/2014 05:06 AM    Prostate Specific Ag 2.1 01/06/2011 11:55 AM    Prostate Specific Ag 2.1 01/06/2011 03:30 AM        ROS    Physical Exam   Constitutional: He appears well-developed and well-nourished. No distress. Appears stated age   HENT:   Head: Normocephalic. Cardiovascular: Normal rate, regular rhythm and normal heart sounds. Exam reveals no gallop and no friction rub. No murmur heard. Pulmonary/Chest: Effort normal and breath sounds normal. No respiratory distress. He has no wheezes. He has no rales. He exhibits no tenderness. Abdominal: Soft. Musculoskeletal: He exhibits no edema. Neurological: He is alert. Psychiatric: He has a normal mood and affect. Nursing note and vitals reviewed.       ASSESSMENT and PLAN  Diagnoses and all orders for this visit:    1. Encounter for immunization  -     INFLUENZA VACCINE INACTIVATED (IIV), SUBUNIT, ADJUVANTED, IM    2. Lung nodule   F/u pulmnoary MD, had recent Chest CT  3. Hepatitis C virus infection without hepatic coma, unspecified chronicity  -     REFERRAL TO LIVER HEPATOLOGY    4. Essential hypertension   controlled  5. Pure hypercholesterolemia   LDL at goal  6. Elevated glucose  -     HEMOGLOBIN A1C WITH EAG   Advised low calorie diet and weight reduction    7. Smoker   rx for nicotine patch    8. HIV/Hep C   F/u ID MD    9. Prostate cancer   F/u urologist  Other orders  -     nicotine (NICODERM CQ) 21 mg/24 hr; APPLY 1 PATCH TOPICALLY EVERY 24 HOURS      Follow-up and Dispositions    · Return in about 6 months (around 4/29/2020) for htn hld hep c/hiv copd.

## 2019-10-29 ENCOUNTER — OFFICE VISIT (OUTPATIENT)
Dept: INTERNAL MEDICINE CLINIC | Age: 69
End: 2019-10-29

## 2019-10-29 VITALS
OXYGEN SATURATION: 97 % | RESPIRATION RATE: 16 BRPM | WEIGHT: 220 LBS | HEIGHT: 70 IN | BODY MASS INDEX: 31.5 KG/M2 | HEART RATE: 80 BPM | SYSTOLIC BLOOD PRESSURE: 117 MMHG | TEMPERATURE: 97.4 F | DIASTOLIC BLOOD PRESSURE: 69 MMHG

## 2019-10-29 DIAGNOSIS — R73.09 ELEVATED GLUCOSE: ICD-10-CM

## 2019-10-29 DIAGNOSIS — Z23 ENCOUNTER FOR IMMUNIZATION: Primary | ICD-10-CM

## 2019-10-29 DIAGNOSIS — I10 ESSENTIAL HYPERTENSION: ICD-10-CM

## 2019-10-29 DIAGNOSIS — B19.20 HEPATITIS C VIRUS INFECTION WITHOUT HEPATIC COMA, UNSPECIFIED CHRONICITY: ICD-10-CM

## 2019-10-29 DIAGNOSIS — E78.00 PURE HYPERCHOLESTEROLEMIA: ICD-10-CM

## 2019-10-29 DIAGNOSIS — R91.1 LUNG NODULE: ICD-10-CM

## 2019-10-29 RX ORDER — IBUPROFEN 200 MG
TABLET ORAL
Qty: 30 PATCH | Refills: 1 | Status: SHIPPED | OUTPATIENT
Start: 2019-10-29 | End: 2020-02-18

## 2019-10-29 RX ORDER — IBUPROFEN 200 MG
TABLET ORAL
Qty: 30 PATCH | Refills: 6 | Status: SHIPPED | OUTPATIENT
Start: 2019-10-29 | End: 2019-10-29 | Stop reason: SDUPTHER

## 2019-10-29 NOTE — PATIENT INSTRUCTIONS
Office Policies    Phone calls/patient messages:            Please allow up to 24 hours for someone in the office to contact you about your call or message. Be mindful your provider may be out of the office or your message may require further review. We encourage you to use [facility] for your messages as this is a faster, more efficient way to communicate with our office                         Medication Refills:            Prescription medications require 48-72 business hours to process. We encourage you to use [facility] for your refills. For controlled medications: Please allow 72 business hours to process. Certain medications may require you to  a written prescription at our office. NO narcotic/controlled medications will be prescribed after 4pm Monday through Friday or on weekends              Form/Paperwork Completion:            Please note a $25 fee may incur for all paperwork for completed by our providers. We ask that you allow 7-10 business days. Pre-payment is due prior to picking up/faxing the completed form. You may also download your forms to [facility] to have your doctor print off.

## 2019-10-29 NOTE — PROGRESS NOTES
Chief Complaint   Patient presents with    Cholesterol Problem     Routine care    Nicotine Dependence     requesting a RX    Labs     Non fasting

## 2019-11-26 RX ORDER — CILOSTAZOL 50 MG/1
TABLET ORAL
Qty: 60 TAB | Refills: 4 | Status: SHIPPED | OUTPATIENT
Start: 2019-11-26 | End: 2020-06-25

## 2019-12-05 ENCOUNTER — OFFICE VISIT (OUTPATIENT)
Dept: HEMATOLOGY | Age: 69
End: 2019-12-05

## 2019-12-05 VITALS
TEMPERATURE: 98.2 F | HEART RATE: 81 BPM | OXYGEN SATURATION: 96 % | WEIGHT: 220 LBS | BODY MASS INDEX: 30.8 KG/M2 | DIASTOLIC BLOOD PRESSURE: 66 MMHG | HEIGHT: 71 IN | SYSTOLIC BLOOD PRESSURE: 114 MMHG

## 2019-12-05 DIAGNOSIS — K74.60 CIRRHOSIS OF LIVER WITHOUT ASCITES, UNSPECIFIED HEPATIC CIRRHOSIS TYPE (HCC): Primary | ICD-10-CM

## 2019-12-05 NOTE — PROGRESS NOTES
[address] [place] [address], MD, Stephany Thompson MD Clent Ask, PA-C Morrell Gallop, ACNP-BC     Cristela Urrutia [facility]NP-BC   Bryan Guerra LAVERNE-CORTNEY Ramirez [facility]-BC       Valentin Davis [facility]    at [facility]    [address] [facility], [address] [place] [address], [facility] 22.    [phone]    FAX: [address] [place] [address]    at [address], [address]    [phone]    FAX: [phone]     Patient Care Team:  María Lomeli MD as PCP - Radha Ibarra MD as PCP - [facility]  Ya Hawkins MD (Infectious Diseases)  Kenzie Murray MD as Surgeon (General Surgery)  Stephon Shelton MD (Gastroenterology)  Kinsey [place] MD (Hepatology)  Cristela Garcia, NP (Nurse Practitioner)  Rafaela Mcgee MD (Cardiology)  Noreen Welch MD (Cardiology)  Shahriar Arreola MD (Urology)     Patient Active Problem List   Diagnosis Code    Chronic hepatitis C ([facility] B18.2    HIV positive ([address] Z21    Weight loss, non-intentional R63.4    Prostate cancer ([address] C61    Substance abuse ([address] F19.10    HTN (hypertension) I10    Colon polyp K63.5    Advanced care planning/counseling discussion Z71.89    Tobacco use Z72.0    PAD (peripheral artery disease) ([address] I73.9    Dyslipidemia E78.5    Lung nodule R91.1         Selam Chung returns to the [facility] for management of cirrhosis due to successfully cleared chronic HCV and HIV co-infection. The active problem list, all pertinent past medical history, medications, radiologic findings and laboratory findings related to the liver disorder were reviewed with the patient.       Patient has completed 12 weeks of HCV treatment with Arben Ao (5/29/2015-8/25/2015). He is a sustained virologic responder to treatment, or cured. The patient underwent a liver biopsy in 3/2015. This demonstrates severe hepatitis with bridging fibrosis. Patient has had post-HCV clearance Fibroscan to reassess liver fibrosis or scarring after successful HCV treatment. This revealed a score of 12.2. Suggested fibrosis level is F3. CAP score is 361, suggestive of significant hepatic steatosis. Ultrasound of the liver was performed in 12/2014. This suggests chronic liver disease. A 0.8 x 0.7 lesion was identified in the right lobe. These lesion has been followed with different radiographic methods over the past several years and there is apparent growth of this lesion to 1.6 cm. It is not definitve that this represents the same lesion and there have been no radiographic signs suggestive of HCC. AFP has been followed and while not rising, this is elevated. He has had most recent imaging with US in 5/2019. We had ordered a follow-up CT scan, the patient has not yet had this scheduled. Patient is co-infected with HIV. He is currently not on anti-retroviral therapy because his virus remains suppressed to undetected levels. He is otherwise feeling well. Patient was treated for prostate cancer 4-5 years ago and continues to have low PSA and no signs of recurrence. His greatest complaint at this time is of intermittent claudication symptoms effecting the LLE. He has elected a trial of medications and this has increased his walking distance. He is still smoking 1/2-3/4 PPD. Patient has no new physical complaints today. He continues to have ongoing fatigue but otherwise feels well. The patient completes all daily activities without any functional limitations. The patient has not experienced arthralgias, myalgias, problems concentrating, swelling of the abdomen, swelling of the lower extremities, hematemesis, or hematochezia.     ALLERGIES  No Known Allergies    MEDICATIONS  Current Outpatient Medications   Medication Sig    cilostazol (PLETAL) 50 mg tablet TAKE 1 TABLET BY MOUTH BEFORE BREAKFAST AND DINNER. INDICATIONS: INTERMITTENT CLAUDICATION    nicotine (NICODERM CQ) 21 mg/24 hr APPLY 1 PATCH TOPICALLY EVERY 24 HOURS    rosuvastatin (CRESTOR) 5 mg tablet Take 1 Tab by mouth daily. TAKE 1 TABLET BY MOUTH ONCE NIGHTLY    aspirin delayed-release 81 mg tablet Take  by mouth daily.  magnesium 250 mg tab Take  by mouth.  lisinopril (PRINIVIL, ZESTRIL) 20 mg tablet Take 1 Tab by mouth daily.  naproxen sodium (ALEVE) 220 mg tablet Take 220 mg by mouth as needed.  MULTIVITAMINS (MULTIVITAMIN PO) Take  by mouth daily. No current facility-administered medications for this visit. REVIEW OF SYSTEMS NOT RELATED TO LIVER DISEASE:  Constitution systems: Negative for fever, chills, weight gain, weight loss. Eyes: Negative for diplopia, visual changes, eye pain. ENT: Negative for sore throat, painful swallowing. Respiratory: Negative for cough, hemoptysis, dyspnea. Cardiology: Negative for chest pain, palpitations. GI:  Negative for constipation or diarrhea. : Negative for urinary frequency, dysuria and hematuria. Skin: Negative for rash. Hematology: Negative for easy bruising, bleeding from gums or nose. Musculo-skeletal: Negative for back pain. Positive for muscle pain, weakness of the LLE with walking distances. Neurologic: Negative for headaches, dizziness, vertigo, memory problems. Psychology: Negative for anxiety, depression. FAMILY HISTORY:  The father  of alcohol cirrhosis. The mother  of CVA. There are no other persons in the family with HCV or liver disease. SOCIAL HISTORY:  The patient is . The spouse has been tested for HCV and is positive. She was treated and achieved SVR. The patient has 3 children, and 7 grandchildren. The patient stopped using tobacco products in 2014. Resumed in 2017 and is now smoking 3/4 PPD. The patient has never consumed significant amounts of alcohol. The patient used to work in contrstruction. PHYSICAL EXAMINATION:  Visit Vitals  /66 (BP 1 Location: Left arm, BP Patient Position: Sitting)   Pulse 81   Temp 98.2 °F (36.8 °C) (Tympanic)   Ht 5' 11\" (1.803 m)   Wt 220 lb (99.8 kg)   SpO2 96%   BMI 30.68 kg/m²     General: No acute distress. Eyes: Sclera anicteric. ENT: No oral lesions. Thyroid normal.  Nodes: No adenopathy. Skin: No spider angiomata. No jaundice. No palmar erythema. Respiratory: Lungs clear to auscultation. Cardiovascular: Regular heart rate. No murmurs. No JVD. Abdomen: Soft non-tender. Liver size normal to percussion/palpation. Spleen not palpable. No obvious ascites. Extremities: No edema. No muscle wasting. No gross arthritic changes. Neurologic: Alert and oriented. Cranial nerves grossly intact. No asterixis.     LABORATORY STUDIES:  [facility] [place] of [address] Units 12/5/2019 7/19/2019   WBC 3.4 - 10.8 x10E3/uL 5.5 4.8   ANC 1.4 - 7.0 x10E3/uL  2.5   HGB 13.0 - 17.7 g/dL 13.9 14.6   HGB 12.1 - 17.0 GM/DL     HGB (iSTAT) 12.1 - 17.0 GM/DL      - 450 x10E3/uL 209 202   INR 0.8 - 1.2 1.1    AST 0 - 40 IU/L 21 23   ALT 0 - 44 IU/L 18 14   Alk Phos 39 - 117 IU/L 69 59   Bili, Total 0.0 - 1.2 mg/dL 0.6 0.8   Bili, Direct 0.00 - 0.40 mg/dL 0.16    Albumin 3.5 - 4.8 g/dL 4.4 4.5   BUN 8 - 27 mg/dL 17 14   BUN (iSTAT) 9 - 20 MG/DL     Creat 0.76 - 1.27 mg/dL 0.76 0.75 (L)   Creat (iSTAT) 0.6 - 1.3 mg/dL     Na 134 - 144 mmol/L 142 139   K 3.5 - 5.2 mmol/L 4.1 4.0   Cl 96 - 106 mmol/L 106 102   CO2 20 - 29 mmol/L 22 21   Glucose 65 - 99 mg/dL 88 125 (H)     [facility] [place] [facility] Latest Ref Rng & Units 2/13/2019   WBC 3.4 - 10.8 x10E3/uL 5.1   ANC 1.4 - 7.0 x10E3/uL    HGB 13.0 - 17.7 g/dL 15.1   HGB 12.1 - 17.0 GM/DL    HGB (iSTAT) 12.1 - 17.0 GM/DL     - 450 x10E3/uL 176   INR 0.8 - 1.2 1.1   AST 0 - 40 IU/L 21   ALT 0 - 44 IU/L 16   Alk Phos 39 - 117 IU/L 55   Bili, Total 0.0 - 1.2 mg/dL 0.8   Bili, Direct 0.00 - 0.40 mg/dL    Albumin 3.5 - 4.8 g/dL 4.8   BUN 8 - 27 mg/dL 14   BUN (iSTAT) 9 - 20 MG/DL    Creat 0.76 - 1.27 mg/dL 0.77   Creat (iSTAT) 0.6 - 1.3 mg/dL    Na 134 - 144 mmol/L 142   K 3.5 - 5.2 mmol/L 4.1   Cl 96 - 106 mmol/L 104   CO2 20 - 29 mmol/L 21   Glucose 65 - 99 mg/dL 91     Cancer Screening Latest Ref Rng & Units 8/13/2019 2/13/2019 8/25/2015   AFP, Serum 0.0 - 8.0 ng/mL 6.9 8.7 (H) 15.7 (H)   AFP-L3% 0.0 - 9.9 % 4.5 7.2 5.2   CA 19-9 0 - 35 U/mL 6     CEA 0.0 - 4.7 ng/mL 2.8     Additional lab values drawn at today's office visit are pending at the time of documentation. SEROLOGIES:  Serologies Latest Ref Rng 10/10/2014 9/1/2014 8/27/2014   Hep A Ab, Total Negative Positive (A)     Hep B Surface Ag Negative Negative     Hep B Core Ab, Total Negative Positive (A)     Hep C Genotype See Note 1a     HCV RT-PCR, Quant  [id number]  [id number]   HCV Log10    6.830   Ferritin 30 - 400 ng/mL 172     Iron % Saturation 15 - 55 % 22     C-ANCA Neg:<1:20 titer  <1:20    P-ANCA Neg:<1:20 titer  <1:20    ANCA Neg:<1:20 titer  <1:20      LIVER HISTOLOGY:  3/2015. Slides reviewed by MLS. Severe hepatitis with bridging fibrosis and possible cirrhosis. Knodell score (3,3,3,3), Bro score 4, Metavir score 3.  2/2017. [facility]. EkPa was 10.0. Suggested fibrosis level is F3.  2/2018. Fib[facility]Scan [facility] at [facility]. EkPa was 11.7. Suggested fibrosis level is F3.  2/2019. Fib[facility]Scan performed at [facility]. EkPa was 12.2. Suggested fibrosis level is F3. CAP score is 361, suggestive of significant hepatic steatosis. ENDOSCOPIC PROCEDURES:  Not available or performed    RADIOLOGY:  12/2014. Ultrasound of liver. Echogenic consistent with chronic liver disease. 0.8x0.7 cm lesion right lobe. No dilated bile ducts. No ascites. 1/2015. Dynamic MRI liver. Changes consistent with chronic liver disease. No liver mass lesions. No dilated bile ducts. No bile duct strictures. No ascites. 11/2017. Ultrasound of liver. Cirrhotic morphology of the liver is again demonstrated. 12 mm echogenic nodule right hepatic lobe. This does not appear to reside in the same location as the prior smaller hyperechoic nodule. This may represent a hemangioma. 11/2018. Triple phase CT. The central right lobe hepatic mass shown to be quite evident on ultrasound and echogenic but very subtle on MRI is very slightly hypoenhancing on CT and most evident on equilibrium phase. The margins are not clearly delineated so accurate measurement is difficult but the lesion is similarly sized compared to prior recent ultrasound measuring about 1.1 x 1.6 cm. No other hepatic lesions are seen in the liver redemonstrates subtle changes cirrhosis. 5/2019. Ultrasound of liver. Diffusely echogenic and heterogeneous. An echogenic lesion measures 1.5 x 2.0 x 1.9 cm, previously 1.6 x 1.1 cm on CT and 1.3 x 1.8 x 1.8 cm on ultrasound. 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 This degress of fibrosis was confirmed with past FibroScan 2/2019. He has preserved liver function. Will continue to monitor patient regularly and continue to monitor for complications of advanced fibrosis/cirrhosis. HIV co-infection. He has low levels of HIV RNA. He is not taking anti-HIV medications. This is being regularly monitored by his ID physician. HCV treatment. Completed 12 weeks of Harvoni treatment in August 2015. [facility] is [facility]. Routine imaging has been done over the past several years. In reviewing his chart, I am concerning that there appears to be some interval growth of this finding and this will warrant additional imaging.   Characteristics apart from change in size over time are not characteristic of HCC. I have drawn tumor markers and the patient has been given the number to call to schedule ordered CT. The patient was directed to continue all current medications at the current dosages. There are no contraindications for the patient to take any medications that are necessary for treatment of other medical issues. The patient was counseled regarding alcohol consumption. The patient was counseled regarding use of illicit drugs. Vaccination for viral hepatitis A and B is not required. The patient has serologic evidence of prior exposure or vaccination with immunity. All of the above issues were discussed with the patient. All questions were answered. The patient expressed a clear understanding of the above. [address] [address] in 4-6 months with repeat imaging in the meantime.     Glenny Velarde PA-C  [facility] [place] of [address] [place] [address] [facility], [facility].  [phone]

## 2019-12-05 NOTE — PROGRESS NOTES
Chief Complaint   Patient presents with    Follow-up     3 most recent PHQ Screens 12/5/2019   PHQ Not Done -   Little interest or pleasure in doing things Not at all   Feeling down, depressed, irritable, or hopeless Not at all   Total Score PHQ 2 0     Abuse Screening Questionnaire 12/5/2019   Do you ever feel afraid of your partner? N   Are you in a relationship with someone who physically or mentally threatens you? N   Is it safe for you to go home? Y     Learning Assessment 12/5/2019   PRIMARY LEARNER Patient   HIGHEST LEVEL OF EDUCATION - PRIMARY LEARNER  -   BARRIERS PRIMARY LEARNER NONE   CO-LEARNER CAREGIVER No   PRIMARY LANGUAGE ENGLISH   LEARNER PREFERENCE PRIMARY DEMONSTRATION   ANSWERED BY patient   RELATIONSHIP SELF     1. Have you been to the ER, urgent care clinic since your last visit? Hospitalized since your last visit? No    2. Have you seen or consulted any other health care providers outside of the [address] [place] [address] since your last visit? Include any pap smears or colon screening.  No     Visit Vitals  /66 (BP 1 Location: Left arm, BP Patient Position: Sitting)   Pulse 81   Temp 98.2 °F (36.8 °C) (Tympanic)   Ht 5' 11\" (1.803 m)   Wt 220 lb (99.8 kg)   SpO2 96%   BMI 30.68 kg/m²

## 2019-12-06 LAB
ALBUMIN SERPL-MCNC: 4.4 G/DL (ref 3.5–4.8)
ALP SERPL-CCNC: 69 IU/L (ref 39–117)
ALT SERPL-CCNC: 18 IU/L (ref 0–44)
AST SERPL-CCNC: 21 IU/L (ref 0–40)
BILIRUB DIRECT SERPL-MCNC: 0.16 MG/DL (ref 0–0.4)
BILIRUB SERPL-MCNC: 0.6 MG/DL (ref 0–1.2)
BUN SERPL-MCNC: 17 MG/DL (ref 8–27)
BUN/CREAT SERPL: 22 (ref 10–24)
CALCIUM SERPL-MCNC: 9.3 MG/DL (ref 8.6–10.2)
CHLORIDE SERPL-SCNC: 106 MMOL/L (ref 96–106)
CO2 SERPL-SCNC: 22 MMOL/L (ref 20–29)
CREAT SERPL-MCNC: 0.76 MG/DL (ref 0.76–1.27)
ERYTHROCYTE [DISTWIDTH] IN BLOOD BY AUTOMATED COUNT: 13.1 % (ref 12.3–15.4)
EST. AVERAGE GLUCOSE BLD GHB EST-MCNC: 108 MG/DL
GLUCOSE SERPL-MCNC: 88 MG/DL (ref 65–99)
HBA1C MFR BLD: 5.4 % (ref 4.8–5.6)
HCT VFR BLD AUTO: 40.5 % (ref 37.5–51)
HGB BLD-MCNC: 13.9 G/DL (ref 13–17.7)
INR PPP: 1.1 (ref 0.8–1.2)
MCH RBC QN AUTO: 32.3 PG (ref 26.6–33)
MCHC RBC AUTO-ENTMCNC: 34.3 G/DL (ref 31.5–35.7)
MCV RBC AUTO: 94 FL (ref 79–97)
PLATELET # BLD AUTO: 209 X10E3/UL (ref 150–450)
POTASSIUM SERPL-SCNC: 4.1 MMOL/L (ref 3.5–5.2)
PROTHROMBIN TIME: 11.8 SEC (ref 9.1–12)
RBC # BLD AUTO: 4.3 X10E6/UL (ref 4.14–5.8)
SODIUM SERPL-SCNC: 142 MMOL/L (ref 134–144)
WBC # BLD AUTO: 5.5 X10E3/UL (ref 3.4–10.8)

## 2019-12-09 LAB
AFP L3 MFR SERPL: 4.8 % (ref 0–9.9)
AFP SERPL-MCNC: 6.3 NG/ML (ref 0–8)

## 2019-12-11 ENCOUNTER — OFFICE VISIT (OUTPATIENT)
Dept: FAMILY MEDICINE CLINIC | Age: 69
End: 2019-12-11

## 2019-12-11 VITALS
SYSTOLIC BLOOD PRESSURE: 136 MMHG | DIASTOLIC BLOOD PRESSURE: 84 MMHG | RESPIRATION RATE: 18 BRPM | WEIGHT: 223 LBS | HEART RATE: 75 BPM | BODY MASS INDEX: 31.22 KG/M2 | TEMPERATURE: 96 F | HEIGHT: 71 IN

## 2019-12-11 DIAGNOSIS — Z21 HIV POSITIVE (HCC): Primary | ICD-10-CM

## 2019-12-11 NOTE — PROGRESS NOTES
Chief Complaint   Patient presents with    Follow-up     HIV- per pt he does not take any HIV medications     Results     07/19/2019

## 2019-12-16 NOTE — PROGRESS NOTES
[facility] Progress Note           Kam Mccain DO    [phone] Office  [phone]  Fax    12/16/2019      Assessment & Plan:   1. 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 New data(recent ADI) says that elite controllers will benefit from ART as it preserves immune system and prevents immune exhaustion. This was discussed at length with the patient in detail. We will plan to start Dovato as this will theoretically be a lower risk drug for long-term drug toxicities. We will repeat labs today and again in 4 weeks after he starts the medication. 2. Prostate Cancer - followed by  urology- s/p radiation  3. HCV. Treated with Harvoni and has SVR. Followed by Rolando Lopez- last CT abdomen 11/15/18.  Very faint visualization of the liver in phase hyperenhancing lesion in thecentral right lobe is not well visualized for measurement and again may reflectwell-differentiated and indolent hepatocellular carcinoma. Given the relativelong-term stability, radiographically, this lesion can be followed with serialultrasound and/or MRI. 4. Cirrhosis with evidence of portal hypertension and gastric varices   5. IVDA- heavy cocaine user- none since 4 years  6. COPD- smoker-  followed by his PCP- chantix gave him side effects   7. Htn- on lisinopril-   8. Peripheral arterial disease- followed by cardiology-  on statin and cilostazol    7/18/2019 CD4 532. Viral load undetectable             Subjective:     No complaints    Objective:     Vitals:   Visit Vitals  /84   Pulse 75   Temp 96 °F (35.6 °C) (Oral)   Resp 18   Ht 5' 11\" (1.803 m)   Wt 223 lb (101.2 kg)   BMI 31.10 kg/m²          Exam:      Physical Examination:   General:  Alert, cooperative, no distress   Head:  Normocephalic, atraumatic. Eyes:  Conjunctivae clear   Neck: Supple       Lungs:   No distress.   Clear to auscultation bilaterally. Chest wall:     Heart:  Regular rate and rhythm, S1, S2 normal, no murmur   Abdomen:   Soft, non-tender, non-distended   Extremities: Moves all. Skin:  No rash   Neurologic: CNII-XII intact. Normal strength     Labs:        No lab exists for component: ITNL   No results for input(s): CPK, CKMB, TROIQ in the last 72 hours. No results for input(s): NA, K, CL, CO2, BUN, CREA, GLU, PHOS, MG, ALB, WBC, HGB, HCT, PLT, HGBEXT, HCTEXT, PLTEXT in the last 72 hours. No lab exists for component:  CA  No results for input(s): INR, PTP, APTT, INREXT in the last 72 hours. Needs: urine analysis, urine sodium, protein and creatinine  No results found for: IFEANYI, CREAU      Cultures:     Lab Results   Component Value Date/Time    Specimen Description: BLOOD 12/29/2010 02:55 AM    Specimen Description: BLOOD 12/24/2010 09:03 PM    Specimen Description: BLOOD 12/24/2010 07:25 PM     Lab Results   Component Value Date/Time    Culture result: HEAVY 08/27/2014 01:40 PM    Culture result: NORMAL RESPIRATORY MEI 08/27/2014 01:40 PM    Culture result: MODERATE 08/27/2014 01:40 PM    Culture result: YEAST 08/27/2014 01:40 PM       Radiology:     Medications       Current Outpatient Medications   Medication Sig Dispense    dolutegravir-lamivudine (DOVATO)  mg tab Take 1 Tab by mouth daily. 30 mg    cilostazol (PLETAL) 50 mg tablet TAKE 1 TABLET BY MOUTH BEFORE BREAKFAST AND DINNER. INDICATIONS: INTERMITTENT CLAUDICATION 60 Tab    nicotine (NICODERM CQ) 21 mg/24 hr APPLY 1 PATCH TOPICALLY EVERY 24 HOURS 30 Patch    rosuvastatin (CRESTOR) 5 mg tablet Take 1 Tab by mouth daily. TAKE 1 TABLET BY MOUTH ONCE NIGHTLY 90 Tab    aspirin delayed-release 81 mg tablet Take  by mouth daily.  magnesium 250 mg tab Take  by mouth.  lisinopril (PRINIVIL, ZESTRIL) 20 mg tablet Take 1 Tab by mouth daily. 90 Tab    naproxen sodium (ALEVE) 220 mg tablet Take 220 mg by mouth as needed.      MULTIVITAMINS (MULTIVITAMIN PO) Take  by mouth daily. No current facility-administered medications for this visit.             Case discussed with:      Juanita Elders, DO

## 2020-01-06 DIAGNOSIS — I10 ESSENTIAL HYPERTENSION: ICD-10-CM

## 2020-01-06 RX ORDER — LISINOPRIL 20 MG/1
TABLET ORAL
Qty: 90 TAB | Refills: 3 | OUTPATIENT
Start: 2020-01-06

## 2020-01-28 ENCOUNTER — OFFICE VISIT (OUTPATIENT)
Dept: CARDIOLOGY CLINIC | Age: 70
End: 2020-01-28

## 2020-01-28 VITALS
SYSTOLIC BLOOD PRESSURE: 120 MMHG | BODY MASS INDEX: 31.15 KG/M2 | HEIGHT: 71 IN | DIASTOLIC BLOOD PRESSURE: 82 MMHG | HEART RATE: 97 BPM | RESPIRATION RATE: 18 BRPM | OXYGEN SATURATION: 97 % | WEIGHT: 222.5 LBS

## 2020-01-28 DIAGNOSIS — I10 ESSENTIAL HYPERTENSION: Primary | ICD-10-CM

## 2020-01-28 DIAGNOSIS — E78.5 DYSLIPIDEMIA: ICD-10-CM

## 2020-01-28 DIAGNOSIS — I73.9 PAD (PERIPHERAL ARTERY DISEASE) (HCC): ICD-10-CM

## 2020-01-28 DIAGNOSIS — Z72.0 TOBACCO USE: ICD-10-CM

## 2020-01-28 RX ORDER — ASCORBIC ACID 500 MG
1000 TABLET ORAL DAILY
COMMUNITY

## 2020-01-28 RX ORDER — GLUCOSAMINE SULFATE 1500 MG
POWDER IN PACKET (EA) ORAL DAILY
COMMUNITY

## 2020-01-28 NOTE — PROGRESS NOTES
1. Have you been to the ER, urgent care clinic since your last visit? Hospitalized since your last visit? No.    2. Have you seen or consulted any other health care providers outside of the [address] [place] [address] since your last visit? Include any pap smears or colon screening.    No.      Chief Complaint   Patient presents with    Annual Exam     vascular consult-pt reports no issues with his legs

## 2020-01-28 NOTE — PROGRESS NOTES
1/28/2020 1:44 PM      Subjective:     Christian Asher Sr is seen in vascular clinic today. Doing very well from PAD perspective. He denies dyspnea, palpitations, irregular heart beats, near-syncope, syncope, fatigue, orthopnea, paroxysmal nocturnal dyspnea, exertional chest pressure/discomfort, claudication, lower extremity edema, tachypnea. Visit Vitals  /82 (BP 1 Location: Right arm, BP Patient Position: Sitting)   Pulse 97   Resp 18   Ht 5' 11\" (1.803 m)   Wt 222 lb 8 oz (100.9 kg)   SpO2 97%   BMI 31.03 kg/m²     Current Outpatient Medications   Medication Sig    omega 3-dha-epa-fish oil (FISH OIL) 100-160-1,000 mg cap Take  by mouth daily.  cholecalciferol (VITAMIN D3) 25 mcg (1,000 unit) cap Take  by mouth daily.  ascorbic acid, vitamin C, (VITAMIN C) 500 mg tablet Take 1,000 mg by mouth daily.  lisinopril (PRINIVIL, ZESTRIL) 20 mg tablet TAKE 1 TABLET BY MOUTH ONCE DAILY    cilostazol (PLETAL) 50 mg tablet TAKE 1 TABLET BY MOUTH BEFORE BREAKFAST AND DINNER. INDICATIONS: INTERMITTENT CLAUDICATION    rosuvastatin (CRESTOR) 5 mg tablet Take 1 Tab by mouth daily. TAKE 1 TABLET BY MOUTH ONCE NIGHTLY    aspirin delayed-release 81 mg tablet Take  by mouth daily.  naproxen sodium (ALEVE) 220 mg tablet Take 220 mg by mouth as needed.  MULTIVITAMINS (MULTIVITAMIN PO) Take  by mouth daily.  dolutegravir-lamivudine (DOVATO)  mg tab Take 1 Tab by mouth daily.  nicotine (NICODERM CQ) 21 mg/24 hr APPLY 1 PATCH TOPICALLY EVERY 24 HOURS    magnesium 250 mg tab Take  by mouth. No current facility-administered medications for this visit.           Objective:      Visit Vitals  /82 (BP 1 Location: Right arm, BP Patient Position: Sitting)   Pulse 97   Resp 18   Ht 5' 11\" (1.803 m)   Wt 222 lb 8 oz (100.9 kg)   SpO2 97%   BMI 31.03 kg/m²       Data Review:     Reviewed and/or ordered active problem list, medication list tests    Past Medical History: Diagnosis Date    BPH     Cancer ([facility] 11/2009    prostate biopsy revealed cancer    Erectile dysfunction     Essential hypertension, benign     Hepatitis C 6/30/2009    HIV positive ([facility] 6/30/2009    Hypercholesterolemia     Ill-defined condition 9-2014    PNEUMONIA/bronchitis hx    Memory disorder     Prostate CA ([facility] 2/22/2010      Past Surgical History:   Procedure Laterality Date    COLONOSCOPY,REMV LESN,SNARE  8/31/2015         HX HEENT      gum surgery-for denture    HX HERNIA REPAIR       Inguinal Hernia Repair    HX HERNIA REPAIR  05/02/2017    Davinci recurrent RIHR with mesh    HX SKIN BIOPSY  11/16/15    left forearm/ upper arm biopsy      No Known Allergies   Family History   Problem Relation Age of Onset    Hypertension Mother     Mental Retardation Mother     Depression Mother    [facility] Anxiety Mother     Liver Disease Father     Lung Disease Father     Diabetes Maternal Grandmother     Hypertension Other     Stroke Other     Anxiety Other     Depression Other       Social History     Socioeconomic History    Marital status:      Spouse name: Not on file    Number of children: Not on file    Years of education: Not on file    Highest education level: Not on file   Occupational History    Not on file   Social Needs    Financial resource strain: Not on file    Food insecurity:     Worry: Not on file     Inability: Not on file    Transportation needs:     Medical: Not on file     Non-medical: Not on file   Tobacco Use    Smoking status: Current Every Day Smoker     Packs/day: 1.00     Years: 45.00     Pack years: 45.00     Types: Cigarettes    Smokeless tobacco: Never Used   Substance and Sexual Activity    Alcohol use: No    Drug use: No     Types: Heroin, Prescription, Cocaine     Comment: none in past 20 years per pt on 5/2/17    Sexual activity: Yes     Partners: Female     Comment: seperated has 3 children   Lifestyle    Physical activity:     Days per week: Not on file     Minutes per session: Not on file    Stress: Not on file   Relationships    Social connections:     Talks on phone: Not on file     Gets together: Not on file     Attends Hoahaoism service: Not on file     Active member of club or organization: Not on file     Attends meetings of clubs or organizations: Not on file     Relationship status: Not on file    Intimate partner violence:     Fear of current or ex partner: Not on file     Emotionally abused: Not on file     Physically abused: Not on file     Forced sexual activity: Not on file   Other Topics Concern    Not on file   Social History Narrative    Not on file         Review of Systems     General: Not Present- Anorexia, Chills, Dietary Changes, Fatigue, Fever, Medication Changes, Night Sweats, Weight Gain > 10lbs. and Weight Loss > 10lbs. .  Skin: Not Present- Bruising and Excessive Sweating. HEENT: Not Present- Headache, Visual Loss and Vertigo. Respiratory: Not Present- Cough, Decreased Exercise Tolerance, Difficulty Breathing, Snoring and Wheezing. Cardiovascular: Not Present- Abnormal Blood Pressure, Chest Pain, Claudications, Difficulty Breathing On Exertion, Edema, Fainting / Blacking Out, Irregular Heart Beat, Night Cramps, Orthopnea, Palpitations, Paroxysmal Nocturnal Dyspnea, Rapid Heart Rate, Shortness of Breath and Swelling of Extremities. Gastrointestinal: Not Present- Black, Tarry Stool, Bloody Stool, Diarrhea, Hematemesis, Rectal Bleeding and Vomiting. Musculoskeletal: Not Present- Muscle Pain and Muscle Weakness. Neurological: Not Present- Dizziness. Psychiatric: Not Present- Depression. Endocrine: Not Present- Cold Intolerance, Heat Intolerance and Thyroid Problems. Hematology: Not Present- Abnormal Bleeding, Anemia, Blood Clots and Easy Bruising.       Physical Exam   The physical exam findings are as follows:       General   Mental Status - Alert. General Appearance - Not in acute distress.       Chest and Lung Exam   Inspection: Accessory muscles - No use of accessory muscles in breathing. Auscultation:   Breath sounds: - Normal.      Cardiovascular   Inspection: Jugular vein - Bilateral - Inspection Normal.  Palpation/Percussion:   Apical Impulse: - Normal.  Auscultation: Rhythm - Regular. Heart Sounds - S1 WNL and S2 WNL. No S3 or S4. Murmurs & Other Heart Sounds: Auscultation of the heart reveals - No Murmurs. Carotid arteries - No Carotid bruit. Peripheral Vascular   Upper Extremity: Inspection - Bilateral - No Cyanotic nailbeds or Digital clubbing. Lower Extremity:   Palpation: Dorsalis pedis pulse - Bilateral - 1+. Posterior tibia pulse - Bilateral - NP. Edema - Bilateral - No edema. Assessment:       ICD-10-CM ICD-9-CM    1. Essential hypertension I10 401.9    2. Tobacco use Z72.0 305.1    3. PAD (peripheral artery disease) ([facility]) I73.9 443.9    4. Dyslipidemia E78.5 272.4        Plan:     1. PAD (peripheral artery disease) ([facility])  Symptoms improved with Pletal with good exercise tolerance. Asymptomatic. Medical treatment. If develops symptoms then will evaluate further.      2. Essential hypertension  BP well controlled. 3. on statin. f/u with Dr. Fidel Bradford for cardiac care. f/u with me as needed.

## 2020-02-18 ENCOUNTER — OFFICE VISIT (OUTPATIENT)
Dept: CARDIOLOGY CLINIC | Age: 70
End: 2020-02-18

## 2020-02-18 VITALS
BODY MASS INDEX: 31.09 KG/M2 | HEART RATE: 84 BPM | DIASTOLIC BLOOD PRESSURE: 72 MMHG | SYSTOLIC BLOOD PRESSURE: 106 MMHG | WEIGHT: 222.1 LBS | HEIGHT: 71 IN | RESPIRATION RATE: 16 BRPM | OXYGEN SATURATION: 96 %

## 2020-02-18 DIAGNOSIS — Z72.0 TOBACCO USE: ICD-10-CM

## 2020-02-18 DIAGNOSIS — E78.5 DYSLIPIDEMIA: ICD-10-CM

## 2020-02-18 DIAGNOSIS — R07.89 ATYPICAL CHEST PAIN: ICD-10-CM

## 2020-02-18 DIAGNOSIS — I73.9 PAD (PERIPHERAL ARTERY DISEASE) (HCC): ICD-10-CM

## 2020-02-18 DIAGNOSIS — I10 ESSENTIAL HYPERTENSION: Primary | ICD-10-CM

## 2020-02-18 RX ORDER — VARENICLINE TARTRATE 1 MG/1
1 TABLET, FILM COATED ORAL
COMMUNITY
End: 2020-04-22 | Stop reason: SDUPTHER

## 2020-02-18 NOTE — PROGRESS NOTES
[address] [facility], [place], [address] [facility]  [phone]     Subjective:      Katerina Conroy is a 78 y.o. male is here for long follow up  Last seen by us in 10/18. Reports some mild chest pain, occurred at rest. He ate and laid down right away. No further episode. He has gone back to work as a ---walks up and down steps, able to lift objects > 50 lbs with no exertional symptoms  C/o lack of energy, unchanged from 2018. He continues to smoke, down to half a PPD.         The patient denies shortness of breath, orthopnea, PND, LE edema, palpitations, syncope, or presyncope.        Patient Active Problem List    Diagnosis Date Noted    Substance abuse ([address] 12/25/2010     Priority: 2 - Two    Lung nodule 10/28/2019    Tobacco use 12/06/2016    PAD (peripheral artery disease) ([address] 12/06/2016    Dyslipidemia 12/06/2016    Advanced care planning/counseling discussion 09/13/2016    Colon polyp 09/18/2015    HTN (hypertension) 01/27/2011    Prostate cancer ([address] 02/22/2010    Chronic hepatitis C ([address] 06/30/2009    HIV positive ([address] 06/30/2009    Weight loss, non-intentional 06/30/2009      Kike Youngblood MD  Past Medical History:   Diagnosis Date    BPH     Cancer [facility]) 11/2009    prostate biopsy revealed cancer    Erectile dysfunction     Essential hypertension, benign     Hepatitis C 6/30/2009    HIV positive ([address] 6/30/2009    Hypercholesterolemia     Ill-defined condition 9-2014    PNEUMONIA/bronchitis hx    Memory disorder     Prostate CA ([address] 2/22/2010      Past Surgical History:   Procedure Laterality Date    COLONOSCOPY,REMV LESN,SNARE  8/31/2015         HX HEENT      gum surgery-for denture    HX HERNIA REPAIR       Inguinal Hernia Repair    HX HERNIA REPAIR  05/02/2017    Davinci recurrent RIHR with mesh    HX SKIN BIOPSY  11/16/15    left forearm/ upper arm biopsy      No Known Allergies   Family History   Problem Relation Age of Onset    Hypertension Mother     Mental Retardation Mother     Depression Mother    [employer] Anxiety Mother     Liver Disease Father     Lung Disease Father     Diabetes Maternal Grandmother     Hypertension Other     Stroke Other     Anxiety Other     Depression Other       Social History     Socioeconomic History    Marital status:      Spouse name: Not on file    Number of children: Not on file    Years of education: Not on file    Highest education level: Not on file   Occupational History    Not on file   Social Needs    Financial resource strain: Not on file    Food insecurity:     Worry: Not on file     Inability: Not on file    Transportation needs:     Medical: Not on file     Non-medical: Not on file   Tobacco Use    Smoking status: Current Every Day Smoker     Packs/day: 1.00     Years: 45.00     Pack years: 45.00     Types: Cigarettes    Smokeless tobacco: Never Used   Substance and Sexual Activity    Alcohol use: No    Drug use: No     Types: Heroin, Prescription, Cocaine     Comment: none in past 20 years per pt on 5/2/17    Sexual activity: Yes     Partners: Female     Comment: radu has 3 children   Lifestyle    Physical activity:     Days per week: Not on file     Minutes per session: Not on file    Stress: Not on file   Relationships    Social connections:     Talks on phone: Not on file     Gets together: Not on file     Attends Taoism service: Not on file     Active member of club or organization: Not on file     Attends meetings of clubs or organizations: Not on file     Relationship status: Not on file    Intimate partner violence:     Fear of current or ex partner: Not on file     Emotionally abused: Not on file     Physically abused: Not on file     Forced sexual activity: Not on file   Other Topics Concern    Not on file   Social History Narrative    Not on file      Current Outpatient Medications   Medication Sig    varenicline (CHANTIX) 1 mg tablet Take 1 mg by mouth two (2) times daily (after meals).  omega 3-dha-epa-fish oil (FISH OIL) 100-160-1,000 mg cap Take  by mouth daily.  cholecalciferol (VITAMIN D3) 25 mcg (1,000 unit) cap Take  by mouth daily.  ascorbic acid, vitamin C, (VITAMIN C) 500 mg tablet Take 1,000 mg by mouth daily.  lisinopril (PRINIVIL, ZESTRIL) 20 mg tablet TAKE 1 TABLET BY MOUTH ONCE DAILY    cilostazol (PLETAL) 50 mg tablet TAKE 1 TABLET BY MOUTH BEFORE BREAKFAST AND DINNER. INDICATIONS: INTERMITTENT CLAUDICATION    rosuvastatin (CRESTOR) 5 mg tablet Take 1 Tab by mouth daily. TAKE 1 TABLET BY MOUTH ONCE NIGHTLY    naproxen sodium (ALEVE) 220 mg tablet Take 220 mg by mouth as needed.  MULTIVITAMINS (MULTIVITAMIN PO) Take  by mouth daily. No current facility-administered medications for this visit. Review of Symptoms:  11 systems reviewed, negative other than as stated in the HPI    Physical ExamPhysical Exam:    Vitals:    02/18/20 1039 02/18/20 1051   BP: 106/72 106/72   Pulse: 84    Resp: 16    SpO2: 96%    Weight: 222 lb 1.6 oz (100.7 kg)    Height: 5' 11\" (1.803 m)      Body mass index is 30.98 kg/m². General PE  Gen:  NAD  Mental Status - Alert. General Appearance - Not in acute distress. HEENT:  PERRL, no carotid bruits or JVD  Chest and Lung Exam   Inspection: Accessory muscles - No use of accessory muscles in breathing. Auscultation:   Breath sounds: - Normal.   Cardiovascular   Inspection: Jugular vein - Bilateral - Inspection Normal.   Palpation/Percussion:   Apical Impulse: - Normal.   Auscultation: Rhythm - Regular. Heart Sounds - S1 WNL and S2 WNL. No S3 or S4. Murmurs & Other Heart Sounds: Auscultation of the heart reveals - No Murmurs. Peripheral Vascular   Upper Extremity: Inspection - Bilateral - No Cyanotic nailbeds or Digital clubbing. Lower Extremity:   Palpation: Edema - Bilateral - No edema. Abdomen:   Soft, non-tender, bowel sounds are active.   Neuro: A&O times 3, CN and motor grossly WNL    Labs:   Lab Results   Component Value Date/Time    Cholesterol, total 151 05/02/2019 09:08 AM    Cholesterol, total 175 03/20/2018 12:11 PM    Cholesterol, total 155 02/17/2017 09:20 AM    Cholesterol, total 232 (H) 10/04/2016 10:08 AM    Cholesterol, total 225 (H) 09/18/2015 11:21 AM    HDL Cholesterol 43 05/02/2019 09:08 AM    HDL Cholesterol 44 03/20/2018 12:11 PM    HDL Cholesterol 40 02/17/2017 09:20 AM    HDL Cholesterol 45 10/04/2016 10:08 AM    HDL Cholesterol 75 09/18/2015 11:21 AM    LDL, calculated 98 05/02/2019 09:08 AM    LDL, calculated 116 (H) 03/20/2018 12:11 PM    LDL, calculated 100 (H) 02/17/2017 09:20 AM    LDL, calculated 170 (H) 10/04/2016 10:08 AM    LDL, calculated 122 (H) 09/18/2015 11:21 AM    Triglyceride 52 05/02/2019 09:08 AM    Triglyceride 77 03/20/2018 12:11 PM    Triglyceride 74 02/17/2017 09:20 AM    Triglyceride 83 10/04/2016 10:08 AM    Triglyceride 141 09/18/2015 11:21 AM    CHOL/HDL Ratio 4.0 08/24/2014 05:06 AM     Lab Results   Component Value Date/Time    CK 29 (L) 08/29/2014 12:12 AM     Lab Results   Component Value Date/Time    Sodium 142 12/05/2019 12:00 AM    Potassium 4.1 12/05/2019 12:00 AM    Chloride 106 12/05/2019 12:00 AM    CO2 22 12/05/2019 12:00 AM    Anion gap 7 04/25/2017 02:27 PM    Glucose 88 12/05/2019 12:00 AM    BUN 17 12/05/2019 12:00 AM    Creatinine 0.76 12/05/2019 12:00 AM    BUN/Creatinine ratio 22 12/05/2019 12:00 AM    GFR est  12/05/2019 12:00 AM    GFR est non-AA 92 12/05/2019 12:00 AM    Calcium 9.3 12/05/2019 12:00 AM    Bilirubin, total 0.6 12/05/2019 12:00 AM    AST (SGOT) 21 12/05/2019 12:00 AM    Alk. phosphatase 69 12/05/2019 12:00 AM    Protein, total 7.3 07/19/2019 10:15 AM    Albumin 4.4 12/05/2019 12:00 AM    Globulin 5.8 (H) 09/07/2014 12:32 AM    A-G Ratio 1.6 07/19/2019 10:15 AM    ALT (SGPT) 18 12/05/2019 12:00 AM       EKG:  NSR      Assessment:     Assessment:      1. Essential hypertension    2.  PAD (peripheral artery disease) ([facility]    3. Dyslipidemia    4. Tobacco use    5. Atypical chest pain        Orders Placed This Encounter    AMB POC EKG ROUTINE W/ 12 LEADS, INTER & REP     Order Specific Question:   Reason for Exam:     Answer:   ROUTINE    varenicline (CHANTIX) 1 mg tablet     Sig: Take 1 mg by mouth two (2) times daily (after meals). Plan:     Patient presents for long follow up. Last seen by us in 10/18. Reports some mild chest pain, occurred at rest. He ate and laid down right away. No further episode. He has gone back to work as a ---walks up and down steps, able to lift objects > 50 lbs with no exertional symptoms  C/o lack of energy, unchanged from 2018. He continues to smoke, down to half a PPD.       Atypical cp  Moderate/severe segmental coronary artery calcifications per CT lung 9/18  Exercise NST 11/18 negative for ischemia  Normal stress echo in 2016  Obtain stress echo    Normal EF with no significant valvular per echo in 10/18    Hypertension  Controlled with Lisinopril     Hyperlipidemia  5/19 LDL at 98  On crestor. Lipids and labs followed by PCP.        Peripheral arterial disease  [facility]     Tobacco use  Lung CT 9/18 : screening lung cancer CT - 4 mm lung nodule vs lymph node right lung--was referred to see pulmonary and has an appointment with them this October.   down to 0.5 PPD, currently on chantix       Continue current care and f/u when testing complete      Shira Dobson NP       [address]  Cardiology    2/18/2020         Patient seen, examined by me personally. Plan discussed as detailed. Agree with note as outlined by  NP. I confirm findings in history and physical exam. No additional findings noted. Agree with plan as outlined above.      Jazmin Roman MD

## 2020-02-18 NOTE — PROGRESS NOTES
1. Have you been to the ER, urgent care clinic since your last visit? Hospitalized since your last visit? NO    2. Have you seen or consulted any other health care providers outside of the [address] [place] [address] since your last visit? Include any pap smears or colon screening. NO    NO CARDIAC C/O.

## 2020-04-02 ENCOUNTER — HOSPITAL ENCOUNTER (EMERGENCY)
Age: 70
Discharge: HOME | End: 2020-04-02
Attending: EMERGENCY MEDICINE
Payer: MEDICARE

## 2020-04-02 ENCOUNTER — APPOINTMENT (OUTPATIENT)
Dept: GENERAL RADIOLOGY | Age: 70
End: 2020-04-02
Attending: EMERGENCY MEDICINE
Payer: MEDICARE

## 2020-04-02 VITALS
HEART RATE: 57 BPM | TEMPERATURE: 98.7 F | RESPIRATION RATE: 15 BRPM | OXYGEN SATURATION: 98 % | SYSTOLIC BLOOD PRESSURE: 137 MMHG | DIASTOLIC BLOOD PRESSURE: 85 MMHG

## 2020-04-02 DIAGNOSIS — J20.9 ACUTE BRONCHITIS, UNSPECIFIED ORGANISM: Primary | ICD-10-CM

## 2020-04-02 LAB
ALBUMIN SERPL-MCNC: 3.7 G/DL (ref 3.5–5)
ALBUMIN/GLOB SERPL: 0.9 {RATIO} (ref 1.1–2.2)
ALP SERPL-CCNC: 82 U/L (ref 45–117)
ALT SERPL-CCNC: 31 U/L (ref 12–78)
ANION GAP SERPL CALC-SCNC: 3 MMOL/L (ref 5–15)
AST SERPL-CCNC: 40 U/L (ref 15–37)
ATRIAL RATE: 77 BPM
BASOPHILS # BLD: 0 K/UL (ref 0–0.1)
BASOPHILS NFR BLD: 1 % (ref 0–1)
BILIRUB SERPL-MCNC: 1.1 MG/DL (ref 0.2–1)
BUN SERPL-MCNC: 12 MG/DL (ref 6–20)
BUN/CREAT SERPL: 14 (ref 12–20)
CALCIUM SERPL-MCNC: 8.4 MG/DL (ref 8.5–10.1)
CALCULATED P AXIS, ECG09: 70 DEGREES
CALCULATED R AXIS, ECG10: 47 DEGREES
CALCULATED T AXIS, ECG11: 56 DEGREES
CHLORIDE SERPL-SCNC: 108 MMOL/L (ref 97–108)
CK SERPL-CCNC: 167 U/L (ref 39–308)
CO2 SERPL-SCNC: 26 MMOL/L (ref 21–32)
CREAT SERPL-MCNC: 0.88 MG/DL (ref 0.7–1.3)
DIAGNOSIS, 93000: NORMAL
DIFFERENTIAL METHOD BLD: ABNORMAL
EOSINOPHIL # BLD: 0.1 K/UL (ref 0–0.4)
EOSINOPHIL NFR BLD: 2 % (ref 0–7)
ERYTHROCYTE [DISTWIDTH] IN BLOOD BY AUTOMATED COUNT: 12.4 % (ref 11.5–14.5)
GLOBULIN SER CALC-MCNC: 4.2 G/DL (ref 2–4)
GLUCOSE SERPL-MCNC: 108 MG/DL (ref 65–100)
HCT VFR BLD AUTO: 41 % (ref 36.6–50.3)
HGB BLD-MCNC: 14.2 G/DL (ref 12.1–17)
IMM GRANULOCYTES # BLD AUTO: 0 K/UL (ref 0–0.04)
IMM GRANULOCYTES NFR BLD AUTO: 0 % (ref 0–0.5)
LYMPHOCYTES # BLD: 1.5 K/UL (ref 0.8–3.5)
LYMPHOCYTES NFR BLD: 33 % (ref 12–49)
MCH RBC QN AUTO: 32.1 PG (ref 26–34)
MCHC RBC AUTO-ENTMCNC: 34.6 G/DL (ref 30–36.5)
MCV RBC AUTO: 92.8 FL (ref 80–99)
MONOCYTES # BLD: 0.5 K/UL (ref 0–1)
MONOCYTES NFR BLD: 12 % (ref 5–13)
NEUTS SEG # BLD: 2.3 K/UL (ref 1.8–8)
NEUTS SEG NFR BLD: 52 % (ref 32–75)
NRBC # BLD: 0 K/UL (ref 0–0.01)
NRBC BLD-RTO: 0 PER 100 WBC
P-R INTERVAL, ECG05: 174 MS
PLATELET # BLD AUTO: 190 K/UL (ref 150–400)
PMV BLD AUTO: 8.8 FL (ref 8.9–12.9)
POTASSIUM SERPL-SCNC: 5 MMOL/L (ref 3.5–5.1)
PROT SERPL-MCNC: 7.9 G/DL (ref 6.4–8.2)
Q-T INTERVAL, ECG07: 376 MS
QRS DURATION, ECG06: 114 MS
QTC CALCULATION (BEZET), ECG08: 425 MS
RBC # BLD AUTO: 4.42 M/UL (ref 4.1–5.7)
SODIUM SERPL-SCNC: 137 MMOL/L (ref 136–145)
TROPONIN I SERPL-MCNC: <0.05 NG/ML
VENTRICULAR RATE, ECG03: 77 BPM
WBC # BLD AUTO: 4.4 K/UL (ref 4.1–11.1)

## 2020-04-02 PROCEDURE — 99283 EMERGENCY DEPT VISIT LOW MDM: CPT

## 2020-04-02 PROCEDURE — 93005 ELECTROCARDIOGRAM TRACING: CPT

## 2020-04-02 PROCEDURE — 85025 COMPLETE CBC W/AUTO DIFF WBC: CPT

## 2020-04-02 PROCEDURE — 84484 ASSAY OF TROPONIN QUANT: CPT

## 2020-04-02 PROCEDURE — 74011250636 HC RX REV CODE- 250/636: Performed by: EMERGENCY MEDICINE

## 2020-04-02 PROCEDURE — 82550 ASSAY OF CK (CPK): CPT

## 2020-04-02 PROCEDURE — 71045 X-RAY EXAM CHEST 1 VIEW: CPT

## 2020-04-02 PROCEDURE — 80053 COMPREHEN METABOLIC PANEL: CPT

## 2020-04-02 PROCEDURE — 36415 COLL VENOUS BLD VENIPUNCTURE: CPT

## 2020-04-02 RX ORDER — DOXYCYCLINE HYCLATE 100 MG
100 TABLET ORAL 2 TIMES DAILY
Qty: 14 TAB | Refills: 0 | Status: SHIPPED | OUTPATIENT
Start: 2020-04-02 | End: 2020-04-09

## 2020-04-02 RX ORDER — SODIUM CHLORIDE 0.9 % (FLUSH) 0.9 %
5-40 SYRINGE (ML) INJECTION AS NEEDED
Status: DISCONTINUED | OUTPATIENT
Start: 2020-04-02 | End: 2020-04-02 | Stop reason: HOSPADM

## 2020-04-02 RX ORDER — SODIUM CHLORIDE 0.9 % (FLUSH) 0.9 %
5-40 SYRINGE (ML) INJECTION EVERY 8 HOURS
Status: DISCONTINUED | OUTPATIENT
Start: 2020-04-02 | End: 2020-04-02 | Stop reason: HOSPADM

## 2020-04-02 RX ORDER — PREDNISONE 10 MG/1
TABLET ORAL
Qty: 48 TAB | Refills: 0 | Status: SHIPPED | OUTPATIENT
Start: 2020-04-02 | End: 2020-04-22 | Stop reason: ALTCHOICE

## 2020-04-02 RX ADMIN — SODIUM CHLORIDE 1000 ML: 900 INJECTION, SOLUTION INTRAVENOUS at 09:51

## 2020-04-02 NOTE — DISCHARGE INSTRUCTIONS
Patient Education        Bronchitis: Care Instructions  Your Care Instructions    Bronchitis is inflammation of the bronchial tubes, which carry air to the lungs. The tubes swell and produce mucus, or phlegm. The mucus and inflamed bronchial tubes make you cough. You may have trouble breathing. Most cases of bronchitis are caused by viruses like those that cause colds. Antibiotics usually do not help and they may be harmful. Bronchitis usually develops rapidly and lasts about 2 to 3 weeks in otherwise healthy people. Follow-up care is a key part of your treatment and safety. Be sure to make and go to all appointments, and call your doctor if you are having problems. It's also a good idea to know your test results and keep a list of the medicines you take. How can you care for yourself at home? · Take all medicines exactly as prescribed. Call your doctor if you think you are having a problem with your medicine. · Get some extra rest.  · Take an over-the-counter pain medicine, such as acetaminophen (Tylenol), ibuprofen (Advil, Motrin), or naproxen (Aleve) to reduce fever and relieve body aches. Read and follow all instructions on the label. · Do not take two or more pain medicines at the same time unless the doctor told you to. Many pain medicines have acetaminophen, which is Tylenol. Too much acetaminophen (Tylenol) can be harmful. · Take an over-the-counter cough medicine that contains dextromethorphan to help quiet a dry, hacking cough so that you can sleep. Avoid cough medicines that have more than one active ingredient. Read and follow all instructions on the label. · Breathe moist air from a humidifier, hot shower, or sink filled with hot water. The heat and moisture will thin mucus so you can cough it out. · Do not smoke. Smoking can make bronchitis worse. If you need help quitting, talk to your doctor about stop-smoking programs and medicines.  These can increase your chances of quitting for good.  When should you call for help? Call 911 anytime you think you may need emergency care. For example, call if:    · You have severe trouble breathing.    Call your doctor now or seek immediate medical care if:    · You have new or worse trouble breathing.     · You cough up dark brown or bloody mucus (sputum).     · You have a new or higher fever.     · You have a new rash.    Watch closely for changes in your health, and be sure to contact your doctor if:    · You cough more deeply or more often, especially if you notice more mucus or a change in the color of your mucus.     · You are not getting better as expected. Where can you learn more? Go to http://sudheer-sparkle.info/  Enter H333 in the search box to learn more about \"Bronchitis: Care Instructions. \"  Current as of: June 9, 2019Content Version: 12.4  © 8213-7074 [employer]. Care instructions adapted under license by [employer] (which disclaims liability or warranty for this information). If you have questions about a medical condition or this instruction, always ask your healthcare professional. [address] any warranty or liability for your use of this information.

## 2020-04-02 NOTE — ED PROVIDER NOTES
EMERGENCY DEPARTMENT HISTORY AND PHYSICAL EXAM      Date: 4/2/2020  Patient Name: Austin Duff Sr    History of Presenting Illness     Chief Complaint   Patient presents with    Cough     Cough for 5 days    Chest Pain     Pain at mid chest intermittent for 2 days, reports hx of heart disease and pneumonia       History Provided By: Patient    HPI: Austin Duff Sr, 78 y.o. male presents to the ED with cc of cough for 1 week. Patient states history of bronchitis and pneumonia and this is been attributed in the past to his employment as a . Patient states he has had a productive cough for the last week coughing up yellow-liliya sputum at times. Patient denies any fever or chills. Patient states he has no shortness of breath. Patient states he has had some intermittent chest pain over the last couple days typically associated with his cough. Chest pain is described as a dull ache mild in intensity and is currently chest pain-free here in the emergency department. Patient states he was due for a follow-up appointment with [facility] today for prostate issues in the past however the office was closed and he felt while he was here at the hospital he would have his lungs evaluated. Patient has no [place] 19 exposures. Patient denies any abdominal pain, nausea, vomiting or diarrhea. Patient denies any recent rashes. There are no other complaints, changes, or physical findings at this time. PCP: Sam Navarrete MD    No current facility-administered medications on file prior to encounter. Current Outpatient Medications on File Prior to Encounter   Medication Sig Dispense Refill    varenicline (CHANTIX) 1 mg tablet Take 1 mg by mouth two (2) times daily (after meals).  omega 3-dha-epa-fish oil (FISH OIL) 100-160-1,000 mg cap Take  by mouth daily.  cholecalciferol (VITAMIN D3) 25 mcg (1,000 unit) cap Take  by mouth daily.       ascorbic acid, vitamin C, (VITAMIN C) 500 mg tablet Take 1,000 mg by mouth daily.  lisinopril (PRINIVIL, ZESTRIL) 20 mg tablet TAKE 1 TABLET BY MOUTH ONCE DAILY 90 Tab 3    cilostazol (PLETAL) 50 mg tablet TAKE 1 TABLET BY MOUTH BEFORE BREAKFAST AND DINNER. INDICATIONS: INTERMITTENT CLAUDICATION 60 Tab 4    rosuvastatin (CRESTOR) 5 mg tablet Take 1 Tab by mouth daily. TAKE 1 TABLET BY MOUTH ONCE NIGHTLY 90 Tab 3    naproxen sodium (ALEVE) 220 mg tablet Take 220 mg by mouth as needed.  MULTIVITAMINS (MULTIVITAMIN PO) Take  by mouth daily.          Past History     Past Medical History:  Past Medical History:   Diagnosis Date    BPH     Cancer ([facility] 11/2009    prostate biopsy revealed cancer    Erectile dysfunction     Essential hypertension, benign     Hepatitis C 6/30/2009    HIV positive ([address] 6/30/2009    Hypercholesterolemia     Ill-defined condition 9-2014    PNEUMONIA/bronchitis hx    Memory disorder     Prostate CA ([address] 2/22/2010       Past Surgical History:  Past Surgical History:   Procedure Laterality Date    COLONOSCOPY,REMV Dusty Ladd  8/31/2015         HX HEENT      gum surgery-for denture    HX HERNIA REPAIR       Inguinal Hernia Repair    HX HERNIA REPAIR  05/02/2017    Davinci recurrent RIHR with mesh    HX SKIN BIOPSY  11/16/15    left forearm/ upper arm biopsy        Family History:  Family History   Problem Relation Age of Onset    Hypertension Mother     Mental Retardation Mother     Depression Mother     Anxiety Mother     Liver Disease Father     Lung Disease Father     Diabetes Maternal Grandmother     Hypertension Other     Stroke Other     Anxiety Other     Depression Other        Social History:  Social History     Tobacco Use    Smoking status: Current Every Day Smoker     Packs/day: 1.00     Years: 45.00     Pack years: 45.00     Types: Cigarettes    Smokeless tobacco: Never Used   Substance Use Topics    Alcohol use: No    Drug use: No     Types: Heroin, Prescription, Cocaine     Comment: none in past 20 years per pt on 5/2/17       Allergies:  No Known Allergies      Review of Systems   Review of Systems   Constitutional: Negative. Negative for appetite change, chills, fatigue and fever. HENT: Negative. Negative for congestion, rhinorrhea, sinus pressure and sore throat. Eyes: Negative. Respiratory: Positive for cough. Negative for choking, chest tightness, shortness of breath and wheezing. Cardiovascular: Negative. Negative for chest pain, palpitations and leg swelling. Gastrointestinal: Negative for abdominal pain, constipation, diarrhea, nausea and vomiting. Endocrine: Negative. Genitourinary: Negative. Negative for difficulty urinating, dysuria, flank pain and urgency. Musculoskeletal: Negative. Skin: Negative. Neurological: Negative. Negative for dizziness, speech difficulty, weakness, light-headedness, numbness and headaches. Psychiatric/Behavioral: Negative. All other systems reviewed and are negative. Physical Exam   Physical Exam  Vitals signs and nursing note reviewed. Constitutional:       General: He is not in acute distress. Appearance: He is well-developed. He is not diaphoretic. HENT:      Head: Normocephalic and atraumatic. Mouth/Throat:      Pharynx: No oropharyngeal exudate. Eyes:      Conjunctiva/sclera: Conjunctivae normal.      Pupils: Pupils are equal, round, and reactive to light. Neck:      Musculoskeletal: Normal range of motion and neck supple. Vascular: No JVD. Trachea: No tracheal deviation. Cardiovascular:      Rate and Rhythm: Normal rate and regular rhythm. Heart sounds: Normal heart sounds. No murmur. Pulmonary:      Effort: Pulmonary effort is normal. No respiratory distress. Breath sounds: Normal breath sounds. No stridor. No wheezing or rales. Chest:      Chest wall: No tenderness. Abdominal:      General: There is no distension. Musculoskeletal: Normal range of motion. General: No tenderness. Skin:     General: Skin is warm and dry. Capillary Refill: Capillary refill takes less than 2 seconds. Neurological:      Mental Status: He is alert and oriented to person, place, and time. Cranial Nerves: No cranial nerve deficit. Comments: No gross motor or sensory deficits    Psychiatric:         Behavior: Behavior normal.         Diagnostic Study Results     Labs -     Recent Results (from the past 12 hour(s))   EKG, 12 LEAD, INITIAL    Collection Time: 04/02/20  8:45 AM   Result Value Ref Range    Ventricular Rate 77 BPM    Atrial Rate 77 BPM    P-R Interval 174 ms    QRS Duration 114 ms    Q-T Interval 376 ms    QTC Calculation (Bezet) 425 ms    Calculated P Axis 70 degrees    Calculated R Axis 47 degrees    Calculated T Axis 56 degrees    Diagnosis       Normal sinus rhythm  When compared with ECG of 20-FEB-2019 07:53,  No significant change was found  Confirmed by Vicente Mirza ([id number]) on 4/2/2020 9:19:19 AM     CBC WITH AUTOMATED DIFF    Collection Time: 04/02/20  9:42 AM   Result Value Ref Range    WBC 4.4 4.1 - 11.1 K/uL    RBC 4.42 4.10 - 5.70 M/uL    HGB 14.2 12.1 - 17.0 g/dL    HCT 41.0 36.6 - 50.3 %    MCV 92.8 80.0 - 99.0 FL    MCH 32.1 26.0 - 34.0 PG    MCHC 34.6 30.0 - 36.5 g/dL    RDW 12.4 11.5 - 14.5 %    PLATELET 288 344 - 504 K/uL    MPV 8.8 (L) 8.9 - 12.9 FL    NRBC 0.0 0  WBC    ABSOLUTE NRBC 0.00 0.00 - 0.01 K/uL    NEUTROPHILS 52 32 - 75 %    LYMPHOCYTES 33 12 - 49 %    MONOCYTES 12 5 - 13 %    EOSINOPHILS 2 0 - 7 %    BASOPHILS 1 0 - 1 %    IMMATURE GRANULOCYTES 0 0.0 - 0.5 %    ABS. NEUTROPHILS 2.3 1.8 - 8.0 K/UL    ABS. LYMPHOCYTES 1.5 0.8 - 3.5 K/UL    ABS. MONOCYTES 0.5 0.0 - 1.0 K/UL    ABS. EOSINOPHILS 0.1 0.0 - 0.4 K/UL    ABS. BASOPHILS 0.0 0.0 - 0.1 K/UL    ABS. IMM.  GRANS. 0.0 0.00 - 0.04 K/UL    DF AUTOMATED     METABOLIC PANEL, COMPREHENSIVE    Collection Time: 04/02/20  9:42 AM   Result Value Ref Range    Sodium 137 136 - 145 mmol/L    Potassium 5.0 3.5 - 5.1 mmol/L    Chloride 108 97 - 108 mmol/L    CO2 26 21 - 32 mmol/L    Anion gap 3 (L) 5 - 15 mmol/L    Glucose 108 (H) 65 - 100 mg/dL    BUN 12 6 - 20 MG/DL    Creatinine 0.88 0.70 - 1.30 MG/DL    BUN/Creatinine ratio 14 12 - 20      GFR est AA >60 >60 ml/min/1.73m2    GFR est non-AA >60 >60 ml/min/1.73m2    Calcium 8.4 (L) 8.5 - 10.1 MG/DL    Bilirubin, total 1.1 (H) 0.2 - 1.0 MG/DL    ALT (SGPT) 31 12 - 78 U/L    AST (SGOT) 40 (H) 15 - 37 U/L    Alk. phosphatase 82 45 - 117 U/L    Protein, total 7.9 6.4 - 8.2 g/dL    Albumin 3.7 3.5 - 5.0 g/dL    Globulin 4.2 (H) 2.0 - 4.0 g/dL    A-G Ratio 0.9 (L) 1.1 - 2.2     CK W/ REFLX CKMB    Collection Time: 04/02/20  9:42 AM   Result Value Ref Range     39 - 308 U/L   TROPONIN I    Collection Time: 04/02/20  9:42 AM   Result Value Ref Range    Troponin-I, Qt. <0.05 <0.05 ng/mL       Radiologic Studies -   XR CHEST PORT   Final Result   IMPRESSION:   No acute process. CT Results  (Last 48 hours)    None        CXR Results  (Last 48 hours)    None          Medical Decision Making   I am the first provider for this patient. I reviewed the vital signs, available nursing notes, past medical history, past surgical history, family history and social history. Vital Signs-Reviewed the patient's vital signs. Patient Vitals for the past 12 hrs:   Temp BP   04/02/20 0853 -- 138/75   04/02/20 0850 98.7 °F (37.1 °C) --       EKG interpretation: (Preliminary)  NSR, rate 77, normal axis/pr/qrs, no acute ST changes, Dilan Quigley,       Records Reviewed: Nursing Notes, Old Medical Records, Previous Radiology Studies and Previous Laboratory Studies    Provider Notes (Medical Decision Making):   DDx- Viral URI, Bronchitis, pneumonia      ED Course:   Initial assessment performed. The patients presenting problems have been discussed, and they are in agreement with the care plan formulated and outlined with them.   I have encouraged them to ask questions as they arise throughout their visit. Discussed results with pt, given hx of pneumonia and is smoking hx will prescribe steroids and antibiotics. Disposition:  DC home    DISCHARGE PLAN:  1. Current Discharge Medication List      START taking these medications    Details   predniSONE (STERAPRED DS) 10 mg dose pack Take as directed  Qty: 48 Tab, Refills: 0      doxycycline (VIBRA-TABS) 100 mg tablet Take 1 Tab by mouth two (2) times a day for 7 days. Qty: 14 Tab, Refills: 0           2. Follow-up Information     Follow up With Specialties Details Why Contact Info    Rivera Jaime MD Internal Medicine  As needed [id number] [facility]  [phone]          3. Return to ED if worse     Diagnosis     Clinical Impression:   1. Acute bronchitis, unspecified organism        Attestations:    Melodie Erazo, DO    Please note that this dictation was completed with [employer], the computer voice recognition software. Quite often unanticipated grammatical, syntax, homophones, and other interpretive errors are inadvertently transcribed by the computer software. Please disregard these errors. Please excuse any errors that have escaped final proofreading. Thank you.

## 2020-04-02 NOTE — ED NOTES
Pt d/c by this RN, all questions and concerns addressed at this time, pt verbalized understanding of d/c and f/u instructions. Pt ambulatory out of ED with steady gait.

## 2020-04-03 ENCOUNTER — PATIENT OUTREACH (OUTPATIENT)
Dept: CARDIOLOGY CLINIC | Age: 70
End: 2020-04-03

## 2020-04-03 NOTE — PROGRESS NOTES
COVID-19 Screening Initial Follow-up Note    Patient contacted regarding COVID-19  risk. Care Transition Nurse/ Ambulatory Care Manager contacted the patient by telephone to perform post discharge assessment. Verified name and  with patient as identifiers. Provided introduction to self, and explanation of the CTN/ACM role, and reason for call due to risk factors for infection and/or exposure to COVID-19. Symptoms reviewed with patient who verbalized the following symptoms: no new/worsening symptoms       Due to no new or worsening symptoms encounter was not routed to provider for escalation. Patient has following risk factors of: heart failure, immunocompromised. CTN/ACM reviewed discharge instructions, medical action plan and red flags such as increased shortness of breath, increasing fever and signs of decompensation with patient who verbalized understanding. Discussed exposure protocols and quarantine with CDC Guidelines What to do if you are sick with coronavirus disease 2019 Patient who was given an opportunity for questions and concerns. The patient agrees to contact the [facility] exposure line [phone], [facility] department [facility]  ([phone] and PCP office for questions related to their healthcare. CTN/ACM provided contact information for future reference.     Reviewed and educated patient on any new and changed medications related to discharge diagnosis     Plan for follow-up call in 14 days based on severity of symptoms and risk factors

## 2020-04-17 ENCOUNTER — PATIENT OUTREACH (OUTPATIENT)
Dept: CARDIOLOGY CLINIC | Age: 70
End: 2020-04-17

## 2020-04-17 NOTE — PROGRESS NOTES
Patient resolved from Transition of Care episode on 4/17/20  Patient/family has been provided the following resources and education related to COVID-19:                         Signs, symptoms and red flags related to COVID-19            CDC exposure and quarantine guidelines            [facility] exposure contact - [phone]            Contact for their local Department of Health                 Patient currently reports that the following symptoms have improved:  pain or aching joints, cough, no new/worsening symptoms     No further outreach scheduled with this CTN/ACM. Episode of Care resolved. Patient has this CTN/ACM contact information if future needs arise.

## 2020-04-21 NOTE — PROGRESS NOTES
HISTORY OF PRESENT ILLNESS  Josefina Moffett is a 69 y.o. male. HPI   Josefina Moffett is a 69 y.o. male evaluated via telephone on 4/22/2020. Josefina Moffett is a 69 y.o. male being evaluated by a Virtual Visit (video visit) encounter to address concerns as mentioned above. A caregiver was present when appropriate. Due to this being a TeleHealth encounter (During XNMII-50 public health emergency), evaluation of the following organ systems was limited: Vitals/Constitutional/EENT/Resp/CV/GI//MS/Neuro/Skin/Heme-Lymph-Imm. Pursuant to the emergency declaration under the [facility], [address] [place] [address] authority and the [employer] Act, this Virtual Visit was conducted with patient's (and/or legal guardian's) consent, to reduce the risk of exposure to COVID-19 and provide necessary medical care. Services were provided through a video synchronous discussion virtually to substitute for in-person encounter. --Mirza Fuller MD on 4/22/2020 at 8:48 AM    An electronic signature was used to authenticate this note. F/u HTN and HLD, hx prostate cancer and PAD   In ED recently for cough and CP--cxr-NAD. Treated with doxy and sterapred  Improved now  sees cardiologist for CAD by CT scan-neg NST, stress echo ordered  See puladilson DASH for lung nodule-DR Alarcon. Last appt was last year  Smokes 1/2ppd-requests refill of chantix  See hepatologist for hep c fibrosis/cirrhosis s/p treatment-stable liver lesion on last lesion  See Dr Lars Mccarthy for HIV management    Home BP--none but wnl in Er x2 this month      Last OV  Had another chest CT last week [facility] f/u lung nodule  Mild COPD by PFT per pt-no inhalers  Will be seeing Dr Lars Mccarthy for ID HIV undetectable and hepc treated next month  Saw Dr Jennie Manuel recently---psa stable s/p XRT so far--PSA  Dr Pritchett Grand appt 2 mos ago--due for repeat imaging right liver lesion? /  Ryan Hassan getr [facility] cance rs/p XRT  Smokes 1ppd tobacco, wants rx for nicotine patch  Feels well overall  Phuong Salgado MD     Patient Active Problem List    Diagnosis Date Noted    Substance abuse ([facility] 12/25/2010     Priority: 2 - Two    Lung nodule 10/28/2019    Tobacco use 12/06/2016    PAD (peripheral artery disease) ([facility] 12/06/2016    Dyslipidemia 12/06/2016    Advanced care planning/counseling discussion 09/13/2016    Colon polyp 09/18/2015    HTN (hypertension) 01/27/2011    Prostate cancer ([facility] 02/22/2010    Chronic hepatitis C ([facility] 06/30/2009    HIV positive ([facility] 06/30/2009    Weight loss, non-intentional 06/30/2009     Current Outpatient Medications   Medication Sig Dispense Refill    predniSONE (STERAPRED DS) 10 mg dose pack Take as directed 48 Tab 0    varenicline (CHANTIX) 1 mg tablet Take 1 mg by mouth two (2) times daily (after meals).  omega 3-dha-epa-fish oil (FISH OIL) 100-160-1,000 mg cap Take  by mouth daily.  cholecalciferol (VITAMIN D3) 25 mcg (1,000 unit) cap Take  by mouth daily.  ascorbic acid, vitamin C, (VITAMIN C) 500 mg tablet Take 1,000 mg by mouth daily.  lisinopril (PRINIVIL, ZESTRIL) 20 mg tablet TAKE 1 TABLET BY MOUTH ONCE DAILY 90 Tab 3    cilostazol (PLETAL) 50 mg tablet TAKE 1 TABLET BY MOUTH BEFORE BREAKFAST AND DINNER. INDICATIONS: INTERMITTENT CLAUDICATION 60 Tab 4    rosuvastatin (CRESTOR) 5 mg tablet Take 1 Tab by mouth daily. TAKE 1 TABLET BY MOUTH ONCE NIGHTLY 90 Tab 3    naproxen sodium (ALEVE) 220 mg tablet Take 220 mg by mouth as needed.  MULTIVITAMINS (MULTIVITAMIN PO) Take  by mouth daily.        No Known Allergies   Lab Results   Component Value Date/Time    WBC 4.4 04/02/2020 09:42 AM    HGB 14.2 04/02/2020 09:42 AM    Hemoglobin (POC) 16.0 05/02/2017 07:50 AM    HCT 41.0 04/02/2020 09:42 AM    Hematocrit (POC) 47 05/02/2017 07:50 AM    PLATELET 087 55/30/7120 09:42 AM    MCV 92.8 04/02/2020 09:42 AM     Lab Results Component Value Date/Time    Hemoglobin A1c 5.4 12/05/2019 12:00 AM    Glucose 108 (H) 04/02/2020 09:42 AM    Glucose (POC) 98 05/02/2017 07:50 AM    Glucose (POC) 95 09/04/2014 12:18 PM    LDL, calculated 98 05/02/2019 09:08 AM    Creatinine (POC) 0.6 04/04/2018 12:05 PM    Creatinine 0.88 04/02/2020 09:42 AM      Lab Results   Component Value Date/Time    Cholesterol, total 151 05/02/2019 09:08 AM    HDL Cholesterol 43 05/02/2019 09:08 AM    LDL, calculated 98 05/02/2019 09:08 AM    Triglyceride 52 05/02/2019 09:08 AM    CHOL/HDL Ratio 4.0 08/24/2014 05:06 AM     Lab Results   Component Value Date/Time    GFR est non-AA >60 04/02/2020 09:42 AM    GFRNA, POC >60 04/04/2018 12:05 PM    GFR est AA >60 04/02/2020 09:42 AM    GFRAA, POC >60 04/04/2018 12:05 PM    Creatinine 0.88 04/02/2020 09:42 AM    Creatinine (POC) 0.6 04/04/2018 12:05 PM    BUN 12 04/02/2020 09:42 AM    BUN (POC) 14 05/02/2017 07:50 AM    Sodium 137 04/02/2020 09:42 AM    Sodium (POC) 141 05/02/2017 07:50 AM    Potassium 5.0 04/02/2020 09:42 AM    Potassium (POC) 4.0 05/02/2017 07:50 AM    Chloride 108 04/02/2020 09:42 AM    Chloride (POC) 102 05/02/2017 07:50 AM    CO2 26 04/02/2020 09:42 AM    Magnesium 1.7 09/07/2014 12:32 AM    Phosphorus 3.3 09/03/2014 04:47 AM        ROS    Physical Exam  Constitutional:       Appearance: Normal appearance. Pulmonary:      Effort: Pulmonary effort is normal.   Neurological:      Mental Status: He is alert. ASSESSMENT and PLAN  Diagnoses and all orders for this visit:    1. Essential hypertension   controlled  2. Pure hypercholesterolemia  -     LIPID PANEL   Refill crestor  3. Elevated coronary artery calcium score   On aspirin and statin  4. HIV infection, unspecified symptom status ([address]   Undetectable RNA  5. Hepatitis C virus infection without hepatic coma, unspecified chronicity   F/u liver inst and f/u liver lesion oer  hepatologist  6.  Lung nodule   Has been foillowed Dr Gwen White cessation   Refilled chantix  7. Prostate cancer s/p XRT   F/u AMRITA DASH  Other orders  -     rosuvastatin (CRESTOR) 5 mg tablet; Take 1 Tab by mouth nightly.

## 2020-04-22 ENCOUNTER — VIRTUAL VISIT (OUTPATIENT)
Dept: INTERNAL MEDICINE CLINIC | Age: 70
End: 2020-04-22

## 2020-04-22 DIAGNOSIS — E78.00 PURE HYPERCHOLESTEROLEMIA: ICD-10-CM

## 2020-04-22 DIAGNOSIS — I10 ESSENTIAL HYPERTENSION: Primary | ICD-10-CM

## 2020-04-22 DIAGNOSIS — B19.20 HEPATITIS C VIRUS INFECTION WITHOUT HEPATIC COMA, UNSPECIFIED CHRONICITY: ICD-10-CM

## 2020-04-22 DIAGNOSIS — B20 HIV INFECTION, UNSPECIFIED SYMPTOM STATUS (HCC): ICD-10-CM

## 2020-04-22 DIAGNOSIS — R91.1 LUNG NODULE: ICD-10-CM

## 2020-04-22 DIAGNOSIS — R93.1 ELEVATED CORONARY ARTERY CALCIUM SCORE: ICD-10-CM

## 2020-04-22 RX ORDER — ROSUVASTATIN CALCIUM 5 MG/1
5 TABLET, COATED ORAL
Qty: 90 TAB | Refills: 3 | Status: SHIPPED | OUTPATIENT
Start: 2020-04-22 | End: 2020-10-22 | Stop reason: ALTCHOICE

## 2020-04-22 RX ORDER — VARENICLINE TARTRATE 1 MG/1
1 TABLET, FILM COATED ORAL
Qty: 180 TAB | Refills: 1 | Status: SHIPPED | OUTPATIENT
Start: 2020-04-22 | End: 2022-03-04 | Stop reason: ALTCHOICE

## 2020-04-22 NOTE — PATIENT INSTRUCTIONS
This is an established visit conducted via telemedicine. The patient has been instructed that this meets HIPAA criteria and acknowledges and agrees to this method of visitation. Thuy Adhikari [place] 
49/98/92 
3:09 AM 
 
Chief Complaint Patient presents with  Hypertension 6 month follow up  Cholesterol Problem 6 month follow up  Follow-up Hep. C and HIV  Labs Mail lab order with map

## 2020-04-22 NOTE — TELEPHONE ENCOUNTER
PCP: Shaw Starkey MD    Last appt: 10/29/2019  Future Appointments   Date Time Provider Laney Diaz   4/22/2020  8:45 AM Shaw Starkey MD [address]   6/1/2020 11:00 AM [facility] [address]   7/7/2020  1:00 PM BEATRICE Mireles [facility] JONATHAN SCHED       Requested Prescriptions     Pending Prescriptions Disp Refills    varenicline (Chantix) 1 mg tablet 180 Tab 1     Sig: Take 1 Tab by mouth two (2) times daily (after meals).

## 2020-06-25 RX ORDER — CILOSTAZOL 50 MG/1
TABLET ORAL
Qty: 60 TAB | Refills: 0 | Status: SHIPPED | OUTPATIENT
Start: 2020-06-25 | End: 2020-08-17

## 2020-07-09 ENCOUNTER — OFFICE VISIT (OUTPATIENT)
Dept: HEMATOLOGY | Age: 70
End: 2020-07-09

## 2020-07-09 VITALS
HEART RATE: 78 BPM | OXYGEN SATURATION: 98 % | HEIGHT: 71 IN | SYSTOLIC BLOOD PRESSURE: 122 MMHG | DIASTOLIC BLOOD PRESSURE: 79 MMHG | TEMPERATURE: 97 F | WEIGHT: 226 LBS | RESPIRATION RATE: 16 BRPM | BODY MASS INDEX: 31.64 KG/M2

## 2020-07-09 DIAGNOSIS — K74.60 CIRRHOSIS OF LIVER WITHOUT ASCITES, UNSPECIFIED HEPATIC CIRRHOSIS TYPE (HCC): Primary | ICD-10-CM

## 2020-07-09 DIAGNOSIS — Z21 HIV POSITIVE (HCC): ICD-10-CM

## 2020-07-09 DIAGNOSIS — C61 PROSTATE CANCER (HCC): ICD-10-CM

## 2020-07-09 LAB
ERYTHROCYTE [DISTWIDTH] IN BLOOD BY AUTOMATED COUNT: 13.2 % (ref 11.6–15.4)
HCT VFR BLD AUTO: 42.9 % (ref 37.5–51)
HGB BLD-MCNC: 15.2 G/DL (ref 13–17.7)
MCH RBC QN AUTO: 32.8 PG (ref 26.6–33)
MCHC RBC AUTO-ENTMCNC: 35.4 G/DL (ref 31.5–35.7)
MCV RBC AUTO: 93 FL (ref 79–97)
PLATELET # BLD AUTO: 241 X10E3/UL (ref 150–450)
RBC # BLD AUTO: 4.64 X10E6/UL (ref 4.14–5.8)
WBC # BLD AUTO: 6.1 X10E3/UL (ref 3.4–10.8)

## 2020-07-09 NOTE — PROGRESS NOTES
Chief Complaint   Patient presents with    Follow-up       1. Have you been to the ER, urgent care clinic since your last visit? Hospitalized since your last visit? Yes [facility] for chest pain 2 months ago, chest xray done normal results. 2. Have you seen or consulted any other health care providers outside of the [address] [place] [address] since your last visit? Include any pap smears or colon screening. Yes colonoscopy, normal results.

## 2020-07-09 NOTE — PROGRESS NOTES
[address] [place] [address], MD, Britton Oakes MD America Lemons, PA-C Charm Carrier, ACN-BC     Cristela Urrutia, [facility]   [facility] P[facility]    Funmi Gonzalez, [facility]-BC       Valentinmelvin Davis [facility] 136    at [address], 81 [facility] [place], [facility] 22.    [phone]    FAX: [address] [place] [address], [address]    [phone]    FAX: [phone]     Patient Care Team:  Darius Keys MD as PCP - Analy Cuenca MD as PCP - [address] [place] [address] [facility]  Inder Mcdonald MD (Infectious Diseases)  Eugenio Rivera MD as Surgeon (General Surgery)  Fransisca Lai MD (Gastroenterology)  Ean Ariza MD (Hepatology)  Brent Elliott MD (Cardiology)  Maggy Nelson MD (Cardiology)  Anahy Sanchez MD (Urology)     Patient Active Problem List   Diagnosis Code    Chronic hepatitis C ([facility] B18.2    HIV positive ([facility] Z21    Weight loss, non-intentional R63.4    Prostate cancer ([address] C61    Substance abuse ([address] F19.10    HTN (hypertension) I10    Colon polyp K63.5    Advanced care planning/counseling discussion Z71.89    Tobacco use Z72.0    PAD (peripheral artery disease) ([address] I73.9    Dyslipidemia E78.5    Lung nodule R91.1       Estevan Chi returns to the [address] for management of cirrhosis due to successfully cleared chronic HCV and HIV co-infection. The active problem list, all pertinent past medical history, medications, radiologic findings and laboratory findings related to the liver disorder were reviewed with the patient. Patient has completed 12 weeks of HCV treatment with Harvoni (5/29/2015-8/25/2015).  He is a sustained virologic responder to treatment, or cured. The patient underwent a liver biopsy in 3/2015. This demonstrates severe hepatitis with bridging fibrosis. Patient has had post-HCV clearance Fibroscan to reassess liver fibrosis or scarring after successful HCV treatment. This revealed a score of 12.2. Suggested fibrosis level is F3. CAP score is 361, suggestive of significant hepatic steatosis. Ultrasound of the liver was performed in 12/2014. This suggests chronic liver disease. A 0.8 x 0.7 lesion was identified in the right lobe. These lesion has been followed with different radiographic methods over the past several years and there is apparent growth of this lesion to 1.6 cm. It is not definitve that this represents the same lesion and there have been no radiographic signs suggestive of HCC. AFP has been followed and while not rising, this is elevated. He has had most recent imaging with US in 5/2019. We had ordered a follow-up CT scan, the patient has not yet had this scheduled. Patient is co-infected with HIV. He is currently not on anti-retroviral therapy because his virus remains suppressed to undetected levels. Viral titer has not been rechecked since his visit with Dr Dionte Penaloza last Summer 2019. He is otherwise feeling well. Patient was treated for prostate cancer 4-5 years ago and continues to have low PSA and no signs of recurrence. He has upcoming appt with Dr Rex Kuhn and is due for repeat PSA lab. His greatest complaint at this time is of intermittent claudication symptoms effecting the LLE. He has elected a trial of medications and this has increased his walking distance. He is still smoking 1/2 PPD, but working on this with Chantix prescription. Patient has no new physical complaints today. He continues to have ongoing fatigue but otherwise feels well. The patient completes all daily activities without any functional limitations.  The patient has not experienced arthralgias, myalgias, problems concentrating, swelling of the abdomen, swelling of the lower extremities, hematemesis, or hematochezia. ALLERGIES  No Known Allergies    MEDICATIONS  Current Outpatient Medications   Medication Sig    cilostazoL (PLETAL) 50 mg tablet TAKE 1 TABLET BY MOUTH BEFORE BREAKFAST AND DINNER. INDICATIONS: INTERMITTENT CLAUDICATION    varenicline (Chantix) 1 mg tablet Take 1 Tab by mouth two (2) times daily (after meals).  rosuvastatin (CRESTOR) 5 mg tablet Take 1 Tab by mouth nightly.  omega 3-dha-epa-fish oil (FISH OIL) 100-160-1,000 mg cap Take  by mouth daily.  cholecalciferol (VITAMIN D3) 25 mcg (1,000 unit) cap Take  by mouth daily.  ascorbic acid, vitamin C, (VITAMIN C) 500 mg tablet Take 1,000 mg by mouth daily.  lisinopril (PRINIVIL, ZESTRIL) 20 mg tablet TAKE 1 TABLET BY MOUTH ONCE DAILY    naproxen sodium (ALEVE) 220 mg tablet Take 220 mg by mouth as needed.  MULTIVITAMINS (MULTIVITAMIN PO) Take  by mouth daily.  rosuvastatin (CRESTOR) 5 mg tablet Take 1 Tab by mouth daily. TAKE 1 TABLET BY MOUTH ONCE NIGHTLY     No current facility-administered medications for this visit. REVIEW OF SYSTEMS NOT RELATED TO LIVER DISEASE:  Constitution systems: Negative for fever, chills, weight gain, weight loss. Eyes: Negative for diplopia, visual changes, eye pain. ENT: Negative for sore throat, painful swallowing. Respiratory: Negative for cough, hemoptysis, dyspnea. Cardiology: Negative for chest pain, palpitations. GI:  Negative for constipation or diarrhea. : Negative for urinary frequency, dysuria and hematuria. Skin: Negative for rash. Hematology: Negative for easy bruising, bleeding from gums or nose. Musculo-skeletal: Negative for back pain. Positive for muscle pain, weakness of the LLE with walking distances. Neurologic: Negative for headaches, dizziness, vertigo, memory problems. Psychology: Negative for anxiety, depression.      FAMILY HISTORY:  The father  of alcohol cirrhosis. The mother  of CVA. There are no other persons in the family with HCV or liver disease. SOCIAL HISTORY:  The patient is . The spouse has been tested for HCV and is positive. She was treated and achieved SVR. The patient has 3 children, and 7 grandchildren. The patient stopped using tobacco products in 2014. Resumed in  and is now smoking 3/4 PPD. The patient has never consumed significant amounts of alcohol. The patient used to work in [employer]. PHYSICAL EXAMINATION:  Visit Vitals  /79   Pulse 78   Temp 97 °F (36.1 °C) (Oral)   Resp 16   Ht 5' 11\" (1.803 m)   Wt 226 lb (102.5 kg)   SpO2 98%   BMI 31.52 kg/m²     General: No acute distress. Eyes: Sclera anicteric. ENT: No oral lesions. Thyroid normal.  Nodes: No adenopathy. Skin: No spider angiomata. No jaundice. No palmar erythema. Respiratory: Lungs clear to auscultation. Cardiovascular: Regular heart rate. No murmurs. No JVD. Abdomen: Soft non-tender. Liver size normal to percussion/palpation. Spleen not palpable. No obvious ascites. Extremities: No edema. No muscle wasting. No gross arthritic changes. Neurologic: Alert and oriented. Cranial nerves grossly intact. No asterixis.     LABORATORY STUDIES:  Liver [place] of [address] & Units 2020   WBC 4.1 - 11.1 K/uL 4.4 5.5 4.8   ANC 1.8 - 8.0 K/UL 2.3  2.5   HGB 12.1 - 17.0 g/dL 14.2 13.9 14.6   HGB 12.1 - 17.0 GM/DL      HGB (iSTAT) 12.1 - 17.0 GM/DL       - 400 K/uL 190 209 202   INR 0.8 - 1.2  1.1    AST 15 - 37 U/L 40 (H) 21 23   ALT 12 - 78 U/L 31 18 14   Alk Phos 45 - 117 U/L 82 69 59   Bili, Total 0.2 - 1.0 MG/DL 1.1 (H) 0.6 0.8   Bili, Direct 0.00 - 0.40 mg/dL  0.16    Albumin 3.5 - 5.0 g/dL 3.7 4.4 4.5   BUN 6 - 20 MG/DL 12 17 14   BUN (iSTAT) 9 - 20 MG/DL      Creat 0.70 - 1.30 MG/DL 0.88 0.76 0.75 (L)   Creat (iSTAT) 0.6 - 1.3 mg/dL      Na 136 - 145 mmol/L 137 142 139   K 3.5 - 5.1 mmol/L 5.0 4.1 4.0   Cl 97 - 108 mmol/L 108 106 102   CO2 21 - 32 mmol/L 26 22 21   Glucose 65 - 100 mg/dL 108 (H) 88 125 (H)     Cancer Screening Latest Ref Rng & Units 12/5/2019 8/13/2019 2/13/2019   AFP, Serum 0.0 - 8.0 ng/mL 6.3 6.9 8.7 (H)   AFP-L3% 0.0 - 9.9 % 4.8 4.5 7.2   CA 19-9 0 - 35 U/mL  6    CEA 0.0 - 4.7 ng/mL  2.8    Additional lab values drawn at today's office visit are pending at the time of documentation. SEROLOGIES:  Serologies Latest Ref Rng 10/10/2014 9/1/2014 8/27/2014   Hep A Ab, Total Negative Positive (A)     Hep B Surface Ag Negative Negative     Hep B Core Ab, Total Negative Positive (A)     Hep C Genotype See Note 1a     HCV RT-PCR, Quant  [id number]  [id number]   HCV Log10    6.830   Ferritin 30 - 400 ng/mL 172     Iron % Saturation 15 - 55 % 22     C-ANCA Neg:<1:20 titer  <1:20    P-ANCA Neg:<1:20 titer  <1:20    ANCA Neg:<1:20 titer  <1:20      LIVER HISTOLOGY:  3/2015. Slides reviewed by MLS. Severe hepatitis with bridging fibrosis and possible cirrhosis. Knodell score (3,3,3,3), Bro score 4, Metavir score 3.  2/2017. [facility]. EkPa was 10.0. Suggested fibrosis level is F3.  2/2018. FibroScan performed at [facility]. EkPa was 11.7. Suggested fibrosis level is F3.  2/2019. FibroScan performed at [facility]. EkPa was 12.2. Suggested fibrosis level is F3. CAP score is 361, suggestive of significant hepatic steatosis. ENDOSCOPIC PROCEDURES:  Not available or performed    RADIOLOGY:  12/2014. Ultrasound of liver. Echogenic consistent with chronic liver disease. 0.8x0.7 cm lesion right lobe. No dilated bile ducts. No ascites. 1/2015. Dynamic MRI liver. Changes consistent with chronic liver disease. No liver mass lesions. No dilated bile ducts. No bile duct strictures. No ascites. 11/2017. Ultrasound of liver. Cirrhotic morphology of the liver is again demonstrated.  12 mm echogenic nodule right hepatic lobe. This does not appear to reside in the same location as the prior smaller hyperechoic nodule. This may represent a hemangioma. 11/2018. Triple phase CT. The central right lobe hepatic mass shown to be quite evident on ultrasound and echogenic but very subtle on MRI is very slightly hypoenhancing on CT and most evident on equilibrium phase. The margins are not clearly delineated so accurate measurement is difficult but the lesion is similarly sized compared to prior recent ultrasound measuring about 1.1 x 1.6 cm. No other hepatic lesions are seen in the liver redemonstrates subtle changes cirrhosis. 5/2019. Ultrasound of liver. Diffusely echogenic and heterogeneous. An echogenic lesion measures 1.5 x 2.0 x 1.9 cm, previously 1.6 x 1.1 cm on CT and 1.3 x 1.8 x 1.8 cm on ultrasound. 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 This degress of fibrosis was confirmed with past FibroScan 2/2019. He has preserved liver function. Will continue to monitor patient regularly and continue to monitor for complications of advanced fibrosis/cirrhosis. HIV co-infection. He has low levels of HIV RNA. He is not taking anti-HIV medications. This had been regularly monitored by his ID physician, but he has not had a level drawn in ~1 year. Will add to lab drawn today. Rhonda Adam HCV treatment. Completed 12 weeks of Harvoni treatment in August 2015. He is cured. [facility] surveillance. Routine imaging has been done over the past several years. In reviewing his chart, I am concerning that there appears to be some interval growth of this finding and this will warrant additional imaging. Characteristics apart from change in size over time are not characteristic of HCC. I have drawn tumor markers and the patient has been given the number to call to schedule ordered CT.   Patient has the number to scheduling to follow-up on this as well. Prostate cancer history. As we are doing labs today anyway, will plan to draw PSA for patient's urologist.  He has follow-up in 8/2020. The patient was directed to continue all current medications at the current dosages. There are no contraindications for the patient to take any medications that are necessary for treatment of other medical issues. The patient was counseled regarding alcohol consumption. The patient was counseled regarding use of illicit drugs. Vaccination for viral hepatitis A and B is not required. The patient has serologic evidence of prior exposure or vaccination with immunity. All of the above issues were discussed with the patient. All questions were answered. The patient expressed a clear understanding of the above. [facility] in 6 months with repeat imaging in the meantime.     Nancy Suarez PA-C  [facility] 59 81 [facility] 22.  [phone]  [address] [place] [address]

## 2020-07-10 LAB
ALBUMIN SERPL-MCNC: 4.8 G/DL (ref 3.7–4.7)
ALP SERPL-CCNC: 66 IU/L (ref 39–117)
ALT SERPL-CCNC: 18 IU/L (ref 0–44)
AST SERPL-CCNC: 22 IU/L (ref 0–40)
BILIRUB DIRECT SERPL-MCNC: 0.09 MG/DL (ref 0–0.4)
BILIRUB SERPL-MCNC: 0.3 MG/DL (ref 0–1.2)
BUN SERPL-MCNC: 20 MG/DL (ref 8–27)
BUN/CREAT SERPL: 23 (ref 10–24)
CALCIUM SERPL-MCNC: 9.7 MG/DL (ref 8.6–10.2)
CHLORIDE SERPL-SCNC: 105 MMOL/L (ref 96–106)
CO2 SERPL-SCNC: 18 MMOL/L (ref 20–29)
CREAT SERPL-MCNC: 0.87 MG/DL (ref 0.76–1.27)
GLUCOSE SERPL-MCNC: 101 MG/DL (ref 65–99)
INR PPP: 1.1 (ref 0.8–1.2)
POTASSIUM SERPL-SCNC: 4.2 MMOL/L (ref 3.5–5.2)
PROT SERPL-MCNC: 7.5 G/DL (ref 6–8.5)
PROTHROMBIN TIME: 11.4 SEC (ref 9.1–12)
PSA SERPL-MCNC: 0.5 NG/ML (ref 0–4)
SODIUM SERPL-SCNC: 138 MMOL/L (ref 134–144)

## 2020-07-13 LAB
AFP L3 MFR SERPL: NORMAL % (ref 0–9.9)
AFP SERPL-MCNC: 6.9 NG/ML (ref 0–8)

## 2020-07-16 LAB
HIV1 RNA # SERPL NAA+PROBE: <20 COPIES/ML
HIV1 RNA SERPL NAA+PROBE-LOG#: NORMAL LOG10COPY/ML

## 2020-08-17 RX ORDER — CILOSTAZOL 50 MG/1
TABLET ORAL
Qty: 60 TAB | Refills: 0 | Status: SHIPPED | OUTPATIENT
Start: 2020-08-17 | End: 2020-08-17 | Stop reason: SDUPTHER

## 2020-08-18 RX ORDER — CILOSTAZOL 50 MG/1
TABLET ORAL
Qty: 60 TAB | Refills: 5 | Status: SHIPPED | OUTPATIENT
Start: 2020-08-18 | End: 2021-05-18

## 2020-10-21 NOTE — PROGRESS NOTES
HISTORY OF PRESENT ILLNESS  Julianne Mitchell is a 69 y.o. male. HPI   F/u HTN and HLD, hx prostate cancer s/p XRT a, hep c treated, HIV coinfection and  PAD and medicare wellenss------------  Had neg stres echo in June for atypical CP  Saw hepatologist recently  Cirrhosis s/p tx hep c.  liver lesion on US   Recent PSA 0.5, nomal LFT HIV <20  Will fu Dr Keanu Caceres for prostate cancer  On chantix but stopped it due to chest pain--smokes >1/2 ppd now  Able to walk 5 blocks then dyspnea occurs but not much claudication  Last OV       In ED recently for cough and CP--cxr-NAD. Treated with doxy and sterapred  Improved now  sees cardiologist for CAD by CT scan-neg NST, stress echo ordered  See puladilson DASH for lung nodule-DR Alarcon. Last appt was last year  Smokes 1/2ppd-requests refill of chantix  See hepatologist for hep c fibrosis/cirrhosis s/p treatment-stable liver lesion on last lesion  See Dr Patrick Krueger for HIV management     Home BP--none but wnl in [place] x2 this month    Patient Active Problem List    Diagnosis Date Noted    Substance abuse ([facility] 12/25/2010     Priority: 2 - Two    Lung nodule 10/28/2019    Tobacco use 12/06/2016    PAD (peripheral artery disease) ([facility] 12/06/2016    Dyslipidemia 12/06/2016    Advanced care planning/counseling discussion 09/13/2016    Colon polyp 09/18/2015    HTN (hypertension) 01/27/2011    Prostate cancer ([address] 02/22/2010    Chronic hepatitis C ([facility] 06/30/2009    HIV positive ([facility] 06/30/2009    Weight loss, non-intentional 06/30/2009     Current Outpatient Medications   Medication Sig Dispense Refill    cilostazoL (PLETAL) 50 mg tablet TAKE ONE TABLET BY MOUTH BEFORE BREAKFAST AND DINNER 60 Tab 5    varenicline (Chantix) 1 mg tablet Take 1 Tab by mouth two (2) times daily (after meals). 180 Tab 1    rosuvastatin (CRESTOR) 5 mg tablet Take 1 Tab by mouth nightly. 90 Tab 3    omega 3-dha-epa-fish oil (FISH OIL) 100-160-1,000 mg cap Take  by mouth daily.       cholecalciferol (VITAMIN D3) 25 mcg (1,000 unit) cap Take  by mouth daily.  ascorbic acid, vitamin C, (VITAMIN C) 500 mg tablet Take 1,000 mg by mouth daily.  lisinopril (PRINIVIL, ZESTRIL) 20 mg tablet TAKE 1 TABLET BY MOUTH ONCE DAILY 90 Tab 3    rosuvastatin (CRESTOR) 5 mg tablet Take 1 Tab by mouth daily. TAKE 1 TABLET BY MOUTH ONCE NIGHTLY 90 Tab 3    naproxen sodium (ALEVE) 220 mg tablet Take 220 mg by mouth as needed.  MULTIVITAMINS (MULTIVITAMIN PO) Take  by mouth daily.        No Known Allergies  Social History     Tobacco Use    Smoking status: Heavy Tobacco Smoker     Packs/day: 0.50     Years: 45.00     Pack years: 22.50     Types: Cigarettes    Smokeless tobacco: Never Used   Substance Use Topics    Alcohol use: No      Lab Results   Component Value Date/Time    WBC 6.1 07/09/2020 08:49 AM    HGB 15.2 07/09/2020 08:49 AM    Hemoglobin (POC) 16.0 05/02/2017 07:50 AM    HCT 42.9 07/09/2020 08:49 AM    Hematocrit (POC) 47 05/02/2017 07:50 AM    PLATELET 915 54/45/9363 08:49 AM    MCV 93 07/09/2020 08:49 AM     Lab Results   Component Value Date/Time    Hemoglobin A1c 5.4 12/05/2019 12:00 AM    Glucose 101 (H) 07/09/2020 08:49 AM    Glucose (POC) 98 05/02/2017 07:50 AM    Glucose (POC) 95 09/04/2014 12:18 PM    LDL, calculated 98 05/02/2019 09:08 AM    Creatinine (POC) 0.6 04/04/2018 12:05 PM    Creatinine 0.87 07/09/2020 08:49 AM      Lab Results   Component Value Date/Time    Cholesterol, total 151 05/02/2019 09:08 AM    HDL Cholesterol 43 05/02/2019 09:08 AM    LDL, calculated 98 05/02/2019 09:08 AM    Triglyceride 52 05/02/2019 09:08 AM    CHOL/HDL Ratio 4.0 08/24/2014 05:06 AM     Lab Results   Component Value Date/Time    GFR est non-AA 87 07/09/2020 08:49 AM    GFRNA, POC >60 04/04/2018 12:05 PM    GFR est  07/09/2020 08:49 AM    GFRAA, POC >60 04/04/2018 12:05 PM    Creatinine 0.87 07/09/2020 08:49 AM    Creatinine (POC) 0.6 04/04/2018 12:05 PM    BUN 20 07/09/2020 08:49 AM    BUN (POC) 14 05/02/2017 07:50 AM    Sodium 138 07/09/2020 08:49 AM    Sodium (POC) 141 05/02/2017 07:50 AM    Potassium 4.2 07/09/2020 08:49 AM    Potassium (POC) 4.0 05/02/2017 07:50 AM    Chloride 105 07/09/2020 08:49 AM    Chloride (POC) 102 05/02/2017 07:50 AM    CO2 18 (L) 07/09/2020 08:49 AM    Magnesium 1.7 09/07/2014 12:32 AM    Phosphorus 3.3 09/03/2014 04:47 AM        ROS    Physical Exam  Vitals signs and nursing note reviewed. Constitutional:       General: He is not in acute distress. Appearance: He is well-developed. Comments: Appears stated age   HENT:      Head: Normocephalic. Cardiovascular:      Rate and Rhythm: Normal rate and regular rhythm. Heart sounds: Normal heart sounds. Pulmonary:      Effort: Pulmonary effort is normal.      Breath sounds: Normal breath sounds. Abdominal:      Palpations: Abdomen is soft. Neurological:      Mental Status: He is alert. ASSESSMENT and PLAN  Diagnoses and all orders for this visit:    1. Pure hypercholesterolemia  -     LIPID PANEL    2. Essential hypertension    3. Needs flu shot  -     FLU (FLUAD QUAD INFLUENZA VACCINE,QUAD,ADJUVANTED)           This is the Subsequent Medicare Annual Wellness Exam, performed 12 months or more after the Initial AWV or the last Subsequent AWV    I have reviewed the patient's medical history in detail and updated the computerized patient record.      History     Patient Active Problem List   Diagnosis Code    [facility] ([facility] B18.2    HIV positive ([facility] Z21    Weight loss, non-intentional R63.4    Prostate cancer ([facility] C61    Substance abuse ([facility] F19.10    HTN (hypertension) I10    Colon polyp K63.5    Advanced care planning/counseling discussion Z71.89    Tobacco use Z72.0    PAD (peripheral artery disease) ([facility] I73.9    Dyslipidemia E78.5    Lung nodule R91.1     Past Medical History:   Diagnosis Date    BPH     Cancer ([facility] 11/2009    prostate biopsy revealed cancer    Erectile dysfunction     Essential hypertension, benign     Hepatitis C 6/30/2009    HIV positive ([facility] 6/30/2009    Hypercholesterolemia     Ill-defined condition 9-2014    PNEUMONIA/bronchitis hx    Memory disorder     Prostate CA ([facility] 2/22/2010      Past Surgical History:   Procedure Laterality Date    Jannie Tom  8/31/2015         HX HEENT      gum surgery-for denture    HX HERNIA REPAIR       Inguinal Hernia Repair    HX HERNIA REPAIR  05/02/2017    Davinci recurrent RIHR with mesh    HX SKIN BIOPSY  11/16/15    left forearm/ upper arm biopsy      Current Outpatient Medications   Medication Sig Dispense Refill    cyanocobalamin (VITAMIN B12) 500 mcg tablet Take 500 mcg by mouth daily.  GARLIC PO Take  by mouth.  cilostazoL (PLETAL) 50 mg tablet TAKE ONE TABLET BY MOUTH BEFORE BREAKFAST AND DINNER 60 Tab 5    omega 3-dha-epa-fish oil (FISH OIL) 100-160-1,000 mg cap Take  by mouth daily.  cholecalciferol (VITAMIN D3) 25 mcg (1,000 unit) cap Take  by mouth daily.  ascorbic acid, vitamin C, (VITAMIN C) 500 mg tablet Take 1,000 mg by mouth daily.  lisinopril (PRINIVIL, ZESTRIL) 20 mg tablet TAKE 1 TABLET BY MOUTH ONCE DAILY 90 Tab 3    rosuvastatin (CRESTOR) 5 mg tablet Take 1 Tab by mouth daily. TAKE 1 TABLET BY MOUTH ONCE NIGHTLY 90 Tab 3    naproxen sodium (ALEVE) 220 mg tablet Take 220 mg by mouth as needed.  MULTIVITAMINS (MULTIVITAMIN PO) Take  by mouth daily.  varenicline (Chantix) 1 mg tablet Take 1 Tab by mouth two (2) times daily (after meals).  180 Tab 1     No Known Allergies    Family History   Problem Relation Age of Onset    Hypertension Mother     Mental Retardation Mother     Depression Mother     Anxiety Mother     Liver Disease Father     Lung Disease Father     Diabetes Maternal Grandmother     Hypertension Other     Stroke Other     Anxiety Other     Depression Other      Social History     Tobacco Use    Smoking status: Heavy Tobacco Smoker     Packs/day: 0.50     Years: 45.00     Pack years: 22.50     Types: Cigarettes    Smokeless tobacco: Never Used   Substance Use Topics    Alcohol use: No       Depression Risk Factor Screening:     3 most recent PHQ Screens 10/22/2020   PHQ Not Done -   Little interest or pleasure in doing things Not at all   Feeling down, depressed, irritable, or hopeless Not at all   Total Score PHQ 2 0       Alcohol Risk Screen   Do you average more than 1 drink per night or more than 7 drinks a week: No    In the past three months have you have had more than 4 drinks containing alcohol on one occasion: No        Functional Ability and Level of Safety:   Hearing: The patient needs further evaluation. Activities of Daily Living: The home contains: no safety equipment. Patient does total self care     Ambulation: with no difficulty     Fall Risk:  Fall Risk Assessment, last 12 mths 10/22/2020   Able to walk? Yes   Fall in past 12 months? No     Abuse Screen:  Patient is not abused       Cognitive Screening   Has your family/caregiver stated any concerns about your memory: no     Cognitive Screening: Normal - serial 3    Patient Care Team   Patient Care Team:  Mariana Liao MD as PCP - Sammy Benton MD as PCP - [facility] Provider  Kishan Spaulding MD (Infectious Disease)  Keren Wiley MD as Surgeon (General Surgery)  Puma Villar MD (Gastroenterology)  Karen Briceño MD (Hepatology)  Kai Bence, MD (Cardiology)  Supriya Frankel MD (Cardiology)  Earnestine Terry MD (Urology)    Assessment/Plan   Education and counseling provided:  Are appropriate based on today's review and evaluation  End-of-Life planning (with patient's consent)  tdap and shingrix recommended  Flu shot today  Diagnoses and all orders for this visit:    1. Pure hypercholesterolemia  -     LIPID PANEL   On statin goal LDL<70  2. Essential hypertension   Controlled    3. Smoker   Counseled on cessation    4. HIV   Fu ID coinfection  5. Hep C    Treated   Will get US per liver MD fu liver lesion    6. PAD   Stable, not very symptomatic on pletal  7.  Needs flu shot  -     FLU (FLUAD QUAD INFLUENZA VACCINE,QUAD,ADJUVANTED)        Health Maintenance Due   Topic Date Due    DTaP/Tdap/Td series (1 - Tdap) 04/18/1970    Shingrix Vaccine Age 50> (1 of 2) 04/18/1999    GLAUCOMA SCREENING Q2Y  04/18/2014    Medicare Yearly Exam  05/02/2020    Lipid Screen  05/02/2020    Flu Vaccine (1) 09/01/2020

## 2020-10-22 ENCOUNTER — HOSPITAL ENCOUNTER (OUTPATIENT)
Dept: LAB | Age: 70
Discharge: HOME | End: 2020-10-22
Payer: MEDICARE

## 2020-10-22 ENCOUNTER — OFFICE VISIT (OUTPATIENT)
Dept: INTERNAL MEDICINE CLINIC | Age: 70
End: 2020-10-22
Payer: MEDICARE

## 2020-10-22 VITALS
SYSTOLIC BLOOD PRESSURE: 114 MMHG | OXYGEN SATURATION: 97 % | HEART RATE: 82 BPM | RESPIRATION RATE: 16 BRPM | HEIGHT: 71 IN | TEMPERATURE: 97.4 F | DIASTOLIC BLOOD PRESSURE: 73 MMHG | WEIGHT: 224 LBS | BODY MASS INDEX: 31.36 KG/M2

## 2020-10-22 DIAGNOSIS — B20 HIV INFECTION, UNSPECIFIED SYMPTOM STATUS (HCC): ICD-10-CM

## 2020-10-22 DIAGNOSIS — F17.200 SMOKER: ICD-10-CM

## 2020-10-22 DIAGNOSIS — I73.9 PAD (PERIPHERAL ARTERY DISEASE) (HCC): ICD-10-CM

## 2020-10-22 DIAGNOSIS — B19.20 HEPATITIS C VIRUS INFECTION WITHOUT HEPATIC COMA, UNSPECIFIED CHRONICITY: ICD-10-CM

## 2020-10-22 DIAGNOSIS — C61 PROSTATE CANCER (HCC): ICD-10-CM

## 2020-10-22 DIAGNOSIS — E78.00 PURE HYPERCHOLESTEROLEMIA: Primary | ICD-10-CM

## 2020-10-22 DIAGNOSIS — Z23 NEEDS FLU SHOT: ICD-10-CM

## 2020-10-22 DIAGNOSIS — I10 ESSENTIAL HYPERTENSION: ICD-10-CM

## 2020-10-22 PROCEDURE — G0008 ADMIN INFLUENZA VIRUS VAC: HCPCS | Performed by: INTERNAL MEDICINE

## 2020-10-22 PROCEDURE — 36415 COLL VENOUS BLD VENIPUNCTURE: CPT

## 2020-10-22 PROCEDURE — 80061 LIPID PANEL: CPT

## 2020-10-22 PROCEDURE — 90694 VACC AIIV4 NO PRSRV 0.5ML IM: CPT | Performed by: INTERNAL MEDICINE

## 2020-10-22 PROCEDURE — 99214 OFFICE O/P EST MOD 30 MIN: CPT | Performed by: INTERNAL MEDICINE

## 2020-10-22 PROCEDURE — G0463 HOSPITAL OUTPT CLINIC VISIT: HCPCS | Performed by: INTERNAL MEDICINE

## 2020-10-22 PROCEDURE — 90694 VACC AIIV4 NO PRSRV 0.5ML IM: CPT

## 2020-10-22 RX ORDER — LANOLIN ALCOHOL/MO/W.PET/CERES
500 CREAM (GRAM) TOPICAL DAILY
COMMUNITY

## 2020-10-22 NOTE — PATIENT INSTRUCTIONS
1. Have you been to the ER, urgent care clinic since your last visit? Hospitalized since your last visit? No    2. Have you seen or consulted any other health care providers outside of the [address] [place] [address] since your last visit? Include any pap smears or colon screening. No    Office Policies    Phone calls/patient messages:            Please allow up to 24 hours for someone in the office to contact you about your call or message. Be mindful your provider may be out of the office or your message may require further review. We encourage you to use [facility] for your messages as this is a faster, more efficient way to communicate with our office                         Medication Refills:            Prescription medications require 48-72 business hours to process. We encourage you to use [facility] for your refills. For controlled medications: Please allow 72 business hours to process. Certain medications may require you to  a written prescription at our office. NO narcotic/controlled medications will be prescribed after 4pm Monday through Friday or on weekends              Form/Paperwork Completion:            Please note a $25 fee may incur for all paperwork for completed by our providers. We ask that you allow 7-10 business days. Pre-payment is due prior to picking up/faxing the completed form. You may also download your forms to [facility] to have your doctor print off. Medicare Wellness Visit, Male    The best way to live healthy is to have a lifestyle where you eat a well-balanced diet, exercise regularly, limit alcohol use, and quit all forms of tobacco/nicotine, if applicable. Regular preventive services are another way to keep healthy. Preventive services (vaccines, screening tests, monitoring & exams) can help personalize your care plan, which helps you manage your own care. Screening tests can find health problems at the earliest stages, when they are easiest to treat. [address] follows the current, evidence-based guidelines published by the [facility] Cornelius Wu ([facility]) when recommending preventive services for our patients. Because we follow these guidelines, sometimes recommendations change over time as research supports it. (For example, a prostate screening blood test is no longer routinely recommended for men with no symptoms.)  Of course, you and your doctor may decide to screen more often for some diseases, based on your risk and co-morbidities (chronic disease you are already diagnosed with). Preventive services for you include:  - Medicare offers their members a free annual wellness visit, which is time for you and your primary care provider to discuss and plan for your preventive service needs. Take advantage of this benefit every year!  -All adults over age 72 should receive the recommended pneumonia vaccines. Current USPSTF guidelines recommend a series of two vaccines for the best pneumonia protection.   -All adults should have a flu vaccine yearly and an ECG. All adults age 48 and older should receive a shingles vaccine once in their lifetime.    -All adults age 38-68 who are overweight should have a diabetes screening test once every three years.   -Other screening tests & preventive services for persons with diabetes include: an eye exam to screen for diabetic retinopathy, a kidney function test, a foot exam, and stricter control over your cholesterol.   -Cardiovascular screening for adults with routine risk involves an electrocardiogram (ECG) at intervals determined by the provider.   -Colorectal cancer screening should be done for adults age 54-65 with no increased risk factors for colorectal cancer. There are a number of acceptable methods of screening for this type of cancer. Each test has its own benefits and drawbacks. Discuss with your provider what is most appropriate for you during your annual wellness visit.  The different tests include: colonoscopy (considered the best screening method), a fecal occult blood test, a fecal DNA test, and sigmoidoscopy.  -All adults born between [facility] should be screened once for Hepatitis C.  -An Abdominal Aortic Aneurysm (AAA) Screening is recommended for men age 73-68 who has ever smoked in their lifetime. Here is a list of your current Health Maintenance items (your personalized list of preventive services) with a due date:  Health Maintenance Due   Topic Date Due    DTaP/Tdap/Td  (1 - Tdap) 04/18/1970    Shingles Vaccine (1 of 2) 04/18/1999    Glaucoma Screening   04/18/2014    Annual Well Visit  05/02/2020    Cholesterol Test   05/02/2020    Yearly Flu Vaccine (1) 09/01/2020           Medicare Wellness Visit, Male    The best way to live healthy is to have a lifestyle where you eat a well-balanced diet, exercise regularly, limit alcohol use, and quit all forms of tobacco/nicotine, if applicable. Regular preventive services are another way to keep healthy. Preventive services (vaccines, screening tests, monitoring & exams) can help personalize your care plan, which helps you manage your own care. Screening tests can find health problems at the earliest stages, when they are easiest to treat. [facility] follows the current, evidence-based guidelines published by the [place] Cornelius Jazmin (USPSTF) when recommending preventive services for our patients. Because we follow these guidelines, sometimes recommendations change over time as research supports it. (For example, a prostate screening blood test is no longer routinely recommended for men with no symptoms). Of course, you and your doctor may decide to screen more often for some diseases, based on your risk and co-morbidities (chronic disease you are already diagnosed with).      Preventive services for you include:  - Medicare offers their members a free annual wellness visit, which is time for you and your primary care provider to discuss and plan for your preventive service needs. Take advantage of this benefit every year!  -All adults over age 72 should receive the recommended pneumonia vaccines. Current USPSTF guidelines recommend a series of two vaccines for the best pneumonia protection.   -All adults should have a flu vaccine yearly and tetanus vaccine every 10 years.  -All adults age 48 and older should receive the shingles vaccines (series of two vaccines). -All adults age 38-68 who are overweight should have a diabetes screening test once every three years.   -Other screening tests & preventive services for persons with diabetes include: an eye exam to screen for diabetic retinopathy, a kidney function test, a foot exam, and stricter control over your cholesterol.   -Cardiovascular screening for adults with routine risk involves an electrocardiogram (ECG) at intervals determined by the provider.   -Colorectal cancer screening should be done for adults age 54-65 with no increased risk factors for colorectal cancer. There are a number of acceptable methods of screening for this type of cancer. Each test has its own benefits and drawbacks. Discuss with your provider what is most appropriate for you during your annual wellness visit. The different tests include: colonoscopy (considered the best screening method), a fecal occult blood test, a fecal DNA test, and sigmoidoscopy.  -All adults born between [facility] should be screened once for Hepatitis C.  -An Abdominal Aortic Aneurysm (AAA) Screening is recommended for men age 73-68 who has ever smoked in their lifetime.      Here is a list of your current Health Maintenance items (your personalized list of preventive services) with a due date:  Health Maintenance Due   Topic Date Due    DTaP/Tdap/Td  (1 - Tdap) 04/18/1970    Shingles Vaccine (1 of 2) 04/18/1999    Glaucoma Screening   04/18/2014    Annual Well Visit  05/02/2020    Cholesterol Test   05/02/2020    Yearly Flu Vaccine (1) 09/01/2020

## 2020-10-22 NOTE — PROGRESS NOTES
Justyna Odell is a 69 y.o. male who presents for routine immunizations. He denies any symptoms , reactions or allergies that would exclude them from being immunized today. Risks and adverse reactions were discussed and the VIS was given to them. All questions were addressed. He was observed for 15 min post injection. There were no reactions observed.     Familia Collier, BRIDGETN

## 2020-10-23 LAB
CHOLEST SERPL-MCNC: 178 MG/DL (ref 100–199)
HDLC SERPL-MCNC: 39 MG/DL
LDLC SERPL CALC-MCNC: 122 MG/DL (ref 0–99)
TRIGL SERPL-MCNC: 92 MG/DL (ref 0–149)
VLDLC SERPL CALC-MCNC: 17 MG/DL (ref 5–40)

## 2020-10-23 NOTE — PROGRESS NOTES
Tell pt LDL chol is above goal -should be < 100.  On crestor 5 mg every day--if taking daily then increase dose to 10 mg every day--if not taking then needs to take 5 mg daily

## 2020-11-10 RX ORDER — ROSUVASTATIN CALCIUM 10 MG/1
10 TABLET, COATED ORAL
Qty: 90 TAB | Refills: 2 | Status: SHIPPED | OUTPATIENT
Start: 2020-11-10 | End: 2021-10-19

## 2020-11-10 NOTE — TELEPHONE ENCOUNTER
Future Appointments:  No future appointments. Last Appointment With Me:  10/22/2020     Requested Prescriptions     Pending Prescriptions Disp Refills    rosuvastatin (CRESTOR) 10 mg tablet 90 Tab 2     Sig: Take 1 Tab by mouth nightly.

## 2021-01-18 DIAGNOSIS — I10 ESSENTIAL HYPERTENSION: ICD-10-CM

## 2021-01-18 RX ORDER — LISINOPRIL 20 MG/1
TABLET ORAL
Qty: 90 TAB | Refills: 0 | Status: SHIPPED | OUTPATIENT
Start: 2021-01-18 | End: 2021-04-28

## 2021-03-08 ENCOUNTER — TELEPHONE (OUTPATIENT)
Dept: INTERNAL MEDICINE CLINIC | Age: 71
End: 2021-03-08

## 2021-03-09 ENCOUNTER — VIRTUAL VISIT (OUTPATIENT)
Dept: INTERNAL MEDICINE CLINIC | Age: 71
End: 2021-03-09
Payer: MEDICARE

## 2021-03-09 DIAGNOSIS — R07.9 CHEST PAIN, UNSPECIFIED TYPE: ICD-10-CM

## 2021-03-09 DIAGNOSIS — K74.60 CIRRHOSIS OF LIVER WITHOUT ASCITES, UNSPECIFIED HEPATIC CIRRHOSIS TYPE (HCC): ICD-10-CM

## 2021-03-09 DIAGNOSIS — F33.9 RECURRENT MAJOR DEPRESSIVE DISORDER, REMISSION STATUS UNSPECIFIED (HCC): ICD-10-CM

## 2021-03-09 DIAGNOSIS — Z20.822 CLOSE EXPOSURE TO COVID-19 VIRUS: ICD-10-CM

## 2021-03-09 DIAGNOSIS — K74.60 HEPATIC CIRRHOSIS, UNSPECIFIED HEPATIC CIRRHOSIS TYPE (HCC): ICD-10-CM

## 2021-03-09 DIAGNOSIS — F17.200 SMOKER: ICD-10-CM

## 2021-03-09 DIAGNOSIS — B20 HIV INFECTION, UNSPECIFIED SYMPTOM STATUS (HCC): ICD-10-CM

## 2021-03-09 DIAGNOSIS — R41.3 MEMORY LOSS: Primary | ICD-10-CM

## 2021-03-09 PROBLEM — K76.9 LIVER DISEASE, CHRONIC, WITH CIRRHOSIS (HCC): Status: ACTIVE | Noted: 2021-03-09

## 2021-03-09 PROCEDURE — 99443 PR PHYS/QHP TELEPHONE EVALUATION 21-30 MIN: CPT | Performed by: INTERNAL MEDICINE

## 2021-03-09 RX ORDER — BUPROPION HYDROCHLORIDE 150 MG/1
150 TABLET ORAL
Qty: 30 TAB | Refills: 0 | Status: SHIPPED | OUTPATIENT
Start: 2021-03-09 | End: 2022-03-07 | Stop reason: ALTCHOICE

## 2021-03-09 NOTE — PATIENT INSTRUCTIONS
This is an established visit conducted via telemedicine. The patient has been instructed that this meets HIPAA criteria and acknowledges and agrees to this method of visitation. Gildardo Ochoa LPN 
[birth year]89 
3:35 AM 
 
1. Have you been to the ER, urgent care clinic since your last visit? Hospitalized since your last visit? No 
 
2. Have you seen or consulted any other health care providers outside of the [address] [place] [address] since your last visit? Include any pap smears or colon screening.  No

## 2021-03-09 NOTE — PROGRESS NOTES
HISTORY OF PRESENT ILLNESS  Kathie Stacy is a 69 y.o. male. HPI   Kathie Stacy, who was evaluated through a synchronous (real-time) audio only encounter, and/or his healthcare decision maker, is aware that it is a billable service, with coverage as determined by his insurance carrier. He provided verbal consent to proceed: Yes, and patient identification was verified. This visit was conducted pursuant to the emergency declaration under the [facility], [address] [place] [address] authority and the [employer] Act. A caregiver was present when appropriate. Ability to conduct physical exam was limited. The patient was located in a state where the provider was credentialed to provide care. --Blanca Tracey MD on 3/9/2021 at 10:00 AM        VV via telephone encounter x 21 minutes      An electronic signature was used to authenticate this note.   Pts sister concerned his supervisor at work called is sister telling her he was being forgetful at work last  2 weeks  Pt denies having problem with memory  He stopped chantix saying it did not agree with him but wants to stop smoking  Some depression but PHQ -score -2    Had covd exposure at work last week -he tested negative with rapid test 1 d after his coworker was dx with covid  No f/c but feels fatigeud and some chest tightness-had neg stress echo last year        Last OV    F/u HTN and HLD, hx prostate cancer s/p XRT a, hep c treated, HIV coinfection and  PAD and medicare wellenss------------  Had neg stres echo in June for atypical CP  Saw hepatologist recently  Cirrhosis s/p tx hep c.  liver lesion on US   Recent PSA 0.5, nomal LFT HIV <20  Will fu Dr Niles Burris for prostate cancer  On chantix but stopped it due to chest pain--smokes >1/2 ppd now  Able to walk 5 blocks then dyspnea occurs but not much claudication    Patient Active Problem List    Diagnosis Date Noted    Substance abuse ([place] [address] 12/25/2010     Priority: 2 - Two    Lung nodule 10/28/2019    Tobacco use 12/06/2016    [facility]AD [facility]peripheral artery disease) ([facility] 12/06/2016    Dyslipidemia 12/06/2016    Advanced care planning/counseling discussion 09/13/2016    Colon polyp 09/18/2015    HTN (hypertension) 01/27/2011    Prostate cancer ([place] [address] 02/22/2010    Chronic hepatitis C ([place] [address] 06/30/2009    HIV positive ([place] [address] 06/30/2009    Weight loss, non-intentional 06/30/2009     Current Outpatient Medications   Medication Sig Dispense Refill    lisinopriL (PRINIVIL, ZESTRIL) 20 mg tablet Take 1 tablet by mouth once daily 90 Tab 0    rosuvastatin (CRESTOR) 10 mg tablet Take 1 Tab by mouth nightly. 90 Tab 2    cyanocobalamin (VITAMIN B12) 500 mcg tablet Take 500 mcg by mouth daily.  GARLIC PO Take  by mouth.  cilostazoL (PLETAL) 50 mg tablet TAKE ONE TABLET BY MOUTH BEFORE BREAKFAST AND DINNER 60 Tab 5    varenicline (Chantix) 1 mg tablet Take 1 Tab by mouth two (2) times daily (after meals). 180 Tab 1    omega 3-dha-epa-fish oil (FISH OIL) 100-160-1,000 mg cap Take  by mouth daily.  cholecalciferol (VITAMIN D3) 25 mcg (1,000 unit) cap Take  by mouth daily.  ascorbic acid, vitamin C, (VITAMIN C) 500 mg tablet Take 1,000 mg by mouth daily.  naproxen sodium (ALEVE) 220 mg tablet Take 220 mg by mouth as needed.  MULTIVITAMINS (MULTIVITAMIN PO) Take  by mouth daily.        No Known Allergies   Lab Results   Component Value Date/Time    WBC 6.1 07/09/2020 08:49 AM    HGB 15.2 07/09/2020 08:49 AM    Hemoglobin (POC) 16.0 05/02/2017 07:50 AM    HCT 42.9 07/09/2020 08:49 AM    Hematocrit (POC) 47 05/02/2017 07:50 AM    PLATELET 027 46/01/1744 08:49 AM    MCV 93 07/09/2020 08:49 AM     Lab Results   Component Value Date/Time    GFR est non-AA 87 07/09/2020 08:49 AM    GFRNA, POC >60 04/04/2018 12:05 PM    GFR est  07/09/2020 08:49 AM    GFRAA, POC >60 04/04/2018 12:05 PM    Creatinine 0.87 07/09/2020 08:49 AM Creatinine (POC) 0.6 04/04/2018 12:05 PM    BUN 20 07/09/2020 08:49 AM    BUN (POC) 14 05/02/2017 07:50 AM    Sodium 138 07/09/2020 08:49 AM    Sodium (POC) 141 05/02/2017 07:50 AM    Potassium 4.2 07/09/2020 08:49 AM    Potassium (POC) 4.0 05/02/2017 07:50 AM    Chloride 105 07/09/2020 08:49 AM    Chloride (POC) 102 05/02/2017 07:50 AM    CO2 18 (L) 07/09/2020 08:49 AM    Magnesium 1.7 09/07/2014 12:32 AM    Phosphorus 3.3 09/03/2014 04:47 AM        ROS    Physical Exam    ASSESSMENT and PLAN  Diagnoses and all orders for this visit:    1. Memory loss  -     AMMONIA  -     VITAMIN B12  -     TSH 3RD GENERATION  -     METABOLIC PANEL, COMPREHENSIVE  -     REFERRAL TO NEUROLOGY    2. Cirrhosis of liver without ascites, unspecified hepatic cirrhosis type (HCC)  -     METABOLIC PANEL, COMPREHENSIVE    3. Hepatic cirrhosis, unspecified hepatic cirrhosis type ([facility]  -     METABOLIC PANEL, COMPREHENSIVE    4. Smoker  -     buPROPion XL (WELLBUTRIN XL) 150 mg tablet; Take 1 Tab by mouth every morning. 5. HIV infection, unspecified symptom status ([facility]    6. Recurrent major depressive disorder, remission status unspecified (HCC)  -     buPROPion XL (WELLBUTRIN XL) 150 mg tablet; Take 1 Tab by mouth every morning. 7. Chest pain   Atypical    Has fu at [facility] in 1-2 weeks -to Er if increased symptoms    8.  Covid exposure   Recommended retest since was tested too  soon - 1 d  after close contact was dx  Fu as scheduled

## 2021-04-05 ENCOUNTER — OFFICE VISIT (OUTPATIENT)
Dept: CARDIOLOGY CLINIC | Age: 71
End: 2021-04-05
Payer: MEDICARE

## 2021-04-05 VITALS
BODY MASS INDEX: 31.4 KG/M2 | WEIGHT: 224.3 LBS | RESPIRATION RATE: 18 BRPM | OXYGEN SATURATION: 97 % | DIASTOLIC BLOOD PRESSURE: 86 MMHG | HEIGHT: 71 IN | HEART RATE: 86 BPM | SYSTOLIC BLOOD PRESSURE: 128 MMHG

## 2021-04-05 DIAGNOSIS — R07.2 PRECORDIAL PAIN: Primary | ICD-10-CM

## 2021-04-05 DIAGNOSIS — I10 ESSENTIAL HYPERTENSION: ICD-10-CM

## 2021-04-05 DIAGNOSIS — I73.9 PAD (PERIPHERAL ARTERY DISEASE) (HCC): ICD-10-CM

## 2021-04-05 DIAGNOSIS — Z72.0 TOBACCO USE: ICD-10-CM

## 2021-04-05 DIAGNOSIS — E78.5 DYSLIPIDEMIA: ICD-10-CM

## 2021-04-05 PROCEDURE — G8417 CALC BMI ABV UP PARAM F/U: HCPCS | Performed by: INTERNAL MEDICINE

## 2021-04-05 PROCEDURE — G8427 DOCREV CUR MEDS BY ELIG CLIN: HCPCS | Performed by: INTERNAL MEDICINE

## 2021-04-05 PROCEDURE — 3017F COLORECTAL CA SCREEN DOC REV: CPT | Performed by: INTERNAL MEDICINE

## 2021-04-05 PROCEDURE — 93000 ELECTROCARDIOGRAM COMPLETE: CPT | Performed by: INTERNAL MEDICINE

## 2021-04-05 PROCEDURE — G8510 SCR DEP NEG, NO PLAN REQD: HCPCS | Performed by: INTERNAL MEDICINE

## 2021-04-05 PROCEDURE — 1101F PT FALLS ASSESS-DOCD LE1/YR: CPT | Performed by: INTERNAL MEDICINE

## 2021-04-05 PROCEDURE — 99214 OFFICE O/P EST MOD 30 MIN: CPT | Performed by: INTERNAL MEDICINE

## 2021-04-05 PROCEDURE — G8754 DIAS BP LESS 90: HCPCS | Performed by: INTERNAL MEDICINE

## 2021-04-05 PROCEDURE — G8752 SYS BP LESS 140: HCPCS | Performed by: INTERNAL MEDICINE

## 2021-04-05 PROCEDURE — G8536 NO DOC ELDER MAL SCRN: HCPCS | Performed by: INTERNAL MEDICINE

## 2021-04-05 RX ORDER — TIOTROPIUM BROMIDE INHALATION SPRAY 3.12 UG/1
SPRAY, METERED RESPIRATORY (INHALATION)
COMMUNITY
Start: 2021-03-04

## 2021-04-05 RX ORDER — ALBUTEROL SULFATE 90 UG/1
AEROSOL, METERED RESPIRATORY (INHALATION)
COMMUNITY
Start: 2021-01-18 | End: 2022-03-04 | Stop reason: ALTCHOICE

## 2021-04-05 NOTE — PROGRESS NOTES
4/5/2021 1:44 PM      Subjective:     Annalee Sebastian is here today for a f/u appt. He reports he was having some sob, had eval with pulm who gave him inhaler, doing better. He has been having issues with midsternal CP. Started on reflux meds which hasnt seemed to help. He denies CAMACHO, orthopnea, PND or edema. He has some tightness in his legs after walking some distance, like 5 blocks. He denies claudication, restless legs, or cramping. He denies palpitations, near syncope or syncope. Visit Vitals  /86 (BP 1 Location: Left upper arm, BP Patient Position: Sitting, BP Cuff Size: Large adult)   Pulse 86   Resp 18   Ht 5' 11\" (1.803 m)   Wt 224 lb 4.8 oz (101.7 kg)   SpO2 97%   BMI 31.28 kg/m²     Current Outpatient Medications   Medication Sig    Spiriva Respimat 2.5 mcg/actuation inhaler INHALE 2 PUFFS BY MOUTH ONCE DAILY    ProAir HFA 90 mcg/actuation inhaler     lisinopriL (PRINIVIL, ZESTRIL) 20 mg tablet Take 1 tablet by mouth once daily    rosuvastatin (CRESTOR) 10 mg tablet Take 1 Tab by mouth nightly.  cyanocobalamin (VITAMIN B12) 500 mcg tablet Take 500 mcg by mouth daily.  GARLIC PO Take  by mouth.  cilostazoL (PLETAL) 50 mg tablet TAKE ONE TABLET BY MOUTH BEFORE BREAKFAST AND DINNER (Patient taking differently: No sig reported)    omega 3-dha-epa-fish oil (FISH OIL) 100-160-1,000 mg cap Take  by mouth daily.  cholecalciferol (VITAMIN D3) 25 mcg (1,000 unit) cap Take  by mouth daily.  ascorbic acid, vitamin C, (VITAMIN C) 500 mg tablet Take 1,000 mg by mouth daily.  naproxen sodium (ALEVE) 220 mg tablet Take 220 mg by mouth as needed.  MULTIVITAMINS (MULTIVITAMIN PO) Take  by mouth daily.  buPROPion XL (WELLBUTRIN XL) 150 mg tablet Take 1 Tab by mouth every morning.  varenicline (Chantix) 1 mg tablet Take 1 Tab by mouth two (2) times daily (after meals). No current facility-administered medications for this visit.           Objective: Visit Vitals  /86 (BP 1 Location: Left upper arm, BP Patient Position: Sitting, BP Cuff Size: Large adult)   Pulse 86   Resp 18   Ht 5' 11\" (1.803 m)   Wt 224 lb 4.8 oz (101.7 kg)   SpO2 97%   BMI 31.28 kg/m²       Data Review:     Reviewed and/or ordered active problem list, medication list tests    Past Medical History:   Diagnosis Date    BPH     [facility] 11/2009    prostate biopsy revealed cancer    Erectile dysfunction     Essential hypertension, benign     Hepatitis C 6/30/2009    HIV positive [facility] 6/30/2009    Hypercholesterolemia     Ill-defined condition 9-2014    PNEUMONIA/bronchitis hx    Memory disorder     Prostate CA ([facility] 2/22/2010      Past Surgical History:   Procedure Laterality Date    COLONOSCOPY,REMV ESPERANZA,SNARE  8/31/2015         HX HEENT      gum surgery-for denture    HX HERNIA REPAIR       Inguinal Hernia Repair    HX HERNIA REPAIR  05/02/2017    Davinci recurrent RIHR with mesh    HX SKIN BIOPSY  11/16/15    left forearm/ upper arm biopsy      No Known Allergies   Family History   Problem Relation Age of Onset    Hypertension Mother     Mental Retardation Mother     Depression Mother     Anxiety Mother     Liver Disease Father     Lung Disease Father     Diabetes Maternal Grandmother     Hypertension Other     Stroke Other     Anxiety Other     Depression Other       Social History     Socioeconomic History    Marital status:      Spouse name: Not on file    Number of children: Not on file    Years of education: Not on file    Highest education level: Not on file   Occupational History    Not on file   Social Needs    Financial resource strain: Not on file    Food insecurity     Worry: Not on file     Inability: Not on file    Transportation needs     Medical: Not on file     Non-medical: Not on file   Tobacco Use    Smoking status: Current Every Day Smoker     Packs/day: 0.50     Years: 45.00     Pack years: 22.50     Types: Cigarettes  Smokeless tobacco: Never Used   Substance and Sexual Activity    Alcohol use: No    Drug use: No     Types: Heroin, Prescription, Cocaine     Comment: none in past 20 years per pt on 5/2/17    Sexual activity: Yes     Partners: Female     Comment: radu has 3 children   Lifestyle    Physical activity     Days per week: Not on file     Minutes per session: Not on file    Stress: Not on file   Relationships    Social connections     Talks on phone: Not on file     Gets together: Not on file     Attends Worship service: Not on file     Active member of club or organization: Not on file     Attends meetings of clubs or organizations: Not on file     Relationship status: Not on file    Intimate partner violence     Fear of current or ex partner: Not on file     Emotionally abused: Not on file     Physically abused: Not on file     Forced sexual activity: Not on file   Other Topics Concern    Not on file   Social History Narrative    Not on file         Review of Systems     General: Not Present- Anorexia, Chills, Dietary Changes, Fatigue, Fever, Medication Changes, Night Sweats, Weight Gain > 10lbs. and Weight Loss > 10lbs. .  Skin: Not Present- Bruising and Excessive Sweating. HEENT: Not Present- Headache, Visual Loss and Vertigo. Respiratory: Not Present- Cough, Decreased Exercise Tolerance, Difficulty Breathing, Snoring and Wheezing. Cardiovascular: Not Present- Abnormal Blood Pressure, Chest Pain, Claudications, Difficulty Breathing On Exertion, Edema, Fainting / Blacking Out, Irregular Heart Beat, Night Cramps, Orthopnea, Palpitations, Paroxysmal Nocturnal Dyspnea, Rapid Heart Rate, Shortness of Breath and Swelling of Extremities. Gastrointestinal: Not Present- Black, Tarry Stool, Bloody Stool, Diarrhea, Hematemesis, Rectal Bleeding and Vomiting. Musculoskeletal: Not Present- Muscle Pain and Muscle Weakness. Neurological: Not Present- Dizziness.   Psychiatric: Not Present- Depression. Endocrine: Not Present- Cold Intolerance, Heat Intolerance and Thyroid Problems. Hematology: Not Present- Abnormal Bleeding, Anemia, Blood Clots and Easy Bruising.       Physical Exam   The physical exam findings are as follows:       General   Mental Status - Alert. General Appearance - Not in acute distress. Chest and Lung Exam   Inspection: Accessory muscles - No use of accessory muscles in breathing. Auscultation:   Breath sounds: - Normal.      Cardiovascular   Inspection: Jugular vein - Bilateral - Inspection Normal.  Palpation/Percussion:   Apical Impulse: - Normal.  Auscultation: Rhythm - Regular. Heart Sounds - S1 WNL and S2 WNL. No S3 or S4. Murmurs & Other Heart Sounds: Auscultation of the heart reveals - No Murmurs. Carotid arteries - No Carotid bruit. Peripheral Vascular   Upper Extremity: Inspection - Bilateral - No Cyanotic nailbeds or Digital clubbing. Lower Extremity:   Palpation: Dorsalis pedis pulse - Bilateral - 1+. Posterior tibia pulse - Bilateral - NP. Edema - Bilateral - No edema. EKG SR    Assessment:       ICD-10-CM ICD-9-CM    1. PAD (peripheral artery disease) ([facility])  I73.9 443.9    2. Essential hypertension  I10 401.9 AMB POC EKG ROUTINE W/ 12 LEADS, INTER & REP   3. Dyslipidemia  E78.5 272.4        Plan:     1. CP: reporting CP not relieved by GERD medications, with exertion. EKG SR. Stress echo 6/2020 neg for ischemia. Obtain lexiscan stress test. F/U with Dr Nate Weston after    2. PAD (peripheral artery disease) ([facility])  Mild right PAD pre/post exercise. Critical left PAD post exercise in 9/2018. Started on Pletal with good exercise tolerance. Asymptomatic. Medical treatment. If develops symptoms then will evaluate further.      3. Essential hypertension  BP remains well controlled. General cardiology care managed by Dr Nate Weston    4. Hyperlipidemia:  10/2020. PCP increased Crestor to 10mg.  Lipids and labs managed by PCP     f/u with me in 1 year      Patient seen and examined by me with nurse practitioner in vascular clinic. I personally performed all components of the history, physical, and medical decision making and agree with the assessment and plan as noted. Doing very well from PVD standpoint since adding pletal. Continue medical treatment for now. Stress test and f/u with Dr. Lauro Ortega. Last FLP noted. BP controlled. D/w patient.      Breanne Le MD

## 2021-04-05 NOTE — PROGRESS NOTES
1. Have you been to the ER, urgent care clinic since your last visit? Hospitalized since your last visit? Yes, 4/2/20, ER, CP    2. Have you seen or consulted any other health care providers outside of the [address] [place] [address] since your last visit? Include any pap smears or colon screening.  No         Chief Complaint   Patient presents with    Hypertension     C/O SOB

## 2021-04-28 DIAGNOSIS — I10 ESSENTIAL HYPERTENSION: ICD-10-CM

## 2021-04-28 RX ORDER — LISINOPRIL 20 MG/1
TABLET ORAL
Qty: 90 TAB | Refills: 0 | Status: SHIPPED | OUTPATIENT
Start: 2021-04-28 | End: 2021-07-29

## 2021-05-18 RX ORDER — CILOSTAZOL 50 MG/1
TABLET ORAL
Qty: 60 TAB | Refills: 0 | Status: SHIPPED | OUTPATIENT
Start: 2021-05-18 | End: 2021-06-29

## 2021-06-04 ENCOUNTER — HOSPITAL ENCOUNTER (OUTPATIENT)
Dept: CT IMAGING | Age: 71
Discharge: HOME | End: 2021-06-04
Attending: PHYSICIAN ASSISTANT
Payer: MEDICARE

## 2021-06-04 DIAGNOSIS — K74.60 CIRRHOSIS OF LIVER WITHOUT ASCITES, UNSPECIFIED HEPATIC CIRRHOSIS TYPE (HCC): ICD-10-CM

## 2021-06-04 LAB — CREAT BLD-MCNC: 0.9 MG/DL (ref 0.6–1.3)

## 2021-06-04 PROCEDURE — 82565 ASSAY OF CREATININE: CPT

## 2021-06-04 PROCEDURE — 74011000636 HC RX REV CODE- 636: Performed by: RADIOLOGY

## 2021-06-04 PROCEDURE — 74170 CT ABD WO CNTRST FLWD CNTRST: CPT

## 2021-06-04 RX ADMIN — IOPAMIDOL 100 ML: 755 INJECTION, SOLUTION INTRAVENOUS at 09:19

## 2021-06-11 ENCOUNTER — ANCILLARY PROCEDURE (OUTPATIENT)
Dept: CARDIOLOGY CLINIC | Age: 71
End: 2021-06-11
Payer: MEDICARE

## 2021-06-11 VITALS
WEIGHT: 222 LBS | BODY MASS INDEX: 31.08 KG/M2 | DIASTOLIC BLOOD PRESSURE: 70 MMHG | SYSTOLIC BLOOD PRESSURE: 120 MMHG | HEIGHT: 71 IN

## 2021-06-11 DIAGNOSIS — Z72.0 TOBACCO USE: ICD-10-CM

## 2021-06-11 DIAGNOSIS — I73.9 PAD (PERIPHERAL ARTERY DISEASE) (HCC): ICD-10-CM

## 2021-06-11 DIAGNOSIS — E78.5 DYSLIPIDEMIA: ICD-10-CM

## 2021-06-11 DIAGNOSIS — R07.2 PRECORDIAL PAIN: ICD-10-CM

## 2021-06-11 DIAGNOSIS — I10 ESSENTIAL HYPERTENSION: ICD-10-CM

## 2021-06-11 LAB
STRESS BASELINE DIAS BP: 70 MMHG
STRESS BASELINE HR: 75 BPM
STRESS BASELINE SYS BP: 120 MMHG
STRESS PEAK DIAS BP: 70 MMHG
STRESS PEAK SYS BP: 120 MMHG
STRESS PERCENT HR ACHIEVED: 65 %
STRESS POST PEAK HR: 96 BPM
STRESS RATE PRESSURE PRODUCT: NORMAL BPM*MMHG
STRESS ST DEPRESSION: 0 MM
STRESS ST ELEVATION: 0 MM
STRESS TARGET HR: 148 BPM

## 2021-06-11 PROCEDURE — 78452 HT MUSCLE IMAGE SPECT MULT: CPT | Performed by: INTERNAL MEDICINE

## 2021-06-11 PROCEDURE — 93015 CV STRESS TEST SUPVJ I&R: CPT | Performed by: INTERNAL MEDICINE

## 2021-06-11 PROCEDURE — A9502 TC99M TETROFOSMIN: HCPCS | Performed by: INTERNAL MEDICINE

## 2021-06-11 NOTE — PROGRESS NOTES
Please call the patient and inform that stress test appears normal.  Low concern for significant blockages in the heart arteries at this time. He should fu with Dr. Rubi Willis if he continues to have symptoms.     Thanks,  [employer]

## 2021-06-14 ENCOUNTER — TELEPHONE (OUTPATIENT)
Dept: CARDIOLOGY CLINIC | Age: 71
End: 2021-06-14

## 2021-06-14 NOTE — TELEPHONE ENCOUNTER
----- Message from Pablito Tracey NP sent at 6/11/2021  1:52 PM EDT -----  Please call the patient and inform that stress test appears normal.  Low concern for significant blockages in the heart arteries at this time. He should fu with Dr. Cleveland Zheng if he continues to have symptoms.     Thanks,  [employer]

## 2021-06-15 NOTE — TELEPHONE ENCOUNTER
Spoke with patients sister. Pts sister is listed on HPI. Verified with two patient identifiers. Advised stress test appears normal.  Low concern for significant blockages in the heart arteries at this time. He should fu with Dr. Jamee Hanks if he continues to have symptoms. Patients sister  verbalized understanding, states that he is still having blurred vision. Pts sister states that she will call back and schedule an appt.

## 2021-06-28 ENCOUNTER — OFFICE VISIT (OUTPATIENT)
Dept: HEMATOLOGY | Age: 71
End: 2021-06-28
Payer: MEDICARE

## 2021-06-28 VITALS
RESPIRATION RATE: 17 BRPM | BODY MASS INDEX: 29.76 KG/M2 | DIASTOLIC BLOOD PRESSURE: 70 MMHG | HEIGHT: 71 IN | WEIGHT: 212.6 LBS | OXYGEN SATURATION: 96 % | HEART RATE: 88 BPM | TEMPERATURE: 96.9 F | SYSTOLIC BLOOD PRESSURE: 98 MMHG

## 2021-06-28 DIAGNOSIS — Z21 HIV POSITIVE (HCC): ICD-10-CM

## 2021-06-28 DIAGNOSIS — R97.0 ELEVATED CARCINOEMBRYONIC ANTIGEN (CEA): ICD-10-CM

## 2021-06-28 DIAGNOSIS — R97.8 OTHER ABNORMAL TUMOR MARKERS: ICD-10-CM

## 2021-06-28 DIAGNOSIS — K74.60 CIRRHOSIS OF LIVER WITHOUT ASCITES, UNSPECIFIED HEPATIC CIRRHOSIS TYPE (HCC): Primary | ICD-10-CM

## 2021-06-28 PROCEDURE — 3017F COLORECTAL CA SCREEN DOC REV: CPT | Performed by: PHYSICIAN ASSISTANT

## 2021-06-28 PROCEDURE — G8432 DEP SCR NOT DOC, RNG: HCPCS | Performed by: PHYSICIAN ASSISTANT

## 2021-06-28 PROCEDURE — G8427 DOCREV CUR MEDS BY ELIG CLIN: HCPCS | Performed by: PHYSICIAN ASSISTANT

## 2021-06-28 PROCEDURE — G8417 CALC BMI ABV UP PARAM F/U: HCPCS | Performed by: PHYSICIAN ASSISTANT

## 2021-06-28 PROCEDURE — 99214 OFFICE O/P EST MOD 30 MIN: CPT | Performed by: PHYSICIAN ASSISTANT

## 2021-06-28 PROCEDURE — G8754 DIAS BP LESS 90: HCPCS | Performed by: PHYSICIAN ASSISTANT

## 2021-06-28 PROCEDURE — G8536 NO DOC ELDER MAL SCRN: HCPCS | Performed by: PHYSICIAN ASSISTANT

## 2021-06-28 PROCEDURE — G8752 SYS BP LESS 140: HCPCS | Performed by: PHYSICIAN ASSISTANT

## 2021-06-28 PROCEDURE — 1101F PT FALLS ASSESS-DOCD LE1/YR: CPT | Performed by: PHYSICIAN ASSISTANT

## 2021-06-28 NOTE — PROGRESS NOTES
[address] [place] [address], MD, MD Siria Wagner PA-C Marquis Height, [facility]-BC     Cristela Urrutia, [facility]   Charlsie Riedel, [facility]-CORTNEY [facility]       Valentin Johnston 136    at [address], [address] [place] [address], [facility] 22.    [phone]    FAX: [address] [place] [address], [address]    [phone]    FAX: [phone]     Patient Care Team:  Clarissa Alvarez MD as PCP - Allan Arredondo MD as PCP - [facility] Provider  Song Mata MD (Infectious Disease)  Pratima Khan MD as Surgeon (General Surgery)  Tressa Vance MD (Gastroenterology)  Annemarie Paz MD (Hepatology)  Elsa Sands MD (Cardiology)  Alisa Armas MD (Cardiology)  Agustín King MD (Urology)     Patient Active Problem List   Diagnosis Code    Chronic hepatitis C ([facility] B18.2    HIV positive ([address] Z21    Weight loss, non-intentional R63.4    Prostate cancer ([address] C61    Substance abuse ([address] F19.10    HTN (hypertension) I10    Colon polyp K63.5    Advanced care planning/counseling discussion Z71.89    Tobacco use Z72.0    PAD (peripheral artery disease) ([address] I73.9    Dyslipidemia E78.5    Lung nodule R91.1    Liver disease, chronic, with cirrhosis ([address] K74.60, K76.9       Beatty Earing. returns to the [address] for management of cirrhosis due to successfully cleared chronic HCV and HIV co-infection. The active problem list, all pertinent past medical history, medications, radiologic findings and laboratory findings related to the liver disorder were reviewed with the patient.       Patient has completed 12 weeks of HCV treatment with Jackie (5/29/2015-8/25/2015). He is a sustained virologic responder to treatment, or cured. The patient underwent a liver biopsy in 3/2015. This demonstrates severe hepatitis with bridging fibrosis. Patient has had post-HCV clearance Fibroscan to reassess liver fibrosis or scarring after successful HCV treatment. This revealed a score of 12.2. Suggested fibrosis level is F3. CAP score is 361, suggestive of significant hepatic steatosis. Ultrasound of the liver was performed in 12/2014. This suggests chronic liver disease. A 0.8 x 0.7 lesion was identified in the right lobe. These lesion has been followed with different radiographic methods over the past several years and there is apparent growth of this lesion to 1.6 cm. It is not definitve that this represents the same lesion and there have been no radiographic signs suggestive of HCC. AFP has been followed and has not been elevated. He has had most recent imaging with CT in 6/2021 and this showed no enhancement or characteristics consistent with [facility] Patient is co-infected with HIV. He is currently not on anti-retroviral therapy because his virus remains suppressed to undetected levels. He is not currently being monitored by anyone for HIV at present, it has been about 1 year since his last assessment. Patient was treated for prostate cancer 4-5 years ago and continues to have low PSA and no signs of recurrence. He has had recent appt with Dr Arcadio Mitchell and the last PSA lab in 6/2021 showed a score of 0.4. His greatest complaint at this time is of intermittent claudication symptoms effecting the LLE. He has seen cardiology and is treated with Pletal, this has helped significantly and he has avoided surgery. He is still smoking 1/2 PPD. Patient has no new physical complaints today. He continues to have ongoing fatigue but otherwise feels well. The patient completes all daily activities without any functional limitations.  The patient has not experienced arthralgias, myalgias, problems concentrating, swelling of the abdomen, swelling of the lower extremities, hematemesis, or hematochezia. ALLERGIES  No Known Allergies    MEDICATIONS  Current Outpatient Medications   Medication Sig    cilostazoL (PLETAL) 50 mg tablet TAKE 1 TABLET BY MOUTH BEFORE BREAKFAST AND BEFORE SUPPER    lisinopriL (PRINIVIL, ZESTRIL) 20 mg tablet Take 1 tablet by mouth once daily    Spiriva Respimat 2.5 mcg/actuation inhaler INHALE 2 PUFFS BY MOUTH ONCE DAILY    ProAir HFA 90 mcg/actuation inhaler     buPROPion XL (WELLBUTRIN XL) 150 mg tablet Take 1 Tab by mouth every morning.  rosuvastatin (CRESTOR) 10 mg tablet Take 1 Tab by mouth nightly.  cyanocobalamin (VITAMIN B12) 500 mcg tablet Take 500 mcg by mouth daily.  GARLIC PO Take  by mouth.  varenicline (Chantix) 1 mg tablet Take 1 Tab by mouth two (2) times daily (after meals).  omega 3-dha-epa-fish oil (FISH OIL) 100-160-1,000 mg cap Take  by mouth daily.  cholecalciferol (VITAMIN D3) 25 mcg (1,000 unit) cap Take  by mouth daily.  ascorbic acid, vitamin C, (VITAMIN C) 500 mg tablet Take 1,000 mg by mouth daily.  naproxen sodium (ALEVE) 220 mg tablet Take 220 mg by mouth as needed.  MULTIVITAMINS (MULTIVITAMIN PO) Take  by mouth daily. No current facility-administered medications for this visit. REVIEW OF SYSTEMS NOT RELATED TO LIVER DISEASE:  Constitution systems: Negative for fever, chills, weight gain, weight loss. Eyes: Negative for diplopia, visual changes, eye pain. ENT: Negative for sore throat, painful swallowing. Respiratory: Negative for cough, hemoptysis, dyspnea. Cardiology: Negative for chest pain, palpitations. GI:  Negative for constipation or diarrhea. : Negative for urinary frequency, dysuria and hematuria. Skin: Negative for rash. Hematology: Negative for easy bruising, bleeding from gums or nose. Musculo-skeletal: Negative for back pain.  Positive for muscle pain, weakness of the LLE with walking distances. Neurologic: Negative for headaches, dizziness, vertigo, memory problems. Psychology: Negative for anxiety, depression. FAMILY HISTORY:  The father  of alcohol cirrhosis. The mother  of CVA. There are no other persons in the family with HCV or liver disease. SOCIAL HISTORY:  The patient is . The spouse has been tested for HCV and is positive. She was treated and achieved SVR. The patient has 3 children, and 7 grandchildren. The patient stopped using tobacco products in 2014. Resumed in  and is now smoking 1/2,  PPD. The patient has never consumed significant amounts of alcohol. The patient used to work in construction, still active and working odd jobs. PHYSICAL EXAMINATION:  Visit Vitals  BP 98/70 (BP 1 Location: Right upper arm, BP Patient Position: Sitting, BP Cuff Size: Large adult)   Pulse 88   Temp 96.9 °F (36.1 °C) (Temporal)   Resp 17   Ht 5' 11\" (1.803 m)   Wt 212 lb 9.6 oz (96.4 kg)   SpO2 96%   BMI 29.65 kg/m²     General: No acute distress. Eyes: Sclera anicteric. ENT: No oral lesions. Thyroid normal.  Nodes: No adenopathy. Skin: No spider angiomata. No jaundice. No palmar erythema. Respiratory: Lungs clear to auscultation. Cardiovascular: Regular heart rate. No murmurs. No JVD. Abdomen: Soft non-tender. Liver size normal to percussion/palpation. Spleen not palpable. No obvious ascites. Extremities: No edema. No muscle wasting. No gross arthritic changes. Neurologic: Alert and oriented. Cranial nerves grossly intact. No asterixis.     LABORATORY STUDIES:  Liver [place] of [address] Units 2020   WBC 3.4 - 10.8 x10E3/uL 6.1 4.4 5.5   ANC 1.8 - 8.0 K/UL  2.3    HGB 13.0 - 17.7 g/dL 15.2 14.2 13.9    - 450 x10E3/uL 241 190 209   INR 0.8 - 1.2 1.1  1.1   AST 0 - 40 IU/L 22 40 (H) 21   ALT 0 - 44 IU/L 18 31 18   Alk Phos 39 - 117 IU/L 66 82 69   Bili, Total 0.0 - 1.2 mg/dL 0.3 1.1 (H) 0.6   Bili, Direct 0.00 - 0.40 mg/dL 0.09  0.16   Albumin 3.7 - 4.7 g/dL 4.8 (H) 3.7 4.4   BUN 8 - 27 mg/dL 20 12 17   Creat 0.76 - 1.27 mg/dL 0.87 0.88 0.76   Creat (iSTAT) 0.6 - 1.3 mg/dL      Na 134 - 144 mmol/L 138 137 142   K 3.5 - 5.2 mmol/L 4.2 5.0 4.1   Cl 96 - 106 mmol/L 105 108 106   CO2 20 - 29 mmol/L 18 (L) 26 22   Glucose 65 - 99 mg/dL 101 (H) 108 (H) 88     Cancer Screening Latest Ref Rng & Units 7/9/2020 12/5/2019 8/13/2019   AFP, Serum 0.0 - 8.0 ng/mL 6.9 6.3 6.9   AFP-L3% 0.0 - 9.9 % Comment 4.8 4.5   CA 19-9 0 - 35 U/mL   6   CEA 0.0 - 4.7 ng/mL   2.8   Additional lab values drawn at today's office visit are pending at the time of documentation. SEROLOGIES:  Serologies Latest Ref Rng 10/10/2014 9/1/2014 8/27/2014   Hep A Ab, Total Negative Positive (A)     Hep B Surface Ag Negative Negative     Hep B Core Ab, Total Negative Positive (A)     Hep C Genotype See Note 1a     HCV RT-PCR, Quant  [id number]  [id number]   HCV Log10    6.830   Ferritin 30 - 400 ng/mL 172     Iron % Saturation 15 - 55 % 22     C-ANCA Neg:<1:20 titer  <1:20    P-ANCA Neg:<1:20 titer  <1:20    ANCA Neg:<1:20 titer  <1:20      LIVER HISTOLOGY:  3/2015. Slides reviewed by MLS. Severe hepatitis with bridging fibrosis and possible cirrhosis. Knodell score (3,3,3,3), Bro score 4, Metavir score 3.  2/2017. FibroScan performed at [address]. EkPa was 10.0. Suggested fibrosis level is F3.  2/2018. FibroScan performed at [address] EkPa was 11.7. Suggested fibrosis level is F3.  2/2019. FibroScan performed at [address]. EkPa was 12.2. Suggested fibrosis level is F3. CAP score is 361, suggestive of significant hepatic steatosis. ENDOSCOPIC PROCEDURES:  Not available or performed    RADIOLOGY:  12/2014. Ultrasound of liver. Echogenic consistent with chronic liver disease. 0.8x0.7 cm lesion right lobe.  No dilated bile ducts. No ascites. 1/2015. Dynamic MRI liver. Changes consistent with chronic liver disease. No liver mass lesions. No dilated bile ducts. No bile duct strictures. No ascites. 11/2017. Ultrasound of liver. Cirrhotic morphology of the liver is again demonstrated. 12 mm echogenic nodule right hepatic lobe. This does not appear to reside in the same location as the prior smaller hyperechoic nodule. This may represent a hemangioma. 11/2018. Triple phase CT. The central right lobe hepatic mass shown to be quite evident on ultrasound and echogenic but very subtle on MRI is very slightly hypoenhancing on CT and most evident on equilibrium phase. The margins are not clearly delineated so accurate measurement is difficult but the lesion is similarly sized compared to prior recent ultrasound measuring about 1.1 x 1.6 cm. No other hepatic lesions are seen in the liver redemonstrates subtle changes cirrhosis. 5/2019. Ultrasound of liver. Diffusely echogenic and heterogeneous. An echogenic lesion measures 1.5 x 2.0 x 1.9 cm, previously 1.6 x 1.1 cm on CT and 1.3 x 1.8 x 1.8 cm on ultrasound. 6/2021. CT abdomen with and without contrast. Known right lobe liver lesion is only faintly hypodense on the equilibrium phase and appears unchanged from prior imaging. No arterial hyperenhancement or other features to suggest hepatocellular carcinoma. 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 This degress of fibrosis was confirmed with past FibroScan 2/2019. He has preserved liver function. Will continue to monitor patient regularly and continue to monitor for complications of advanced fibrosis/cirrhosis. HIV co-infection. He has low levels of HIV RNA. He is not taking anti-HIV medications. This had been regularly monitored by his ID physician, but he has not had a level drawn in ~1 year.   Will add to lab drawn today, and have him follow-up with ID. HCV treatment. Completed 12 weeks of Harvoni treatment in August 2015. He is cured. [facility] surveillance. Routine imaging has been done over the past several years. While there has been interval growth of this finding, there are no CT characteristics to suggest [facility] I have drawn tumor markers and will plan on continued US surveillance for size changes. Prostate cancer history. He has had recent follow-up in 6/2021 with PSA of 0.4. The patient was directed to continue all current medications at the current dosages. There are no contraindications for the patient to take any medications that are necessary for treatment of other medical issues. The patient was counseled regarding alcohol consumption. The patient was counseled regarding use of illicit drugs. Vaccination for viral hepatitis A and B is not required. The patient has serologic evidence of prior exposure or vaccination with immunity. All of the above issues were discussed with the patient. All questions were answered. The patient expressed a clear understanding of the above. [facility] 87 in 6 months with repeat imaging at that time. Documentation reviewed and updated to reflect current, accurate patient information.     Gregorio Packer PA-C  Liver [facility] 59, [address] [facility] [place], [place]  [address].  [phone]  [address] [facility]

## 2021-06-28 NOTE — PROGRESS NOTES
Identified pt with two pt identifiers(name and ). Reviewed record in preparation for visit and have obtained necessary documentation. Chief Complaint   Patient presents with    Hepatitis C     f/u    Cirrhosis Of Liver      Vitals:    21 1537   BP: 98/70   Pulse: 88   Resp: 17   Temp: 96.9 °F (36.1 °C)   TempSrc: Temporal   SpO2: 96%   Weight: 212 lb 9.6 oz (96.4 kg)   Height: 5' 11\" (1.803 m)   PainSc:   6   PainLoc: Leg       Health Maintenance Review: Patient reminded of \"due or due soon\" health maintenance. I have asked the patient to contact his/her primary care provider (PCP) for follow-up on his/her health maintenance. Coordination of Care Questionnaire:  :   1) Have you been to an emergency room, urgent care, or hospitalized since your last visit? If yes, where when, and reason for visit? Yes, [facility], A refrigerator fell on the patient     2. Have seen or consulted any other health care provider since your last visit? If yes, where when, and reason for visit? YES, Cardiologist for stress test      Patient is accompanied by self I have received verbal consent from [facility]. to discuss any/all medical information while they are present in the room.

## 2021-06-29 LAB
AFP L3 MFR SERPL: 5.1 % (ref 0–9.9)
AFP SERPL-MCNC: 7.1 NG/ML (ref 0–8)
ALBUMIN SERPL-MCNC: 4.8 G/DL (ref 3.7–4.7)
ALP SERPL-CCNC: 59 IU/L (ref 48–121)
ALT SERPL-CCNC: 16 IU/L (ref 0–44)
AST SERPL-CCNC: 22 IU/L (ref 0–40)
BASOPHILS # BLD AUTO: 0 X10E3/UL (ref 0–0.2)
BASOPHILS NFR BLD AUTO: 1 %
BILIRUB DIRECT SERPL-MCNC: 0.16 MG/DL (ref 0–0.4)
BILIRUB SERPL-MCNC: 0.5 MG/DL (ref 0–1.2)
BUN SERPL-MCNC: 18 MG/DL (ref 8–27)
BUN/CREAT SERPL: 22 (ref 10–24)
CALCIUM SERPL-MCNC: 10 MG/DL (ref 8.6–10.2)
CANCER AG19-9 SERPL-ACNC: 5 U/ML (ref 0–35)
CD3+CD4+ CELLS # BLD: 648 /UL (ref 359–1519)
CD3+CD4+ CELLS NFR BLD: 32.4 % (ref 30.8–58.5)
CEA SERPL-MCNC: 2.7 NG/ML (ref 0–4.7)
CHLORIDE SERPL-SCNC: 103 MMOL/L (ref 96–106)
CO2 SERPL-SCNC: 22 MMOL/L (ref 20–29)
CREAT SERPL-MCNC: 0.82 MG/DL (ref 0.76–1.27)
EOSINOPHIL # BLD AUTO: 0.2 X10E3/UL (ref 0–0.4)
EOSINOPHIL NFR BLD AUTO: 3 %
ERYTHROCYTE [DISTWIDTH] IN BLOOD BY AUTOMATED COUNT: 13.1 % (ref 11.6–15.4)
GLUCOSE SERPL-MCNC: 91 MG/DL (ref 65–99)
HCT VFR BLD AUTO: 42.1 % (ref 37.5–51)
HGB BLD-MCNC: 14.4 G/DL (ref 13–17.7)
HIV 1+2 AB+HIV1 P24 AG SERPL QL IA: REACTIVE
HIV 2 AB SERPL QL IA: NEGATIVE
HIV1 AB SERPLBLD QL IA.RAPID: POSITIVE
IMM GRANULOCYTES # BLD AUTO: 0 X10E3/UL (ref 0–0.1)
IMM GRANULOCYTES NFR BLD AUTO: 0 %
INR PPP: 1.1 (ref 0.9–1.2)
INTERPRETATION: ABNORMAL
LYMPHOCYTES # BLD AUTO: 2 X10E3/UL (ref 0.7–3.1)
LYMPHOCYTES NFR BLD AUTO: 30 %
MCH RBC QN AUTO: 32.7 PG (ref 26.6–33)
MCHC RBC AUTO-ENTMCNC: 34.2 G/DL (ref 31.5–35.7)
MCV RBC AUTO: 96 FL (ref 79–97)
MONOCYTES # BLD AUTO: 0.8 X10E3/UL (ref 0.1–0.9)
MONOCYTES NFR BLD AUTO: 12 %
NEUTROPHILS # BLD AUTO: 3.6 X10E3/UL (ref 1.4–7)
NEUTROPHILS NFR BLD AUTO: 54 %
PLATELET # BLD AUTO: 217 X10E3/UL (ref 150–450)
POTASSIUM SERPL-SCNC: 4 MMOL/L (ref 3.5–5.2)
PROT SERPL-MCNC: 7.7 G/DL (ref 6–8.5)
PROTHROMBIN TIME: 11.8 SEC (ref 9.1–12)
RBC # BLD AUTO: 4.41 X10E6/UL (ref 4.14–5.8)
SODIUM SERPL-SCNC: 138 MMOL/L (ref 134–144)
WBC # BLD AUTO: 6.6 X10E3/UL (ref 3.4–10.8)

## 2021-06-29 RX ORDER — CILOSTAZOL 50 MG/1
TABLET ORAL
Qty: 60 TABLET | Refills: 3 | OUTPATIENT
Start: 2021-06-29

## 2021-06-29 RX ORDER — CILOSTAZOL 50 MG/1
TABLET ORAL
Qty: 60 TABLET | Refills: 0 | Status: SHIPPED | OUTPATIENT
Start: 2021-06-29 | End: 2021-07-30

## 2021-06-30 ENCOUNTER — TELEPHONE (OUTPATIENT)
Dept: CARDIOLOGY CLINIC | Age: 71
End: 2021-06-30

## 2021-07-12 ENCOUNTER — OFFICE VISIT (OUTPATIENT)
Dept: CARDIOLOGY CLINIC | Age: 71
End: 2021-07-12

## 2021-07-12 DIAGNOSIS — I10 HYPERTENSION, UNSPECIFIED TYPE: Primary | ICD-10-CM

## 2021-07-12 NOTE — PROGRESS NOTES
1. Have you been to the ER, urgent care clinic since your last visit? Hospitalized since your last visit? No    2. Have you seen or consulted any other health care providers outside of the [address] [place] [address] since your last visit? Include any pap smears or colon screening.  No

## 2021-07-30 RX ORDER — CILOSTAZOL 50 MG/1
TABLET ORAL
Qty: 60 TABLET | Refills: 3 | Status: SHIPPED | OUTPATIENT
Start: 2021-07-30 | End: 2021-12-30

## 2021-08-29 NOTE — PROGRESS NOTES
HISTORY OF PRESENT ILLNESS  Clarence Gatica is a 66 y.o. male. HPI   Hx HTN and HLD, hx prostate cancer s/p XRT with low level PSA , hep c treated, liver fibrosis-cirrhosis, HIV coinfection and  PAD. Pt denies and problems with memory-repots long an short term memory are ok but his mind drifts a lot  Still drives his car, works full time, pays his own bills    Was referred to neurologist last OV-no appt made by pt      Had low risk NST this year for CP  Saw Hepatologist this for spot on liver 1x5-2 cm--stable on US this year-repeat in 6 months    Went to  in June for ?  Syncope  Had   Last OV  Pts sister concerned his supervisor at work called is sister telling her he was being forgetful at work last  2 weeks  Pt denies having problem with memory  He stopped chantix saying it did not agree with him but wants to stop smoking  Some depression but PHQ -score -2     Had covd exposure at work last week -he tested negative with rapid test 1 d after his coworker was dx with covid  No f/c but feels fatigeud and some chest tightness-had neg stress echo last year          Patient Active Problem List    Diagnosis Date Noted    Substance abuse ([address] 12/25/2010    Liver disease, chronic, with cirrhosis ([facility] 03/09/2021    Lung nodule 10/28/2019    Tobacco use 12/06/2016    PAD (peripheral artery disease) ([facility] [facility] 12/06/2016    Dyslipidemia 12/06/2016    Advanced care planning/counseling discussion 09/13/2016    Colon polyp 09/18/2015    HTN (hypertension) 01/27/2011    Prostate cancer ([address] 02/22/2010    Chronic hepatitis C ([address] 06/30/2009    HIV positive ([facility] 06/30/2009    Weight loss, non-intentional 06/30/2009     Current Outpatient Medications   Medication Sig Dispense Refill    cilostazoL (PLETAL) 50 mg tablet TAKE 1 TABLET BY MOUTH BEFORE BREAKFAST AND BEFORE SUPPER 60 Tablet 3    lisinopriL (PRINIVIL, ZESTRIL) 20 mg tablet Take 1 tablet by mouth once daily 90 Tablet 3    Spiriva Respimat 2.5 mcg/actuation inhaler INHALE 2 PUFFS BY MOUTH ONCE DAILY      ProAir HFA 90 mcg/actuation inhaler       buPROPion XL (WELLBUTRIN XL) 150 mg tablet Take 1 Tab by mouth every morning. 30 Tab 0    rosuvastatin (CRESTOR) 10 mg tablet Take 1 Tab by mouth nightly. 90 Tab 2    cyanocobalamin (VITAMIN B12) 500 mcg tablet Take 500 mcg by mouth daily.  GARLIC PO Take  by mouth.  varenicline (Chantix) 1 mg tablet Take 1 Tab by mouth two (2) times daily (after meals). 180 Tab 1    omega 3-dha-epa-fish oil (FISH OIL) 100-160-1,000 mg cap Take  by mouth daily.  cholecalciferol (VITAMIN D3) 25 mcg (1,000 unit) cap Take  by mouth daily.  ascorbic acid, vitamin C, (VITAMIN C) 500 mg tablet Take 1,000 mg by mouth daily.  naproxen sodium (ALEVE) 220 mg tablet Take 220 mg by mouth as needed.  MULTIVITAMINS (MULTIVITAMIN PO) Take  by mouth daily.        No Known Allergies   Lab Results   Component Value Date/Time    WBC 6.6 06/28/2021 04:42 PM    HGB 14.4 06/28/2021 04:42 PM    Hemoglobin (POC) 16.0 05/02/2017 07:50 AM    HCT 42.1 06/28/2021 04:42 PM    Hematocrit (POC) 47 05/02/2017 07:50 AM    PLATELET 638 61/82/7272 04:42 PM    MCV 96 06/28/2021 04:42 PM     Lab Results   Component Value Date/Time    GFR est non-AA 88 06/28/2021 04:42 PM    GFRNA, POC >60 06/04/2021 09:03 AM    GFR est  06/28/2021 04:42 PM    GFRAA, POC >60 06/04/2021 09:03 AM    Creatinine 0.82 06/28/2021 04:42 PM    Creatinine (POC) 0.90 06/04/2021 09:03 AM    BUN 18 06/28/2021 04:42 PM    BUN (POC) 14 05/02/2017 07:50 AM    Sodium 138 06/28/2021 04:42 PM    Sodium (POC) 141 05/02/2017 07:50 AM    Potassium 4.0 06/28/2021 04:42 PM    Potassium (POC) 4.0 05/02/2017 07:50 AM    Chloride 103 06/28/2021 04:42 PM    Chloride (POC) 102 05/02/2017 07:50 AM    CO2 22 06/28/2021 04:42 PM    Magnesium 1.7 09/07/2014 12:32 AM    Phosphorus 3.3 09/03/2014 04:47 AM     Lab Results   Component Value Date/Time    TSH 0.999 05/02/2019 09:06 AM      Lab Results   Component Value Date/Time    Glucose 91 06/28/2021 04:42 PM    Glucose (POC) 98 05/02/2017 07:50 AM    Glucose (POC) 95 09/04/2014 12:18 PM         ROS    Physical Exam  Vitals and nursing note reviewed. Constitutional:       General: He is not in acute distress. Appearance: Normal appearance. He is well-developed. Comments: Appears stated age   HENT:      Head: Normocephalic. Cardiovascular:      Rate and Rhythm: Normal rate and regular rhythm. Heart sounds: Normal heart sounds. Pulmonary:      Effort: Pulmonary effort is normal.      Breath sounds: Normal breath sounds. Abdominal:      Palpations: Abdomen is soft. Neurological:      Mental Status: He is alert. Psychiatric:         Mood and Affect: Mood normal.         Behavior: Behavior normal.         Thought Content: Thought content normal.         ASSESSMENT and PLAN  Diagnoses and all orders for this visit:    1. Prostate cancer ([facility]  -     REFERRAL TO UROLOGY-needs fu Dr Danyelle York      2. Memory impairment   [facility] 27/30   Pt appears to have jarrett difficulty hearing and maybe some concentration issues but not MCI or dementia Consider hearing aid evaluation    3. Liver fibrosis/cirrhosis   Has fu US for liver lesion and fu with Hepatologist  Follow-up and Dispositions    · Return in about 6 months (around 2/28/2022) for medicare wellness.

## 2021-08-30 ENCOUNTER — OFFICE VISIT (OUTPATIENT)
Dept: INTERNAL MEDICINE CLINIC | Age: 71
End: 2021-08-30
Payer: MEDICARE

## 2021-08-30 VITALS
DIASTOLIC BLOOD PRESSURE: 70 MMHG | RESPIRATION RATE: 18 BRPM | WEIGHT: 215 LBS | HEIGHT: 71 IN | BODY MASS INDEX: 30.1 KG/M2 | HEART RATE: 96 BPM | SYSTOLIC BLOOD PRESSURE: 112 MMHG | TEMPERATURE: 98.1 F | OXYGEN SATURATION: 98 %

## 2021-08-30 DIAGNOSIS — C61 PROSTATE CANCER (HCC): Primary | ICD-10-CM

## 2021-08-30 DIAGNOSIS — R41.3 MEMORY IMPAIRMENT: ICD-10-CM

## 2021-08-30 PROCEDURE — G8427 DOCREV CUR MEDS BY ELIG CLIN: HCPCS | Performed by: INTERNAL MEDICINE

## 2021-08-30 PROCEDURE — 3017F COLORECTAL CA SCREEN DOC REV: CPT | Performed by: INTERNAL MEDICINE

## 2021-08-30 PROCEDURE — G8752 SYS BP LESS 140: HCPCS | Performed by: INTERNAL MEDICINE

## 2021-08-30 PROCEDURE — 1101F PT FALLS ASSESS-DOCD LE1/YR: CPT | Performed by: INTERNAL MEDICINE

## 2021-08-30 PROCEDURE — G8417 CALC BMI ABV UP PARAM F/U: HCPCS | Performed by: INTERNAL MEDICINE

## 2021-08-30 PROCEDURE — G8536 NO DOC ELDER MAL SCRN: HCPCS | Performed by: INTERNAL MEDICINE

## 2021-08-30 PROCEDURE — 99213 OFFICE O/P EST LOW 20 MIN: CPT | Performed by: INTERNAL MEDICINE

## 2021-08-30 PROCEDURE — G8510 SCR DEP NEG, NO PLAN REQD: HCPCS | Performed by: INTERNAL MEDICINE

## 2021-08-30 PROCEDURE — G8754 DIAS BP LESS 90: HCPCS | Performed by: INTERNAL MEDICINE

## 2021-08-30 NOTE — PROGRESS NOTES
Екатерина Aden is a 66 y.o. male     Chief Complaint   Patient presents with    Physical     Patient states he is here for annual check up at this time. 1. Have you been to the ER, urgent care clinic since your last visit? Hospitalized since your last visit? Yes When: Patient states that he was seen at [facility] in June because a refrigerator fell on him    2. Have you seen or consulted any other health care providers outside of the [address] [place] [address] since your last visit? Include any pap smears or colon screening.  No     Visit Vitals  Temp 98.1 °F (36.7 °C) (Temporal)   Ht 5' 11\" (1.803 m)   Wt 215 lb (97.5 kg)   BMI 29.99 kg/m²

## 2021-10-03 ENCOUNTER — HOSPITAL ENCOUNTER (EMERGENCY)
Age: 71
Discharge: HOME | End: 2021-10-03
Attending: EMERGENCY MEDICINE | Admitting: EMERGENCY MEDICINE
Payer: MEDICARE

## 2021-10-03 VITALS
HEART RATE: 83 BPM | OXYGEN SATURATION: 99 % | DIASTOLIC BLOOD PRESSURE: 76 MMHG | TEMPERATURE: 98 F | SYSTOLIC BLOOD PRESSURE: 161 MMHG | RESPIRATION RATE: 16 BRPM

## 2021-10-03 DIAGNOSIS — S61.212A LACERATION OF RIGHT MIDDLE FINGER WITHOUT FOREIGN BODY WITHOUT DAMAGE TO NAIL, INITIAL ENCOUNTER: Primary | ICD-10-CM

## 2021-10-03 PROCEDURE — 90715 TDAP VACCINE 7 YRS/> IM: CPT | Performed by: EMERGENCY MEDICINE

## 2021-10-03 PROCEDURE — 99282 EMERGENCY DEPT VISIT SF MDM: CPT

## 2021-10-03 PROCEDURE — 90471 IMMUNIZATION ADMIN: CPT

## 2021-10-03 PROCEDURE — 74011250636 HC RX REV CODE- 250/636: Performed by: EMERGENCY MEDICINE

## 2021-10-03 PROCEDURE — 74011250637 HC RX REV CODE- 250/637: Performed by: EMERGENCY MEDICINE

## 2021-10-03 RX ORDER — CEPHALEXIN 500 MG/1
1000 CAPSULE ORAL
Status: COMPLETED | OUTPATIENT
Start: 2021-10-03 | End: 2021-10-03

## 2021-10-03 RX ORDER — CEPHALEXIN 500 MG/1
500 CAPSULE ORAL 4 TIMES DAILY
Qty: 28 CAPSULE | Refills: 0 | Status: SHIPPED | OUTPATIENT
Start: 2021-10-03 | End: 2021-10-10

## 2021-10-03 RX ADMIN — CEPHALEXIN 1000 MG: 500 CAPSULE ORAL at 09:06

## 2021-10-03 RX ADMIN — TETANUS TOXOID, REDUCED DIPHTHERIA TOXOID AND ACELLULAR PERTUSSIS VACCINE, ADSORBED 0.5 ML: 5; 2.5; 8; 8; 2.5 SUSPENSION INTRAMUSCULAR at 09:06

## 2021-10-03 NOTE — ED PROVIDER NOTES
HPI patient is a 69-year-old male with past medical history significant for prostate cancer, hypertension, hepatitis C, HIV positive, hypercholesterolemia, memory disorder who presents to the ED with a laceration to the volar pad of the right third finger that occurred on Friday. He was moving a tile  his sisters garage when the injury happened. Bleeding is controlled. There is no obvious bony deformity. Good neurovascular sensation. No apparent tendon or nerve injury. He has not had any medications today prior to arrival.  Last tetanus booster was over 10 years ago.       Past Medical History:   Diagnosis Date    BPH     Cancer ([facility] 11/2009    prostate biopsy revealed cancer    Erectile dysfunction     Essential hypertension, benign     Hepatitis C 6/30/2009    [facility] 6/30/2009    Hypercholesterolemia     Ill-defined condition 9-2014    PNEUMONIA/bronchitis hx    Memory disorder     Prostate CA ([facility] 2/22/2010       Past Surgical History:   Procedure Laterality Date    COLONOSCOPY,REMV LESN,SNARE  8/31/2015         HX HEENT      gum surgery-for denture    HX HERNIA REPAIR       Inguinal Hernia Repair    HX HERNIA REPAIR  05/02/2017    Davinci recurrent RIHR with mesh    HX SKIN BIOPSY  11/16/15    left forearm/ upper arm biopsy          Family History:   Problem Relation Age of Onset    Hypertension Mother     Mental Retardation Mother     Depression Mother     Anxiety Mother     Liver Disease Father     Lung Disease Father     Diabetes Maternal Grandmother     Hypertension Other     Stroke Other     Anxiety Other     Depression Other        Social History     Socioeconomic History    Marital status: LEGALLY      Spouse name: Not on file    Number of children: Not on file    Years of education: Not on file    Highest education level: Not on file   Occupational History    Not on file   Tobacco Use    Smoking status: Current Every Day Smoker Packs/day: 0.50     Years: 45.00     Pack years: 22.50     Types: Cigarettes    Smokeless tobacco: Never Used   Vaping Use    Vaping Use: Former    Substances: Nicotine   Substance and Sexual Activity    Alcohol use: No    Drug use: No     Types: Heroin, Prescription, Cocaine     Comment: none in past 20 years per pt on 5/2/17    Sexual activity: Yes     Partners: Female     Comment: radu has 3 children   Other Topics Concern    Not on file   Social History Narrative    Not on file     Social Determinants of Health     Financial Resource Strain:     Difficulty of Paying Living Expenses:    Food Insecurity:     Worried About Running Out of Food in the Last Year:     [address] in the Last Year:    Transportation Needs:     Lack of Transportation (Medical):  Lack of Transportation (Non-Medical):    Physical Activity:     Days of Exercise per Week:     Minutes of Exercise per Session:    Stress:     Feeling of Stress :    Social Connections:     Frequency of Communication with Friends and Family:     Frequency of Social Gatherings with Friends and Family:     Attends Worship Services:     Active Member of Clubs or Organizations:     Attends Club or Organization Meetings:     Marital Status:    Intimate Partner Violence:     Fear of Current or Ex-Partner:     Emotionally Abused:     Physically Abused:     Sexually Abused: ALLERGIES: Patient has no known allergies. Review of Systems   Constitutional: Negative for activity change, appetite change, fever and unexpected weight change. HENT: Negative for congestion, sore throat and trouble swallowing. Eyes: Negative for visual disturbance. Respiratory: Negative for cough and shortness of breath. Cardiovascular: Negative for chest pain, palpitations and leg swelling. Gastrointestinal: Negative for abdominal pain. Genitourinary: Negative for flank pain. Skin: Positive for wound.    Neurological: Negative for dizziness, light-headedness and headaches. All other systems reviewed and are negative. Vitals:    10/03/21 0716   BP: (!) 161/76   Pulse: 83   Resp: 16   Temp: 98 °F (36.7 °C)   SpO2: 99%            Physical Exam  Vitals and nursing note reviewed. Constitutional:       General: He is not in acute distress. Appearance: Normal appearance. He is not ill-appearing, toxic-appearing or diaphoretic. Comments: Elderly male, former smoker   Cardiovascular:      Rate and Rhythm: Normal rate and regular rhythm. Pulmonary:      Effort: Pulmonary effort is normal.      Breath sounds: Normal breath sounds. Musculoskeletal:         General: Tenderness and signs of injury present. No deformity. Comments: Jagged laceration volar pad of the right third finger; bleeding controlled. there is no obvious bony deformity, bruising, discharge or extending erythema. Good neurovascular sensation. No apparent tendon or nerve injury. Neurological:      Mental Status: He is alert and oriented to person, place, and time. MDM       Procedures    The wound was cleansed with Hibiclens. Nonadhesive dressing and antibiotic ointment were applied. The wound is greater than 50 hours old and will have to heal without closure. Tetanus booster updated and will have patient take oral Keflex to enhance healing. Wound care instructions were reviewed with the patient. He was encouraged to have a wound check in 2 days with his PCP.    8:34 AM  Patient's results and plan of care have been reviewed with him. Patient has verbally conveyed his understanding and agreement of his signs, symptoms, diagnosis, treatment and prognosis and additionally agrees to follow up as recommended or return to the Emergency Room should his condition change prior to follow-up.   Discharge instructions have also been provided to the patient with some educational information regarding his diagnosis as well a list of reasons why he would want to return to the ER prior to his follow-up appointment should his condition change. Terra Conrad NP

## 2021-10-03 NOTE — ED TRIAGE NOTES
Patient arrives ambulatory from home. Patient states he cut his right third finger on Friday. Patient wrapped it up with a bandage but wants to know if it needs stitches.

## 2021-12-03 ENCOUNTER — HOSPITAL ENCOUNTER (OUTPATIENT)
Dept: ULTRASOUND IMAGING | Age: 71
Discharge: HOME | End: 2021-12-03
Attending: PHYSICIAN ASSISTANT
Payer: MEDICARE

## 2021-12-03 DIAGNOSIS — K74.60 CIRRHOSIS OF LIVER WITHOUT ASCITES, UNSPECIFIED HEPATIC CIRRHOSIS TYPE (HCC): ICD-10-CM

## 2021-12-03 PROCEDURE — 76700 US EXAM ABDOM COMPLETE: CPT

## 2021-12-30 RX ORDER — CILOSTAZOL 50 MG/1
TABLET ORAL
Qty: 60 TABLET | Refills: 0 | Status: SHIPPED | OUTPATIENT
Start: 2021-12-30 | End: 2022-02-03

## 2022-01-03 ENCOUNTER — VIRTUAL VISIT (OUTPATIENT)
Dept: HEMATOLOGY | Age: 72
End: 2022-01-03
Payer: MEDICARE

## 2022-01-03 DIAGNOSIS — Z09 ENCOUNTER FOR FOLLOW-UP EXAMINATION AFTER COMPLETED TREATMENT FOR CONDITIONS OTHER THAN MALIGNANT NEOPLASM: ICD-10-CM

## 2022-01-03 DIAGNOSIS — K74.60 CIRRHOSIS OF LIVER WITHOUT ASCITES, UNSPECIFIED HEPATIC CIRRHOSIS TYPE (HCC): Primary | ICD-10-CM

## 2022-01-03 DIAGNOSIS — R93.5 ABNORMAL FINDINGS ON DIAGNOSTIC IMAGING OF OTHER ABDOMINAL REGIONS, INCLUDING RETROPERITONEUM: ICD-10-CM

## 2022-01-03 DIAGNOSIS — R97.8 OTHER ABNORMAL TUMOR MARKERS: ICD-10-CM

## 2022-01-03 PROCEDURE — 99443 PR PHYS/QHP TELEPHONE EVALUATION 21-30 MIN: CPT | Performed by: PHYSICIAN ASSISTANT

## 2022-01-03 NOTE — PROGRESS NOTES
Reviewed with Dr Idalmis Khan and patient. No real change in size over past 3 years. Will plan on repeat imaging with MRI in 3/2022 and full compliment of tumor testing on current/upcoming labs. Pt agreeable.

## 2022-01-03 NOTE — PROGRESS NOTES
[facility], MD, MD Yuniel Case PA-C Floyce Copping, [facility]     Cristela Urrutia, [facility]   Peg Shen, [facility]    Hayder Horton [facility]       Valentin Deputado [address]    at [address]    [address] [facility], [address] [place] [address], [facility] 22.    [phone]    FAX: [address] [place] [address], [address]    [phone]    FAX: [phone]     Patient Care Team:  Nataliya Yu MD as PCP - Rudine Gaucher, MD as PCP - [facility]  Elisabeth Bamberger, MD (Infectious Disease)  Alberta Zuniga MD as Surgeon (General Surgery)  Viktor Mcwillimas MD (Gastroenterology)  Chasidy Dick MD (Hepatology)  Yomi Gonzalez MD (Cardiology)  Deanna Bolden MD (Cardiology)  Porsha Bennett MD (Urology)     Patient Active Problem List   Diagnosis Code    Chronic hepatitis C ([facility] B18.2    HIV positive ([facility] Z21    Weight loss, non-intentional R63.4    Prostate cancer ([facility] C61    HTN (hypertension) I10    Colon polyp K63.5    Advanced care planning/counseling discussion Z71.89    Tobacco use Z72.0    PAD (peripheral artery disease) ([facility] I73.9    Dyslipidemia E78.5    Lung nodule R91.1    Liver disease, chronic, with cirrhosis ([facility] K74.60, K76.9     TELEPHONE VISIT PERFORMED DUE TO COVID-19 EPIDEMIC    Kike Cooper Sr. is evaluated via telephone on 1/3/2022    Consent:  He and/or health care decision maker is aware that that he may receive a bill for this telephone service, depending on his insurance coverage, and has provided verbal consent to proceed: Yes    Documentation:  I communicated with the patient and/or health care decision maker per the note below. I affirm this is a Patient Initiated Episode with an Established Patient who has not had a related appointment within my department in the past 7 days or scheduled within the next 24 hours. Total Time: minutes: 25 minutes    Note: not billable if this call serves to triage the patient into an appointment for the relevant concern        Zayra Ochoa presents to the [place] [address] for management of cirrhosis due to successfully cleared chronic HCV and HIV co-infection. The active problem list, all pertinent past medical history, medications, radiologic findings and laboratory findings related to the liver disorder were reviewed with the patient. Patient has completed 12 weeks of HCV treatment with Harvoni (5/29/2015-8/25/2015). He is a sustained virologic responder to treatment, or cured. The patient underwent a liver biopsy in 3/2015. This demonstrates severe hepatitis with bridging fibrosis. Patient has had post-HCV clearance Fibroscan to reassess liver fibrosis or scarring after successful HCV treatment. This revealed a score of 12.2. Suggested fibrosis level is F3. CAP score is 361, suggestive of significant hepatic steatosis. Ultrasound of the liver was performed in 12/2014. This suggests chronic liver disease. A 0.8 x 0.7 lesion was identified in the right lobe. These lesion has been followed with different radiographic methods over the past several years and there is apparent growth of this lesion to current 2.2 cm. It is not definitve that this represents the same lesion and there have been no radiographic signs suggestive of HCC. AFP has been followed and has not been elevated. He has had cross sectional imaging with CT in 6/2021 and this showed no enhancement or characteristics consistent with HCC. Patient is co-infected with HIV. He is currently not on anti-retroviral therapy because his virus remains suppressed to undetected levels.  He is not currently being monitored by anyone for HIV at present, it has been about 1 year since his last assessment and he has been following with his PCP. Patient was treated for prostate cancer ~5 years ago and continues to have low PSA and no signs of recurrence. He has had appt with Dr Edson Landau and the last PSA lab in 6/2021 showed a score of 0.4. he is due for follow-up again in early 2022 and has requested a PSA be drawn for urology. He has had bout of constipation in the past but this has normalized as has a hacking cough. He has otherwise had no new medical concerns or hospitalizations in late 2021. He continues to have intermittent claudication symptoms effecting the LLE. He has seen cardiology and is treated with Pletal, this has helped significantly and he has avoided surgery. He is still smoking 1/2 PPD. He is due for routine evaluation with pulmonary associates in the near future. The patient completes all daily activities without any functional limitations. The patient has not experienced arthralgias, myalgias, problems concentrating, swelling of the abdomen, swelling of the lower extremities, hematemesis, or hematochezia. ALLERGIES  No Known Allergies    MEDICATIONS  Current Outpatient Medications   Medication Sig    cilostazoL (PLETAL) 50 mg tablet TAKE 1 TABLET BY MOUTH BEFORE BREAKFAST AND BEFORE SUPPER    rosuvastatin (CRESTOR) 10 mg tablet Take 1 tablet by mouth nightly    lisinopriL (PRINIVIL, ZESTRIL) 20 mg tablet Take 1 tablet by mouth once daily    Spiriva Respimat 2.5 mcg/actuation inhaler INHALE 2 PUFFS BY MOUTH ONCE DAILY    ProAir HFA 90 mcg/actuation inhaler  (Patient not taking: Reported on 8/30/2021)    buPROPion XL (WELLBUTRIN XL) 150 mg tablet Take 1 Tab by mouth every morning.  cyanocobalamin (VITAMIN B12) 500 mcg tablet Take 500 mcg by mouth daily.  GARLIC PO Take  by mouth.  varenicline (Chantix) 1 mg tablet Take 1 Tab by mouth two (2) times daily (after meals).  (Patient not taking: Reported on 2021)    omega 3-dha-epa-fish oil (FISH OIL) 100-160-1,000 mg cap Take  by mouth daily.  cholecalciferol (VITAMIN D3) 25 mcg (1,000 unit) cap Take  by mouth daily.  ascorbic acid, vitamin C, (VITAMIN C) 500 mg tablet Take 1,000 mg by mouth daily.  naproxen sodium (ALEVE) 220 mg tablet Take 220 mg by mouth as needed.  MULTIVITAMINS (MULTIVITAMIN PO) Take  by mouth daily. No current facility-administered medications for this visit. REVIEW OF SYSTEMS NOT RELATED TO LIVER DISEASE:  Constitution systems: Negative for fever, chills, weight gain, weight loss. Eyes: Negative for diplopia, visual changes, eye pain. ENT: Negative for sore throat, painful swallowing. Respiratory: Negative for cough, hemoptysis, dyspnea. Cardiology: Negative for chest pain, palpitations. GI:  Negative for constipation or diarrhea. : Negative for urinary frequency, dysuria and hematuria. Skin: Negative for rash. Hematology: Negative for easy bruising, bleeding from gums or nose. Musculo-skeletal: Negative for back pain. Positive for muscle pain, weakness of the LLE with walking distances. Neurologic: Negative for headaches, dizziness, vertigo, memory problems. Psychology: Negative for anxiety, depression. FAMILY HISTORY:  The father  of alcohol cirrhosis. The mother  of CVA. There are no other persons in the family with HCV or liver disease. SOCIAL HISTORY:  The patient is . The spouse has been tested for HCV and is positive. She was treated and achieved SVR. The patient has 3 children, and 7 grandchildren. The patient stopped using tobacco products in 2014. Resumed in  and is now smoking 1/2,  PPD. The patient has never consumed significant amounts of alcohol. The patient used to work in construction, still active and working odd jobs.        PHYSICAL EXAMINATION PERFORMED BY VIRTUAL TELEHEALTH:  VS: Not performed   Phone visit only. LABORATORY STUDIES:  Liver [place] of [address] Units 6/28/2021 6/4/2021 7/9/2020   WBC 3.4 - 10.8 x10E3/uL 6.6  6.1   ANC 1.4 - 7.0 x10E3/uL 3.6     HGB 13.0 - 17.7 g/dL 14.4  15.2    - 450 x10E3/uL 217  241   INR 0.9 - 1.2 1.1  1.1   AST 0 - 40 IU/L 22  22   ALT 0 - 44 IU/L 16  18   Alk Phos 48 - 121 IU/L 59  66   Bili, Total 0.0 - 1.2 mg/dL 0.5  0.3   Bili, Direct 0.00 - 0.40 mg/dL 0.16  0.09   Albumin 3.7 - 4.7 g/dL 4.8 (H)  4.8 (H)   BUN 8 - 27 mg/dL 18  20   Creat 0.76 - 1.27 mg/dL 0.82  0.87   Creat (iSTAT) 0.6 - 1.3 mg/dL  0.90    Na 134 - 144 mmol/L 138  138   K 3.5 - 5.2 mmol/L 4.0  4.2   Cl 96 - 106 mmol/L 103  105   CO2 20 - 29 mmol/L 22  18 (L)   Glucose 65 - 99 mg/dL 91  101 (H)     Virology Latest Ref Rng & Units 10/16/2017 2/13/2017 9/28/2016   HCV RNA, COLETTE, QL Negative Negative Negative Negative     Cancer Screening Latest Ref Rng & Units 6/28/2021 7/9/2020 12/5/2019   AFP, Serum 0.0 - 8.0 ng/mL 7.1 6.9 6.3   AFP-L3% 0.0 - 9.9 % 5.1 Comment 4.8   CA 19-9 0 - 35 U/mL 5     CEA 0.0 - 4.7 ng/mL 2.7     Additional lab values will be drawn following today's office visit. SEROLOGIES:  Serologies Latest Ref Rng 10/10/2014 9/1/2014 8/27/2014   Hep A Ab, Total Negative Positive (A)     Hep B Surface Ag Negative Negative     Hep B Core Ab, Total Negative Positive (A)     Hep C Genotype See Note 1a     HCV RT-PCR, Quant  [id number]  [id number]   HCV Log10    6.830   Ferritin 30 - 400 ng/mL 172     Iron % Saturation 15 - 55 % 22     C-ANCA Neg:<1:20 titer  <1:20    P-ANCA Neg:<1:20 titer  <1:20    ANCA Neg:<1:20 titer  <1:20      LIVER HISTOLOGY:  3/2015. Slides reviewed by MLS. Severe hepatitis with bridging fibrosis and possible cirrhosis. Knodell score (3,3,3,3), Bro score 4, Metavir score 3.  2/2017. Fibro[facility] performed at [facility]. EkPa was 10.0. Suggested fibrosis level is F3.  2/2018. Fib[facility] performed at [facility].  EkPa was 11.7. Suggested fibrosis level is F3.  2/2019. [facility] at [facility]. EkPa was 12.2. Suggested fibrosis level is F3. CAP score is 361, suggestive of significant hepatic steatosis. ENDOSCOPIC PROCEDURES:  Not available or performed    RADIOLOGY:  12/2014. Ultrasound of liver. Echogenic consistent with chronic liver disease. 0.8x0.7 cm lesion right lobe. No dilated bile ducts. No ascites. 1/2015. Dynamic MRI liver. Changes consistent with chronic liver disease. No liver mass lesions. No dilated bile ducts. No bile duct strictures. No ascites. 11/2017. Ultrasound of liver. Cirrhotic morphology of the liver is again demonstrated. 12 mm echogenic nodule right hepatic lobe. This does not appear to reside in the same location as the prior smaller hyperechoic nodule. This may represent a hemangioma. 11/2018. Triple phase CT. The central right lobe hepatic mass shown to be quite evident on ultrasound and echogenic but very subtle on MRI is very slightly hypoenhancing on CT and most evident on equilibrium phase. The margins are not clearly delineated so accurate measurement is difficult but the lesion is similarly sized compared to prior recent ultrasound measuring about 1.1 x 1.6 cm. No other hepatic lesions are seen in the liver redemonstrates subtle changes cirrhosis. 5/2019. Ultrasound of liver. Diffusely echogenic and heterogeneous. An echogenic lesion measures 1.5 x 2.0 x 1.9 cm, previously 1.6 x 1.1 cm on CT and 1.3 x 1.8 x 1.8 cm on ultrasound. 6/2021. CT abdomen with and without contrast. Known right lobe liver lesion is only faintly hypodense on the equilibrium phase and appears unchanged from prior imaging. No arterial hyperenhancement or other features to suggest hepatocellular carcinoma. 12/2021. Ultrasound of liver. Hepatic cirrhosis, with a focal echogenic mass in the right hepatic lobe.  Its size has increased to 2.3 x 1.9 x 1.4 cm, compared to an older ultrasound of 0.8 x 0.7 x 0.7 cm. The most recent prior ultrasound in 2019 showed a size of 2.0 x 1.9 x 1.5 cm. If further evaluation of this slowly enlarging hepatic mass is planned, three-phase dedicated hepatic MRI would be recommended. 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 This degree of fibrosis was confirmed with past FibroScan 2/2019. He has preserved liver function. Will continue to monitor patient regularly and continue to monitor for complications of advanced fibrosis/cirrhosis. HIV co-infection. He has low levels of HIV RNA. He is not taking anti-HIV medications. He follows with PCP and ID intermittently, I have encouraged him to re-establish. HCV treatment. Completed 12 weeks of Harvoni treatment in August 2015. He is cured. [address] surveillance. Routine imaging has been done over the past several years. While there has been slow interval growth of a liver lesion, there were no CT characteristics to suggest [address] I have drawn tumor markers and will plan on continued surveillance for size changes. Will reassess with MRI in 3 months. I have reviewed these findings with Dr Cande Reich who advanced this plan. Prostate cancer history. He has had follow-up in 6/2021 with PSA of 0.4. he has requested repeat PSA now and I will add to his upcoming lab order. The patient was directed to continue all current medications at the current dosages. There are no contraindications for the patient to take any medications that are necessary for treatment of other medical issues. The patient was counseled regarding alcohol consumption. The patient was counseled regarding use of illicit drugs. Vaccination for viral hepatitis A and B is not required. The patient has serologic evidence of prior exposure or vaccination with immunity.       FOLLOW-UP AFTER VIRTUAL VISIT:  Pursuant to the emergency declaration under the [facility] waiver authority and the [employer] Act, this Virtual  Visit was conducted, with the patient's (and/or their legal guardian's) consent, to reduce the patient's risk of exposure to COVID-19 and provide necessary medical care. Services were provided through a video synchronous discussion virtually to substitute for an in-person clinic visit. The patient was located in their home. The provider was located in the [place] [address] office. All of the issues listed above in the Assessment and Plan were discussed with the patient. All questions were answered. The patient expressed a clear understanding of the above. [facility] in 6 months with repeat imaging in the meantime, MRI in 3/2022. Documentation reviewed and updated to reflect current, accurate patient information.     Abby Degroot PA-C  [facility].  [phone]  [address] [place] [address]

## 2022-02-03 RX ORDER — CILOSTAZOL 50 MG/1
TABLET ORAL
Qty: 60 TABLET | Refills: 0 | Status: SHIPPED | OUTPATIENT
Start: 2022-02-03 | End: 2022-03-19

## 2022-03-05 NOTE — PROGRESS NOTES
HISTORY OF PRESENT ILLNESS  Ana Parsons is a 66 y.o. male. HPI   Ana Parsons, who was evaluated through a synchronous (real-time) audio only encounter, and/or his healthcare decision maker, is aware that it is a billable service, which includes applicable co-pays, with coverage as determined by his insurance carrier. He provided verbal consent to proceed and patient identification was verified. This visit was conducted pursuant to the emergency declaration under the [facility], [address] [place] [address] authority and the [employer] Act. A caregiver was present when appropriate. Ability to conduct physical exam was limited. The patient was located at home in a state where the provider was licensed to provide care. --Hillary Delgadillo MD on 3/7/2022 at 9:04 AM              Hx HTN and HLD, hx prostate cancer s/p XRT with low level PSA , hep c treated, liver fibrosis-cirrhosis, HIV coinfection and  PAD 4mm right lung nodule and medicare wellness----  Khang Barcenas MD fu prostate cancer--will see Dr Hans Dunlap in June per pt  Has fu liver inst for fibrosis/cirrhosis due to hep c treated, slow growth liver lesion-followed by imaging not c/w HCC  Hx hiv confection suppressed viral load--  Saw SKYLAR DASH near [facility]-pt declined medication since undetectable VL and expense of medication  stoppedwellbutrin for tobacco cessation-still smokes 1/2 ppd tobacco  Saw Chaitanya DASH and had xr or CT a few months ago per pt at [address] [place] [address].   On pletal for left leg PAD--Dr Alvaro Bro  Hx 4 mm right lung nodule s/p bronch -no luminal defect--missed fu with chaitanya Izquierdo earlier this year---has 6 months fu per pt  Last OV  Pt denies and problems with memory-repots long an short term memory are ok but his mind drifts a lot  Still drives his car, works full time, pays his own bills     Was referred to neurologist last OV-no appt made by pt        Had low risk NST this year for CP  Saw Hepatologist this for spot on liver 1x5-2 cm--stable on US this year-repeat in 6 months     Went to [facility] in June for ? Syncope      Patient Active Problem List    Diagnosis Date Noted    Liver disease, chronic, with cirrhosis ([facility] 03/09/2021    Lung nodule 10/28/2019    Tobacco use 12/06/2016    PAD (peripheral artery disease) ([facility] 12/06/2016    Dyslipidemia 12/06/2016    Advanced care planning/counseling discussion 09/13/2016    Colon polyp 09/18/2015    HTN (hypertension) 01/27/2011    Prostate cancer ([facility] 02/22/2010    Chronic hepatitis C ([facility] 06/30/2009    HIV positive ([facility] 06/30/2009    Weight loss, non-intentional 06/30/2009     Current Outpatient Medications   Medication Sig Dispense Refill    cilostazoL (PLETAL) 50 mg tablet TAKE 1 TABLET BY MOUTH BEFORE BREAKFAST AND BEFORE SUPPER 60 Tablet 0    rosuvastatin (CRESTOR) 10 mg tablet Take 1 tablet by mouth nightly 90 Tablet 3    lisinopriL (PRINIVIL, ZESTRIL) 20 mg tablet Take 1 tablet by mouth once daily 90 Tablet 3    Spiriva Respimat 2.5 mcg/actuation inhaler INHALE 2 PUFFS BY MOUTH ONCE DAILY      buPROPion XL (WELLBUTRIN XL) 150 mg tablet Take 1 Tab by mouth every morning. 30 Tab 0    cyanocobalamin (VITAMIN B12) 500 mcg tablet Take 500 mcg by mouth daily.  GARLIC PO Take  by mouth.  omega 3-dha-epa-fish oil (FISH OIL) 100-160-1,000 mg cap Take  by mouth daily.  cholecalciferol (VITAMIN D3) 25 mcg (1,000 unit) cap Take  by mouth daily.  ascorbic acid, vitamin C, (VITAMIN C) 500 mg tablet Take 1,000 mg by mouth daily.  naproxen sodium (ALEVE) 220 mg tablet Take 220 mg by mouth as needed.  MULTIVITAMINS (MULTIVITAMIN PO) Take  by mouth daily.        No Known Allergies   Lab Results   Component Value Date/Time    WBC 6.6 06/28/2021 04:42 PM    HGB 14.4 06/28/2021 04:42 PM    Hemoglobin (POC) 16.0 05/02/2017 07:50 AM    HCT 42.1 06/28/2021 04:42 PM    Hematocrit (POC) 47 05/02/2017 07:50 AM    PLATELET 064 06/28/2021 04:42 PM    MCV 96 06/28/2021 04:42 PM     Lab Results   Component Value Date/Time    Hemoglobin A1c 5.4 12/05/2019 12:00 AM    Glucose 91 06/28/2021 04:42 PM    Glucose (POC) 98 05/02/2017 07:50 AM    Glucose (POC) 95 09/04/2014 12:18 PM    LDL, calculated 122 (H) 10/22/2020 09:13 AM    LDL, calculated 98 05/02/2019 09:08 AM    Creatinine (POC) 0.90 06/04/2021 09:03 AM    Creatinine 0.82 06/28/2021 04:42 PM      Lab Results   Component Value Date/Time    Cholesterol, total 178 10/22/2020 09:13 AM    HDL Cholesterol 39 (L) 10/22/2020 09:13 AM    LDL, calculated 122 (H) 10/22/2020 09:13 AM    LDL, calculated 98 05/02/2019 09:08 AM    Triglyceride 92 10/22/2020 09:13 AM    CHOL/HDL Ratio 4.0 08/24/2014 05:06 AM     Lab Results   Component Value Date/Time    GFR est non-AA 88 06/28/2021 04:42 PM    GFRNA, POC >60 06/04/2021 09:03 AM    GFR est  06/28/2021 04:42 PM    GFRAA, POC >60 06/04/2021 09:03 AM    Creatinine 0.82 06/28/2021 04:42 PM    Creatinine (POC) 0.90 06/04/2021 09:03 AM    BUN 18 06/28/2021 04:42 PM    BUN (POC) 14 05/02/2017 07:50 AM    Sodium 138 06/28/2021 04:42 PM    Sodium (POC) 141 05/02/2017 07:50 AM    Potassium 4.0 06/28/2021 04:42 PM    Potassium (POC) 4.0 05/02/2017 07:50 AM    Chloride 103 06/28/2021 04:42 PM    Chloride (POC) 102 05/02/2017 07:50 AM    CO2 22 06/28/2021 04:42 PM    Magnesium 1.7 09/07/2014 12:32 AM    Phosphorus 3.3 09/03/2014 04:47 AM        ROS    Physical Exam    ASSESSMENT and PLAN  Diagnoses and all orders for this visit:    1. HIV infection, unspecified symptom status ([place] [address].)   Check HIV RNA  2. HIV positive ([place] [address].)    3. Recurrent major depressive disorder, remission status unspecified ([place] [address].)   Not depressed   Not on wellbutrin  4. PAD (peripheral artery disease) ([facility])   Stable on pletal per pt   Can walk 5 blocks -stable  5. Prostate cancer ([place] [address].)   S/p EBRT--will fu URO MD  6. Hypertension, unspecified type   bp monitoring  7.  Pure hypercholesterolemia   Check labs  8. Pulmonary nodule   Fu Dr Ramesh Pump  9. Liver cirrhosis/liver lesion   Will get labs per [facility] and repeat imaging    10 .  Smoker   Pt will stop smoking--will send in wellbutrin

## 2022-03-07 ENCOUNTER — VIRTUAL VISIT (OUTPATIENT)
Dept: INTERNAL MEDICINE CLINIC | Age: 72
End: 2022-03-07
Payer: MEDICARE

## 2022-03-07 DIAGNOSIS — E78.00 PURE HYPERCHOLESTEROLEMIA: ICD-10-CM

## 2022-03-07 DIAGNOSIS — I10 HYPERTENSION, UNSPECIFIED TYPE: ICD-10-CM

## 2022-03-07 DIAGNOSIS — R91.1 PULMONARY NODULE: ICD-10-CM

## 2022-03-07 DIAGNOSIS — Z21 HIV POSITIVE (HCC): ICD-10-CM

## 2022-03-07 DIAGNOSIS — C61 PROSTATE CANCER (HCC): ICD-10-CM

## 2022-03-07 DIAGNOSIS — I73.9 PAD (PERIPHERAL ARTERY DISEASE) (HCC): ICD-10-CM

## 2022-03-07 DIAGNOSIS — F33.9 RECURRENT MAJOR DEPRESSIVE DISORDER, REMISSION STATUS UNSPECIFIED (HCC): ICD-10-CM

## 2022-03-07 DIAGNOSIS — F17.200 SMOKER: ICD-10-CM

## 2022-03-07 DIAGNOSIS — B20 HIV INFECTION, UNSPECIFIED SYMPTOM STATUS (HCC): Primary | ICD-10-CM

## 2022-03-07 PROCEDURE — 99442 PR PHYS/QHP TELEPHONE EVALUATION 11-20 MIN: CPT | Performed by: INTERNAL MEDICINE

## 2022-03-07 RX ORDER — BUPROPION HYDROCHLORIDE 150 MG/1
150 TABLET ORAL
Qty: 90 TABLET | Refills: 1 | Status: SHIPPED | OUTPATIENT
Start: 2022-03-07 | End: 2022-09-07 | Stop reason: ALTCHOICE

## 2022-03-07 NOTE — PATIENT INSTRUCTIONS
This is an established visit conducted via telemedicine. The patient has been instructed that this meets HIPAA criteria and acknowledges and agrees to this method of visitation.     Esteban Cloud [facility]  64/[birth year]/66  4:80 AM

## 2022-03-18 PROBLEM — K76.9 LIVER DISEASE, CHRONIC, WITH CIRRHOSIS (HCC): Status: ACTIVE | Noted: 2021-03-09

## 2022-03-18 PROBLEM — K74.60 LIVER DISEASE, CHRONIC, WITH CIRRHOSIS (HCC): Status: ACTIVE | Noted: 2021-03-09

## 2022-03-19 RX ORDER — CILOSTAZOL 50 MG/1
TABLET ORAL
Qty: 60 TABLET | Refills: 0 | Status: SHIPPED | OUTPATIENT
Start: 2022-03-19 | End: 2022-04-27

## 2022-03-20 PROBLEM — R91.1 LUNG NODULE: Status: ACTIVE | Noted: 2019-10-28

## 2022-04-27 RX ORDER — CILOSTAZOL 50 MG/1
TABLET ORAL
Qty: 60 TABLET | Refills: 0 | Status: SHIPPED | OUTPATIENT
Start: 2022-04-27

## 2022-05-05 LAB
AFP L3 MFR SERPL: 54.4 % (ref 0–9.9)
AFP SERPL-MCNC: 1070.1 NG/ML (ref 0–8.4)
ALBUMIN SERPL-MCNC: 4.4 G/DL (ref 3.7–4.7)
ALP SERPL-CCNC: 60 IU/L (ref 44–121)
ALT SERPL-CCNC: 8 IU/L (ref 0–44)
AST SERPL-CCNC: 21 IU/L (ref 0–40)
BILIRUB DIRECT SERPL-MCNC: 0.1 MG/DL (ref 0–0.4)
BILIRUB SERPL-MCNC: 0.3 MG/DL (ref 0–1.2)
BUN SERPL-MCNC: 12 MG/DL (ref 8–27)
BUN/CREAT SERPL: 16 (ref 10–24)
CALCIUM SERPL-MCNC: 9.5 MG/DL (ref 8.6–10.2)
CHLORIDE SERPL-SCNC: 101 MMOL/L (ref 96–106)
CO2 SERPL-SCNC: 17 MMOL/L (ref 20–29)
CREAT SERPL-MCNC: 0.76 MG/DL (ref 0.76–1.27)
EGFR: 95 ML/MIN/1.73
ERYTHROCYTE [DISTWIDTH] IN BLOOD BY AUTOMATED COUNT: 12.3 % (ref 11.6–15.4)
GLUCOSE SERPL-MCNC: 100 MG/DL (ref 65–99)
HCT VFR BLD AUTO: 40.3 % (ref 37.5–51)
HGB BLD-MCNC: 13.4 G/DL (ref 13–17.7)
INR PPP: 1.1 (ref 0.9–1.2)
MCH RBC QN AUTO: 31.7 PG (ref 26.6–33)
MCHC RBC AUTO-ENTMCNC: 33.3 G/DL (ref 31.5–35.7)
MCV RBC AUTO: 95 FL (ref 79–97)
PLATELET # BLD AUTO: 224 X10E3/UL (ref 150–450)
POTASSIUM SERPL-SCNC: 3.9 MMOL/L (ref 3.5–5.2)
PROT SERPL-MCNC: 7.6 G/DL (ref 6–8.5)
PROTHROMBIN TIME: 11.6 SEC (ref 9.1–12)
PSA SERPL-MCNC: 0.4 NG/ML (ref 0–4)
RBC # BLD AUTO: 4.23 X10E6/UL (ref 4.14–5.8)
SODIUM SERPL-SCNC: 139 MMOL/L (ref 134–144)
WBC # BLD AUTO: 5.3 X10E3/UL (ref 3.4–10.8)

## 2022-05-06 LAB
CANCER AG19-9 SERPL-ACNC: 7 U/ML (ref 0–35)
CEA SERPL-MCNC: 2.7 NG/ML (ref 0–4.7)

## 2022-05-06 NOTE — PROGRESS NOTES
Pt notified of marked rise in AFP and concern for this finding in light of the lesion that we have been monitoring. Emphasized importance of keeping the MRI on 5/15/22 and we will follow-up with this report as soon as available. Pt verbalizes understanding.

## 2022-05-11 ENCOUNTER — TELEPHONE (OUTPATIENT)
Dept: HEMATOLOGY | Age: 72
End: 2022-05-11

## 2022-05-11 DIAGNOSIS — K74.60 CIRRHOSIS OF LIVER WITHOUT ASCITES, UNSPECIFIED HEPATIC CIRRHOSIS TYPE (HCC): Primary | ICD-10-CM

## 2022-05-11 RX ORDER — LORAZEPAM 0.5 MG/1
0.5 TABLET ORAL AS NEEDED
Qty: 6 TABLET | Refills: 0 | Status: SHIPPED | OUTPATIENT
Start: 2022-05-11 | End: 2022-09-07 | Stop reason: ALTCHOICE

## 2022-05-11 NOTE — TELEPHONE ENCOUNTER
Her brother is scheduled for MRI on 5/15/22 and she wants to know:    1. If the contrast is necessary, he has pulmonary and other issues and she's not sure if it would have any affect on him.   2.  Can you prescribe a sedative as she doesn't feel he will be able to lay still in the tube for a long period of time

## 2022-05-11 NOTE — TELEPHONE ENCOUNTER
[email or web address] Spoke with Siria--patient sister concerning MRI will need to be with contrast and should not effort his resp symptoms. Ativan low dose sent to [address]. Take one pill one hour prior to procedure and one pill once in MRI dept. Edward Troy (sister) verbalize understanding. (KF)

## 2022-05-15 ENCOUNTER — HOSPITAL ENCOUNTER (OUTPATIENT)
Dept: MRI IMAGING | Age: 72
Discharge: HOME | End: 2022-05-15
Attending: PHYSICIAN ASSISTANT

## 2022-05-15 DIAGNOSIS — K74.60 CIRRHOSIS OF LIVER WITHOUT ASCITES, UNSPECIFIED HEPATIC CIRRHOSIS TYPE (HCC): ICD-10-CM

## 2022-05-16 ENCOUNTER — TELEPHONE (OUTPATIENT)
Dept: HEMATOLOGY | Age: 72
End: 2022-05-16

## 2022-05-18 ENCOUNTER — HOSPITAL ENCOUNTER (OUTPATIENT)
Dept: MRI IMAGING | Age: 72
Discharge: HOME | End: 2022-05-18
Attending: PHYSICIAN ASSISTANT
Payer: MEDICARE

## 2022-05-18 PROCEDURE — 74011000258 HC RX REV CODE- 258: Performed by: PHYSICIAN ASSISTANT

## 2022-05-18 PROCEDURE — 74011000250 HC RX REV CODE- 250: Performed by: PHYSICIAN ASSISTANT

## 2022-05-18 PROCEDURE — A9576 INJ PROHANCE MULTIPACK: HCPCS

## 2022-05-18 PROCEDURE — 74011250636 HC RX REV CODE- 250/636

## 2022-05-18 PROCEDURE — 74183 MRI ABD W/O CNTR FLWD CNTR: CPT

## 2022-05-18 RX ORDER — SODIUM CHLORIDE 0.9 % (FLUSH) 0.9 %
10 SYRINGE (ML) INJECTION
Status: COMPLETED | OUTPATIENT
Start: 2022-05-18 | End: 2022-05-18

## 2022-05-18 RX ADMIN — GADOTERIDOL 19 ML: 279.3 INJECTION, SOLUTION INTRAVENOUS at 10:50

## 2022-05-18 RX ADMIN — SODIUM CHLORIDE, PRESERVATIVE FREE 10 ML: 5 INJECTION INTRAVENOUS at 10:50

## 2022-05-18 RX ADMIN — SODIUM CHLORIDE 100 ML: 9 INJECTION, SOLUTION INTRAVENOUS at 10:50

## 2022-05-18 NOTE — PROGRESS NOTES
Pt is a difficult stick. Came on Sunday and 3 techs unable to achieve IV access. Back today after hydrating with water. IV obtained in left hand.

## 2022-05-19 DIAGNOSIS — C22.0 HEPATOCELLULAR CARCINOMA (HCC): ICD-10-CM

## 2022-05-19 DIAGNOSIS — K74.60 CIRRHOSIS OF LIVER WITHOUT ASCITES, UNSPECIFIED HEPATIC CIRRHOSIS TYPE (HCC): Primary | ICD-10-CM

## 2022-05-19 NOTE — PROGRESS NOTES
Pt notified of 2.8 cm lesion concerning for [address] with his significant rise in AFP/L3%. Will make referral to IR for discussion of loco-regional therapy, likely a good candidate for Y-90. CT chest and bone scan will be ordered given the value of AFP.

## 2022-05-20 ENCOUNTER — TELEPHONE (OUTPATIENT)
Dept: HEMATOLOGY | Age: 72
End: 2022-05-20

## 2022-05-20 DIAGNOSIS — C22.0 HEPATOCELLULAR CARCINOMA (HCC): Primary | ICD-10-CM

## 2022-06-01 ENCOUNTER — HOSPITAL ENCOUNTER (OUTPATIENT)
Dept: NUCLEAR MEDICINE | Age: 72
Discharge: HOME | End: 2022-06-01
Attending: PHYSICIAN ASSISTANT
Payer: MEDICARE

## 2022-06-01 ENCOUNTER — HOSPITAL ENCOUNTER (OUTPATIENT)
Dept: CT IMAGING | Age: 72
Discharge: HOME | End: 2022-06-01
Attending: PHYSICIAN ASSISTANT
Payer: MEDICARE

## 2022-06-01 ENCOUNTER — HOSPITAL ENCOUNTER (OUTPATIENT)
Dept: INTERVENTIONAL RADIOLOGY/VASCULAR | Age: 72
Discharge: HOME | End: 2022-06-01
Attending: PHYSICIAN ASSISTANT
Payer: MEDICARE

## 2022-06-01 DIAGNOSIS — C22.0 HEPATOCELLULAR CARCINOMA (HCC): ICD-10-CM

## 2022-06-01 DIAGNOSIS — K74.60 CIRRHOSIS OF LIVER WITHOUT ASCITES, UNSPECIFIED HEPATIC CIRRHOSIS TYPE (HCC): ICD-10-CM

## 2022-06-01 PROCEDURE — 78306 BONE IMAGING WHOLE BODY: CPT

## 2022-06-01 PROCEDURE — 74011000636 HC RX REV CODE- 636: Performed by: RADIOLOGY

## 2022-06-01 PROCEDURE — 99212 OFFICE O/P EST SF 10 MIN: CPT

## 2022-06-01 PROCEDURE — 71260 CT THORAX DX C+: CPT

## 2022-06-01 RX ADMIN — IOPAMIDOL 100 ML: 755 INJECTION, SOLUTION INTRAVENOUS at 10:54

## 2022-06-03 ENCOUNTER — HOSPITAL ENCOUNTER (OUTPATIENT)
Dept: RADIATION THERAPY | Age: 72
Discharge: HOME | End: 2022-06-03

## 2022-06-07 ENCOUNTER — TELEPHONE (OUTPATIENT)
Dept: HEMATOLOGY | Age: 72
End: 2022-06-07

## 2022-06-07 ENCOUNTER — HOSPITAL ENCOUNTER (EMERGENCY)
Age: 72
Discharge: LEFT BEFORE DISCHARGE | End: 2022-06-07
Attending: EMERGENCY MEDICINE
Payer: MEDICARE

## 2022-06-07 ENCOUNTER — APPOINTMENT (OUTPATIENT)
Dept: GENERAL RADIOLOGY | Age: 72
End: 2022-06-07
Attending: EMERGENCY MEDICINE
Payer: MEDICARE

## 2022-06-07 VITALS
RESPIRATION RATE: 16 BRPM | TEMPERATURE: 99.4 F | HEART RATE: 107 BPM | OXYGEN SATURATION: 96 % | DIASTOLIC BLOOD PRESSURE: 76 MMHG | SYSTOLIC BLOOD PRESSURE: 132 MMHG

## 2022-06-07 LAB
ATRIAL RATE: 108 BPM
CALCULATED P AXIS, ECG09: 70 DEGREES
CALCULATED R AXIS, ECG10: 72 DEGREES
CALCULATED T AXIS, ECG11: 33 DEGREES
DIAGNOSIS, 93000: NORMAL
P-R INTERVAL, ECG05: 188 MS
Q-T INTERVAL, ECG07: 352 MS
QRS DURATION, ECG06: 150 MS
QTC CALCULATION (BEZET), ECG08: 471 MS
VENTRICULAR RATE, ECG03: 108 BPM

## 2022-06-07 PROCEDURE — 71046 X-RAY EXAM CHEST 2 VIEWS: CPT

## 2022-06-07 PROCEDURE — 93005 ELECTROCARDIOGRAM TRACING: CPT

## 2022-06-07 PROCEDURE — 75810000275 HC EMERGENCY DEPT VISIT NO LEVEL OF CARE

## 2022-06-07 NOTE — ED TRIAGE NOTES
Pt c/o body aches that started today, also has something on the side of his ear, maybe a \"spider\" bit him , denies fever, denies sob, denies sore throat, denies cough , wants to be tested for Covid

## 2022-06-07 NOTE — TELEPHONE ENCOUNTER
Received call from patient's sister Evon Alfred stating that patient is on his way to the ER - after his consultation with Dr. Demario Gamboa for Y-90 consult patient understood that he was told to stop his Cilostazol for 2 weeks prior to the mapping procedure. Patient stopped the medication and was off of it for 3 days when he began to have body aches and leg pains, he wasn't sure but also thought that he may have been bitten by a spider. These things led him to go to the ER at [facility].    3:30pm - logged into the system and noticed that patient was in the ER around 10:30am this morning but there was no documentation of treatment and he was discharged around 1:30pm. Called his sister Evon Alfred to check in. She states patient did go to the ER and was triaged but was told that it would be 6 hours until he could be seen, after waiting for some time the patient left and was going to [facility] to be evaluated - she is trying to get in contact with him now. She shared the story about Dr. Demario Gamboa telling the patient to stop his Cilostazol for 2 weeks before the next procedure and that her brother had gone ahead and stopped the medication. She called Dr. Demario Gamboa about this and he returned her call today and shared that there had been a misunderstanding and that the patient should only have to stop the medication for 2-3 days prior to the procedure. We reviewed that would mean that patient should go ahead and start the medication again, then stop it after taking the dose on 6/14 so that he would be 2-3 days before the procedure without the medication - then restart as directed after the procedure based on how the patient is doing. Evon Alfred verbalized understanding with repeat back and said she would call back to let me know once she finds out how the appt at [facility] went. 6/8/22 - 1pm - received VM from patient's sister. Returned call.  [facility] and they thought he had an  infection and prescribed antibiotics. Asked if antibiotics would impact the Y-90 mapping procedure scheduled for 6/17, no reason to stop antibiotics prescribed. I will FU after the procedure but if there are issues call me at [phone].

## 2022-06-13 ENCOUNTER — TRANSCRIBE ORDER (OUTPATIENT)
Dept: SCHEDULING | Age: 72
End: 2022-06-13

## 2022-06-13 DIAGNOSIS — C22.0 HEPATOCELLULAR CARCINOMA (HCC): Primary | ICD-10-CM

## 2022-06-17 ENCOUNTER — HOSPITAL ENCOUNTER (OUTPATIENT)
Dept: INTERVENTIONAL RADIOLOGY/VASCULAR | Age: 72
Discharge: STILL ADMITTED OR ADMINISTRATIVE | End: 2022-06-17
Attending: RADIOLOGY | Admitting: RADIOLOGY
Payer: MEDICARE

## 2022-06-17 ENCOUNTER — HOSPITAL ENCOUNTER (EMERGENCY)
Age: 72
Discharge: HOME | End: 2022-06-17
Attending: EMERGENCY MEDICINE
Payer: MEDICARE

## 2022-06-17 ENCOUNTER — HOSPITAL ENCOUNTER (OUTPATIENT)
Dept: NUCLEAR MEDICINE | Age: 72
Discharge: HOME | End: 2022-06-17
Attending: RADIOLOGY
Payer: MEDICARE

## 2022-06-17 VITALS
WEIGHT: 210 LBS | BODY MASS INDEX: 29.4 KG/M2 | SYSTOLIC BLOOD PRESSURE: 111 MMHG | DIASTOLIC BLOOD PRESSURE: 67 MMHG | HEIGHT: 71 IN | TEMPERATURE: 98.3 F | HEART RATE: 51 BPM | OXYGEN SATURATION: 99 % | RESPIRATION RATE: 20 BRPM

## 2022-06-17 VITALS
HEART RATE: 69 BPM | SYSTOLIC BLOOD PRESSURE: 129 MMHG | RESPIRATION RATE: 18 BRPM | DIASTOLIC BLOOD PRESSURE: 65 MMHG | OXYGEN SATURATION: 98 %

## 2022-06-17 DIAGNOSIS — C22.0 HEPATOCELLULAR CARCINOMA (HCC): ICD-10-CM

## 2022-06-17 DIAGNOSIS — R00.1 BRADYCARDIA: ICD-10-CM

## 2022-06-17 DIAGNOSIS — R07.9 CHEST PAIN, UNSPECIFIED TYPE: Primary | ICD-10-CM

## 2022-06-17 LAB
ALBUMIN SERPL-MCNC: 3.9 G/DL (ref 3.5–5)
ALBUMIN/GLOB SERPL: 0.9 {RATIO} (ref 1.1–2.2)
ALP SERPL-CCNC: 56 U/L (ref 45–117)
ALT SERPL-CCNC: 24 U/L (ref 12–78)
ANION GAP SERPL CALC-SCNC: 6 MMOL/L (ref 5–15)
AST SERPL-CCNC: 19 U/L (ref 15–37)
ATRIAL RATE: 48 BPM
ATRIAL RATE: 51 BPM
BASOPHILS # BLD: 0 K/UL (ref 0–0.1)
BASOPHILS NFR BLD: 0 % (ref 0–1)
BILIRUB SERPL-MCNC: 0.7 MG/DL (ref 0.2–1)
BUN SERPL-MCNC: 15 MG/DL (ref 6–20)
BUN/CREAT SERPL: 21 (ref 12–20)
CALCIUM SERPL-MCNC: 9.5 MG/DL (ref 8.5–10.1)
CALCULATED P AXIS, ECG09: 71 DEGREES
CALCULATED P AXIS, ECG09: 72 DEGREES
CALCULATED R AXIS, ECG10: 17 DEGREES
CALCULATED R AXIS, ECG10: 37 DEGREES
CALCULATED T AXIS, ECG11: 24 DEGREES
CALCULATED T AXIS, ECG11: 39 DEGREES
CHLORIDE SERPL-SCNC: 106 MMOL/L (ref 97–108)
CO2 SERPL-SCNC: 23 MMOL/L (ref 21–32)
COMMENT, HOLDF: NORMAL
CREAT SERPL-MCNC: 0.71 MG/DL (ref 0.7–1.3)
DIAGNOSIS, 93000: NORMAL
DIAGNOSIS, 93000: NORMAL
DIFFERENTIAL METHOD BLD: ABNORMAL
EOSINOPHIL # BLD: 0.1 K/UL (ref 0–0.4)
EOSINOPHIL NFR BLD: 1 % (ref 0–7)
ERYTHROCYTE [DISTWIDTH] IN BLOOD BY AUTOMATED COUNT: 13.1 % (ref 11.5–14.5)
GLOBULIN SER CALC-MCNC: 4.3 G/DL (ref 2–4)
GLUCOSE SERPL-MCNC: 95 MG/DL (ref 65–100)
HCT VFR BLD AUTO: 38.9 % (ref 36.6–50.3)
HGB BLD-MCNC: 13.2 G/DL (ref 12.1–17)
IMM GRANULOCYTES # BLD AUTO: 0 K/UL (ref 0–0.04)
IMM GRANULOCYTES NFR BLD AUTO: 0 % (ref 0–0.5)
LYMPHOCYTES # BLD: 1 K/UL (ref 0.8–3.5)
LYMPHOCYTES NFR BLD: 15 % (ref 12–49)
MCH RBC QN AUTO: 32.4 PG (ref 26–34)
MCHC RBC AUTO-ENTMCNC: 33.9 G/DL (ref 30–36.5)
MCV RBC AUTO: 95.6 FL (ref 80–99)
MONOCYTES # BLD: 0.5 K/UL (ref 0–1)
MONOCYTES NFR BLD: 7 % (ref 5–13)
NEUTS SEG # BLD: 5.2 K/UL (ref 1.8–8)
NEUTS SEG NFR BLD: 77 % (ref 32–75)
NRBC # BLD: 0 K/UL (ref 0–0.01)
NRBC BLD-RTO: 0 PER 100 WBC
P-R INTERVAL, ECG05: 200 MS
P-R INTERVAL, ECG05: 202 MS
PLATELET # BLD AUTO: 200 K/UL (ref 150–400)
PMV BLD AUTO: 8.8 FL (ref 8.9–12.9)
POTASSIUM SERPL-SCNC: 4.4 MMOL/L (ref 3.5–5.1)
PROT SERPL-MCNC: 8.2 G/DL (ref 6.4–8.2)
Q-T INTERVAL, ECG07: 426 MS
Q-T INTERVAL, ECG07: 438 MS
QRS DURATION, ECG06: 116 MS
QRS DURATION, ECG06: 118 MS
QTC CALCULATION (BEZET), ECG08: 391 MS
QTC CALCULATION (BEZET), ECG08: 392 MS
RBC # BLD AUTO: 4.07 M/UL (ref 4.1–5.7)
SAMPLES BEING HELD,HOLD: NORMAL
SODIUM SERPL-SCNC: 135 MMOL/L (ref 136–145)
TROPONIN-HIGH SENSITIVITY: 5 NG/L (ref 0–76)
VENTRICULAR RATE, ECG03: 48 BPM
VENTRICULAR RATE, ECG03: 51 BPM
WBC # BLD AUTO: 6.8 K/UL (ref 4.1–11.1)

## 2022-06-17 PROCEDURE — 74011250636 HC RX REV CODE- 250/636

## 2022-06-17 PROCEDURE — 75726 ARTERY X-RAYS ABDOMEN: CPT

## 2022-06-17 PROCEDURE — C1760 CLOSURE DEV, VASC: HCPCS

## 2022-06-17 PROCEDURE — 84484 ASSAY OF TROPONIN QUANT: CPT

## 2022-06-17 PROCEDURE — C1887 CATHETER, GUIDING: HCPCS

## 2022-06-17 PROCEDURE — 77030012468 HC VLV BLEEDBK CNTRL ABBT -B

## 2022-06-17 PROCEDURE — 93005 ELECTROCARDIOGRAM TRACING: CPT

## 2022-06-17 PROCEDURE — 77030019698 HC SYR ANGI MDLON MRTM -A

## 2022-06-17 PROCEDURE — 80053 COMPREHEN METABOLIC PANEL: CPT

## 2022-06-17 PROCEDURE — 74011000636 HC RX REV CODE- 636: Performed by: RADIOLOGY

## 2022-06-17 PROCEDURE — 77030014021 HC SYR ANGI FIX LOK MRTM -A

## 2022-06-17 PROCEDURE — 78801 RP LOCLZJ TUM 2+AREA 1+D IMG: CPT

## 2022-06-17 PROCEDURE — 2709999900 HC NON-CHARGEABLE SUPPLY

## 2022-06-17 PROCEDURE — C1769 GUIDE WIRE: HCPCS

## 2022-06-17 PROCEDURE — 99284 EMERGENCY DEPT VISIT MOD MDM: CPT

## 2022-06-17 PROCEDURE — C1894 INTRO/SHEATH, NON-LASER: HCPCS

## 2022-06-17 PROCEDURE — 74011000250 HC RX REV CODE- 250: Performed by: RADIOLOGY

## 2022-06-17 PROCEDURE — 74011250636 HC RX REV CODE- 250/636: Performed by: RADIOLOGY

## 2022-06-17 PROCEDURE — 85025 COMPLETE CBC W/AUTO DIFF WBC: CPT

## 2022-06-17 PROCEDURE — 36415 COLL VENOUS BLD VENIPUNCTURE: CPT

## 2022-06-17 RX ORDER — NALOXONE HYDROCHLORIDE 0.4 MG/ML
0.4 INJECTION, SOLUTION INTRAMUSCULAR; INTRAVENOUS; SUBCUTANEOUS
Status: DISCONTINUED | OUTPATIENT
Start: 2022-06-17 | End: 2022-06-17 | Stop reason: HOSPADM

## 2022-06-17 RX ORDER — MIDAZOLAM HYDROCHLORIDE 1 MG/ML
INJECTION, SOLUTION INTRAMUSCULAR; INTRAVENOUS
Status: COMPLETED
Start: 2022-06-17 | End: 2022-06-17

## 2022-06-17 RX ORDER — FENTANYL CITRATE 50 UG/ML
12.5-2 INJECTION, SOLUTION INTRAMUSCULAR; INTRAVENOUS
Status: DISCONTINUED | OUTPATIENT
Start: 2022-06-17 | End: 2022-06-17 | Stop reason: HOSPADM

## 2022-06-17 RX ORDER — SODIUM CHLORIDE 9 MG/ML
25 INJECTION, SOLUTION INTRAVENOUS CONTINUOUS
Status: DISCONTINUED | OUTPATIENT
Start: 2022-06-17 | End: 2022-06-17 | Stop reason: HOSPADM

## 2022-06-17 RX ORDER — LIDOCAINE HYDROCHLORIDE 20 MG/ML
10 INJECTION, SOLUTION INFILTRATION; PERINEURAL ONCE
Status: COMPLETED | OUTPATIENT
Start: 2022-06-17 | End: 2022-06-17

## 2022-06-17 RX ORDER — FLUMAZENIL 0.1 MG/ML
0.5 INJECTION INTRAVENOUS
Status: DISCONTINUED | OUTPATIENT
Start: 2022-06-17 | End: 2022-06-17 | Stop reason: HOSPADM

## 2022-06-17 RX ORDER — MIDAZOLAM HYDROCHLORIDE 1 MG/ML
.5-5 INJECTION, SOLUTION INTRAMUSCULAR; INTRAVENOUS
Status: DISCONTINUED | OUTPATIENT
Start: 2022-06-17 | End: 2022-06-17 | Stop reason: HOSPADM

## 2022-06-17 RX ADMIN — WATER 2 G: 1 INJECTION INTRAMUSCULAR; INTRAVENOUS; SUBCUTANEOUS at 08:05

## 2022-06-17 RX ADMIN — MIDAZOLAM 0.5 MG: 1 INJECTION INTRAMUSCULAR; INTRAVENOUS at 08:50

## 2022-06-17 RX ADMIN — LIDOCAINE HYDROCHLORIDE 5 ML: 20 INJECTION, SOLUTION INFILTRATION; PERINEURAL at 09:12

## 2022-06-17 RX ADMIN — IOPAMIDOL 105 ML: 755 INJECTION, SOLUTION INTRAVENOUS at 09:15

## 2022-06-17 RX ADMIN — MIDAZOLAM 0.5 MG: 1 INJECTION INTRAMUSCULAR; INTRAVENOUS at 08:45

## 2022-06-17 RX ADMIN — FENTANYL CITRATE 25 MCG: 0.05 INJECTION, SOLUTION INTRAMUSCULAR; INTRAVENOUS at 09:05

## 2022-06-17 RX ADMIN — FENTANYL CITRATE 75 MCG: 0.05 INJECTION, SOLUTION INTRAMUSCULAR; INTRAVENOUS at 08:30

## 2022-06-17 RX ADMIN — FENTANYL CITRATE 25 MCG: 0.05 INJECTION, SOLUTION INTRAMUSCULAR; INTRAVENOUS at 08:50

## 2022-06-17 RX ADMIN — MIDAZOLAM 2 MG: 1 INJECTION INTRAMUSCULAR; INTRAVENOUS at 08:31

## 2022-06-17 RX ADMIN — FENTANYL CITRATE 25 MCG: 0.05 INJECTION, SOLUTION INTRAMUSCULAR; INTRAVENOUS at 08:44

## 2022-06-17 RX ADMIN — SODIUM CHLORIDE 25 ML/HR: 9 INJECTION, SOLUTION INTRAVENOUS at 08:06

## 2022-06-17 NOTE — ED PROVIDER NOTES
HPI       75y M with hx of hep C and HIV here with chest pain. Was in IR having a procedure done when afterwards he was noted to have an episode of bradycardia (40-50's) and complained of chest pain. He says it was more that he had been laying in a certain position that was uncomfortable for the procedure. He has no complaints at this time. IR spoke with cards who asked him to go to the ED for ECG and troponins.      Past Medical History:   Diagnosis Date    BPH     Cancer ([facility] 11/2009    prostate biopsy revealed cancer    Erectile dysfunction     Essential hypertension, benign     Hepatitis C 6/30/2009    HIV positive ([facility] 6/30/2009    Hypercholesterolemia     Ill-defined condition 9-2014    PNEUMONIA/bronchitis hx    Memory disorder     Prostate CA ([facility] 2/22/2010       Past Surgical History:   Procedure Laterality Date    COLONOSCOPY,REMV LESN,SNARE  8/31/2015         HX HEENT      gum surgery-for denture    HX HERNIA REPAIR       Inguinal Hernia Repair    HX HERNIA REPAIR  05/02/2017    Davinci recurrent RIHR with mesh    HX SKIN BIOPSY  11/16/15    left forearm/ upper arm biopsy          Family History:   Problem Relation Age of Onset    Hypertension Mother     Mental Retardation Mother     Depression Mother     Anxiety Mother     Liver Disease Father     Lung Disease Father     Diabetes Maternal Grandmother     Hypertension Other     Stroke Other     Anxiety Other     Depression Other        Social History     Socioeconomic History    Marital status: LEGALLY      Spouse name: Not on file    Number of children: Not on file    Years of education: Not on file    Highest education level: Not on file   Occupational History    Not on file   Tobacco Use    Smoking status: Current Every Day Smoker     Packs/day: 0.50     Years: 45.00     Pack years: 22.50     Types: Cigarettes    Smokeless tobacco: Never Used   Vaping Use    Vaping Use: Former    Substances: Nicotine Substance and Sexual Activity    Alcohol use: No    Drug use: No     Types: Heroin, Prescription, Cocaine     Comment: none in past 20 years per pt on 5/2/17    Sexual activity: Yes     Partners: Female     Comment: radu has 3 children   Other Topics Concern    Not on file   Social History Narrative    Not on file     Social Determinants of Health     Financial Resource Strain:     Difficulty of Paying Living Expenses: Not on file   Food Insecurity:     Worried About [address] in the Last Year: Not on file    Celsa [employer] in the Last Year: Not on file   Transportation Needs:     Lack of Transportation (Medical): Not on file    Lack of Transportation (Non-Medical): Not on file   Physical Activity:     Days of Exercise per Week: Not on file    Minutes of Exercise per Session: Not on file   Stress:     Feeling of Stress : Not on file   Social Connections:     Frequency of Communication with Friends and Family: Not on file    Frequency of Social Gatherings with Friends and Family: Not on file    Attends Pent[employer]ism Services: Not on file    Active Member of [address] [place] [address] or Organizations: Not on file    Attends Club or Organization Meetings: Not on file    Marital Status: Not on file   Intimate Partner Violence:     Fear of Current or Ex-Partner: Not on file    Emotionally Abused: Not on file    Physically Abused: Not on file    Sexually Abused: Not on file   Housing Stability:     Unable to Pay for Housing in the Last Year: Not on file    Number of [place] in the Last Year: Not on file    Unstable Housing in the Last Year: Not on file         ALLERGIES: Patient has no known allergies. Review of Systems   Review of Systems   Constitutional: (-) weight loss. HEENT: (-) stiff neck   Eyes: (-) discharge. Respiratory: (-) cough. Cardiovascular: (-) syncope. Gastrointestinal: (-) blood in stool. Genitourinary: (-) hematuria. Musculoskeletal: (-) myalgias.    Neurological: (-) seizure. Skin: (-) petechiae  Lymph/Immunologic: (-) enlarged lymph nodes  All other systems reviewed and are negative. There were no vitals filed for this visit. Physical Exam Nursing note and vitals reviewed. Constitutional: oriented to person, place, and time. appears well-developed and well-nourished. No distress. Head: Normocephalic and atraumatic. Sclera anicteric  Nose: No rhinorrhea  Mouth/Throat: Oropharynx is clear and moist. Pharynx normal  Eyes: Conjunctivae are normal. Pupils are equal, round, and reactive to light. Right eye exhibits no discharge. Left eye exhibits no discharge. Neck: Painless normal range of motion. Neck supple. No LAD. Cardiovascular: Normal rate, regular rhythm, normal heart sounds and intact distal pulses. Exam reveals no gallop and no friction rub. No murmur heard. Pulmonary/Chest:  No respiratory distress. No wheezes. No rales. No rhonchi. No increased work of breathing. No accessory muscle use. Good air exchange throughout. Abdominal: soft, non-tender, no rebound or guarding. No hepatosplenomegaly. Normal bowel sounds throughout. Back: no tenderness to palpation, no deformities, no CVA tenderness  Extremities/Musculoskeletal: Normal range of motion. no tenderness. No edema. Distal extremities are neurovasc intact. Lymphadenopathy:   No adenopathy. Neurological:  Alert and oriented to person, place, and time. Coordination normal. CN 2-12 intact. Motor and sensory function intact. Skin: Skin is warm and dry. No rash noted. No pallor. MDM 75y M here with chest pain associated with a bradycardic episode. Will check labs and ECG. Will d/w cardiology. Procedures      ED EKG interpretation:  Rhythm: sinus bradycardia with sinus arrthymia; and regular . Rate (approx.): 51; Axis: normal; P wave: normal; QRS interval: normal ; ST/T wave: normal;  This EKG was interpreted by Tawanda Nails MD,ED Provider.

## 2022-06-17 NOTE — PROGRESS NOTES
Pt arrives ambulatory to angio department accompanied by sister for Y90 Mapping procedure. All assessments completed and consent was reviewed. Education given was regarding procedure, conscious sedation, post-procedure care and  management/follow-up. Opportunity for questions was provided and all questions and concerns were addressed.

## 2022-06-17 NOTE — H&P
Interventional and Vascular Radiology History and Physical    Patient: Bruce Aggarwal Sr. 67 y.o. male       Chief Complaint: No chief complaint on file.       History of Present Illness: hepatoma     History:    Past Medical History:   Diagnosis Date    BPH     Cancer ([facility] 11/2009    prostate biopsy revealed cancer    Erectile dysfunction     Essential hypertension, benign     Hepatitis C 6/30/2009    HIV positive ([facility] 6/30/2009    Hypercholesterolemia     Ill-defined condition 9-2014    PNEUMONIA/bronchitis hx    Memory disorder     Prostate CA ([facility] 2/22/2010     Family History   Problem Relation Age of Onset    Hypertension Mother     Mental Retardation Mother     Depression Mother     Anxiety Mother     Liver Disease Father     Lung Disease Father     Diabetes Maternal Grandmother     Hypertension Other     Stroke Other     Anxiety Other     Depression Other      Social History     Socioeconomic History    Marital status: LEGALLY      Spouse name: Not on file    Number of children: Not on file    Years of education: Not on file    Highest education level: Not on file   Occupational History    Not on file   Tobacco Use    Smoking status: Current Every Day Smoker     Packs/day: 0.50     Years: 45.00     Pack years: 22.50     Types: Cigarettes    Smokeless tobacco: Never Used   Vaping Use    Vaping Use: Former    Substances: Nicotine   Substance and Sexual Activity    Alcohol use: No    Drug use: No     Types: Heroin, Prescription, Cocaine     Comment: none in past 20 years per pt on 5/2/17    Sexual activity: Yes     Partners: Female     Comment: seperated has 3 children   Other Topics Concern    Not on file   Social History Narrative    Not on file     Social Determinants of Health     Financial Resource Strain:     Difficulty of Paying Living Expenses: Not on file   Food Insecurity:     Worried About [address] in the Last Year: Not on file    [employer] in the Last Year: Not on file   Transportation Needs:     Lack of Transportation (Medical): Not on file    Lack of Transportation (Non-Medical): Not on file   Physical Activity:     Days of Exercise per Week: Not on file    Minutes of Exercise per Session: Not on file   Stress:     Feeling of Stress : Not on file   Social Connections:     Frequency of Communication with Friends and Family: Not on file    Frequency of Social Gatherings with Friends and Family: Not on file    Attends Restorationism Services: Not on file    Active Member of [address] [place] [address] [employer] or Organizations: Not on file    Attends Club or Organization Meetings: Not on file    Marital Status: Not on file   Intimate Partner Violence:     Fear of Current or Ex-Partner: Not on file    Emotionally Abused: Not on file    Physically Abused: Not on file    Sexually Abused: Not on file   Housing Stability:     Unable to Pay for Housing in the Last Year: Not on file    Number of [place] in the Last Year: Not on file    Unstable Housing in the Last Year: Not on file       Allergies: No Known Allergies    Current Medications:  No current facility-administered medications for this encounter. Current Outpatient Medications   Medication Sig    cilostazoL (PLETAL) 50 mg tablet TAKE 1 TABLET BY MOUTH BEFORE BREAKFAST AND BEFORE SUPPER    rosuvastatin (CRESTOR) 10 mg tablet Take 1 tablet by mouth nightly    lisinopriL (PRINIVIL, ZESTRIL) 20 mg tablet Take 1 tablet by mouth once daily    Spiriva Respimat 2.5 mcg/actuation inhaler INHALE 2 PUFFS BY MOUTH ONCE DAILY    cyanocobalamin (VITAMIN B12) 500 mcg tablet Take 500 mcg by mouth daily.  omega 3-dha-epa-fish oil (FISH OIL) 100-160-1,000 mg cap Take  by mouth daily.  cholecalciferol (VITAMIN D3) 25 mcg (1,000 unit) cap Take  by mouth daily.  ascorbic acid, vitamin C, (VITAMIN C) 500 mg tablet Take 1,000 mg by mouth daily.  MULTIVITAMINS (MULTIVITAMIN PO) Take  by mouth daily.     LORazepam (ATIVAN) 0.5 mg tablet Take 1 Tablet by mouth as needed for Anxiety. Max Daily Amount: 48 mg. Take 1 tab PO 1 hour prior to procedure, may repeat at procedure time if needed. (Patient not taking: Reported on 6/17/2022)    buPROPion XL (WELLBUTRIN XL) 150 mg tablet Take 1 Tablet by mouth every morning. (Patient not taking: Reported on 7/45/9417)    GARLIC PO Take  by mouth. (Patient not taking: Reported on 6/17/2022)    naproxen sodium (ALEVE) 220 mg tablet Take 220 mg by mouth as needed. (Patient not taking: Reported on 6/17/2022)        Physical Exam:  Blood pressure 111/67, pulse (!) 51, temperature 98.3 °F (36.8 °C), resp. rate 20, height 5' 11\" (1.803 m), weight 95.3 kg (210 lb), SpO2 99 %. LUNG: clear to auscultation bilaterally, HEART: regular rate and rhythm, S1, S2 normal, no murmur, click, rub or gallop      Alerts:    Hospital Problems  Date Reviewed: 1/3/2022    None          Laboratory:      Recent Labs     06/17/22  1233   HGB 13.2   HCT 38.9   WBC 6.8      BUN 15   CREA 0.71   K 4.4         Plan of Care/Planned Procedure:  Risks, benefits, and alternatives reviewed with patient and he agrees to proceed with the procedure. Conscious sedation will be performed with IV fentanyl and versed.  Plan is for liver mapping Deyanira Hager MD

## 2022-06-17 NOTE — PROGRESS NOTES
Name of procedure:  y90 mapping    Sedation medications given: Fentanyl 150 mcg, versed 3mg     Sedation tolerated: well     Total Sedation time: 40 min        Pt tolerated procedure well. VSS. No C/O pain. Dressing to site D&I. No bleeding or hematoma noted to site. Pt experienced cardiac arrhythmia and chest pain post procedure and discharged to ED for cards workup.

## 2022-06-17 NOTE — ED TRIAGE NOTES
Patient arrived from angio after Y90 mapping. Pt developed symptomatic bradycardia and irregular HR. Pt's cardiologist recommended ER admission.

## 2022-06-17 NOTE — CONSULTS
Cardiology Consult Note    CC: bradycardia  Reason for consult:  Post procedural bradycardia  Requesting MD:  Dr. Luis Alfredo Mcclendon     Subjective:      Date of  Admission: 6/17/2022 11:58 AM     Admission type:Emergency    Kym Blevins is a 67 y.o. male was seen in ER for his apparent bradycardia and relative hypotension immediately following liver mapping which required laying flat on a table and he started developing severe back pain. This is when his HR dropped and BP dropped. Since he has been here and resolution of back pain his HR and low BPs all improved. There was no cardiac sx. He has known PAD.      Patient Active Problem List    Diagnosis Date Noted    Liver disease, chronic, with cirrhosis ([facility] 03/09/2021    Lung nodule 10/28/2019    Tobacco use 12/06/2016    PAD (peripheral artery disease) ([address] 12/06/2016    Dyslipidemia 12/06/2016    Advanced care planning/counseling discussion 09/13/2016    Colon polyp 09/18/2015    HTN (hypertension) 01/27/2011    Prostate cancer ([address] 02/22/2010    Chronic hepatitis C ([address] 06/30/2009    HIV positive ([address] 06/30/2009    Weight loss, non-intentional 06/30/2009      Kenyetta Valladares MD  Past Medical History:   Diagnosis Date    BPH     Cancer ([address] 11/2009    prostate biopsy revealed cancer    Erectile dysfunction     Essential hypertension, benign     Hepatitis C 6/30/2009    HIV positive ([address] 6/30/2009    Hypercholesterolemia     Ill-defined condition 9-2014    PNEUMONIA/bronchitis hx    Memory disorder     Prostate CA ([address] 2/22/2010      Past Surgical History:   Procedure Laterality Date    COLONOSCOPY,REMV ESPERANZA,SNARE  8/31/2015         HX HEENT      gum surgery-for denture    HX HERNIA REPAIR       Inguinal Hernia Repair    HX HERNIA REPAIR  05/02/2017    Davinci recurrent RIHR with mesh    HX SKIN BIOPSY  11/16/15    left forearm/ upper arm biopsy      No Known Allergies     Shx; still smokes and denies any ETOH    Family History   Problem Relation Age of Onset    Hypertension Mother     Mental Retardation Mother     Depression Mother     Anxiety Mother     Liver Disease Father     Lung Disease Father     Diabetes Maternal Grandmother     Hypertension Other     Stroke Other     Anxiety Other     Depression Other       No current facility-administered medications for this encounter. Current Outpatient Medications   Medication Sig    LORazepam (ATIVAN) 0.5 mg tablet Take 1 Tablet by mouth as needed for Anxiety. Max Daily Amount: 48 mg. Take 1 tab PO 1 hour prior to procedure, may repeat at procedure time if needed. (Patient not taking: Reported on 6/17/2022)    cilostazoL (PLETAL) 50 mg tablet TAKE 1 TABLET BY MOUTH BEFORE BREAKFAST AND BEFORE SUPPER    buPROPion XL (WELLBUTRIN XL) 150 mg tablet Take 1 Tablet by mouth every morning. (Patient not taking: Reported on 6/17/2022)    rosuvastatin (CRESTOR) 10 mg tablet Take 1 tablet by mouth nightly    lisinopriL (PRINIVIL, ZESTRIL) 20 mg tablet Take 1 tablet by mouth once daily    Spiriva Respimat 2.5 mcg/actuation inhaler INHALE 2 PUFFS BY MOUTH ONCE DAILY    cyanocobalamin (VITAMIN B12) 500 mcg tablet Take 500 mcg by mouth daily.  GARLIC PO Take  by mouth. (Patient not taking: Reported on 6/17/2022)    omega 3-dha-epa-fish oil (FISH OIL) 100-160-1,000 mg cap Take  by mouth daily.  cholecalciferol (VITAMIN D3) 25 mcg (1,000 unit) cap Take  by mouth daily.  ascorbic acid, vitamin C, (VITAMIN C) 500 mg tablet Take 1,000 mg by mouth daily.  naproxen sodium (ALEVE) 220 mg tablet Take 220 mg by mouth as needed. (Patient not taking: Reported on 6/17/2022)    MULTIVITAMINS (MULTIVITAMIN PO) Take  by mouth daily. Prior to Admission Medications:  Prior to Admission medications    Medication Sig Start Date End Date Taking? Authorizing Provider   LORazepam (ATIVAN) 0.5 mg tablet Take 1 Tablet by mouth as needed for Anxiety. Max Daily Amount: 48 mg.  Take 1 tab PO 1 hour prior to procedure, may repeat at procedure time if needed. Patient not taking: Reported on 6/17/2022 5/11/22   BEATRICE Hannah   cilostazoL (PLETAL) 50 mg tablet TAKE 1 TABLET BY MOUTH BEFORE BREAKFAST AND BEFORE SUPPER 4/27/22   Claudio Worrell MD   buPROPion XL (WELLBUTRIN XL) 150 mg tablet Take 1 Tablet by mouth every morning. Patient not taking: Reported on 6/17/2022 3/7/22   Yumiko Duran MD   rosuvastatin (CRESTOR) 10 mg tablet Take 1 tablet by mouth nightly 10/19/21   Yumiko Duran MD   lisinopriL (PRINIVIL, ZESTRIL) 20 mg tablet Take 1 tablet by mouth once daily 7/29/21   Yumiko Duran MD   Spiriva Respimat 2.5 mcg/actuation inhaler INHALE 2 PUFFS BY MOUTH ONCE DAILY 3/4/21   Provider, Historical   cyanocobalamin (VITAMIN B12) 500 mcg tablet Take 500 mcg by mouth daily. Provider, Historical   GARLIC PO Take  by mouth. Patient not taking: Reported on 6/17/2022    Provider, Historical   omega 3-dha-epa-fish oil (FISH OIL) 100-160-1,000 mg cap Take  by mouth daily. Provider, Historical   cholecalciferol (VITAMIN D3) 25 mcg (1,000 unit) cap Take  by mouth daily. Provider, Historical   ascorbic acid, vitamin C, (VITAMIN C) 500 mg tablet Take 1,000 mg by mouth daily. Provider, Historical   naproxen sodium (ALEVE) 220 mg tablet Take 220 mg by mouth as needed. Patient not taking: Reported on 6/17/2022    Provider, Historical   MULTIVITAMINS (MULTIVITAMIN PO) Take  by mouth daily. 2/22/10   Provider, Historical        Review of Symptoms:  Except as noted in HPI, patient denies recent fever or chills, nausea, vomiting, diarrhea, hemoptysis, hematemesis, dysuria, myalgias, focal neurologic symptoms, ecchymosis, angioedema, odynophagia, dysphagia, sore throat, earache,rash, melena, hematochezia, depression, GERD, cold intolerance, petechia, bleeding gums, or significant weight loss. A comprehensive review of systems was negative except for that written in the HPI.      Subjective:    24 hr VS reviewed, overall VSSAF  Temp (24hrs), Av.3 °F (36.8 °C), Min:98.3 °F (36.8 °C), Max:98.3 °F (36.8 °C)    Patient Vitals for the past 8 hrs:   Pulse   22 1313 60    Patient Vitals for the past 8 hrs:   Resp   22 1313 16    Patient Vitals for the past 8 hrs:   BP   22 1313 124/77        No intake or output data in the 24 hours ending 22 1412      Physical Exam (complete single organ system exam)      Visit Vitals  /77 (BP 1 Location: Left upper arm, BP Patient Position: At rest)   Pulse 60   Resp 16   SpO2 98%     General Appearance:  Well developed, well nourished,alert and oriented x 3, and individual in no acute distress. Ears/Nose/Mouth/Throat:   Hearing grossly normal.         Neck: Supple. Chest:   Lungs clear to auscultation bilaterally. Cardiovascular:  Regular rate and rhythm, S1, S2 normal, no murmur. Abdomen:   Soft, non-tender, bowel sounds are active. Extremities: No edema bilaterally. Skin: Warm and dry. Cardiographics    Telemetry: normal sinus rhythm  ECG: normal EKG, normal sinus rhythm, unchanged from previous tracings  Echocardiogram: Not done    Labs:   Recent Results (from the past 24 hour(s))   EKG, 12 LEAD, INITIAL    Collection Time: 22 10:46 AM   Result Value Ref Range    Ventricular Rate 48 BPM    Atrial Rate 48 BPM    P-R Interval 200 ms    QRS Duration 118 ms    Q-T Interval 438 ms    QTC Calculation (Bezet) 391 ms    Calculated P Axis 72 degrees    Calculated R Axis 17 degrees    Calculated T Axis 24 degrees    Diagnosis       Marked sinus bradycardia with marked sinus arrhythmia  Abnormal ECG  When compared with ECG of 2022 10:32,  Vent.  rate has decreased BY  60 BPM  Right bundle branch block is no longer present     EKG, 12 LEAD, INITIAL    Collection Time: 22 11:47 AM   Result Value Ref Range    Ventricular Rate 51 BPM    Atrial Rate 51 BPM    P-R Interval 202 ms    QRS Duration 116 ms    Q-T Interval 426 ms    QTC Calculation (Bezet) 392 ms    Calculated P Axis 71 degrees    Calculated R Axis 37 degrees    Calculated T Axis 39 degrees    Diagnosis       Sinus bradycardia with marked sinus arrhythmia  Otherwise normal ECG  When compared with ECG of 17-JUN-2022 10:46,  MANUAL COMPARISON REQUIRED, DATA IS UNCONFIRMED     CBC WITH AUTOMATED DIFF    Collection Time: 06/17/22 12:33 PM   Result Value Ref Range    WBC 6.8 4.1 - 11.1 K/uL    RBC 4.07 (L) 4.10 - 5.70 M/uL    HGB 13.2 12.1 - 17.0 g/dL    HCT 38.9 36.6 - 50.3 %    MCV 95.6 80.0 - 99.0 FL    MCH 32.4 26.0 - 34.0 PG    MCHC 33.9 30.0 - 36.5 g/dL    RDW 13.1 11.5 - 14.5 %    PLATELET 451 875 - 564 K/uL    MPV 8.8 (L) 8.9 - 12.9 FL    NRBC 0.0 0  WBC    ABSOLUTE NRBC 0.00 0.00 - 0.01 K/uL    NEUTROPHILS 77 (H) 32 - 75 %    LYMPHOCYTES 15 12 - 49 %    MONOCYTES 7 5 - 13 %    EOSINOPHILS 1 0 - 7 %    BASOPHILS 0 0 - 1 %    IMMATURE GRANULOCYTES 0 0.0 - 0.5 %    ABS. NEUTROPHILS 5.2 1.8 - 8.0 K/UL    ABS. LYMPHOCYTES 1.0 0.8 - 3.5 K/UL    ABS. MONOCYTES 0.5 0.0 - 1.0 K/UL    ABS. EOSINOPHILS 0.1 0.0 - 0.4 K/UL    ABS. BASOPHILS 0.0 0.0 - 0.1 K/UL    ABS. IMM. GRANS. 0.0 0.00 - 0.04 K/UL    DF AUTOMATED     METABOLIC PANEL, COMPREHENSIVE    Collection Time: 06/17/22 12:33 PM   Result Value Ref Range    Sodium 135 (L) 136 - 145 mmol/L    Potassium 4.4 3.5 - 5.1 mmol/L    Chloride 106 97 - 108 mmol/L    CO2 23 21 - 32 mmol/L    Anion gap 6 5 - 15 mmol/L    Glucose 95 65 - 100 mg/dL    BUN 15 6 - 20 MG/DL    Creatinine 0.71 0.70 - 1.30 MG/DL    BUN/Creatinine ratio 21 (H) 12 - 20      GFR est AA >60 >60 ml/min/1.73m2    GFR est non-AA >60 >60 ml/min/1.73m2    Calcium 9.5 8.5 - 10.1 MG/DL    Bilirubin, total 0.7 0.2 - 1.0 MG/DL    ALT (SGPT) 24 12 - 78 U/L    AST (SGOT) 19 15 - 37 U/L    Alk.  phosphatase 56 45 - 117 U/L    Protein, total 8.2 6.4 - 8.2 g/dL    Albumin 3.9 3.5 - 5.0 g/dL    Globulin 4.3 (H) 2.0 - 4.0 g/dL    A-G Ratio 0.9 (L) 1.1 - 2.2     SAMPLES BEING HELD    Collection Time: 06/17/22 12:33 PM   Result Value Ref Range    SAMPLES BEING HELD 1RED     COMMENT        Add-on orders for these samples will be processed based on acceptable specimen integrity and analyte stability, which may vary by analyte. TROPONIN-HIGH SENSITIVITY    Collection Time: 06/17/22 12:33 PM   Result Value Ref Range    Troponin-High Sensitivity 5 0 - 76 ng/L        Assessment:     Assessment:   Bradycardia; resolved;  It was due to vasovagal reaction to back pain  PAD; on Pletal   Cirrhosis; liver mapping was done  HLD     Plan:   He can be released from ER  Keep well hydrated today    Román Collazo MD

## 2022-06-17 NOTE — DISCHARGE INSTRUCTIONS
[address] Dr. Gab Torres has scheduled you for your Y90 treatment 6/30/22 at 7:00 am.     Side effects of sedation medications and other medications used today have been reviewed. Notify us of nausea, itching, hives, dizziness, or anything else out of the ordinary. Should you experience any of these significant changes, please call [phone] between the hours of 7:30 am and 10 pm or [phone] after hours. After hours, ask the  to page the X-ray Technologist, and describe the problem to the technologist.        Angiogram: What to Expect at [address] [place] [address]  An angiogram is an X-ray test that uses dye and a camera to take pictures of the blood flow in an artery or a vein. The doctor inserted a thin, flexible tube (catheter) into a blood vessel in your groin. In some cases, the catheter is placed in a blood vessel in the arm. An angiogram is done for many reasons. For example, you may have an angiogram to find the source of bleeding, such as an ulcer. Or it may be done to look for blocked blood vessels in your lungs. After an angiogram, your groin or arm may have a bruise and feel sore for a day or two. You can do light activities around the house but nothing strenuous for several days. Your doctor may give you specific instructions on when you can do your normal activities again, such as driving and going back to work. This care sheet gives you a general idea about how long it will take for you to recover. But each person recovers at a different pace. Follow the steps below to feel better as quickly as possible. How can you care for yourself at home? Activity    · Do not do strenuous exercise and do not lift, pull, or push anything heavy. This may be for a day or two.  You can walk around the house and do light activity, such as cooking.     · If the catheter was placed in your groin, try not to walk up stairs for the first couple of days.     · If the catheter was placed in your arm near your wrist, do not bend your wrist deeply for the first couple of days. Be careful using your hand to get into and out of a chair or bed.     · If your doctor recommends it, get more exercise. Walking is a good choice. Bit by bit, increase the amount you walk every day. Try for at least 30 minutes on most days of the week. Diet    · Drink plenty of fluids to help your body flush out the dye. If you have kidney, heart, or liver disease and have to limit fluids, talk with your doctor before you increase the amount of fluids you drink.     · You can eat your normal diet. If your stomach is upset, try bland, low-fat foods like plain rice, broiled chicken, toast, and yogurt. Medicines    · Be safe with medicines. Read and follow all instructions on the label. ? If the doctor gave you a prescription medicine for pain, take it as prescribed. ? If you are not taking a prescription pain medicine, ask your doctor if you can take an over-the-counter medicine.     · If you take aspirin or some other blood thinner, ask your doctor if and when to start taking it again. Make sure that you understand exactly what your doctor wants you to do.     · Your doctor will tell you if and when you can restart your medicines. He or she will also give you instructions about taking any new medicines. Care of the catheter site    · You will have a dressing over the cut (incision). A dressing helps the incision heal and protects it. Your doctor will tell you how to take care of this.     · Put ice or a cold pack on the area for 10 to 20 minutes at a time to help with soreness or swelling. Put a thin cloth between the ice and your skin.     · You may shower 24 to 48 hours after the procedure, if your doctor okays it. Pat the incision dry.     · Do not soak the catheter site until it is healed. Don't take a bath for 1 week, or until your doctor tells you it is okay.     · Watch for bleeding from the site.  A small amount of blood (up to the size of a quarter) on the bandage can be normal.     · If you are bleeding, lie down and press on the area for 15 minutes to try to make it stop. If the bleeding does not stop, call your doctor or seek immediate medical care. Follow-up care is a key part of your treatment and safety. Be sure to make and go to all appointments, and call your doctor if you are having problems. It's also a good idea to know your test results and keep a list of the medicines you take. When should you call for help? Call 911 anytime you think you may need emergency care. For example, call if:    · You passed out (lost consciousness).     · You have severe trouble breathing.     · You have sudden chest pain and shortness of breath, or you cough up blood. Call your doctor now or seek immediate medical care if:    · You are bleeding from the area where the catheter was put in your artery.     · You have a fast-growing, painful lump at the catheter site.     · You have signs of infection, such as:  ? Increased pain, swelling, warmth, or redness. ? Red streaks leading from the incision. ? Pus draining from the incision. ? A fever. Watch closely for any changes in your health, and be sure to contact your doctor if:    · You don't get better as expected. Where can you learn more? Go to http://www.gray.com/  Enter [id number] in the search box to learn more about \"Angiogram: What to Expect at Home. \"  Current as of: June 17, 2021               Content Version: 13.2  © 6972-9989 [employer]. Care instructions adapted under license by [employer] (which disclaims liability or warranty for this information). If you have questions about a medical condition or this instruction, always ask your healthcare professional. [address] any warranty or liability for your use of this information.

## 2022-06-17 NOTE — PROGRESS NOTES
0930: pt HR bradyed down to 57    1000: Pt began complaining of chest discomfort, HR abnormal on monitor. 1015: Pt's cardiologist notified. This RN spoke with NP Glimar Sullivan, requested pt have EKG, taken to the ER, and serial troponin's drawn.      1100: pt taken to ED for cards evaluation

## 2022-06-21 ENCOUNTER — TELEPHONE (OUTPATIENT)
Dept: HEMATOLOGY | Age: 72
End: 2022-06-21

## 2022-06-21 NOTE — TELEPHONE ENCOUNTER
Called patient's daughter to check-in on patient and see how he was doing. Patient went to cardiologist on Monday and he said the patient is fine and probably had bradycardia due to the sedative that was given. Reviewed the Y-90 treatment date of 6/30 to arrive at 7am to inpatient registration. Reviewed precautions to take after patient comes home. She will call me with any issues or concerns.

## 2022-06-22 ENCOUNTER — DOCUMENTATION ONLY (OUTPATIENT)
Dept: HEMATOLOGY | Age: 72
End: 2022-06-22

## 2022-06-22 NOTE — PROGRESS NOTES
At request of patient's sister, I sent the following message to Dr. Jasson Alvarez via [employer]:  \"Patient's sister, Mian Yepez, asked me to let you know Mr. Sheryl Olmedo saw his Cardiologist, he is cleared for 6/30 Y-90 treatment, she believes bradycardia was caused by fentanyl and wants to request a lower dose or possibly different medication for the y-90 treatment\"

## 2022-06-30 ENCOUNTER — TELEPHONE (OUTPATIENT)
Dept: HEMATOLOGY | Age: 72
End: 2022-06-30

## 2022-06-30 NOTE — TELEPHONE ENCOUNTER
Called patient's sister Everton Burris to reschedule appt with Ms. Pedro White since the Y-90 treatment date was changed to 7/14, ok per Ms. Eugene to schedule patient 2 weeks after treatment - scheduled for August 2nd at [address] [facility]. Let Ms. Bhupinder Grimaldo know that we could draw her brothers lab work while he is here for the appt. She verbalized understanding. Mailed change in appt with reminder to patient's home.

## 2022-07-11 ENCOUNTER — TELEPHONE (OUTPATIENT)
Dept: HEMATOLOGY | Age: 72
End: 2022-07-11

## 2022-07-12 ENCOUNTER — TELEPHONE (OUTPATIENT)
Dept: HEMATOLOGY | Age: 72
End: 2022-07-12

## 2022-07-12 NOTE — TELEPHONE ENCOUNTER
Called IR and spoke to [place] to confirm that patient's Y-90 procedure was approved through his insurance. Called Raysanathan Jha back to let her know, patient can hold his Pletal for the procedure. Reminded Ms. Cony Cunningham to mention to the nursing staff that patient responded to the medication given during he mapping procedure with a low heart rate and ended up in the ER, let her know that I did send a message to Dr. Kenyatta Syed to let him know the outcome of patient's cardiology visit from this event - using less Fentanyl if possible will be best for him and his heart rate. She verbalized understanding.

## 2022-07-12 NOTE — TELEPHONE ENCOUNTER
Please contact patient's sister regarding the Y-90 procedure and if it will still be proceeding on 7/15/22 (previous note says the procedure date is 7/14/22) Please confirm the date w/Siria.

## 2022-07-15 ENCOUNTER — HOSPITAL ENCOUNTER (OUTPATIENT)
Dept: RADIATION THERAPY | Age: 72
Discharge: HOME | End: 2022-07-15
Payer: MEDICARE

## 2022-07-15 ENCOUNTER — HOSPITAL ENCOUNTER (OUTPATIENT)
Dept: INTERVENTIONAL RADIOLOGY/VASCULAR | Age: 72
Discharge: HOME | End: 2022-07-15
Attending: INTERNAL MEDICINE | Admitting: RADIOLOGY
Payer: MEDICARE

## 2022-07-15 VITALS
OXYGEN SATURATION: 93 % | HEIGHT: 71 IN | BODY MASS INDEX: 28 KG/M2 | RESPIRATION RATE: 18 BRPM | HEART RATE: 56 BPM | TEMPERATURE: 98.6 F | WEIGHT: 200 LBS | DIASTOLIC BLOOD PRESSURE: 57 MMHG | SYSTOLIC BLOOD PRESSURE: 99 MMHG

## 2022-07-15 DIAGNOSIS — C22.0 HEPATOCELLULAR CARCINOMA (HCC): ICD-10-CM

## 2022-07-15 PROCEDURE — 77030019698 HC SYR ANGI MDLON MRTM -A

## 2022-07-15 PROCEDURE — 77790 RADIATION HANDLING: CPT

## 2022-07-15 PROCEDURE — 74011000636 HC RX REV CODE- 636: Performed by: RADIOLOGY

## 2022-07-15 PROCEDURE — C1894 INTRO/SHEATH, NON-LASER: HCPCS

## 2022-07-15 PROCEDURE — C2616 BRACHYTX, NON-STR,YTTRIUM-90: HCPCS

## 2022-07-15 PROCEDURE — 37243 VASC EMBOLIZE/OCCLUDE ORGAN: CPT

## 2022-07-15 PROCEDURE — C1769 GUIDE WIRE: HCPCS

## 2022-07-15 PROCEDURE — 2709999900 HC NON-CHARGEABLE SUPPLY

## 2022-07-15 PROCEDURE — 77300 RADIATION THERAPY DOSE PLAN: CPT

## 2022-07-15 PROCEDURE — 77030014021 HC SYR ANGI FIX LOK MRTM -A

## 2022-07-15 PROCEDURE — C1887 CATHETER, GUIDING: HCPCS

## 2022-07-15 PROCEDURE — 74011000250 HC RX REV CODE- 250: Performed by: RADIOLOGY

## 2022-07-15 PROCEDURE — 74011250636 HC RX REV CODE- 250/636: Performed by: RADIOLOGY

## 2022-07-15 PROCEDURE — 77470 SPECIAL RADIATION TREATMENT: CPT

## 2022-07-15 RX ORDER — LIDOCAINE HYDROCHLORIDE 20 MG/ML
20 INJECTION, SOLUTION INFILTRATION; PERINEURAL
Status: COMPLETED | OUTPATIENT
Start: 2022-07-15 | End: 2022-07-15

## 2022-07-15 RX ORDER — FENTANYL CITRATE 50 UG/ML
200 INJECTION, SOLUTION INTRAMUSCULAR; INTRAVENOUS
Status: DISCONTINUED | OUTPATIENT
Start: 2022-07-15 | End: 2022-07-15 | Stop reason: HOSPADM

## 2022-07-15 RX ORDER — MIDAZOLAM HYDROCHLORIDE 1 MG/ML
5 INJECTION, SOLUTION INTRAMUSCULAR; INTRAVENOUS
Status: DISCONTINUED | OUTPATIENT
Start: 2022-07-15 | End: 2022-07-15 | Stop reason: HOSPADM

## 2022-07-15 RX ADMIN — CEFAZOLIN 2 G: 1 INJECTION, POWDER, FOR SOLUTION INTRAMUSCULAR; INTRAVENOUS at 08:10

## 2022-07-15 RX ADMIN — IOPAMIDOL 92 ML: 755 INJECTION, SOLUTION INTRAVENOUS at 09:00

## 2022-07-15 RX ADMIN — FENTANYL CITRATE 25 MCG: 50 INJECTION, SOLUTION INTRAMUSCULAR; INTRAVENOUS at 08:46

## 2022-07-15 RX ADMIN — FENTANYL CITRATE 25 MCG: 50 INJECTION, SOLUTION INTRAMUSCULAR; INTRAVENOUS at 09:02

## 2022-07-15 RX ADMIN — LIDOCAINE HYDROCHLORIDE 200 MG: 20 INJECTION, SOLUTION INFILTRATION; PERINEURAL at 08:00

## 2022-07-15 RX ADMIN — MIDAZOLAM 1 MG: 1 INJECTION INTRAMUSCULAR; INTRAVENOUS at 08:35

## 2022-07-15 RX ADMIN — MIDAZOLAM 2 MG: 1 INJECTION INTRAMUSCULAR; INTRAVENOUS at 09:00

## 2022-07-15 RX ADMIN — MIDAZOLAM 1 MG: 1 INJECTION INTRAMUSCULAR; INTRAVENOUS at 08:46

## 2022-07-15 RX ADMIN — FENTANYL CITRATE 50 MCG: 50 INJECTION, SOLUTION INTRAMUSCULAR; INTRAVENOUS at 08:35

## 2022-07-15 RX ADMIN — FENTANYL CITRATE 50 MCG: 50 INJECTION, SOLUTION INTRAMUSCULAR; INTRAVENOUS at 09:10

## 2022-07-15 NOTE — H&P
Attempted to reach parent.  The telephone line just rings and rings.  I am unable to leave a message.   Interventional and Vascular Radiology History and Physical    Patient: Celio Krueger Sr. 67 y.o. male       Chief Complaint: No chief complaint on file.       History of Present Illness: hepatoma     History:    Past Medical History:   Diagnosis Date    BPH     Cancer ([facility] 11/2009    prostate biopsy revealed cancer    Erectile dysfunction     Essential hypertension, benign     Hepatitis C 6/30/2009    HIV positive ([facility] 6/30/2009    Hypercholesterolemia     Ill-defined condition 9-2014    PNEUMONIA/bronchitis hx    Memory disorder     Prostate CA ([facility] 2/22/2010     Family History   Problem Relation Age of Onset    Hypertension Mother     Mental Retardation Mother     Depression Mother     Anxiety Mother     Liver Disease Father     Lung Disease Father     Diabetes Maternal Grandmother     Hypertension Other     Stroke Other     Anxiety Other     Depression Other      Social History     Socioeconomic History    Marital status: LEGALLY      Spouse name: Not on file    Number of children: Not on file    Years of education: Not on file    Highest education level: Not on file   Occupational History    Not on file   Tobacco Use    Smoking status: Current Every Day Smoker     Packs/day: 0.50     Years: 45.00     Pack years: 22.50     Types: Cigarettes    Smokeless tobacco: Never Used   Vaping Use    Vaping Use: Former    Substances: Nicotine   Substance and Sexual Activity    Alcohol use: No    Drug use: No     Types: Heroin, Prescription, Cocaine     Comment: none in past 20 years per pt on 5/2/17    Sexual activity: Yes     Partners: Female     Comment: seperated has 3 children   Other Topics Concern    Not on file   Social History Narrative    Not on file     Social Determinants of Health     Financial Resource Strain:     Difficulty of Paying Living Expenses: Not on file   Food Insecurity:     Worried About [address] in the Last Year: Not on file    [employer] in the Last Year: Not on file   Transportation Needs:     Lack of Transportation (Medical): Not on file    Lack of Transportation (Non-Medical): Not on file   Physical Activity:     Days of Exercise per Week: Not on file    Minutes of Exercise per Session: Not on file   Stress:     Feeling of Stress : Not on file   Social Connections:     Frequency of Communication with Friends and Family: Not on file    Frequency of Social Gatherings with Friends and Family: Not on file    Attends Jehovah's witness Services: Not on file    Active Member of [address] [place] [address] [employer] or Organizations: Not on file    Attends Club or Organization Meetings: Not on file    Marital Status: Not on file   Intimate Partner Violence:     Fear of Current or Ex-Partner: Not on file    Emotionally Abused: Not on file    Physically Abused: Not on file    Sexually Abused: Not on file   Housing Stability:     Unable to Pay for Housing in the Last Year: Not on file    Number of [place] in the Last Year: Not on file    Unstable Housing in the Last Year: Not on file       Allergies: No Known Allergies    Current Medications:  Current Facility-Administered Medications   Medication Dose Route Frequency    fentaNYL citrate (PF) injection 200 mcg  200 mcg IntraVENous Rad Multiple    midazolam (VERSED) injection 5 mg  5 mg IntraVENous Rad Multiple        Physical Exam:  Blood pressure (!) 99/57, pulse (!) 56, temperature 98.6 °F (37 °C), resp. rate 18, height 5' 11\" (1.803 m), weight 90.7 kg (200 lb), SpO2 93 %. LUNG: clear to auscultation bilaterally, HEART: regular rate and rhythm, S1, S2 normal, no murmur, click, rub or gallop      Alerts:    Hospital Problems  Date Reviewed: 1/3/2022    None          Laboratory:    No results for input(s): HGB, HCT, WBC, PLT, INR, BUN, CREA, K, CRCLT, HGBEXT, HCTEXT, PLTEXT, INREXT in the last 72 hours.     No lab exists for component: PTT, PT      Plan of Care/Planned Procedure:  Risks, benefits, and alternatives reviewed with patient and he agrees to proceed with the procedure. Conscious sedation will be performed with IV fentanyl and versed.  Plan is for liver y90 treatment       Kristie Wren MD

## 2022-07-15 NOTE — PROGRESS NOTES
Pt discharge instructions were given to pt by  RN. Opportunities for questions and concerns were provided. Pt verbalized understanding of medication use and follow-up. IV dc'd, Pt wheeled off unit in no signs of distress, steady gait noted.

## 2022-07-15 NOTE — ROUTINE PROCESS
Pt arrives ambulatory to angio department accompanied by sister for y-90  procedure. All assessments completed and consent was reviewed. Education given was regarding procedure, conscious sedation, post-procedure care and  management/follow-up. Opportunity for questions was provided and all questions and concerns were addressed.

## 2022-07-15 NOTE — DISCHARGE INSTRUCTIONS
[address] [facility]  Department of Interventional Radiology      Chemoembolization Discharge Instructions    General Information:  Chemoembolization is a procedure used to treat a tumor in your liver. Using angiography (a medical imaging technique) your doctor has inserted beads that have been soaked in a chemotherapy agent directly into the tumor. The goal of this therapy is to cut off the blood supply to the tumor and deliver the cancer killing drugs directly into the tumor, thus slowing growth. Home Care Instructions: You can resume your regular diet. Do not tub bathe, swim, drink alcohol, or make any important legal decisions for the next 48 hours. You may shower tomorrow. Do not lift anything heavier than a gallon of milk for 5 days. If you take Glucophage (metformin) for diabetes, do not take it for the next 48 hours. Over the next week you may experience abdominal pain, nausea, vomiting, and a low grade fever. Follow-Up Instructions:  Please see your ordering doctor as he/she has requested. Call If:   You should call your Physician and/or the Radiology Nurse if you have any signs of infection like fever, drainage, redness, and/or swelling. If the puncture site should ooze, please call. Also call if you have any pain, decreased sensation, numbness, tingling, swelling, or change in color to the affected extremity. SEEK IMMEDIATE MEDICAL CARE IF YOUR PUNCTURE SITE STARTS TO BLEED. APPLY ENOUGH FIRM PRESSURE TO THE SITE WITH THE TIPS OF YOUR FINGERS TO STOP THE BLEEDING. Arterial bleeding is a medical emergency and should be evaluated immediately. Call your doctor immediately if you develop a fever greater than 101.5°F, shaking, chills, or dizziness. Also notify your doctor if you develop severe right upper abdominal pain or nausea/vomiting and the symptoms are not relieved by medication.     To Reach Us:  Side effects of sedation medications and other medications used today have been reviewed. Notify us of nausea, itching, hives, dizziness, or anything else out of the ordinary. Should you experience any of these significant changes, please call [phone] between the hours of 7:30 am and 10 pm or [phone] after hours. After hours, ask the  to page the [address] Technologist, and describe the problem to the technologist.          Patient Signature:  Date: 7/15/2022  Discharging Nurse: Holly Hester RN     You have received sedation medications today. YOU SHOULD NOT DRIVE FOR 24 HOURS, DO NOT OPERATE HEAVY MACHINERY, DO NOT MAKE ANY LEGAL DECISIONS OR SIGN LEGAL DOCUMENTS FOR 24 HOURS. DO NOT DRINK ALCOHOL, TAKE ANY MEDICATIONS UNLESS PRESCRIBED BY YOUR DOCTOR. IF YOU ARE A CAREGIVER, SOMEONE SHOULD TAKE THAT ROLE FOR 24 HOURS. Side effects of sedation medications and other medications used today have been reviewed  Those side effects can include but are not limited to: dizziness, drowsiness, poor balance, fatigue, sleepiness. Take precautions at home to prevent falls, such as assistance with walking or stairs if allowed and /or when needed or position changes. Allergic or adverse reactions could include nausea, itching, hives, dizziness, or anything else out of the ordinary. Should you experience any of these significant changes, please call [phone] between the hours of 7:30 am and 3:30 pm or [phone] after hours. After hours, ask the  to page the X-ray Technologist, and describe the problem to the technologist. If you are experiencing chest pain, shortness of breath, altered mental state, unusual bleeding or any other emergent symptom you should call 911 immediately.

## 2022-07-15 NOTE — PROGRESS NOTES
----- Message from Cari Wray sent at 1/21/2022  9:39 AM CST -----  Contact: Sara christian [phone]  Requesting an RX refill or new RX.  Is this a refill or new RX: refill  RX name and strength:  amLODIPine (NORVASC) 2.5 MG tablet  Is this a 30 day or 90 day RX:   Patient advised that in the future they can use their [facility] account to request a refill?:  Patient advised that RX refills can take up to 72 hours?:yes  Pharmacy name and phone # [facility] [place], [place] - [id number]   Phone: [phone]  Comments:               Pt stated \"last time I had to go to the ED because of my heart rate\". Per pt, he followed up with cardiology and was cleared. Cardiology note 6/21.

## 2022-07-18 ENCOUNTER — TELEPHONE (OUTPATIENT)
Dept: HEMATOLOGY | Age: 72
End: 2022-07-18

## 2022-08-02 ENCOUNTER — OFFICE VISIT (OUTPATIENT)
Dept: HEMATOLOGY | Age: 72
End: 2022-08-02
Payer: MEDICARE

## 2022-08-02 VITALS
RESPIRATION RATE: 18 BRPM | HEART RATE: 71 BPM | HEIGHT: 71 IN | TEMPERATURE: 97.2 F | OXYGEN SATURATION: 98 % | DIASTOLIC BLOOD PRESSURE: 77 MMHG | SYSTOLIC BLOOD PRESSURE: 123 MMHG | BODY MASS INDEX: 27.58 KG/M2 | WEIGHT: 197 LBS

## 2022-08-02 DIAGNOSIS — K74.60 CIRRHOSIS OF LIVER WITHOUT ASCITES, UNSPECIFIED HEPATIC CIRRHOSIS TYPE (HCC): ICD-10-CM

## 2022-08-02 DIAGNOSIS — C22.0 HEPATOCELLULAR CARCINOMA (HCC): Primary | ICD-10-CM

## 2022-08-02 DIAGNOSIS — Z21 HIV POSITIVE (HCC): ICD-10-CM

## 2022-08-02 LAB
ALBUMIN SERPL-MCNC: 3.9 G/DL (ref 3.5–5)
ALBUMIN/GLOB SERPL: 1 {RATIO} (ref 1.1–2.2)
ALP SERPL-CCNC: 71 U/L (ref 45–117)
ALT SERPL-CCNC: 26 U/L (ref 12–78)
ANION GAP SERPL CALC-SCNC: 5 MMOL/L (ref 5–15)
AST SERPL-CCNC: 19 U/L (ref 15–37)
BILIRUB DIRECT SERPL-MCNC: 0.1 MG/DL (ref 0–0.2)
BILIRUB SERPL-MCNC: 0.4 MG/DL (ref 0.2–1)
BUN SERPL-MCNC: 12 MG/DL (ref 6–20)
BUN/CREAT SERPL: 18 (ref 12–20)
CALCIUM SERPL-MCNC: 9.7 MG/DL (ref 8.5–10.1)
CHLORIDE SERPL-SCNC: 107 MMOL/L (ref 97–108)
CO2 SERPL-SCNC: 26 MMOL/L (ref 21–32)
CREAT SERPL-MCNC: 0.68 MG/DL (ref 0.7–1.3)
ERYTHROCYTE [DISTWIDTH] IN BLOOD BY AUTOMATED COUNT: 13.8 % (ref 11.5–14.5)
GLOBULIN SER CALC-MCNC: 3.8 G/DL (ref 2–4)
GLUCOSE SERPL-MCNC: 79 MG/DL (ref 65–100)
HCT VFR BLD AUTO: 43.1 % (ref 36.6–50.3)
HGB BLD-MCNC: 14.3 G/DL (ref 12.1–17)
INR PPP: 1.1 (ref 0.9–1.1)
MCH RBC QN AUTO: 32.3 PG (ref 26–34)
MCHC RBC AUTO-ENTMCNC: 33.2 G/DL (ref 30–36.5)
MCV RBC AUTO: 97.3 FL (ref 80–99)
NRBC # BLD: 0 K/UL (ref 0–0.01)
NRBC BLD-RTO: 0 PER 100 WBC
PLATELET # BLD AUTO: 237 K/UL (ref 150–400)
PMV BLD AUTO: 9.3 FL (ref 8.9–12.9)
POTASSIUM SERPL-SCNC: 4.3 MMOL/L (ref 3.5–5.1)
PROT SERPL-MCNC: 7.7 G/DL (ref 6.4–8.2)
PROTHROMBIN TIME: 11.3 SEC (ref 9–11.1)
RBC # BLD AUTO: 4.43 M/UL (ref 4.1–5.7)
SODIUM SERPL-SCNC: 138 MMOL/L (ref 136–145)
WBC # BLD AUTO: 4.2 K/UL (ref 4.1–11.1)

## 2022-08-02 PROCEDURE — G8417 CALC BMI ABV UP PARAM F/U: HCPCS | Performed by: PHYSICIAN ASSISTANT

## 2022-08-02 PROCEDURE — 1123F ACP DISCUSS/DSCN MKR DOCD: CPT | Performed by: PHYSICIAN ASSISTANT

## 2022-08-02 PROCEDURE — G8754 DIAS BP LESS 90: HCPCS | Performed by: PHYSICIAN ASSISTANT

## 2022-08-02 PROCEDURE — G8432 DEP SCR NOT DOC, RNG: HCPCS | Performed by: PHYSICIAN ASSISTANT

## 2022-08-02 PROCEDURE — G0463 HOSPITAL OUTPT CLINIC VISIT: HCPCS | Performed by: PHYSICIAN ASSISTANT

## 2022-08-02 PROCEDURE — 1101F PT FALLS ASSESS-DOCD LE1/YR: CPT | Performed by: PHYSICIAN ASSISTANT

## 2022-08-02 PROCEDURE — 3017F COLORECTAL CA SCREEN DOC REV: CPT | Performed by: PHYSICIAN ASSISTANT

## 2022-08-02 PROCEDURE — G8536 NO DOC ELDER MAL SCRN: HCPCS | Performed by: PHYSICIAN ASSISTANT

## 2022-08-02 PROCEDURE — G8427 DOCREV CUR MEDS BY ELIG CLIN: HCPCS | Performed by: PHYSICIAN ASSISTANT

## 2022-08-02 PROCEDURE — G8752 SYS BP LESS 140: HCPCS | Performed by: PHYSICIAN ASSISTANT

## 2022-08-02 PROCEDURE — 99215 OFFICE O/P EST HI 40 MIN: CPT | Performed by: PHYSICIAN ASSISTANT

## 2022-08-02 NOTE — PROGRESS NOTES
Identified pt with two pt identifiers(name and ). Reviewed record in preparation for visit and have obtained necessary documentation. Chief Complaint   Patient presents with    Follow-up      Vitals:    22 0804   BP: 123/77   Pulse: 71   Resp: 18   Temp: 97.2 °F (36.2 °C)   TempSrc: Temporal   SpO2: 98%   Weight: 197 lb (89.4 kg)   Height: 5' 11\" (1.803 m)   PainSc:   0 - No pain       Health Maintenance Review: Patient reminded of \"due or due soon\" health maintenance. I have asked the patient to contact his/her primary care provider (PCP) for follow-up on his/her health maintenance. Coordination of Care Questionnaire:  :   1) Have you been to an emergency room, urgent care, or hospitalized since your last visit? If yes, where when, and reason for visit? no       2. Have seen or consulted any other health care provider since your last visit? If yes, where when, and reason for visit? NO      Patient is accompanied by self I have received verbal consent from  Divya Aquino. to discuss any/all medical information while they are present in the room.

## 2022-08-02 NOTE — PROGRESS NOTES
Identified pt with two pt identifiers(name and ). Reviewed record in preparation for visit and have obtained necessary documentation. No chief complaint on file. There were no vitals filed for this visit. Health Maintenance Review: Patient reminded of \"due or due soon\" health maintenance. I have asked the patient to contact his/her primary care provider (PCP) for follow-up on his/her health maintenance. Coordination of Care Questionnaire:  :   1) Have you been to an emergency room, urgent care, or hospitalized since your last visit? If yes, where when, and reason for visit? yes       2. Have seen or consulted any other health care provider since your last visit? If yes, where when, and reason for visit? YES      Patient is accompanied by self I have received verbal consent from Stephen Aquino. to discuss any/all medical information while they are present in the room.

## 2022-08-02 NOTE — PROGRESS NOTES
[address] [facility], Ingrid DASH Darvin Bushman, MD Ree Massy, PA-C Rebecca Purdue, [facility]     Cristela Urrutia, [facility]   Jennifer Dao [facility]    Kayla Calle, [facility]       Valentin Deputado [address]    at [address], [address] [place] [address], [facility] 22.    [phone]    FAX: [address] [place] [address], [address], [address]    [phone]    FAX: [phone]     Patient Care Team:  Henrietta Torres MD as PCP - Gabby Robles MD as PCP - [facility]  Pilo Ma MD (Infectious Disease Physician)  Donta Rojas MD as Surgeon (General Surgery)  Sophronia Dubin, MD (Gastroenterology)  Yasmin Kay MD (Cardiovascular Disease Physician)  Riana Barraza MD (Cardiovascular Disease Physician)  Ayesha Cardenas MD (Urology)  Michelle Ruiz RN as Care Coordinator  Candy Jerilyn, [address] Br[address] Armstrong as Physician Assistant (Hepatology)     Patient Active Problem List   Diagnosis Code    Chronic hepatitis C ([facility] B18.2    HIV positive ([address] Z21    Weight loss, non-intentional R63.4    Prostate cancer ([address] C61    HTN (hypertension) I10    Colon polyp K63.5    Advanced care planning/counseling discussion Z71.89    Tobacco use Z72.0    PAD (peripheral artery disease) ([address] I73.9    Dyslipidemia E78.5    Lung nodule R91.1    Liver disease, chronic, with cirrhosis ([address] K74.60, K76.9       Ángel Reyna. presents to the [address] for management of cirrhosis due to successfully cleared chronic HCV and HIV co-infection.  The active problem list, all pertinent past medical history, medications, radiologic findings and laboratory findings related to the liver disorder were reviewed with the patient. Patient has completed 12 weeks of HCV treatment with Harvoni (5/29/2015-8/25/2015). He is a sustained virologic responder to treatment, or cured. The patient underwent a liver biopsy in 3/2015. This demonstrates severe hepatitis with bridging fibrosis. Patient has had post-HCV clearance Fibroscan to reassess liver fibrosis or scarring after successful HCV treatment. This revealed a score of 12.2. Suggested fibrosis level is F3. CAP score is 361, suggestive of significant hepatic steatosis. Ultrasound of the liver was performed in 12/2014. This suggests chronic liver disease. A 0.8 x 0.7 lesion was identified in the right lobe. These lesion has been followed with different radiographic methods over the past several years and there is apparent growth of this lesion to current 2.2 cm. It was not definitve that this represents the same lesion and there have been no radiographic signs suggestive of HCC. AFP has been followed and had not been elevated. He has had cross sectional imaging with CT in 6/2021 and this showed no enhancement or characteristics consistent with [address] and no changes in size on US in 12/2021. Lab studies in 5/2022 showed a massive rise in AFP to ~1000 and he was sent for MRI which showed a LIRADS 5 lesion, 2.8 cm in the right lobe. He underwent mapping and CT chest and bone scan. This demonstrated solitary lesion amenable to TARE and no metastases. He has had Y-90 TARE on 7/15/2022 and tolerated this procedure reasonably well. He has had 2-3 days of some groin pain at the access site and mild nausea, both of which have resolved. He has felt a little sluggish but overall is doing well. Patient is co-infected with HIV. He is currently not on anti-retroviral therapy because his virus remains suppressed to undetected levels.  He is not currently being monitored by anyone for HIV at present, it has been about 1 year since his last assessment and he has been following with his PCP. Patient was treated for prostate cancer ~5 years ago and continues to have low PSA and no signs of recurrence. He has had appt with Dr Micki Dick and the last PSA lab in 5/2022 showed a score of 0.4. He has pending urology evaluation. He continues to have intermittent claudication symptoms effecting the LLE. He has seen cardiology and is treated with Pletal, this has helped significantly and he has avoided surgery. He is still smoking 1/2-1 PPD. He is due for routine evaluation with pulmonary associates in the near future. The patient completes all daily activities without any functional limitations. The patient has not experienced arthralgias, myalgias, problems concentrating, swelling of the abdomen, swelling of the lower extremities, hematemesis, or hematochezia. Patient relates that he is due to have evaluation of lungs with pulmonologist, this is due to his smoking history. There was no signs of concerning nodule or metastases. Patient is scheduled to have EGD pending for 10/2022. This is to evaluate past esophagitis. ALLERGIES  No Known Allergies    MEDICATIONS  Current Outpatient Medications   Medication Sig    LORazepam (ATIVAN) 0.5 mg tablet Take 1 Tablet by mouth as needed for Anxiety. Max Daily Amount: 48 mg. Take 1 tab PO 1 hour prior to procedure, may repeat at procedure time if needed. cilostazoL (PLETAL) 50 mg tablet TAKE 1 TABLET BY MOUTH BEFORE BREAKFAST AND BEFORE SUPPER    buPROPion XL (WELLBUTRIN XL) 150 mg tablet Take 1 Tablet by mouth every morning. rosuvastatin (CRESTOR) 10 mg tablet Take 1 tablet by mouth nightly    lisinopriL (PRINIVIL, ZESTRIL) 20 mg tablet Take 1 tablet by mouth once daily    Spiriva Respimat 2.5 mcg/actuation inhaler INHALE 2 PUFFS BY MOUTH ONCE DAILY    cyanocobalamin (VITAMIN B12) 500 mcg tablet Take 500 mcg by mouth daily. GARLIC PO Take  by mouth. omega 3-dha-epa-fish oil 100-160-1,000 mg cap Take  by mouth daily. cholecalciferol (VITAMIN D3) 25 mcg (1,000 unit) cap Take  by mouth daily. ascorbic acid, vitamin C, (VITAMIN C) 500 mg tablet Take 1,000 mg by mouth daily. naproxen sodium (NAPROSYN) 220 mg tablet Take 220 mg by mouth as needed. MULTIVITAMINS (MULTIVITAMIN PO) Take  by mouth daily. No current facility-administered medications for this visit. REVIEW OF SYSTEMS NOT RELATED TO LIVER DISEASE:  Constitution systems: Negative for fever, chills, weight gain, weight loss. Eyes: Negative for diplopia, visual changes, eye pain. ENT: Negative for sore throat, painful swallowing. Respiratory: Negative for cough, hemoptysis, dyspnea. Cardiology: Negative for chest pain, palpitations. GI:  Negative for constipation or diarrhea. : Negative for urinary frequency, dysuria and hematuria. Skin: Negative for rash. Hematology: Negative for easy bruising, bleeding from gums or nose. Musculo-skeletal: Negative for back pain. Positive for muscle pain, weakness of the LLE with walking distances. Neurologic: Negative for headaches, dizziness, vertigo, memory problems. Psychology: Negative for anxiety, depression. FAMILY HISTORY:  The father  of alcohol cirrhosis. The mother  of CVA. There are no other persons in the family with HCV or liver disease. SOCIAL HISTORY:  The patient is . The spouse has been tested for HCV and is positive. She was treated and achieved SVR. The patient has 3 children, and 7 grandchildren. The patient stopped using tobacco products in 2014. Resumed in  and is now smoking 1/2,  PPD. The patient has never consumed significant amounts of alcohol. The patient used to work in construction, still active and working odd jobs. PHYSICAL EXAMINATION:  VS: per nursing note. General: No acute distress. Eyes: Sclera anicteric. ENT: No oral lesions. Skin: No rashes. spider angiomata. No jaundice.     Abdomen: No obvious distention suggesting ascites. Extremities: No edema. No muscle wasting. Neurologic: Alert and oriented. Cranial nerves grossly intact. No asterixis. LABORATORY STUDIES:  Liver [place] [address] Units 6/17/2022 5/4/2022 6/28/2021   WBC 4.1 - 11.1 K/uL 6.8 5.3 6.6   ANC 1.8 - 8.0 K/UL 5.2  3.6   HGB 12.1 - 17.0 g/dL 13.2 13.4 14.4    - 400 K/uL 200 224 217   INR 0.9 - 1.2  1.1 1.1   AST 15 - 37 U/L 19 21 22   ALT 12 - 78 U/L 24 8 16   Alk Phos 45 - 117 U/L 56 60 59   Bili, Total 0.2 - 1.0 MG/DL 0.7 0.3 0.5   Bili, Direct 0.00 - 0.40 mg/dL  0.10 0.16   Albumin 3.5 - 5.0 g/dL 3.9 4.4 4.8 (H)   BUN 6 - 20 MG/DL 15 12 18   Creat 0.70 - 1.30 MG/DL 0.71 0.76 0.82   Creat (iSTAT) 0.6 - 1.3 mg/dL      Na 136 - 145 mmol/L 135 (L) 139 138   K 3.5 - 5.1 mmol/L 4.4 3.9 4.0   Cl 97 - 108 mmol/L 106 101 103   CO2 21 - 32 mmol/L 23 17 (L) 22   Glucose 65 - 100 mg/dL 95 100 (H) 91     Cancer Screening Latest Ref Rng & Units 5/4/2022 6/28/2021 7/9/2020   AFP, Serum 0.0 - 8.4 ng/mL 1,070.1 (H) 7.1 6.9   AFP-L3% 0.0 - 9.9 % 54.4 (H) 5.1 Comment   CA 19-9 0 - 35 U/mL 7 5    CEA 0.0 - 4.7 ng/mL 2.7 2.7    Additional lab values will be drawn following today's office visit. SEROLOGIES:  Serologies Latest Ref Rng 10/10/2014 9/1/2014 8/27/2014   Hep A Ab, Total Negative Positive (A)     Hep B Surface Ag Negative Negative     Hep B Core Ab, Total Negative Positive (A)     Hep C Genotype See Note 1a     HCV RT-PCR, Quant  [id number]  [id number]   HCV Log10    6.830   Ferritin 30 - 400 ng/mL 172     Iron % Saturation 15 - 55 % 22     C-ANCA Neg:<1:20 titer  <1:20    P-ANCA Neg:<1:20 titer  <1:20    ANCA Neg:<1:20 titer  <1:20      LIVER HISTOLOGY:  3/2015. Slides reviewed by MLS. Severe hepatitis with bridging fibrosis and possible cirrhosis. Knodell score (3,3,3,3), Bro score 4, Metavir score 3.  2/2017. FibroScan performed at [facility] [place]. EkPa was 10.0.  Suggested fibrosis level is F3.  2/2018. FibroScan performed at [address]. EkPa was 11.7. Suggested fibrosis level is F3.  2/2019. FibroScan performed at [address]. EkPa was 12.2. Suggested fibrosis level is F3. CAP score is 361, suggestive of significant hepatic steatosis. ENDOSCOPIC PROCEDURES:  Not available or performed    RADIOLOGY:  12/2014. Ultrasound of liver. Echogenic consistent with chronic liver disease. 0.8x0.7 cm lesion right lobe. No dilated bile ducts. No ascites. 1/2015. Dynamic MRI liver. Changes consistent with chronic liver disease. No liver mass lesions. No dilated bile ducts. No bile duct strictures. No ascites. 11/2017. Ultrasound of liver. Cirrhotic morphology of the liver is again demonstrated. 12 mm echogenic nodule right hepatic lobe. This does not appear to reside in the same location as the prior smaller hyperechoic nodule. This may represent a hemangioma. 11/2018. Triple phase CT. The central right lobe hepatic mass shown to be quite evident on ultrasound and echogenic but very subtle on MRI is very slightly hypoenhancing on CT and most evident on equilibrium phase. The margins are not clearly delineated so accurate measurement is difficult but the lesion is similarly sized compared to prior recent ultrasound measuring about 1.1 x 1.6 cm. No other hepatic lesions are seen in the liver redemonstrates subtle changes cirrhosis. 5/2019. Ultrasound of liver. Diffusely echogenic and heterogeneous. An echogenic lesion measures 1.5 x 2.0 x 1.9 cm, previously 1.6 x 1.1 cm on CT and 1.3 x 1.8 x 1.8 cm on ultrasound. 6/2021. CT abdomen with and without contrast. Known right lobe liver lesion is only faintly hypodense on the equilibrium phase and appears unchanged from prior imaging. No arterial hyperenhancement or other features to suggest hepatocellular carcinoma. 12/2021. Ultrasound of liver. Hepatic cirrhosis, with a focal echogenic mass in the right hepatic lobe.  Its size has increased to 2.3 x 1.9 x 1.4 cm, compared to an older ultrasound of 0.8 x 0.7 x 0.7 cm. The most recent prior ultrasound in 2019 showed a size of 2.0 x 1.9 x 1.5 cm. If further evaluation of this slowly enlarging hepatic mass is planned, three-phase dedicated hepatic MRI would be recommended. 2022. MRI abdomen. Previously noted lesion now demonstrates arterial enhancement with patchy areas of washout. The lesion is likely increase in size although was more difficult to visualize on previous studies. LIRADS 5.    OTHER TESTIN2022. Bone scan. No evidence of bony metastases. 2022. CT chest. No evidence for metastatic disease to the chest. Enhancing liver lesion in the right lobe of liver. Prominent calcified disc osteophyte complex at the T9-10. ASSESSMENT AND PLAN:  [address] Patient was treated for 2.8 cm lesion in the right lobe with single course of Y-90 TARE in 2022. This lesion had been noted on imaging for some time, but had not had definitive characteristics of [address] until sharp rise in AFP. He has tolerated this course of therapy reasonably well and I have outlined for him the plan to monitor las today and to continue with imaging studies every 3 months by MRI. I have ordered this for 10/2022 and we will keep a close surveillance. 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 This degree of fibrosis was confirmed with past FibroScan 2019. He has preserved liver function. Will continue to monitor patient regularly and continue to monitor for complications of advanced fibrosis/cirrhosis. HCV treatment. Completed 12 weeks of Harvoni treatment in 2015. He is cured. HIV co-infection. He has low levels of HIV RNA. He is not taking anti-HIV medications. He follows with PCP and ID intermittently, I have encouraged him to re-establish and we have rechecked viral titer, I am unable to recheck CD4 in the office. Prostate cancer history.    He has had [place] [id number] with PSA of 0.4. I have encouraged him to follow-up per urology. The patient was directed to continue all current medications at the current dosages. There are no contraindications for the patient to take any medications that are necessary for treatment of other medical issues. The patient was counseled regarding alcohol consumption. The patient was counseled regarding use of illicit drugs. Vaccination for viral hepatitis A and B is not required. The patient has serologic evidence of prior exposure or vaccination with immunity. FOLLOW-UP:  All of the issues listed above in the Assessment and Plan were discussed with the patient. All questions were answered. The patient expressed a clear understanding of the above. Follow-up [address] in 3 months with repeat MRI in early 10/2022. Documentation reviewed and updated to reflect current, accurate patient information.     Bascom Kussmaul, PA-C  Liver [facility], [address] [facility] [address]  [phone]  [address]

## 2022-08-04 LAB
HIV1 RNA # SERPL NAA+PROBE: <20 COPIES/ML
HIV1 RNA SERPL NAA+PROBE-LOG#: NORMAL LOG10COPY/ML

## 2022-08-05 ENCOUNTER — TELEPHONE (OUTPATIENT)
Dept: HEMATOLOGY | Age: 72
End: 2022-08-05

## 2022-08-05 NOTE — TELEPHONE ENCOUNTER
Patient called her to see what the liquid was for he received. Advised we dd not prescribe anything  She asked if we would call the patient. Called patient lm on home number. Called mobile/sister and spoke with his sister she states he's having a colonoscopy in October and they sent the prep fluid early.   She will reach out to the pharmacy

## 2022-08-09 LAB
AFP L3 MFR SERPL: NORMAL % (ref 0–9.9)
AFP SERPL-MCNC: 2.6 NG/ML (ref 0–8.4)

## 2022-08-09 NOTE — PROGRESS NOTES
Pt notified of stable labs and dramatic reduction in AFP, will proceed with MRI in 10/2022 as discussed.

## 2022-09-07 ENCOUNTER — OFFICE VISIT (OUTPATIENT)
Dept: INTERNAL MEDICINE CLINIC | Age: 72
End: 2022-09-07
Payer: MEDICARE

## 2022-09-07 VITALS
BODY MASS INDEX: 27.02 KG/M2 | SYSTOLIC BLOOD PRESSURE: 120 MMHG | TEMPERATURE: 98.1 F | WEIGHT: 193 LBS | RESPIRATION RATE: 16 BRPM | DIASTOLIC BLOOD PRESSURE: 70 MMHG | HEART RATE: 80 BPM | OXYGEN SATURATION: 96 % | HEIGHT: 71 IN

## 2022-09-07 DIAGNOSIS — C61 PROSTATE CANCER (HCC): ICD-10-CM

## 2022-09-07 DIAGNOSIS — E78.5 HYPERLIPIDEMIA, UNSPECIFIED HYPERLIPIDEMIA TYPE: ICD-10-CM

## 2022-09-07 DIAGNOSIS — J44.9 CHRONIC OBSTRUCTIVE PULMONARY DISEASE, UNSPECIFIED COPD TYPE (HCC): ICD-10-CM

## 2022-09-07 DIAGNOSIS — I73.9 PAD (PERIPHERAL ARTERY DISEASE) (HCC): ICD-10-CM

## 2022-09-07 DIAGNOSIS — C22.0 HEPATOCELLULAR CARCINOMA (HCC): ICD-10-CM

## 2022-09-07 DIAGNOSIS — B20 HIV INFECTION, UNSPECIFIED SYMPTOM STATUS (HCC): Primary | ICD-10-CM

## 2022-09-07 DIAGNOSIS — I10 HYPERTENSION, UNSPECIFIED TYPE: ICD-10-CM

## 2022-09-07 PROCEDURE — G0439 PPPS, SUBSEQ VISIT: HCPCS | Performed by: INTERNAL MEDICINE

## 2022-09-07 PROCEDURE — G8510 SCR DEP NEG, NO PLAN REQD: HCPCS | Performed by: INTERNAL MEDICINE

## 2022-09-07 PROCEDURE — G8752 SYS BP LESS 140: HCPCS | Performed by: INTERNAL MEDICINE

## 2022-09-07 PROCEDURE — G8417 CALC BMI ABV UP PARAM F/U: HCPCS | Performed by: INTERNAL MEDICINE

## 2022-09-07 PROCEDURE — G8754 DIAS BP LESS 90: HCPCS | Performed by: INTERNAL MEDICINE

## 2022-09-07 PROCEDURE — 3017F COLORECTAL CA SCREEN DOC REV: CPT | Performed by: INTERNAL MEDICINE

## 2022-09-07 PROCEDURE — G8536 NO DOC ELDER MAL SCRN: HCPCS | Performed by: INTERNAL MEDICINE

## 2022-09-07 PROCEDURE — G8427 DOCREV CUR MEDS BY ELIG CLIN: HCPCS | Performed by: INTERNAL MEDICINE

## 2022-09-07 PROCEDURE — 1101F PT FALLS ASSESS-DOCD LE1/YR: CPT | Performed by: INTERNAL MEDICINE

## 2022-09-07 NOTE — PROGRESS NOTES
HISTORY OF PRESENT ILLNESS  Eliezer Heart is a 67 y.o. male. HPI     Hx HTN and HLD, hx prostate cancer s/p XRT with low level PSA , hep c treated, liver fibrosis-cirrhosis, HIV coinfection and  PAD 4mm right lung nodule , smoker and medicare wellness----  Had recent Chest CT for HCC-no chest mets or nodules  S/p recent radioembolization of right lobe HCC-followed by liver inst-will need fu MRI q3 months for 1 yr per pt report  CARD for PAD -Dr Anthony Yuan about 10 lbs this summer but working a lot-contractor and painting  Anecarlos Yu MD   Not seeing SKYLAR DASH now-saw Dr Anette Peña in 2019    Some chills last night. Wsa dizzy labor -better after resting most of the day in bed  Last Kyleigh Barron MD fu prostate cancer--will see Dr Dorita Castillo in June per pt  Has fu liver inst for fibrosis/cirrhosis due to hep c treated, slow growth liver lesion-followed by imaging not c/w HCC  Hx hiv confection suppressed viral load--  Saw SKYLAR DASH near [place]-pt declined medication since undetectable VL and expense of medication  stoppedwellbutrin for tobacco cessation-still smokes 1/2 ppd tobacco  Saw Chaitanya DASH and had xr or CT a few months ago per pt at [facility].   On pletal for left leg PAD--Dr Nancy Roca  Hx 4 mm right lung nodule s/p bronch -no luminal defect--missed fu with chaitanya Enamorado earlier this year---has 6 months fu per pt    Patient Active Problem List    Diagnosis Date Noted    Liver disease, chronic, with cirrhosis ([facility] 03/09/2021    Lung nodule 10/28/2019    Tobacco use 12/06/2016    PAD (peripheral artery disease) ([facility] 12/06/2016    Dyslipidemia 12/06/2016    Advanced care planning/counseling discussion 09/13/2016    Colon polyp 09/18/2015    HTN (hypertension) 01/27/2011    Prostate cancer ([address] 02/22/2010    Chronic hepatitis C ([facility] 06/30/2009    HIV positive ([facility] 06/30/2009    Weight loss, non-intentional 06/30/2009     Current Outpatient Medications   Medication Sig Dispense Refill    lisinopriL (PRINIVIL, ZESTRIL) 20 mg tablet Take 1 tablet by mouth once daily 90 Tablet 3    LORazepam (ATIVAN) 0.5 mg tablet Take 1 Tablet by mouth as needed for Anxiety. Max Daily Amount: 48 mg. Take 1 tab PO 1 hour prior to procedure, may repeat at procedure time if needed. 6 Tablet 0    cilostazoL (PLETAL) 50 mg tablet TAKE 1 TABLET BY MOUTH BEFORE BREAKFAST AND BEFORE SUPPER 60 Tablet 0    buPROPion XL (WELLBUTRIN XL) 150 mg tablet Take 1 Tablet by mouth every morning. 90 Tablet 1    rosuvastatin (CRESTOR) 10 mg tablet Take 1 tablet by mouth nightly 90 Tablet 3    Spiriva Respimat 2.5 mcg/actuation inhaler INHALE 2 PUFFS BY MOUTH ONCE DAILY      cyanocobalamin (VITAMIN B12) 500 mcg tablet Take 500 mcg by mouth daily. GARLIC PO Take  by mouth. omega 3-dha-epa-fish oil 100-160-1,000 mg cap Take  by mouth daily. cholecalciferol (VITAMIN D3) 25 mcg (1,000 unit) cap Take  by mouth daily. ascorbic acid, vitamin C, (VITAMIN C) 500 mg tablet Take 1,000 mg by mouth daily. naproxen sodium (NAPROSYN) 220 mg tablet Take 220 mg by mouth as needed. MULTIVITAMINS (MULTIVITAMIN PO) Take  by mouth daily.        No Known Allergies   Lab Results   Component Value Date/Time    WBC 4.2 08/02/2022 09:09 AM    HGB 14.3 08/02/2022 09:09 AM    Hemoglobin (POC) 16.0 05/02/2017 07:50 AM    HCT 43.1 08/02/2022 09:09 AM    Hematocrit (POC) 47 05/02/2017 07:50 AM    PLATELET 544 71/21/4873 09:09 AM    MCV 97.3 08/02/2022 09:09 AM     Lab Results   Component Value Date/Time    Hemoglobin A1c 5.4 12/05/2019 12:00 AM    Glucose 79 08/02/2022 09:09 AM    Glucose (POC) 98 05/02/2017 07:50 AM    Glucose (POC) 95 09/04/2014 12:18 PM    LDL, calculated 122 (H) 10/22/2020 09:13 AM    LDL, calculated 98 05/02/2019 09:08 AM    Creatinine (POC) 0.90 06/04/2021 09:03 AM    Creatinine 0.68 (L) 08/02/2022 09:09 AM      Lab Results   Component Value Date/Time    Cholesterol, total 178 10/22/2020 09:13 AM    HDL Cholesterol 39 (L) 10/22/2020 09:13 AM    LDL, calculated 122 (H) 10/22/2020 09:13 AM    LDL, calculated 98 05/02/2019 09:08 AM    Triglyceride 92 10/22/2020 09:13 AM    CHOL/HDL Ratio 4.0 08/24/2014 05:06 AM     Lab Results   Component Value Date/Time    GFR est non-AA >60 08/02/2022 09:09 AM    GFRNA, POC >60 06/04/2021 09:03 AM    GFR est AA >60 08/02/2022 09:09 AM    GFRAA, POC >60 06/04/2021 09:03 AM    Creatinine 0.68 (L) 08/02/2022 09:09 AM    Creatinine (POC) 0.90 06/04/2021 09:03 AM    BUN 12 08/02/2022 09:09 AM    BUN (POC) 14 05/02/2017 07:50 AM    Sodium 138 08/02/2022 09:09 AM    Sodium (POC) 141 05/02/2017 07:50 AM    Potassium 4.3 08/02/2022 09:09 AM    Potassium (POC) 4.0 05/02/2017 07:50 AM    Chloride 107 08/02/2022 09:09 AM    Chloride (POC) 102 05/02/2017 07:50 AM    CO2 26 08/02/2022 09:09 AM    Magnesium 1.7 09/07/2014 12:32 AM    Phosphorus 3.3 09/03/2014 04:47 AM     Lab Results   Component Value Date/Time    Prostate Specific Ag 0.4 05/04/2022 08:48 AM    Prostate Specific Ag 0.5 07/09/2020 08:49 AM    Prostate Specific Ag 1.5 08/24/2014 05:06 AM    Prostate Specific Ag 2.1 01/06/2011 11:55 AM    Prostate Specific Ag 2.1 01/06/2011 03:30 AM     Lab Results   Component Value Date/Time    TSH 0.999 05/02/2019 09:06 AM      Lab Results   Component Value Date/Time    Glucose 79 08/02/2022 09:09 AM    Glucose (POC) 98 05/02/2017 07:50 AM    Glucose (POC) 95 09/04/2014 12:18 PM         ROS    Physical Exam  Vitals and nursing note reviewed. Constitutional:       General: He is not in acute distress. Appearance: Normal appearance. He is well-developed. Comments: Appears stated age   HENT:      Head: Normocephalic. Cardiovascular:      Rate and Rhythm: Normal rate and regular rhythm. Heart sounds: Normal heart sounds. Pulmonary:      Effort: Pulmonary effort is normal.      Breath sounds: Normal breath sounds. Abdominal:      Palpations: Abdomen is soft. Neurological:      Mental Status: He is alert.        ASSESSMENT and PLAN ICD-10-CM ICD-9-CM    1. HIV infection, unspecified symptom status ([facility]  B20 V08       2. Hepatocellular carcinoma (HCC)  C22.0 155.0       3. Chronic obstructive pulmonary disease, unspecified COPD type ([facility]  J44.9 496       4. PAD (peripheral artery disease) (HCC)  I73.9 443.9       5. Hypertension, unspecified type  I10 401.9       6. Prostate cancer [facility])  C61 185       This is the Subsequent Medicare Annual Wellness Exam, performed 12 months or more after the Initial AWV or the last Subsequent AWV    I have reviewed the patient's medical history in detail and updated the computerized patient record. Assessment/Plan   Education and counseling provided:  Are appropriate based on today's review and evaluation  Flu shot  and covid booster recommended. 1. HIV infection, unspecified symptom status (HCC)-advised fu again with ID MD  2. Hepatocellular carcinoma (HCC)-s/o TARE-close fu liver inst  3. Chronic obstructive pulmonary disease, unspecified COPD type (HCC)-stabkle. Advised tobacco cessation and fu PUlm MD  4. PAD (peripheral artery disease) (HCC)-stable  5. Hypertension, unspecified type-controlled  6. Prostate cancer (HCC)-fu URO   7. HLD-on statin-lipid and tsh ordered       Depression Risk Factor Screening     3 most recent PHQ Screens 9/7/2022   PHQ Not Done -   Little interest or pleasure in doing things Not at all   Feeling down, depressed, irritable, or hopeless Not at all   Total Score PHQ 2 0       Alcohol & Drug Abuse Risk Screen    Do you average more than 1 drink per night or more than 7 drinks a week: No    In the past three months have you have had more than 4 drinks containing alcohol on one occasion: No          Functional Ability and Level of Safety    Hearing: The patient needs further evaluation. Activities of Daily Living: The home contains: no safety equipment.   Patient does total self care      Ambulation: with no difficulty     Fall Risk:  Fall Risk Assessment, last 15 mths 9/7/2022   Able to walk? Yes   Fall in past 12 months? 0   Do you feel unsteady?  0   Are you worried about falling 0      Abuse Screen:  Patient is not abused       Cognitive Screening    Has your family/caregiver stated any concerns about your memory: no     Cognitive Screening: Normal - Verbal Fluency Test    Health Maintenance Due     Health Maintenance Due   Topic Date Due    Shingrix Vaccine Age 49> (1 of 2) Never done    Low dose CT lung screening  09/18/2019    Medicare Yearly Exam  05/02/2020    Lipid Screen  10/22/2021    COVID-19 Vaccine (4 - Booster for Ranjit Lords series) 02/27/2022    Flu Vaccine (1) 09/01/2022       Patient Care Team   Patient Care Team:  Kyung Flores MD as PCP - Eugenia Bae MD as PCP - [facility] Provider  Garcia Moreno MD (Infectious Disease Physician)  Sailaja Louise MD as Surgeon (General Surgery)  Nat Schultz MD (Gastroenterology)  Anurag Stallworth MD (Cardiovascular Disease Physician)  Nicol Tolentino MD (Cardiovascular Disease Physician)  Demetrius Dempsey MD (Urology)  Kianna Krueger RN as Care Coordinator  Dandre Cervantes as Physician Assistant (Hepatology)    History     Patient Active Problem List   Diagnosis Code    Chronic hepatitis C ([address] B18.2    HIV positive ([address] Z21    Weight loss, non-intentional R63.4    Prostate cancer ([address] C61    HTN (hypertension) I10    Colon polyp K63.5    Advanced care planning/counseling discussion Z71.89    Tobacco use Z72.0    PAD (peripheral artery disease) ([address] I73.9    Dyslipidemia E78.5    Lung nodule R91.1    Liver disease, chronic, with cirrhosis ([address] K74.60, K76.9     Past Medical History:   Diagnosis Date    BPH     Cancer ([address] 11/2009    prostate biopsy revealed cancer    Erectile dysfunction     Essential hypertension, benign     Hepatitis C 6/30/2009    HIV positive ([address] 6/30/2009    Hypercholesterolemia     Ill-defined condition 9-2014    PNEUMONIA/bronchitis hx Memory disorder     [facility] 2/22/2010      Past Surgical History:   Procedure Laterality Date    COLONOSCOPY,REMV LESN,SNARE  8/31/2015         HX HEENT      gum surgery-for denture    HX HERNIA REPAIR       Inguinal Hernia Repair    HX HERNIA REPAIR  05/02/2017    Davinci recurrent RIHR with mesh    HX SKIN BIOPSY  11/16/15    left forearm/ upper arm biopsy     IR OCCL TXCATH ORGAN W SI  7/15/2022     Current Outpatient Medications   Medication Sig Dispense Refill    lisinopriL (PRINIVIL, ZESTRIL) 20 mg tablet Take 1 tablet by mouth once daily 90 Tablet 3    cilostazoL (PLETAL) 50 mg tablet TAKE 1 TABLET BY MOUTH BEFORE BREAKFAST AND BEFORE SUPPER 60 Tablet 0    rosuvastatin (CRESTOR) 10 mg tablet Take 1 tablet by mouth nightly 90 Tablet 3    Spiriva Respimat 2.5 mcg/actuation inhaler INHALE 2 PUFFS BY MOUTH ONCE DAILY      cyanocobalamin (VITAMIN B12) 500 mcg tablet Take 500 mcg by mouth daily. GARLIC PO Take  by mouth. omega 3-dha-epa-fish oil 100-160-1,000 mg cap Take  by mouth daily. cholecalciferol (VITAMIN D3) 25 mcg (1,000 unit) cap Take  by mouth daily. ascorbic acid, vitamin C, (VITAMIN C) 500 mg tablet Take 1,000 mg by mouth daily. MULTIVITAMINS (MULTIVITAMIN PO) Take  by mouth daily.        No Known Allergies    Family History   Problem Relation Age of Onset    Hypertension Mother     Mental Retardation Mother     Depression Mother     Anxiety Mother     Liver Disease Father     Lung Disease Father     Diabetes Maternal Grandmother     Hypertension Other     Stroke Other     Anxiety Other     Depression Other      Social History     Tobacco Use    Smoking status: Every Day     Packs/day: 0.50     Years: 45.00     Pack years: 22.50     Types: Cigarettes    Smokeless tobacco: Never   Substance Use Topics    Alcohol use: No         Grant Long MD

## 2022-09-07 NOTE — PATIENT INSTRUCTIONS
Medicare Wellness Visit, Male    The best way to live healthy is to have a lifestyle where you eat a well-balanced diet, exercise regularly, limit alcohol use, and quit all forms of tobacco/nicotine, if applicable. Regular preventive services are another way to keep healthy. Preventive services (vaccines, screening tests, monitoring & exams) can help personalize your care plan, which helps you manage your own care. Screening tests can find health problems at the earliest stages, when they are easiest to treat. [facility] follows the current, evidence-based guidelines published by the [facility] ([facility]) when recommending preventive services for our patients. Because we follow these guidelines, sometimes recommendations change over time as research supports it. (For example, a prostate screening blood test is no longer routinely recommended for men with no symptoms). Of course, you and your doctor may decide to screen more often for some diseases, based on your risk and co-morbidities (chronic disease you are already diagnosed with). Preventive services for you include:  - Medicare offers their members a free annual wellness visit, which is time for you and your primary care provider to discuss and plan for your preventive service needs. Take advantage of this benefit every year!  -All adults over age 72 should receive the recommended pneumonia vaccines. Current USPSTF guidelines recommend a series of two vaccines for the best pneumonia protection.   -All adults should have a flu vaccine yearly and tetanus vaccine every 10 years.  -All adults age 48 and older should receive the shingles vaccines (series of two vaccines).        -All adults age 38-68 who are overweight should have a diabetes screening test once every three years.   -Other screening tests & preventive services for persons with diabetes include: an eye exam to screen for diabetic retinopathy, a kidney function test, a foot exam, and stricter control over your cholesterol.   -Cardiovascular screening for adults with routine risk involves an electrocardiogram (ECG) at intervals determined by the provider.   -Colorectal cancer screening should be done for adults age 54-65 with no increased risk factors for colorectal cancer. There are a number of acceptable methods of screening for this type of cancer. Each test has its own benefits and drawbacks. Discuss with your provider what is most appropriate for you during your annual wellness visit. The different tests include: colonoscopy (considered the best screening method), a fecal occult blood test, a fecal DNA test, and sigmoidoscopy.  -All adults born between [facility] should be screened once for Hepatitis C.  -An Abdominal Aortic Aneurysm (AAA) Screening is recommended for men age 73-68 who has ever smoked in their lifetime.      Here is a list of your current Health Maintenance items (your personalized list of preventive services) with a due date:  Health Maintenance Due   Topic Date Due    Shingles Vaccine (1 of 2) Never done    Smoker or Former Smoker - [address]  09/18/2019    Cholesterol Test   10/22/2021    COVID-19 Vaccine (4 - Booster for Moderna series) 02/27/2022    Yearly Flu Vaccine (1) 09/01/2022

## 2022-10-17 DIAGNOSIS — K74.60 CIRRHOSIS OF LIVER WITHOUT ASCITES, UNSPECIFIED HEPATIC CIRRHOSIS TYPE (HCC): ICD-10-CM

## 2022-10-17 RX ORDER — LORAZEPAM 0.5 MG/1
0.5 TABLET ORAL AS NEEDED
Qty: 6 TABLET | Refills: 0 | Status: SHIPPED | OUTPATIENT
Start: 2022-10-17

## 2022-10-21 ENCOUNTER — HOSPITAL ENCOUNTER (OUTPATIENT)
Dept: MRI IMAGING | Age: 72
Discharge: HOME | End: 2022-10-21
Attending: PHYSICIAN ASSISTANT
Payer: MEDICAID

## 2022-10-21 VITALS — BODY MASS INDEX: 27.2 KG/M2 | WEIGHT: 195 LBS

## 2022-10-21 DIAGNOSIS — C22.0 HEPATOCELLULAR CARCINOMA (HCC): ICD-10-CM

## 2022-10-21 DIAGNOSIS — K74.60 CIRRHOSIS OF LIVER WITHOUT ASCITES, UNSPECIFIED HEPATIC CIRRHOSIS TYPE (HCC): ICD-10-CM

## 2022-10-21 PROCEDURE — 74011250636 HC RX REV CODE- 250/636

## 2022-10-21 PROCEDURE — 74011000258 HC RX REV CODE- 258: Performed by: PHYSICIAN ASSISTANT

## 2022-10-21 PROCEDURE — A9576 INJ PROHANCE MULTIPACK: HCPCS

## 2022-10-21 PROCEDURE — 74011000250 HC RX REV CODE- 250: Performed by: PHYSICIAN ASSISTANT

## 2022-10-21 PROCEDURE — 74183 MRI ABD W/O CNTR FLWD CNTR: CPT

## 2022-10-21 RX ORDER — SODIUM CHLORIDE 0.9 % (FLUSH) 0.9 %
10 SYRINGE (ML) INJECTION
Status: COMPLETED | OUTPATIENT
Start: 2022-10-21 | End: 2022-10-21

## 2022-10-21 RX ORDER — GADOTERATE MEGLUMINE 376.9 MG/ML
18 INJECTION INTRAVENOUS
Status: DISPENSED | OUTPATIENT
Start: 2022-10-21 | End: 2022-10-21

## 2022-10-21 RX ADMIN — SODIUM CHLORIDE 100 ML: 900 INJECTION, SOLUTION INTRAVENOUS at 09:32

## 2022-10-21 RX ADMIN — GADOTERIDOL 18 ML: 279.3 INJECTION, SOLUTION INTRAVENOUS at 09:32

## 2022-10-21 RX ADMIN — SODIUM CHLORIDE, PRESERVATIVE FREE 10 ML: 5 INJECTION INTRAVENOUS at 09:32

## 2022-10-21 NOTE — PROGRESS NOTES
Pt is a very difficult stick. Told to drink plenty of water 24 hour prior to next MRI. Obtained IV back of right forearm.

## 2022-11-02 NOTE — PROGRESS NOTES
Pt notified via [address] of nonviable changes to prior treatment area. Will review in greater detail at follow-up in 2 wks. Will recheck labs then.

## 2022-11-14 ENCOUNTER — OFFICE VISIT (OUTPATIENT)
Dept: HEMATOLOGY | Age: 72
End: 2022-11-14
Payer: MEDICARE

## 2022-11-14 VITALS
HEIGHT: 71 IN | OXYGEN SATURATION: 99 % | RESPIRATION RATE: 18 BRPM | WEIGHT: 192 LBS | HEART RATE: 85 BPM | TEMPERATURE: 97.3 F | BODY MASS INDEX: 26.88 KG/M2 | DIASTOLIC BLOOD PRESSURE: 66 MMHG | SYSTOLIC BLOOD PRESSURE: 117 MMHG

## 2022-11-14 DIAGNOSIS — C22.0 HEPATOCELLULAR CARCINOMA (HCC): Primary | ICD-10-CM

## 2022-11-14 DIAGNOSIS — K74.60 CIRRHOSIS OF LIVER WITHOUT ASCITES, UNSPECIFIED HEPATIC CIRRHOSIS TYPE (HCC): ICD-10-CM

## 2022-11-14 PROCEDURE — 1123F ACP DISCUSS/DSCN MKR DOCD: CPT | Performed by: PHYSICIAN ASSISTANT

## 2022-11-14 PROCEDURE — G0463 HOSPITAL OUTPT CLINIC VISIT: HCPCS | Performed by: PHYSICIAN ASSISTANT

## 2022-11-14 PROCEDURE — 3017F COLORECTAL CA SCREEN DOC REV: CPT | Performed by: PHYSICIAN ASSISTANT

## 2022-11-14 PROCEDURE — G8432 DEP SCR NOT DOC, RNG: HCPCS | Performed by: PHYSICIAN ASSISTANT

## 2022-11-14 PROCEDURE — G8536 NO DOC ELDER MAL SCRN: HCPCS | Performed by: PHYSICIAN ASSISTANT

## 2022-11-14 PROCEDURE — 1101F PT FALLS ASSESS-DOCD LE1/YR: CPT | Performed by: PHYSICIAN ASSISTANT

## 2022-11-14 PROCEDURE — 3074F SYST BP LT 130 MM HG: CPT | Performed by: PHYSICIAN ASSISTANT

## 2022-11-14 PROCEDURE — 3078F DIAST BP <80 MM HG: CPT | Performed by: PHYSICIAN ASSISTANT

## 2022-11-14 PROCEDURE — G8754 DIAS BP LESS 90: HCPCS | Performed by: PHYSICIAN ASSISTANT

## 2022-11-14 PROCEDURE — G8752 SYS BP LESS 140: HCPCS | Performed by: PHYSICIAN ASSISTANT

## 2022-11-14 PROCEDURE — G8417 CALC BMI ABV UP PARAM F/U: HCPCS | Performed by: PHYSICIAN ASSISTANT

## 2022-11-14 PROCEDURE — G8427 DOCREV CUR MEDS BY ELIG CLIN: HCPCS | Performed by: PHYSICIAN ASSISTANT

## 2022-11-14 PROCEDURE — 99214 OFFICE O/P EST MOD 30 MIN: CPT | Performed by: PHYSICIAN ASSISTANT

## 2022-11-14 NOTE — PROGRESS NOTES
Identified pt with two pt identifiers(name and ). Reviewed record in preparation for visit and have obtained necessary documentation. Chief Complaint   Patient presents with    Follow-up      Vitals:    22 0807 22 0812   BP: 116/68 117/66   Pulse: 86 85   Resp: 18    Temp: 97.3 °F (36.3 °C)    TempSrc: Temporal    SpO2: 99%    Weight: 192 lb (87.1 kg)    Height: 5' 11\" (1.803 m)    PainSc:   0 - No pain        Health Maintenance Review: Patient reminded of \"due or due soon\" health maintenance. I have asked the patient to contact his/her primary care provider (PCP) for follow-up on his/her health maintenance. Coordination of Care Questionnaire:  :   1) Have you been to an emergency room, urgent care, or hospitalized since your last visit? If yes, where when, and reason for visit? yes       2. Have seen or consulted any other health care provider since your last visit? If yes, where when, and reason for visit? YES/ check up      Patient is accompanied by sister I have received verbal consent from  Divya Aquino. to discuss any/all medical information while they are present in the room.

## 2022-11-14 NOTE — PROGRESS NOTES
[address] [facility], Levon DASH, Deborah Roshannathan Andrade, [place]      KatyBEATRICE Jackson-CORTNEY Urr[place]ia, PCNP-BC   Kaya Hubbard, [facility]-   Naila Gilbert, FNP-C  Mariaelena Abernathy, FNP-C   Curt Shannon, PCNP-BC      [address]   at [address], [address] [place] [address], [facility].   [phone]   FAX: [phone]  Liver [place] of [facility]   at [facility]   [address], [address]   [address] beryl [facility], [address]   [phone]   FAX: [phone]     Patient Care Team:  Uri Kelly MD as PCP - Kathy Kendrick MD as PCP - [facility] Provider  Berenice Dee MD (Infectious Disease Physician)  Ilda Danielle MD as Surgeon (General Surgery)  Julianne Villeda MD (Gastroenterology)  Chanda Houston MD (Cardiovascular Disease Physician)  Aubree Travis MD (Cardiovascular Disease Physician)  Maxine Bass MD (Urology)  Gosia Ventura RN as Care Coordinator  Dandre Smart as Physician Assistant (Hepatology)     Patient Active Problem List   Diagnosis Code    Chronic hepatitis C ([facility] B18.2    HIV positive ([facility] Z21    Weight loss, non-intentional R63.4    Prostate cancer ([address] C61    HTN (hypertension) I10    Colon polyp K63.5    Advanced care planning/counseling discussion Z71.89    Tobacco use Z72.0    PAD (peripheral artery disease) ([address] I73.9    Dyslipidemia E78.5    Lung nodule R91.1    Liver disease, chronic, with cirrhosis ([address] K74.60, K76.9       Jake Jim. [facility] for [facility] of cirrhosis due to successfully cleared chronic HCV and HIV co-infection. The active problem list, all pertinent past medical history, medications, radiologic findings and laboratory findings related to the liver disorder were reviewed with the patient.       Patient has completed 12 weeks of HCV treatment with Harvoni (5/29/2015-8/25/2015). He is a sustained virologic responder to treatment, or cured. The patient underwent a liver biopsy in 3/2015. This demonstrates severe hepatitis with bridging fibrosis. Patient has had post-HCV clearance Fibroscan to reassess liver fibrosis or scarring after successful HCV treatment. This revealed a score of 12.2. Suggested fibrosis level is F3. CAP score is 361, suggestive of significant hepatic steatosis. Ultrasound of the liver was performed in 12/2014. This suggests chronic liver disease. A 0.8 x 0.7 lesion was identified in the right lobe. These lesion has been followed with different radiographic methods over the past several years and there is apparent growth of this lesion to 2.2 cm. It was not definitve that this represents the same lesion and there have been no radiographic signs suggestive of HCC. AFP has been followed and had not been elevated in the past until 5/2022. Lab studies in 5/2022 showed a massive rise in AFP to ~1000 and he was sent for MRI which showed a LIRADS 5 lesion, 2.8 cm in the right lobe. He underwent mapping and CT chest and bone scan. This demonstrated solitary lesion amenable to TARE and no metastases. He has had Y-90 TARE on 7/15/2022 and tolerated this procedure reasonably well. Follow-up imaging has shown no residual/viable tumor activity in 10/2022 and his tumor markers have returned to normal.      Patient is co-infected with HIV. He is currently not on anti-retroviral therapy because his virus remains suppressed to undetected levels. He is not currently being monitored by anyone for HIV at present, it has been about 1 year since his last assessment and he has been following with his PCP. Patient was treated for prostate cancer ~5 years ago and continues to have low PSA and no signs of recurrence. He has had appt with Dr Mahendra Oconnor and the last PSA lab in 5/2022 showed a score of 0.4.  He is being followed annually at this point. He continues to have intermittent claudication symptoms effecting the LLE. He has seen cardiology and is treated with Pletal, this has helped significantly and he has avoided surgery. He is still smoking 1/2-1 PPD. He is seen for routine evaluation with pulmonary associates as well, per patient, no lung disease. The patient completes all daily activities without any functional limitations. The patient has not experienced arthralgias, myalgias, problems concentrating, swelling of the abdomen, swelling of the lower extremities, hematemesis, or hematochezia. Patient had EGD with Dr Brett Peralta in 10/2022. This is to evaluate past esophagitis and now reportedly shows no concerning findings. He reports that he has had modest reduction in appetite and weight losses. This has been the case for the past 5-6 months. He continues to work full time. ALLERGIES  No Known Allergies    MEDICATIONS  Current Outpatient Medications   Medication Sig    LORazepam (ATIVAN) 0.5 mg tablet Take 1 Tablet by mouth as needed for Anxiety. Max Daily Amount: 2 mg. Take 1 tab PO 1 hour prior to procedure, may repeat at procedure time if needed. lisinopriL (PRINIVIL, ZESTRIL) 20 mg tablet Take 1 tablet by mouth once daily    cilostazoL (PLETAL) 50 mg tablet TAKE 1 TABLET BY MOUTH BEFORE BREAKFAST AND BEFORE SUPPER    rosuvastatin (CRESTOR) 10 mg tablet Take 1 tablet by mouth nightly    Spiriva Respimat 2.5 mcg/actuation inhaler INHALE 2 PUFFS BY MOUTH ONCE DAILY    cyanocobalamin (VITAMIN B12) 500 mcg tablet Take 500 mcg by mouth daily. GARLIC PO Take  by mouth. omega 3-dha-epa-fish oil 100-160-1,000 mg cap Take  by mouth daily. cholecalciferol (VITAMIN D3) 25 mcg (1,000 unit) cap Take  by mouth daily. ascorbic acid, vitamin C, (VITAMIN C) 500 mg tablet Take 1,000 mg by mouth daily. MULTIVITAMINS (MULTIVITAMIN PO) Take  by mouth daily.      No current facility-administered medications for this visit. REVIEW OF SYSTEMS NOT RELATED TO LIVER DISEASE:  Constitution systems: Negative for fever, chills, weight gain. Modest weight loss. Eyes: Negative for diplopia, visual changes, eye pain. ENT: Negative for sore throat, painful swallowing. Respiratory: Negative for cough, hemoptysis, dyspnea. Cardiology: Negative for chest pain, palpitations. GI:  Negative for constipation or diarrhea. : Negative for urinary frequency, dysuria and hematuria. Skin: Negative for rash. Hematology: Negative for easy bruising, bleeding from gums or nose. Musculo-skeletal: Negative for back pain. Positive for muscle pain, weakness of the LLE with walking distances. Neurologic: Negative for headaches, dizziness, vertigo, memory problems. Psychology: Negative for anxiety, depression. FAMILY HISTORY:  The father  of alcohol cirrhosis. The mother  of CVA. There are no other persons in the family with HCV or liver disease. SOCIAL HISTORY:  The patient is . The spouse has been tested for HCV and is positive. She was treated and achieved SVR. The patient has 3 children, and 7 grandchildren. The patient stopped using tobacco products in 2014. Resumed in  and is now smoking /,  PPD. The patient has never consumed significant amounts of alcohol. The patient used to work in construction, still active and working odd jobs. PHYSICAL EXAMINATION:  VS: per nursing note. General: No acute distress. Eyes: Sclera anicteric. ENT: No oral lesions. Skin: No rashes. spider angiomata. No jaundice. Abdomen: No obvious distention suggesting ascites. Extremities: No edema. No muscle wasting. Neurologic: Alert and oriented. Cranial nerves grossly intact. No asterixis.      LABORATORY STUDIES:  Liver [place] of [address] Units 2022   WBC 4.1 - 11.1 K/uL 4.2 6.8 5.3   ANC 1.8 - 8.0 K/UL  5.2    HGB 12.1 - 17.0 g/dL 14.3 13.2 13.4    - 400 K/uL 237 200 224   INR 0.9 - 1.1   1.1  1.1   AST 15 - 37 U/L 19 19 21   ALT 12 - 78 U/L 26 24 8   Alk Phos 45 - 117 U/L 71 56 60   Bili, Total 0.2 - 1.0 MG/DL 0.4 0.7 0.3   Bili, Direct 0.0 - 0.2 MG/DL 0.1  0.10   Albumin 3.5 - 5.0 g/dL 3.9 3.9 4.4   BUN 6 - 20 MG/DL 12 15 12   Creat 0.70 - 1.30 MG/DL 0.68 (L) 0.71 0.76   Creat (iSTAT) 0.6 - 1.3 mg/dL      Na 136 - 145 mmol/L 138 135 (L) 139   K 3.5 - 5.1 mmol/L 4.3 4.4 3.9   Cl 97 - 108 mmol/L 107 106 101   CO2 21 - 32 mmol/L 26 23 17 (L)   Glucose 65 - 100 mg/dL 79 95 100 (H)     Cancer Screening Latest Ref Rng & Units 8/2/2022 5/4/2022 6/28/2021   AFP, Serum 0.0 - 8.4 ng/mL 2.6 1,070.1 (H) 7.1   AFP-L3% 0.0 - 9.9 % Comment 54.4 (H) 5.1   CA 19-9 0 - 35 U/mL  7 5   CEA 0.0 - 4.7 ng/mL  2.7 2.7     Additional lab values will be drawn following today's office visit. SEROLOGIES:  Serologies Latest Ref Rng 10/10/2014 9/1/2014 8/27/2014   Hep A Ab, Total Negative Positive (A)     Hep B Surface Ag Negative Negative     Hep B Core Ab, Total Negative Positive (A)     Hep C Genotype See Note 1a     HCV RT-PCR, Quant  [id number]  [id number]   HCV Log10    6.830   Ferritin 30 - 400 ng/mL 172     Iron % Saturation 15 - 55 % 22     C-ANCA Neg:<1:20 titer  <1:20    P-ANCA Neg:<1:20 titer  <1:20    ANCA Neg:<1:20 titer  <1:20      LIVER HISTOLOGY:  3/2015. Slides reviewed by MLS. Severe hepatitis with bridging fibrosis and possible cirrhosis. Knodell score (3,3,3,3), Bro score 4, Metavir score 3.  2/2017. FibroScan performed at [facility] [place]. EkPa was 10.0. Suggested fibrosis level is F3.  2/2018. FibroScan performed at [facility]. EkPa was 11.7. Suggested fibrosis level is F3.  2/2019. FibroScan performed at [facility] [place]. EkPa was 12.2. Suggested fibrosis level is F3. CAP score is 361, suggestive of significant hepatic steatosis. ENDOSCOPIC PROCEDURES:  10/2022.  EGD performed by Dr Anjana Putnam, reportedly normal.  Colon reportedly normal as well. RADIOLOGY:  12/2014. Ultrasound of liver. Echogenic consistent with chronic liver disease. 0.8x0.7 cm lesion right lobe. No dilated bile ducts. No ascites. 1/2015. Dynamic MRI liver. Changes consistent with chronic liver disease. No liver mass lesions. No dilated bile ducts. No bile duct strictures. No ascites. 11/2017. Ultrasound of liver. Cirrhotic morphology of the liver is again demonstrated. 12 mm echogenic nodule right hepatic lobe. This does not appear to reside in the same location as the prior smaller hyperechoic nodule. This may represent a hemangioma. 11/2018. Triple phase CT. The central right lobe hepatic mass shown to be quite evident on ultrasound and echogenic but very subtle on MRI is very slightly hypoenhancing on CT and most evident on equilibrium phase. The margins are not clearly delineated so accurate measurement is difficult but the lesion is similarly sized compared to prior recent ultrasound measuring about 1.1 x 1.6 cm. No other hepatic lesions are seen in the liver redemonstrates subtle changes cirrhosis. 5/2019. Ultrasound of liver. Diffusely echogenic and heterogeneous. An echogenic lesion measures 1.5 x 2.0 x 1.9 cm, previously 1.6 x 1.1 cm on CT and 1.3 x 1.8 x 1.8 cm on ultrasound. 6/2021. CT abdomen with and without contrast. Known right lobe liver lesion is only faintly hypodense on the equilibrium phase and appears unchanged from prior imaging. No arterial hyperenhancement or other features to suggest hepatocellular carcinoma. 12/2021. Ultrasound of liver. Hepatic cirrhosis, with a focal echogenic mass in the right hepatic lobe. Its size has increased to 2.3 x 1.9 x 1.4 cm, compared to an older ultrasound of 0.8 x 0.7 x 0.7 cm. The most recent prior ultrasound in 2019 showed a size of 2.0 x 1.9 x 1.5 cm.  If further evaluation of this slowly enlarging hepatic mass is planned, three-phase dedicated hepatic MRI would be recommended. 2022. MRI abdomen. Previously noted lesion now demonstrates arterial enhancement with patchy areas of washout. The lesion is likely increase in size although was more difficult to visualize on previous studies. LIRADS 5.  10/2022. MRI abdomen. Treatment related changes in segment 7. No definite residual tumor is identified. LIRADS TR nonviable. Small arterial enhancing foci scattered throughout the liver may be related to treatment as well. Attention on follow-up imaging. OTHER TESTIN2022. Bone scan. No evidence of bony metastases. 2022. CT chest. No evidence for metastatic disease to the chest. Enhancing liver lesion in the right lobe of liver. Prominent calcified disc osteophyte complex at the T9-10. ASSESSMENT AND PLAN:  [address] Patient was treated for 2.8 cm lesion in the right lobe with single course of Y-90 TARE in 2022. This lesion had been noted on imaging for some time, but had not had definitive characteristics of [address] until sharp rise in AFP. He has tolerated this course of therapy reasonably well and I have outlined for him the plan to monitor las today and to continue with imaging studies every 3 months by MRI. I have ordered this for 2023 and we will keep a close surveillance on lab studies as well. I have reviewed with him in detail the results of the 10/2022 MRI. 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 This degree of fibrosis was confirmed with past FibroScan 2019. He has preserved liver function. Will continue to monitor patient regularly and continue to monitor for complications of advanced fibrosis/cirrhosis. HCV treatment. Completed 12 weeks of Harvoni treatment in 2015. He is cured. HIV co-infection. He has low levels of HIV RNA. He is not taking anti-HIV medications.  He follows with PCP and ID intermittently, I have encouraged him to re-establish and we have rechecked viral titer, I am unable to recheck CD4 in the office. Prostate cancer history. He has had follow-up in [address] with PSA of 0.4. I have encouraged him to follow-up per urology. The patient was directed to continue all current medications at the current dosages. There are no contraindications for the patient to take any medications that are necessary for treatment of other medical issues. The patient was counseled regarding alcohol consumption. The patient was counseled regarding use of illicit drugs. Vaccination for viral hepatitis A and B is not required. The patient has serologic evidence of prior exposure or vaccination with immunity. FOLLOW-UP:  All of the issues listed above in the Assessment and Plan were discussed with the patient. All questions were answered. The patient expressed a clear understanding of the above. [facility] in 3 months with repeat MRI in late 1/2023. Documentation reviewed and updated to reflect current, accurate patient information.     Ariel Monroy PA-C  [facility], [address] [facility] GamalielFelicitas castanedalorennathan  22.  [phone]  [address] [place] [address]

## 2023-01-01 ENCOUNTER — HOME CARE VISIT (OUTPATIENT)
Age: 73
End: 2023-01-01
Payer: MEDICARE

## 2023-01-01 ENCOUNTER — HOSPICE ADMISSION (OUTPATIENT)
Age: 73
End: 2023-01-01
Payer: MEDICARE

## 2023-01-01 ENCOUNTER — HOME CARE VISIT (OUTPATIENT)
Facility: HOME HEALTH | Age: 73
End: 2023-01-01
Payer: MEDICARE

## 2023-01-01 VITALS
DIASTOLIC BLOOD PRESSURE: 65 MMHG | TEMPERATURE: 97.8 F | SYSTOLIC BLOOD PRESSURE: 140 MMHG | RESPIRATION RATE: 16 BRPM | HEART RATE: 98 BPM

## 2023-01-01 PROCEDURE — 0651 HSPC ROUTINE HOME CARE

## 2023-01-01 PROCEDURE — G0299 HHS/HOSPICE OF RN EA 15 MIN: HCPCS

## 2023-01-01 PROCEDURE — 3331090004 HSPC SERVICE INTENSITY ADD-ON

## 2023-01-01 PROCEDURE — 2500000001 HSPC NON INJECTABLE MED

## 2023-01-01 RX ADMIN — HYDROMORPHONE HYDROCHLORIDE 1 MG: 1 SOLUTION ORAL at 11:15

## 2023-01-01 RX ADMIN — LORAZEPAM 0.5 MG: 2 CONCENTRATE ORAL at 11:00

## 2023-01-01 RX ADMIN — HYDROMORPHONE HYDROCHLORIDE 1 MG: 1 SOLUTION ORAL at 11:45

## 2023-02-13 ENCOUNTER — HOSPITAL ENCOUNTER (OUTPATIENT)
Age: 73
Setting detail: OBSERVATION
Discharge: HOME | End: 2023-02-14
Attending: INTERNAL MEDICINE | Admitting: INTERNAL MEDICINE
Payer: MEDICARE

## 2023-02-13 ENCOUNTER — TRANSCRIBE ORDER (OUTPATIENT)
Dept: CARDIAC REHAB | Age: 73
End: 2023-02-13

## 2023-02-13 DIAGNOSIS — Z95.5 STENTED CORONARY ARTERY: Primary | ICD-10-CM

## 2023-02-13 DIAGNOSIS — R07.9 CHEST PAIN, UNSPECIFIED TYPE: ICD-10-CM

## 2023-02-13 PROBLEM — I25.10 ASHD (ARTERIOSCLEROTIC HEART DISEASE): Status: ACTIVE | Noted: 2023-01-01

## 2023-02-13 PROBLEM — I25.10 ASHD (ARTERIOSCLEROTIC HEART DISEASE): Status: ACTIVE | Noted: 2023-02-13

## 2023-02-13 LAB
ACT BLD: 480 SECS (ref 79–138)
ANION GAP SERPL CALC-SCNC: 4 MMOL/L (ref 5–15)
BUN SERPL-MCNC: 10 MG/DL (ref 6–20)
BUN/CREAT SERPL: 13 (ref 12–20)
CALCIUM SERPL-MCNC: 9.5 MG/DL (ref 8.5–10.1)
CHLORIDE SERPL-SCNC: 104 MMOL/L (ref 97–108)
CHOLEST SERPL-MCNC: 157 MG/DL
CO2 SERPL-SCNC: 29 MMOL/L (ref 21–32)
CREAT SERPL-MCNC: 0.75 MG/DL (ref 0.7–1.3)
ERYTHROCYTE [DISTWIDTH] IN BLOOD BY AUTOMATED COUNT: 12.5 % (ref 11.5–14.5)
GLUCOSE SERPL-MCNC: 97 MG/DL (ref 65–100)
HCT VFR BLD AUTO: 43.6 % (ref 36.6–50.3)
HDLC SERPL-MCNC: 52 MG/DL
HDLC SERPL: 3 (ref 0–5)
HGB BLD-MCNC: 15 G/DL (ref 12.1–17)
LDLC SERPL CALC-MCNC: 92.4 MG/DL (ref 0–100)
MCH RBC QN AUTO: 32.5 PG (ref 26–34)
MCHC RBC AUTO-ENTMCNC: 34.4 G/DL (ref 30–36.5)
MCV RBC AUTO: 94.4 FL (ref 80–99)
NRBC # BLD: 0 K/UL (ref 0–0.01)
NRBC BLD-RTO: 0 PER 100 WBC
PLATELET # BLD AUTO: 227 K/UL (ref 150–400)
PMV BLD AUTO: 8.8 FL (ref 8.9–12.9)
POTASSIUM SERPL-SCNC: 3.9 MMOL/L (ref 3.5–5.1)
RBC # BLD AUTO: 4.62 M/UL (ref 4.1–5.7)
SODIUM SERPL-SCNC: 137 MMOL/L (ref 136–145)
TRIGL SERPL-MCNC: 63 MG/DL (ref ?–150)
VLDLC SERPL CALC-MCNC: 12.6 MG/DL
WBC # BLD AUTO: 6.1 K/UL (ref 4.1–11.1)

## 2023-02-13 PROCEDURE — 77030004549 HC CATH ANGI DX PRF MRTM -A: Performed by: INTERNAL MEDICINE

## 2023-02-13 PROCEDURE — 74011250637 HC RX REV CODE- 250/637: Performed by: INTERNAL MEDICINE

## 2023-02-13 PROCEDURE — G0378 HOSPITAL OBSERVATION PER HR: HCPCS

## 2023-02-13 PROCEDURE — C1769 GUIDE WIRE: HCPCS | Performed by: INTERNAL MEDICINE

## 2023-02-13 PROCEDURE — 74011250636 HC RX REV CODE- 250/636: Performed by: INTERNAL MEDICINE

## 2023-02-13 PROCEDURE — 77030010221 HC SPLNT WR POS TELE -B: Performed by: INTERNAL MEDICINE

## 2023-02-13 PROCEDURE — 92928 PRQ TCAT PLMT NTRAC ST 1 LES: CPT | Performed by: INTERNAL MEDICINE

## 2023-02-13 PROCEDURE — 85027 COMPLETE CBC AUTOMATED: CPT

## 2023-02-13 PROCEDURE — 77030008543 HC TBNG MON PRSS MRTM -A: Performed by: INTERNAL MEDICINE

## 2023-02-13 PROCEDURE — C1725 CATH, TRANSLUMIN NON-LASER: HCPCS | Performed by: INTERNAL MEDICINE

## 2023-02-13 PROCEDURE — 80048 BASIC METABOLIC PNL TOTAL CA: CPT

## 2023-02-13 PROCEDURE — 77030019569 HC BND COMPR RAD TERU -B: Performed by: INTERNAL MEDICINE

## 2023-02-13 PROCEDURE — 99152 MOD SED SAME PHYS/QHP 5/>YRS: CPT | Performed by: INTERNAL MEDICINE

## 2023-02-13 PROCEDURE — 99153 MOD SED SAME PHYS/QHP EA: CPT | Performed by: INTERNAL MEDICINE

## 2023-02-13 PROCEDURE — 74011000636 HC RX REV CODE- 636: Performed by: INTERNAL MEDICINE

## 2023-02-13 PROCEDURE — 36415 COLL VENOUS BLD VENIPUNCTURE: CPT

## 2023-02-13 PROCEDURE — C1887 CATHETER, GUIDING: HCPCS | Performed by: INTERNAL MEDICINE

## 2023-02-13 PROCEDURE — 93458 L HRT ARTERY/VENTRICLE ANGIO: CPT | Performed by: INTERNAL MEDICINE

## 2023-02-13 PROCEDURE — 85347 COAGULATION TIME ACTIVATED: CPT

## 2023-02-13 PROCEDURE — 80061 LIPID PANEL: CPT

## 2023-02-13 PROCEDURE — C1874 STENT, COATED/COV W/DEL SYS: HCPCS | Performed by: INTERNAL MEDICINE

## 2023-02-13 PROCEDURE — C1894 INTRO/SHEATH, NON-LASER: HCPCS | Performed by: INTERNAL MEDICINE

## 2023-02-13 PROCEDURE — 74011000250 HC RX REV CODE- 250: Performed by: INTERNAL MEDICINE

## 2023-02-13 PROCEDURE — 77030015766: Performed by: INTERNAL MEDICINE

## 2023-02-13 DEVICE — STENT RONYX35022UX RESOLUTE ONYX 3.50X22
Type: IMPLANTABLE DEVICE | Status: FUNCTIONAL
Brand: RESOLUTE ONYX™

## 2023-02-13 RX ORDER — HEPARIN SODIUM 200 [USP'U]/100ML
INJECTION, SOLUTION INTRAVENOUS
Status: COMPLETED | OUTPATIENT
Start: 2023-02-13 | End: 2023-02-13

## 2023-02-13 RX ORDER — CILOSTAZOL 100 MG/1
50 TABLET ORAL
Status: DISCONTINUED | OUTPATIENT
Start: 2023-02-13 | End: 2023-02-14 | Stop reason: HOSPADM

## 2023-02-13 RX ORDER — HEPARIN SODIUM 1000 [USP'U]/ML
INJECTION, SOLUTION INTRAVENOUS; SUBCUTANEOUS AS NEEDED
Status: DISCONTINUED | OUTPATIENT
Start: 2023-02-13 | End: 2023-02-13 | Stop reason: HOSPADM

## 2023-02-13 RX ORDER — ASPIRIN 81 MG/1
81 TABLET ORAL DAILY
Status: DISCONTINUED | OUTPATIENT
Start: 2023-02-14 | End: 2023-02-14 | Stop reason: HOSPADM

## 2023-02-13 RX ORDER — PHENYLEPHRINE HYDROCHLORIDE 10 MG/ML
INJECTION INTRAVENOUS AS NEEDED
Status: DISCONTINUED | OUTPATIENT
Start: 2023-02-13 | End: 2023-02-13 | Stop reason: HOSPADM

## 2023-02-13 RX ORDER — SODIUM CHLORIDE 0.9 % (FLUSH) 0.9 %
5-40 SYRINGE (ML) INJECTION EVERY 8 HOURS
Status: DISCONTINUED | OUTPATIENT
Start: 2023-02-13 | End: 2023-02-14 | Stop reason: HOSPADM

## 2023-02-13 RX ORDER — SODIUM CHLORIDE 0.9 % (FLUSH) 0.9 %
5-40 SYRINGE (ML) INJECTION AS NEEDED
Status: DISCONTINUED | OUTPATIENT
Start: 2023-02-13 | End: 2023-02-14 | Stop reason: HOSPADM

## 2023-02-13 RX ORDER — GUAIFENESIN 100 MG/5ML
81 LIQUID (ML) ORAL
Status: DISCONTINUED | OUTPATIENT
Start: 2023-02-13 | End: 2023-02-13 | Stop reason: HOSPADM

## 2023-02-13 RX ORDER — CLOPIDOGREL BISULFATE 75 MG/1
75 TABLET ORAL DAILY
Status: DISCONTINUED | OUTPATIENT
Start: 2023-02-14 | End: 2023-02-14 | Stop reason: HOSPADM

## 2023-02-13 RX ORDER — SODIUM CHLORIDE 9 MG/ML
100 INJECTION, SOLUTION INTRAVENOUS CONTINUOUS
Status: DISPENSED | OUTPATIENT
Start: 2023-02-13 | End: 2023-02-13

## 2023-02-13 RX ORDER — ROSUVASTATIN CALCIUM 10 MG/1
10 TABLET, COATED ORAL
Status: DISCONTINUED | OUTPATIENT
Start: 2023-02-13 | End: 2023-02-14 | Stop reason: HOSPADM

## 2023-02-13 RX ORDER — LIDOCAINE HYDROCHLORIDE 10 MG/ML
INJECTION, SOLUTION EPIDURAL; INFILTRATION; INTRACAUDAL; PERINEURAL AS NEEDED
Status: DISCONTINUED | OUTPATIENT
Start: 2023-02-13 | End: 2023-02-13 | Stop reason: HOSPADM

## 2023-02-13 RX ORDER — LANOLIN ALCOHOL/MO/W.PET/CERES
10 CREAM (GRAM) TOPICAL
Status: DISCONTINUED | OUTPATIENT
Start: 2023-02-13 | End: 2023-02-14 | Stop reason: HOSPADM

## 2023-02-13 RX ORDER — LORAZEPAM 0.5 MG/1
0.5 TABLET ORAL AS NEEDED
Status: DISCONTINUED | OUTPATIENT
Start: 2023-02-13 | End: 2023-02-14 | Stop reason: HOSPADM

## 2023-02-13 RX ORDER — VERAPAMIL HYDROCHLORIDE 2.5 MG/ML
INJECTION, SOLUTION INTRAVENOUS AS NEEDED
Status: DISCONTINUED | OUTPATIENT
Start: 2023-02-13 | End: 2023-02-13 | Stop reason: HOSPADM

## 2023-02-13 RX ORDER — CLOPIDOGREL 300 MG/1
600 TABLET, FILM COATED ORAL
Status: ACTIVE | OUTPATIENT
Start: 2023-02-13 | End: 2023-02-14

## 2023-02-13 RX ORDER — MIDAZOLAM HYDROCHLORIDE 1 MG/ML
INJECTION, SOLUTION INTRAMUSCULAR; INTRAVENOUS AS NEEDED
Status: DISCONTINUED | OUTPATIENT
Start: 2023-02-13 | End: 2023-02-13 | Stop reason: HOSPADM

## 2023-02-13 RX ORDER — FENTANYL CITRATE 50 UG/ML
INJECTION, SOLUTION INTRAMUSCULAR; INTRAVENOUS AS NEEDED
Status: DISCONTINUED | OUTPATIENT
Start: 2023-02-13 | End: 2023-02-13 | Stop reason: HOSPADM

## 2023-02-13 RX ORDER — CLOPIDOGREL BISULFATE 75 MG/1
TABLET ORAL AS NEEDED
Status: DISCONTINUED | OUTPATIENT
Start: 2023-02-13 | End: 2023-02-13 | Stop reason: HOSPADM

## 2023-02-13 RX ADMIN — CILOSTAZOL 50 MG: 100 TABLET ORAL at 16:23

## 2023-02-13 RX ADMIN — SODIUM CHLORIDE 100 ML/HR: 9 INJECTION, SOLUTION INTRAVENOUS at 15:05

## 2023-02-13 RX ADMIN — MELATONIN 10.5 MG: at 21:14

## 2023-02-13 RX ADMIN — ROSUVASTATIN CALCIUM 10 MG: 10 TABLET, COATED ORAL at 21:14

## 2023-02-13 RX ADMIN — SODIUM CHLORIDE, PRESERVATIVE FREE 10 ML: 5 INJECTION INTRAVENOUS at 21:15

## 2023-02-13 NOTE — PROGRESS NOTES
Primary Nurse Bishop Tao RN and Armin Woods RN performed a dual skin assessment on this patient No impairment noted  Ramu score is 23    Meplex applied to sacrum

## 2023-02-13 NOTE — Clinical Note
Single view of the obtained using power injection. Total volume = 18 mL. Rate = 12 mL/sec. Pressure = 900 PSI.

## 2023-02-13 NOTE — PROGRESS NOTES
[facility] [place] [facility]  [address]  [address] [facility] [address], [address] [facility]  [phone]  WWW. Penemarie K Murphy    Brief Procedure Note    Patient: Mirta Mathias Sr. MRN: [id number]  SSN: [id number]    YOB: 1950  Age: 67 y.o. Sex: male      Date of Procedure: 2/13/2023     Pre-procedure Diagnosis: chest pain, abnormal stress test    Post-procedure Diagnosis: ASHD    Procedure: LHC    Performed By: Bishop Downey MD     Anesthesia: Moderate Sedation    Estimated Blood Loss: Less than 10 mL      Specimens:  None    Findings: LM-normal, LAD-proximal 50%, LCX-mid 60%, RCA-mid 85%, LVED 60%. Complications: None    Implants: None    Recommendations: PCI with MARTA to RCA by Dr. Brent Jon.     Signed By: Bishop Downey MD     February 13, 2023

## 2023-02-13 NOTE — DISCHARGE INSTRUCTIONS
Smoking Cessation Program:   Azalia Coelho is now offering a proven, interactive, text-based smoking cessation program for FREE! To register, please text Josue Orellana [phone] or visit [email or web address]  For more information, please call: [phone]

## 2023-02-13 NOTE — Clinical Note
TRANSFER - OUT REPORT:     Verbal report given to: latrell. Report consisted of patient's Situation, Background, Assessment and   Recommendations(SBAR). Opportunity for questions and clarification was provided. Patient transported with a Registered Nurse.

## 2023-02-13 NOTE — PROGRESS NOTES
Cardiac Cath Lab Recovery Arrival Note:      Javier Franco Sr. arrived to Cardiac Cath Lab, Recovery Area. Staff introduced to patient. Patient identifiers verified with NAME and DATE OF BIRTH. Procedure verified with patient. Consent forms reviewed and signed by patient or authorized representative and verified. Allergies verified. Patient and family oriented to department. Patient and family informed of procedure and plan of care. Questions answered with review. Patient prepped for procedure, per orders from physician, prior to arrival.    Patient on cardiac monitor, non-invasive blood pressure, SPO2 monitor. On room air. Patient is A&Ox 4. Patient reports no complaints. Patient in stretcher, in low position, with side rails up, call bell within reach, patient instructed to call if assistance as needed. Patient prep in: [address], [place] [address]. Patient family has pager # na  Family in: Ursula Esparza.    Prep by: Tamara Wild and RENE FU

## 2023-02-13 NOTE — PROGRESS NOTES
1424: pt arrived to [facility] from [facility]. R radial [email or web address]. Pt alert sitting up at 30 degrees. 1636: TR band taken off. R radial covered with gauze and tegarderm.   1650: pt up in chair

## 2023-02-13 NOTE — CARDIO/PULMONARY
Cardiac Rehab Note: chart review/referral     Cardiac cath with intervention 2/13/23    Smoking history assessed. Patient is a current smoker. Smoking Cessation Program link has been added to the AVS.     CAD education folder, with heart heathy diet, warning signs, heart facts, catheterization brochure, and out patient cardiac rehab program provided to Cande Chaidez. on 2/10/23                                              Educated using teach back method. Reviewed CAD diagnosis definition and purpose of intervention. Discussed risk factors for CAD to include the following: family history, elevated BMI, hyperlipidemia, hypertension, diabetes, and stress. Discussed Heart Healthy/Low Sodium (less than 2000 mg) diet. Reviewed the importance of medication compliance, follow up appointments with cardiologist, signs and symptoms of angina, and what to report to physician after discharge. Emphasized the value of cardiac rehab. Discussed Cardiac Rehab Program format, benefits, and encouraged enrollment to assist with risk modification and management. Patient lives in [place] and would be more interested in the [facility] location. Contact his sister, Bibiana Singh at [phone] to schedule. Vargas Gauthier Sr. verbalized understanding with questions answered.   Ezella Primrose, RN

## 2023-02-14 VITALS
TEMPERATURE: 97.9 F | RESPIRATION RATE: 16 BRPM | HEART RATE: 73 BPM | DIASTOLIC BLOOD PRESSURE: 80 MMHG | WEIGHT: 196 LBS | BODY MASS INDEX: 27.44 KG/M2 | HEIGHT: 71 IN | OXYGEN SATURATION: 96 % | SYSTOLIC BLOOD PRESSURE: 118 MMHG

## 2023-02-14 LAB
ATRIAL RATE: 66 BPM
CALCULATED P AXIS, ECG09: 94 DEGREES
CALCULATED R AXIS, ECG10: 29 DEGREES
CALCULATED T AXIS, ECG11: 41 DEGREES
DIAGNOSIS, 93000: NORMAL
P-R INTERVAL, ECG05: 200 MS
Q-T INTERVAL, ECG07: 412 MS
QRS DURATION, ECG06: 106 MS
QTC CALCULATION (BEZET), ECG08: 431 MS
VENTRICULAR RATE, ECG03: 66 BPM

## 2023-02-14 PROCEDURE — 74011250637 HC RX REV CODE- 250/637: Performed by: INTERNAL MEDICINE

## 2023-02-14 PROCEDURE — 74011000250 HC RX REV CODE- 250: Performed by: INTERNAL MEDICINE

## 2023-02-14 PROCEDURE — 93005 ELECTROCARDIOGRAM TRACING: CPT

## 2023-02-14 PROCEDURE — G0378 HOSPITAL OBSERVATION PER HR: HCPCS

## 2023-02-14 RX ORDER — ASPIRIN 81 MG/1
81 TABLET ORAL DAILY
Qty: 90 TABLET | Refills: 3 | Status: SHIPPED | OUTPATIENT
Start: 2023-02-15

## 2023-02-14 RX ORDER — CLOPIDOGREL BISULFATE 75 MG/1
75 TABLET ORAL DAILY
Qty: 90 TABLET | Refills: 3 | Status: SHIPPED | OUTPATIENT
Start: 2023-02-15

## 2023-02-14 RX ADMIN — ASPIRIN 81 MG: 81 TABLET, COATED ORAL at 09:08

## 2023-02-14 RX ADMIN — SODIUM CHLORIDE, PRESERVATIVE FREE 10 ML: 5 INJECTION INTRAVENOUS at 06:00

## 2023-02-14 RX ADMIN — CLOPIDOGREL BISULFATE 75 MG: 75 TABLET ORAL at 09:08

## 2023-02-14 RX ADMIN — CILOSTAZOL 50 MG: 100 TABLET ORAL at 09:08

## 2023-02-14 NOTE — PROGRESS NOTES
Problem: Falls - Risk of  Goal: *Absence of Falls  Description: Document Bela Dominguez Fall Risk and appropriate interventions in the flowsheet.   Outcome: Progressing Towards Goal  Note: Fall Risk Interventions:            Medication Interventions: Patient to call before getting OOB

## 2023-02-14 NOTE — DISCHARGE SUMMARY
[address]  [phone]      [address] [facility] INSTRUCTIONS      Patient ID:  Nakita De La Cruz  [id number]  46 y.o.  1950    Admit Date: 2/13/2023    Discharge Date: 2/14/2023     Admitting Physician: Theresa Patel MD     Discharge Physician: Angeles Chopra NP    Admission Diagnoses:   Chest pain, unspecified type [R07.9]  ASHD (arteriosclerotic heart disease) [I25.10]    Discharge Diagnoses: Active Problems:    ASHD (arteriosclerotic heart disease) (2/13/2023)        Discharge Condition: Good    Cardiology Procedures this Admission:  Left heart catheterization with PCI    Disposition: home      Reference discharge instructions provided by nursing for diet and activity. STOP  CILOSTAZOL, you will take aspirin and plavix which act as antiplatelet medications for the next year. Signed: Angeles Chopra NP  2/14/2023  10:29 AM    CARDIAC CATHETERIZATION/PCI DISCHARGE INSTRUCTIONS    It is normal to feel tired the first couple days. Take it easy and follow the physicians instructions. CHECK THE CATHETER INSERTION SITE DAILY:    You may shower 24 hours after the procedure, remove the bandage during showering. Wash with soap and water and pat dry. Gentle cleaning of the site with soap and water is sufficient, cover with a dry clean dressing or bandage. Do not apply creams or powders to the area. Do not sit in a bathtub or pool of water for 7 days or until wound has completely healed. Temporary bruising and discomfort is normal and may last a few weeks. You may have a  formation of a small lump at the site which may last up to 6 weeks. CALL THE PHYSICIANS:    If the site becomes red, swollen or feels warm to the touch  If there is bleeding or drainage or if there is unusual pain at the groin or down the leg.   If there is any bleeding, lie down, apply pressure or have someone apply pressure with a clean cloth until the bleeding stops.   If the bleeding continues, call 911 to be transported to the hospital.  DO NOT DRIVE YOURSELF, [place] Gunter3. ACTIVITY:    For the first 24-48 hours or as instructed by the physician:  No lifting, pushing or pulling over 10 pounds and no straining the insertion site. Do not life grocery bags or the garbage can, do not run the vacuum  or  for 7 days. Start with short walks as in the hospital and gradually increase as tolerated each day. It is recommended to walk 30 minutes 5-7 days per week. Follow your physicians instructions on activity. Avoid walking outside in extremes of heat or cold. Walk inside when it is cold and windy or hot and humid. Things to keep in mind:  No driving for at least 24 hours, or as designated by your physician. Limit the number of times you go up and down the stairs  Take rests and pace yourself with activity. Be careful and do not strain with bowel movements. MEDICATIONS:    Take all medications as prescribed  Call your physician if you have any questions  Keep an updated list of your medications with you at all times and give a list to your physician and pharmacist        SIGNS AND SYMPTOMS:    Be cautious of symptoms of angina or recurrent symptoms such as chest discomfort, unusual shortness of breath or fatigue. These could be symptoms of restenosis, a new blockage or a heart attack. If your symptoms are relieved with rest it is still recommended that you notify your physician of recurrent chest pain or discomfort. FOR CHEST PAIN or symptoms of angina not relieved with rest:  If the discomfort is not relieved with rest, and you have been prescribed Nitroglycerin, take as directed (taken under the tongue, one at a time 5 minutes apart for a total of 3 doses). If the discomfort is not relieved after the 3rd nitroglycerin, call 911.   If you have not been prescribed Nitroglycerin  and your chest discomfort is not relieved with rest, call 911. AFTER CARE:    Follow up with your physician as instructed. Follow a heart healthy diet with proper portion control, daily stress management, daily exercise, blood pressure and cholesterol control , and smoking cessation.

## 2023-02-14 NOTE — PROGRESS NOTES
Bedside shift change report given to [address] [facility] (oncoming nurse) by Estella Contreras (offgoing nurse). Report included the following information SBAR and Kardex. End of Shift Note    Bedside shift change report given to *** (oncoming nurse) by Shun Santana RN (offgoing nurse). Report included the following information SBAR and Kardex    Shift worked:  Day     Shift summary and any significant changes:     EKG ordered and completed. Concerns for physician to address:       Zone phone for oncoming shift:          Activity:  Activity Level: Up ad heather  Number times ambulated in hallways past shift: 0  Number of times OOB to chair past shift: 1    Cardiac:   Cardiac Monitoring: Yes      Cardiac Rhythm: Sinus Rhythm    Access:  Current line(s): PIV     Genitourinary:   Urinary status: voiding    Respiratory:   O2 Device: None (Room air)  Chronic home O2 use?: NO  Incentive spirometer at bedside: NO       GI:     Current diet:  ADULT DIET Regular; Low Fat/Low Chol/High Fiber/KIMBERLY  Passing flatus: YES  Tolerating current diet: YES       Pain Management:   Patient states pain is manageable on current regimen: YES    Skin:  Ramu Score: 23  Interventions: float heels and increase time out of bed    Patient Safety:  Fall Score:  Total Score: 1  Interventions: gripper socks       Length of Stay:  Expected LOS: - - -  Actual LOS: 0      Shun Santana RN

## 2023-02-14 NOTE — PROGRESS NOTES
Problem: Falls - Risk of  Goal: *Absence of Falls  Description: Document Danell Holter Fall Risk and appropriate interventions in the flowsheet.   Outcome: Progressing Towards Goal  Note: Fall Risk Interventions:            Medication Interventions: Patient to call before getting OOB                   Problem: Patient Education: Go to Patient Education Activity  Goal: Patient/Family Education  Outcome: Progressing Towards Goal

## 2023-02-16 LAB
ATRIAL RATE: 73 BPM
CALCULATED P AXIS, ECG09: 74 DEGREES
CALCULATED R AXIS, ECG10: 31 DEGREES
CALCULATED T AXIS, ECG11: 53 DEGREES
DIAGNOSIS, 93000: NORMAL
P-R INTERVAL, ECG05: 184 MS
Q-T INTERVAL, ECG07: 402 MS
QRS DURATION, ECG06: 106 MS
QTC CALCULATION (BEZET), ECG08: 442 MS
VENTRICULAR RATE, ECG03: 73 BPM

## 2023-02-22 ENCOUNTER — HOSPITAL ENCOUNTER (EMERGENCY)
Age: 73
Discharge: HOME | End: 2023-02-22
Attending: EMERGENCY MEDICINE
Payer: MEDICAID

## 2023-02-22 ENCOUNTER — APPOINTMENT (OUTPATIENT)
Dept: CT IMAGING | Age: 73
End: 2023-02-22
Payer: MEDICAID

## 2023-02-22 VITALS
OXYGEN SATURATION: 97 % | DIASTOLIC BLOOD PRESSURE: 88 MMHG | SYSTOLIC BLOOD PRESSURE: 133 MMHG | HEART RATE: 95 BPM | TEMPERATURE: 97.6 F | RESPIRATION RATE: 16 BRPM

## 2023-02-22 DIAGNOSIS — R16.0 LIVER MASS: ICD-10-CM

## 2023-02-22 DIAGNOSIS — R10.11 RUQ ABDOMINAL PAIN: Primary | ICD-10-CM

## 2023-02-22 LAB
ALBUMIN SERPL-MCNC: 3.5 G/DL (ref 3.5–5)
ALBUMIN/GLOB SERPL: 0.7 (ref 1.1–2.2)
ALP SERPL-CCNC: 77 U/L (ref 45–117)
ALT SERPL-CCNC: 27 U/L (ref 12–78)
ANION GAP SERPL CALC-SCNC: 3 MMOL/L (ref 5–15)
APPEARANCE UR: CLEAR
AST SERPL-CCNC: 39 U/L (ref 15–37)
BACTERIA URNS QL MICRO: NEGATIVE /HPF
BASOPHILS # BLD: 0 K/UL (ref 0–0.1)
BASOPHILS NFR BLD: 0 % (ref 0–1)
BILIRUB SERPL-MCNC: 0.8 MG/DL (ref 0.2–1)
BILIRUB UR QL CFM: NEGATIVE
BUN SERPL-MCNC: 15 MG/DL (ref 6–20)
BUN/CREAT SERPL: 17 (ref 12–20)
CALCIUM SERPL-MCNC: 9.7 MG/DL (ref 8.5–10.1)
CHLORIDE SERPL-SCNC: 103 MMOL/L (ref 97–108)
CK SERPL-CCNC: 60 U/L (ref 39–308)
CO2 SERPL-SCNC: 32 MMOL/L (ref 21–32)
COLOR UR: ABNORMAL
COMMENT, HOLDF: NORMAL
CREAT SERPL-MCNC: 0.89 MG/DL (ref 0.7–1.3)
DIFFERENTIAL METHOD BLD: ABNORMAL
EOSINOPHIL # BLD: 0.1 K/UL (ref 0–0.4)
EOSINOPHIL NFR BLD: 1 % (ref 0–7)
EPITH CASTS URNS QL MICRO: ABNORMAL /LPF
ERYTHROCYTE [DISTWIDTH] IN BLOOD BY AUTOMATED COUNT: 12.5 % (ref 11.5–14.5)
GLOBULIN SER CALC-MCNC: 4.8 G/DL (ref 2–4)
GLUCOSE SERPL-MCNC: 116 MG/DL (ref 65–100)
GLUCOSE UR STRIP.AUTO-MCNC: NEGATIVE MG/DL
HCT VFR BLD AUTO: 38.6 % (ref 36.6–50.3)
HGB BLD-MCNC: 13.2 G/DL (ref 12.1–17)
HGB UR QL STRIP: NEGATIVE
IMM GRANULOCYTES # BLD AUTO: 0 K/UL (ref 0–0.04)
IMM GRANULOCYTES NFR BLD AUTO: 0 % (ref 0–0.5)
KETONES UR QL STRIP.AUTO: ABNORMAL MG/DL
LEUKOCYTE ESTERASE UR QL STRIP.AUTO: NEGATIVE
LYMPHOCYTES # BLD: 1.1 K/UL (ref 0.8–3.5)
LYMPHOCYTES NFR BLD: 11 % (ref 12–49)
MCH RBC QN AUTO: 32.4 PG (ref 26–34)
MCHC RBC AUTO-ENTMCNC: 34.2 G/DL (ref 30–36.5)
MCV RBC AUTO: 94.6 FL (ref 80–99)
MONOCYTES # BLD: 1.1 K/UL (ref 0–1)
MONOCYTES NFR BLD: 11 % (ref 5–13)
NEUTS SEG # BLD: 7.2 K/UL (ref 1.8–8)
NEUTS SEG NFR BLD: 77 % (ref 32–75)
NITRITE UR QL STRIP.AUTO: NEGATIVE
NRBC # BLD: 0 K/UL (ref 0–0.01)
NRBC BLD-RTO: 0 PER 100 WBC
PH UR STRIP: 5.5 (ref 5–8)
PLATELET # BLD AUTO: 217 K/UL (ref 150–400)
PMV BLD AUTO: 9 FL (ref 8.9–12.9)
POTASSIUM SERPL-SCNC: 3.9 MMOL/L (ref 3.5–5.1)
PROT SERPL-MCNC: 8.3 G/DL (ref 6.4–8.2)
PROT UR STRIP-MCNC: NEGATIVE MG/DL
RBC # BLD AUTO: 4.08 M/UL (ref 4.1–5.7)
RBC #/AREA URNS HPF: ABNORMAL /HPF (ref 0–5)
SAMPLES BEING HELD,HOLD: NORMAL
SODIUM SERPL-SCNC: 138 MMOL/L (ref 136–145)
SP GR UR REFRACTOMETRY: 1.03 (ref 1–1.03)
UR CULT HOLD, URHOLD: NORMAL
UROBILINOGEN UR QL STRIP.AUTO: 2 EU/DL (ref 0.2–1)
WBC # BLD AUTO: 9.5 K/UL (ref 4.1–11.1)
WBC URNS QL MICRO: ABNORMAL /HPF (ref 0–4)

## 2023-02-22 PROCEDURE — 74011250636 HC RX REV CODE- 250/636

## 2023-02-22 PROCEDURE — 82550 ASSAY OF CK (CPK): CPT

## 2023-02-22 PROCEDURE — 81001 URINALYSIS AUTO W/SCOPE: CPT

## 2023-02-22 PROCEDURE — 85025 COMPLETE CBC W/AUTO DIFF WBC: CPT

## 2023-02-22 PROCEDURE — 74011250637 HC RX REV CODE- 250/637

## 2023-02-22 PROCEDURE — 99284 EMERGENCY DEPT VISIT MOD MDM: CPT

## 2023-02-22 PROCEDURE — 74176 CT ABD & PELVIS W/O CONTRAST: CPT

## 2023-02-22 PROCEDURE — 74011000250 HC RX REV CODE- 250

## 2023-02-22 PROCEDURE — 80053 COMPREHEN METABOLIC PANEL: CPT

## 2023-02-22 PROCEDURE — 96360 HYDRATION IV INFUSION INIT: CPT

## 2023-02-22 PROCEDURE — 36415 COLL VENOUS BLD VENIPUNCTURE: CPT

## 2023-02-22 RX ORDER — LIDOCAINE 4 G/100G
1 PATCH TOPICAL
Status: DISCONTINUED | OUTPATIENT
Start: 2023-02-22 | End: 2023-02-22 | Stop reason: HOSPADM

## 2023-02-22 RX ORDER — AMOXICILLIN AND CLAVULANATE POTASSIUM 875; 125 MG/1; MG/1
1 TABLET, FILM COATED ORAL
Status: COMPLETED | OUTPATIENT
Start: 2023-02-22 | End: 2023-02-22

## 2023-02-22 RX ORDER — ACETAMINOPHEN 500 MG
1000 TABLET ORAL ONCE
Status: COMPLETED | OUTPATIENT
Start: 2023-02-22 | End: 2023-02-22

## 2023-02-22 RX ORDER — AMOXICILLIN AND CLAVULANATE POTASSIUM 875; 125 MG/1; MG/1
1 TABLET, FILM COATED ORAL 2 TIMES DAILY
Qty: 10 TABLET | Refills: 0 | Status: SHIPPED | OUTPATIENT
Start: 2023-02-22 | End: 2023-02-27

## 2023-02-22 RX ADMIN — AMOXICILLIN AND CLAVULANATE POTASSIUM 1 TABLET: 875; 125 TABLET, FILM COATED ORAL at 17:08

## 2023-02-22 RX ADMIN — SODIUM CHLORIDE 1000 ML: 9 INJECTION, SOLUTION INTRAVENOUS at 14:49

## 2023-02-22 RX ADMIN — ACETAMINOPHEN 1000 MG: 500 TABLET, FILM COATED ORAL at 17:08

## 2023-02-22 NOTE — Clinical Note
[address]  [address] [facility] [address]  [phone]    Work/School Note    Date: 2/22/2023    To Whom It May concern:    Mati Wells Sr. was seen and treated today in the emergency room by the following provider(s):  Attending Provider: Ramon Carpenter DO  Nurse Practitioner: Prabhu Tierney, [address] [facility]. Latrice Melissa is excused from work/school on 2/22/2023 through 2/24/2023. He is medically clear to return to work/school on 2/25/2023.          Sincerely,          [place]

## 2023-02-22 NOTE — ED TRIAGE NOTES
Pt c/o right flank pain x 2 days, denies fever, denies urinary symptoms, denies n/v, denies injury, thinks he pulled something , but wants to make she he does not have internal bleeding since he takes blood thinners

## 2023-02-22 NOTE — DISCHARGE INSTRUCTIONS
Take Augmentin (antibiotic) with food and make sure to complete full course. For pain, feel free to take Tylenol as needed for pain and you may also use a Lidocaine 4% patch (solanpas). Apply the patch once a day for only 12 hours. Remove the patch after 12 hours and cleanse skin. Follow-up with general surgery if symptoms do not improve in 2-3 days. Otherwise, follow-up with your primary care provider and make sure to get the MRI as scheduled on Friday regarding liver mass. Come back to the emergency department with worsening symptoms.

## 2023-02-22 NOTE — ED PROVIDER NOTES
Melody Kahn is a 67 y.o. male c/o right flank pain that worsen w/ movement and cough X 3-4 days. Pt reports had cardiac stent placed last Monday via right wrist at [facility]. Pt reports feeling constipated. LBM X 4 days ago. Pt reports taking stool softeners w/o much relief. Urine was dark yesterday. 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 Pt reports taking tylenol occasionally as needed for pain. Pt reports having Y-90 procedure done on liver to radiate and stop blood flow to liver July 2022. Pt is scheduled to get MRI on Friday to look on liver. Denies fever, chills, nausea, vomiting, swelling in legs, long distance traveling, sob, chest pain, hemoptysis, dysuria. Medical hx: prostate CA, HTN, cardiac cath, hypercholesterolemia, liver radiation    The history is provided by the patient. No  was used. Flank Pain   Associated symptoms include abdominal pain. Pertinent negatives include no chest pain, no fever, no headaches, no dysuria and no weakness.       Past Medical History:   Diagnosis Date    BPH     Cancer ([address] 11/2009    prostate biopsy revealed cancer    Erectile dysfunction     Essential hypertension, benign     Hepatitis C 6/30/2009    HIV positive ([facility] 6/30/2009    Hypercholesterolemia     Ill-defined condition 9-2014    PNEUMONIA/bronchitis hx    Memory disorder     Prostate CA ([facility] [address] 2/22/2010       Past Surgical History:   Procedure Laterality Date    COLONOSCOPY,REMV ESPERANZA,SNARE  8/31/2015         HX HEENT      gum surgery-for denture    HX HERNIA REPAIR       Inguinal Hernia Repair    HX HERNIA REPAIR  05/02/2017    Davinci recurrent RIHR with mesh    HX SKIN BIOPSY  11/16/15    left forearm/ upper arm biopsy     IR OCCL TXCATH ORGAN W SI  7/15/2022         Family History:   Problem Relation Age of Onset    Hypertension Mother     Mental Retardation Mother Depression Mother     Anxiety Mother     Liver Disease Father     Lung Disease Father     Diabetes Maternal Grandmother     Hypertension Other     Stroke Other     Anxiety Other     Depression Other        Social History     Socioeconomic History    Marital status: LEGALLY      Spouse name: Not on file    Number of children: Not on file    Years of education: Not on file    Highest education level: Not on file   Occupational History    Not on file   Tobacco Use    Smoking status: Every Day     Packs/day: 0.50     Years: 45.00     Pack years: 22.50     Types: Cigarettes    Smokeless tobacco: Never   Vaping Use    Vaping Use: Former    Substances: Nicotine   Substance and Sexual Activity    Alcohol use: No    Drug use: No     Types: Heroin, Prescription, Cocaine     Comment: none in past 20 years per pt on 5/2/17    Sexual activity: Yes     Partners: Female     Comment: seperated has 3 children   Other Topics Concern    Not on file   Social History Narrative    Not on file     Social Determinants of Health     Financial Resource Strain: Medium Risk    Difficulty of Paying Living Expenses: Somewhat hard   Food Insecurity: Food Insecurity Present    Worried About Running Out of Food in the Last Year: Never true    Ran Out of Food in the Last Year: Sometimes true   Transportation Needs: Not on file   Physical Activity: Not on file   Stress: Not on file   Social Connections: Not on file   Intimate Partner Violence: Not on file   Housing Stability: Not on file         ALLERGIES: Patient has no known allergies. Review of Systems   Constitutional:  Negative for activity change, appetite change, chills and fever. HENT:  Negative for congestion, rhinorrhea and sore throat. Eyes:  Negative for visual disturbance. Respiratory:  Negative for cough and shortness of breath. Cardiovascular:  Negative for chest pain. Gastrointestinal:  Positive for abdominal pain and constipation.  Negative for diarrhea, nausea and vomiting. Genitourinary:  Positive for flank pain. Negative for decreased urine volume, difficulty urinating, dysuria and hematuria. Dark urine   Musculoskeletal:  Negative for myalgias. Skin:  Negative for rash. Neurological:  Negative for speech difficulty, weakness and headaches. All other systems reviewed and are negative. Vitals:    02/22/23 1248   BP: 133/88   Pulse: 95   Resp: 16   Temp: 97.6 °F (36.4 °C)   SpO2: 97%            Physical Exam  Vitals reviewed. Constitutional:       General: He is not in acute distress. Appearance: Normal appearance. He is normal weight. HENT:      Head: Normocephalic. Nose: No rhinorrhea. Eyes:      Extraocular Movements: Extraocular movements intact. Conjunctiva/sclera: Conjunctivae normal.      Pupils: Pupils are equal, round, and reactive to light. Cardiovascular:      Rate and Rhythm: Normal rate and regular rhythm. Pulmonary:      Effort: Pulmonary effort is normal. No respiratory distress. Breath sounds: Normal breath sounds. No wheezing or rhonchi. Abdominal:      General: Bowel sounds are normal.      Palpations: Abdomen is soft. Tenderness: There is abdominal tenderness in the right upper quadrant. There is right CVA tenderness. There is no left CVA tenderness. Positive signs include Adams's sign. Negative signs include McBurney's sign. Musculoskeletal:         General: Normal range of motion. Cervical back: Normal range of motion and neck supple. Skin:     General: Skin is warm and dry. Capillary Refill: Capillary refill takes less than 2 seconds. Neurological:      Mental Status: He is alert and oriented to person, place, and time. Mental status is at baseline. Cranial Nerves: No cranial nerve deficit. Gait: Gait normal.        Medical Decision Making  Amount and/or Complexity of Data Reviewed  Labs: ordered. Radiology: ordered. Risk  OTC drugs.   Prescription drug management. Patient is a 76-year-old male presenting to the emergency room complaining of right upper quadrant and flank pain that increased with movement and cough for the past 3 to 4 days. Doubt urinary tract infection or pyelonephritis given urine was negative for white blood cell, leukocyte esterase, and bacteria. Patient had noted dark urine but CK was normal at 60 so doubt rhabdomyolysis. Doubt acute cholecystitis given gallbladder is within normal limits. Doubt kidney stones given CT of the abdomen showed no calculus or hydronephrosis. CT of the abdomen did not note appendix was thickened. General surgery was consulted regarding possible acute appendicitis. General surgery docs acute appendicitis given patient has no right lower quadrant abdominal tenderness, white blood cell was within normal limits, and patient was afebrile while in the emergency room. General surgery recommends to outpatient trial Augmentin and to follow-up as needed. Patient has a known hepatic mass and has an MRI appointment scheduled on Friday to reevaluate mass. Patient encouraged to follow-up as scheduled with the MRI. Patient was given a dose of the Augmentin, Tylenol, lidocaine patch, and IV fluids while in the emergency room. Patient encouraged to complete the full course of Augmentin as instructed and take Tylenol and lidocaine patch as needed for pain. Patient encouraged to follow-up with general surgery if symptoms do not improve in 2 to 3 days. Strict return to ED precautions given. ACI discussed with the patient; see instruction below. Patient verbalized understanding.       Procedures  N/A    LABORATORY TESTS:  Recent Results (from the past 12 hour(s))   URINALYSIS W/MICROSCOPIC    Collection Time: 02/22/23  1:14 PM   Result Value Ref Range    Color DARK YELLOW      Appearance CLEAR CLEAR      Specific gravity 1.027 1.003 - 1.030      pH (UA) 5.5 5.0 - 8.0      Protein Negative NEG mg/dL    Glucose Negative NEG mg/dL    Ketone TRACE (A) NEG mg/dL    Blood Negative NEG      Urobilinogen 2.0 (H) 0.2 - 1.0 EU/dL    Nitrites Negative NEG      Leukocyte Esterase Negative NEG      WBC 0-4 0 - 4 /hpf    RBC 0-5 0 - 5 /hpf    Epithelial cells FEW FEW /lpf    Bacteria Negative NEG /hpf   URINE CULTURE HOLD SAMPLE    Collection Time: 02/22/23  1:14 PM    Specimen: Urine   Result Value Ref Range    Urine culture hold        Urine on hold in Microbiology dept for 2 days. If unpreserved urine is submitted, it cannot be used for addtional testing after 24 hours, recollection will be required. CBC WITH AUTOMATED DIFF    Collection Time: 02/22/23  1:14 PM   Result Value Ref Range    WBC 9.5 4.1 - 11.1 K/uL    RBC 4.08 (L) 4.10 - 5.70 M/uL    HGB 13.2 12.1 - 17.0 g/dL    HCT 38.6 36.6 - 50.3 %    MCV 94.6 80.0 - 99.0 FL    MCH 32.4 26.0 - 34.0 PG    MCHC 34.2 30.0 - 36.5 g/dL    RDW 12.5 11.5 - 14.5 %    PLATELET 667 103 - 074 K/uL    MPV 9.0 8.9 - 12.9 FL    NRBC 0.0 0  WBC    ABSOLUTE NRBC 0.00 0.00 - 0.01 K/uL    NEUTROPHILS 77 (H) 32 - 75 %    LYMPHOCYTES 11 (L) 12 - 49 %    MONOCYTES 11 5 - 13 %    EOSINOPHILS 1 0 - 7 %    BASOPHILS 0 0 - 1 %    IMMATURE GRANULOCYTES 0 0.0 - 0.5 %    ABS. NEUTROPHILS 7.2 1.8 - 8.0 K/UL    ABS. LYMPHOCYTES 1.1 0.8 - 3.5 K/UL    ABS. MONOCYTES 1.1 (H) 0.0 - 1.0 K/UL    ABS. EOSINOPHILS 0.1 0.0 - 0.4 K/UL    ABS. BASOPHILS 0.0 0.0 - 0.1 K/UL    ABS. IMM.  GRANS. 0.0 0.00 - 0.04 K/UL    DF AUTOMATED     METABOLIC PANEL, COMPREHENSIVE    Collection Time: 02/22/23  1:14 PM   Result Value Ref Range    Sodium 138 136 - 145 mmol/L    Potassium 3.9 3.5 - 5.1 mmol/L    Chloride 103 97 - 108 mmol/L    CO2 32 21 - 32 mmol/L    Anion gap 3 (L) 5 - 15 mmol/L    Glucose 116 (H) 65 - 100 mg/dL    BUN 15 6 - 20 MG/DL    Creatinine 0.89 0.70 - 1.30 MG/DL    BUN/Creatinine ratio 17 12 - 20      eGFR >60 >60 ml/min/1.73m2    Calcium 9.7 8.5 - 10.1 MG/DL    Bilirubin, total 0.8 0.2 - 1.0 MG/DL    ALT (SGPT) 27 12 - 78 U/L    AST (SGOT) 39 (H) 15 - 37 U/L    Alk. phosphatase 77 45 - 117 U/L    Protein, total 8.3 (H) 6.4 - 8.2 g/dL    Albumin 3.5 3.5 - 5.0 g/dL    Globulin 4.8 (H) 2.0 - 4.0 g/dL    A-G Ratio 0.7 (L) 1.1 - 2.2     SAMPLES BEING HELD    Collection Time: 02/22/23  1:14 PM   Result Value Ref Range    SAMPLES BEING HELD [id number]     COMMENT        Add-on orders for these samples will be processed based on acceptable specimen integrity and analyte stability, which may vary by analyte. CK    Collection Time: 02/22/23  1:14 PM   Result Value Ref Range    CK 60 39 - 308 U/L   BILIRUBIN, CONFIRM    Collection Time: 02/22/23  1:14 PM   Result Value Ref Range    Bilirubin UA, confirm Negative NEG         IMAGING RESULTS:  CT ABD PELV WO CONT   Final Result   1. The appendix is thickened demonstrates increased density which may represent   a large appendicolith. Recommend correlation for acute appendicitis. 2.  Mild bilateral perinephric stranding with a small amount of fluid along the   left kidney. When correlation for acute pyelonephritis. No evidence of   hydronephrosis or ureteral stones. 3.  Mass in the right hepatic lobe incompletely evaluated. 4.  Other incidental findings as above          MEDICATIONS GIVEN:  Medications   lidocaine 4 % patch 1 Patch (1 Patch TransDERmal Apply Patch 2/22/23 9356)   sodium chloride 0.9 % bolus infusion 1,000 mL (0 mL IntraVENous IV Completed 2/22/23 1549)   amoxicillin-clavulanate (AUGMENTIN) 875-125 mg per tablet 1 Tablet (1 Tablet Oral Given 2/22/23 1708)   acetaminophen (TYLENOL) tablet 1,000 mg (1,000 mg Oral Given 2/22/23 1708)       IMPRESSION:  1. RUQ abdominal pain    2. Liver mass        PLAN:  1. Augmentin, tylenol, and lidocaine patch. Discharge Medication List as of 2/22/2023  4:55 PM        START taking these medications    Details   amoxicillin-clavulanate (Augmentin) 875-125 mg per tablet Take 1 Tablet by mouth two (2) times a day for 5 days. , Normal, Disp-10 Tablet, R-0           CONTINUE these medications which have NOT CHANGED    Details   aspirin delayed-release 81 mg tablet Take 1 Tablet by mouth daily. , Normal, Disp-90 Tablet, R-3      clopidogreL (PLAVIX) 75 mg tab Take 1 Tablet by mouth daily. , Normal, Disp-90 Tablet, R-3      rosuvastatin (CRESTOR) 10 mg tablet Take 1 tablet by mouth nightly, Normal, Disp-90 Tablet, R-3      lisinopriL (PRINIVIL, ZESTRIL) 20 mg tablet Take 1 tablet by mouth once daily, Normal, Disp-90 Tablet, R-3      Spiriva Respimat 2.5 mcg/actuation inhaler INHALE 2 PUFFS BY MOUTH ONCE DAILY, Historical Med, JU      cyanocobalamin (VITAMIN B12) 500 mcg tablet Take 500 mcg by mouth daily. , Historical Med      GARLIC PO Take  by mouth., Historical Med      omega 3-dha-epa-fish oil 100-160-1,000 mg cap Take  by mouth daily. , Historical Med      cholecalciferol (VITAMIN D3) 25 mcg (1,000 unit) cap Take  by mouth daily. , Historical Med      ascorbic acid, vitamin C, (VITAMIN C) 500 mg tablet Take 1,000 mg by mouth daily. , Historical Med      MULTIVITAMINS (MULTIVITAMIN PO) Take  by mouth daily. , Historical Med           2. Follow-up with general surgery in 2-3 days. Follow-up Information       Follow up With Specialties Details Why Contact Info    Joanne Hsu MD General Surgery Schedule an appointment as soon as possible for a visit in 3 days follow-up if symptoms do not improve [address] [facility] [address]  [phone]      [address] [facility] [facility] Emergency Medicine  As needed, If symptoms worsen [address] [facility]  [phone]          3.  Return to ED if worse     Signed By: LUIS Painting     February 22, 2023

## 2023-02-23 ENCOUNTER — TELEPHONE (OUTPATIENT)
Dept: HEMATOLOGY | Age: 73
End: 2023-02-23

## 2023-02-23 DIAGNOSIS — K74.60 CIRRHOSIS OF LIVER WITHOUT ASCITES, UNSPECIFIED HEPATIC CIRRHOSIS TYPE (HCC): ICD-10-CM

## 2023-02-23 RX ORDER — LORAZEPAM 0.5 MG/1
0.5 TABLET ORAL AS NEEDED
Qty: 6 TABLET | Refills: 0 | Status: SHIPPED | OUTPATIENT
Start: 2023-02-23

## 2023-02-23 NOTE — TELEPHONE ENCOUNTER
Patient's sister called with questions:    Patient was in the ER yesterday, took a CT Scan on right side. The ER said liver and kidney were inflamed and appendix had a thickening pf the wall. Then they said they want him to have an MRI, but he would need a sedative. Needs a prescription for sedative. Send to [facility] on file. Joe Garcia wanted him to have blood work done, however the hospital have already taken some blood work, and sister would like to know if he still needs to get blood work done? Could Plavix cause irritation to liver/appendix?

## 2023-02-24 ENCOUNTER — HOSPITAL ENCOUNTER (OUTPATIENT)
Dept: MRI IMAGING | Age: 73
Discharge: HOME | End: 2023-02-24
Attending: PHYSICIAN ASSISTANT

## 2023-02-24 DIAGNOSIS — C22.0 HEPATOCELLULAR CARCINOMA (HCC): ICD-10-CM

## 2023-02-24 DIAGNOSIS — K74.60 CIRRHOSIS OF LIVER WITHOUT ASCITES, UNSPECIFIED HEPATIC CIRRHOSIS TYPE (HCC): ICD-10-CM

## 2023-02-24 RX ORDER — SODIUM CHLORIDE 0.9 % (FLUSH) 0.9 %
10 SYRINGE (ML) INJECTION
Status: DISCONTINUED | OUTPATIENT
Start: 2023-02-24 | End: 2023-02-25 | Stop reason: HOSPADM

## 2023-02-27 ENCOUNTER — DOCUMENTATION ONLY (OUTPATIENT)
Dept: HEMATOLOGY | Age: 73
End: 2023-02-27

## 2023-02-27 ENCOUNTER — OFFICE VISIT (OUTPATIENT)
Dept: HEMATOLOGY | Age: 73
End: 2023-02-27

## 2023-02-27 ENCOUNTER — OFFICE VISIT (OUTPATIENT)
Dept: SURGERY | Age: 73
End: 2023-02-27
Payer: MEDICARE

## 2023-02-27 VITALS
TEMPERATURE: 97.9 F | BODY MASS INDEX: 26.88 KG/M2 | SYSTOLIC BLOOD PRESSURE: 127 MMHG | HEIGHT: 71 IN | WEIGHT: 192 LBS | DIASTOLIC BLOOD PRESSURE: 70 MMHG | HEART RATE: 72 BPM | OXYGEN SATURATION: 97 % | RESPIRATION RATE: 18 BRPM

## 2023-02-27 VITALS
HEART RATE: 75 BPM | WEIGHT: 195 LBS | DIASTOLIC BLOOD PRESSURE: 64 MMHG | HEIGHT: 71 IN | SYSTOLIC BLOOD PRESSURE: 110 MMHG | RESPIRATION RATE: 18 BRPM | OXYGEN SATURATION: 98 % | BODY MASS INDEX: 27.3 KG/M2 | TEMPERATURE: 97.9 F

## 2023-02-27 DIAGNOSIS — R10.9 ACUTE RIGHT FLANK PAIN: ICD-10-CM

## 2023-02-27 DIAGNOSIS — K74.60 LIVER DISEASE, CHRONIC, WITH CIRRHOSIS (HCC): Primary | ICD-10-CM

## 2023-02-27 DIAGNOSIS — C22.0 HEPATOCELLULAR CARCINOMA (HCC): Primary | ICD-10-CM

## 2023-02-27 DIAGNOSIS — K76.9 LIVER DISEASE, CHRONIC, WITH CIRRHOSIS (HCC): Primary | ICD-10-CM

## 2023-02-27 PROCEDURE — G8427 DOCREV CUR MEDS BY ELIG CLIN: HCPCS | Performed by: PHYSICIAN ASSISTANT

## 2023-02-27 PROCEDURE — 3074F SYST BP LT 130 MM HG: CPT | Performed by: SURGERY

## 2023-02-27 PROCEDURE — 1123F ACP DISCUSS/DSCN MKR DOCD: CPT | Performed by: SURGERY

## 2023-02-27 PROCEDURE — 99214 OFFICE O/P EST MOD 30 MIN: CPT | Performed by: PHYSICIAN ASSISTANT

## 2023-02-27 PROCEDURE — 3017F COLORECTAL CA SCREEN DOC REV: CPT | Performed by: SURGERY

## 2023-02-27 PROCEDURE — G8427 DOCREV CUR MEDS BY ELIG CLIN: HCPCS | Performed by: SURGERY

## 2023-02-27 PROCEDURE — 1123F ACP DISCUSS/DSCN MKR DOCD: CPT | Performed by: PHYSICIAN ASSISTANT

## 2023-02-27 PROCEDURE — G8510 SCR DEP NEG, NO PLAN REQD: HCPCS | Performed by: PHYSICIAN ASSISTANT

## 2023-02-27 PROCEDURE — 3078F DIAST BP <80 MM HG: CPT | Performed by: PHYSICIAN ASSISTANT

## 2023-02-27 PROCEDURE — 1101F PT FALLS ASSESS-DOCD LE1/YR: CPT | Performed by: SURGERY

## 2023-02-27 PROCEDURE — G8510 SCR DEP NEG, NO PLAN REQD: HCPCS | Performed by: SURGERY

## 2023-02-27 PROCEDURE — G8536 NO DOC ELDER MAL SCRN: HCPCS | Performed by: SURGERY

## 2023-02-27 PROCEDURE — G8417 CALC BMI ABV UP PARAM F/U: HCPCS | Performed by: SURGERY

## 2023-02-27 PROCEDURE — G8417 CALC BMI ABV UP PARAM F/U: HCPCS | Performed by: PHYSICIAN ASSISTANT

## 2023-02-27 PROCEDURE — 3074F SYST BP LT 130 MM HG: CPT | Performed by: PHYSICIAN ASSISTANT

## 2023-02-27 PROCEDURE — 3078F DIAST BP <80 MM HG: CPT | Performed by: SURGERY

## 2023-02-27 PROCEDURE — 1101F PT FALLS ASSESS-DOCD LE1/YR: CPT | Performed by: PHYSICIAN ASSISTANT

## 2023-02-27 PROCEDURE — 3017F COLORECTAL CA SCREEN DOC REV: CPT | Performed by: PHYSICIAN ASSISTANT

## 2023-02-27 PROCEDURE — G8536 NO DOC ELDER MAL SCRN: HCPCS | Performed by: PHYSICIAN ASSISTANT

## 2023-02-27 PROCEDURE — 99204 OFFICE O/P NEW MOD 45 MIN: CPT | Performed by: SURGERY

## 2023-02-27 RX ORDER — ALBUTEROL SULFATE 90 UG/1
AEROSOL, METERED RESPIRATORY (INHALATION)
COMMUNITY
Start: 2023-02-24 | End: 2023-03-01 | Stop reason: ALTCHOICE

## 2023-02-27 RX ORDER — TRAMADOL HYDROCHLORIDE 50 MG/1
50 TABLET ORAL
Qty: 14 TABLET | Refills: 0 | Status: SHIPPED | OUTPATIENT
Start: 2023-02-27 | End: 2023-03-02

## 2023-02-27 NOTE — PROGRESS NOTES
Identified pt with two pt identifiers (name and ). Reviewed chart in preparation for visit and have obtained necessary documentation. Nora Caceres is a 67 y.o. male  Chief Complaint   Patient presents with    Abdominal Pain     Eval Appendix, S/P ED 23     Visit Vitals  /70 (BP 1 Location: Left upper arm, BP Patient Position: Sitting, BP Cuff Size: Large adult)   Pulse 72   Temp 97.9 °F (36.6 °C) (Oral)   Resp 18   Ht 5' 11\" (1.803 m)   Wt 192 lb (87.1 kg)   SpO2 97%   BMI 26.78 kg/m²       1. Have you been to the ER, urgent care clinic since your last visit? Hospitalized since your last visit? No    2. Have you seen or consulted any other health care providers outside of the [address] [place] [address] since your last visit? Include any pap smears or colon screening.  No

## 2023-02-27 NOTE — PROGRESS NOTES
[address] [facility], MD, [address], [place], [place]      YADIRA Troy, AGPCNP-BC   Yesenia Ashley, [facility]-AG   Ben Bledsoe, FNP-CORTNEY Herbert, FNP-C   Aristeo Sykes, AGPCNP-BC      [address]   at [address], [address]   [phone]   FAX: [address]   at [address], [address] [place] [address], [address]   [phone]   FAX: [phone]     Patient Care Team:  Hiram Hernandez MD as PCP - Jewell Chris MD as PCP - [facility]  Juan Manuel Price MD (Infectious Disease Physician)  Alejandra Syed MD as Surgeon (General Surgery)  Guillermo Deluca MD (Gastroenterology)  Sebastian Bautista MD (Cardiovascular Disease Physician)  Hammad Mayberry MD (Cardiovascular Disease Physician)  Aleyda Velarde MD (Urology)  Monisha Christianson, RN as Care Coordinator  Dandre Ordonez as Physician Assistant (Hepatology)     Patient Active Problem List   Diagnosis Code    Chronic hepatitis C ([facility] B18.2    HIV positive ([facility] Z21    Weight loss, non-intentional R63.4    Prostate cancer ([address] C61    HTN (hypertension) I10    Colon polyp K63.5    Advanced care planning/counseling discussion Z71.89    Tobacco use Z72.0    PAD (peripheral artery disease) ([address] I73.9    Dyslipidemia E78.5    Lung nodule R91.1    Liver disease, chronic, with cirrhosis ([address] K74.60, K76.9    ASHD (arteriosclerotic heart disease) I25.10       Izabella Mora. presents to the [address] for management of cirrhosis due to successfully cleared chronic HCV and HIV co-infection.  The active problem list, all pertinent past medical history, medications, radiologic findings and laboratory findings related to the liver disorder were reviewed with the patient. Patient has completed 12 weeks of HCV treatment with Harvoni (5/29/2015-8/25/2015). He is a sustained virologic responder to treatment, or cured. The patient underwent a liver biopsy in 3/2015. This demonstrates severe hepatitis with bridging fibrosis. Patient has had post-HCV clearance Fibroscan to reassess liver fibrosis or scarring after successful HCV treatment. This revealed a score of 12.2. Suggested fibrosis level is F3. CAP score is 361, suggestive of significant hepatic steatosis. Ultrasound of the liver was performed in 12/2014. This suggests chronic liver disease. A 0.8 x 0.7 lesion was identified in the right lobe. These lesion has been followed with different radiographic methods over the past several years and there is apparent growth of this lesion to 2.2 cm. It was not definitve that this represents the same lesion and there have been no radiographic signs suggestive of HCC. AFP has been followed and had not been elevated in the past until 5/2022. Lab studies in 5/2022 showed a massive rise in AFP to ~1000 and he was sent for MRI which showed a LIRADS 5 lesion, 2.8 cm in the right lobe. He underwent mapping and CT chest and bone scan. This demonstrated solitary lesion amenable to TARE and no metastases. He has had Y-90 TARE on 7/15/2022 and tolerated this procedure reasonably well. Follow-up imaging has shown no residual/viable tumor activity in 10/2022 and his tumor markers have returned to normal.  He was supposed to have repeat MRI this week but had cardiac cath/stenting done and this study was not rescheduled. He had presentation to the [facility] last week and a \"lesion\" was seen in the liver, this was a non-contrasted study and likely reflective of prior treatment. He  was unable to lay flat for MRI at the end of last week and is in need of additional imaging for general surgery as well. Patient is co-infected with HIV.  He is currently not on anti-retroviral therapy because his virus remains suppressed to undetected levels. He is not currently being monitored by anyone for HIV at present, it has been about 1 year since his last assessment and he has been following with his PCP. Patient was treated for prostate cancer ~5 years ago and continues to have low PSA and no signs of recurrence. He has had appt with Dr Memo Herrera and the last PSA lab in 5/2022 showed a score of 0.4. He is being followed annually at this point. He continues to have intermittent claudication symptoms effecting the LLE. He has seen cardiology and is treated with Pletal, this has helped significantly and he has avoided surgery. He is still smoking 1/2-1 PPD. He is seen for routine evaluation with pulmonary associates as well, per patient, no lung disease. Patient had EGD with Dr Brock Portillo in 10/2022. This is to evaluate past esophagitis and now reportedly shows no concerning findings. He reports that he has had modest reduction in appetite and weight losses. This has been the case for the past 5-6 months. He continues to work full time. Since his last office visit:  He has had evaluation with cardiology and had to have L heart catheterization with significant occlusion to RCA and stent was placed. He has done well with this procedure and will be on Plavix for the next 12 months. He has also had presentation to the [facility] last week for evaluation of acute onset R-sided/flank pain. Labs and imaging were not revealing, ? appendix issue. His ua was negative and he has had normal bowel habits, no trauma to the side. This is point tender with movement and compression of the abdominal wall. He has met with surgeon this am, Dr Pascual Becerril and his surgeon has requested additional imaging to be done. Patient has been unable to lay flat to go forward with the MRI that we wanted.      ALLERGIES  No Known Allergies    MEDICATIONS  Current Outpatient Medications   Medication Sig LORazepam (ATIVAN) 0.5 mg tablet Take 1 Tablet by mouth as needed for Anxiety. Max Daily Amount: 2 mg. Take 1 tab PO 1 hour prior to procedure, may repeat at procedure time if needed. amoxicillin-clavulanate (Augmentin) 875-125 mg per tablet Take 1 Tablet by mouth two (2) times a day for 5 days. aspirin delayed-release 81 mg tablet Take 1 Tablet by mouth daily. clopidogreL (PLAVIX) 75 mg tab Take 1 Tablet by mouth daily. rosuvastatin (CRESTOR) 10 mg tablet Take 1 tablet by mouth nightly    lisinopriL (PRINIVIL, ZESTRIL) 20 mg tablet Take 1 tablet by mouth once daily    Spiriva Respimat 2.5 mcg/actuation inhaler INHALE 2 PUFFS BY MOUTH ONCE DAILY    cyanocobalamin (VITAMIN B12) 500 mcg tablet Take 500 mcg by mouth daily. GARLIC PO Take  by mouth. omega 3-dha-epa-fish oil 100-160-1,000 mg cap Take  by mouth daily. (Patient not taking: Reported on 2/27/2023)    cholecalciferol (VITAMIN D3) 25 mcg (1,000 unit) cap Take  by mouth daily. ascorbic acid, vitamin C, (VITAMIN C) 500 mg tablet Take 1,000 mg by mouth daily. MULTIVITAMINS (MULTIVITAMIN PO) Take  by mouth daily. No current facility-administered medications for this visit. REVIEW OF SYSTEMS NOT RELATED TO LIVER DISEASE:  Constitution systems: Negative for fever, chills, weight gain. Modest weight loss. Eyes: Negative for diplopia, visual changes, eye pain. ENT: Negative for sore throat, painful swallowing. Respiratory: Negative for cough, hemoptysis, dyspnea. Cardiology: Negative for chest pain, palpitations. GI:  Negative for constipation or diarrhea. Acute R flank pain. : Negative for urinary frequency, dysuria and hematuria. Skin: Negative for rash. Hematology: Negative for easy bruising, bleeding from gums or nose. Musculo-skeletal: Negative for back pain. Positive for muscle pain, weakness of the LLE with walking distances. Neurologic: Negative for headaches, dizziness, vertigo, memory problems.    Psychology: Negative for anxiety, depression. FAMILY HISTORY:  The father  of alcohol cirrhosis. The mother  of CVA. There are no other persons in the family with HCV or liver disease. SOCIAL HISTORY:  The patient is . The spouse has been tested for HCV and is positive. She was treated and achieved SVR. The patient has 3 children, and 7 grandchildren. The patient stopped using tobacco products in 2014. Resumed in  and is now smoking 1/2,  PPD. The patient has never consumed significant amounts of alcohol. The patient used to work in construction, still active and working odd jobs. PHYSICAL EXAMINATION:  VS: per nursing note. General: No acute distress. Eyes: Sclera anicteric. ENT: No oral lesions. Skin: No rashes. spider angiomata. No jaundice. Abdomen: No obvious distention suggesting ascites. Point tender at the right costal margin/flank and overlying musculature. No CVA tenderness. Extremities: No edema. No muscle wasting. Neurologic: Alert and oriented. Cranial nerves grossly intact. No asterixis.      LABORATORY STUDIES:  Liver [place] [address] Units 2023   WBC 4.1 - 11.1 K/uL 9.5 6.1 4.2   ANC 1.8 - 8.0 K/UL 7.2     HGB 12.1 - 17.0 g/dL 13.2 15.0 14.3    - 400 K/uL 217 227 237   INR 0.9 - 1.1     1.1   AST 15 - 37 U/L 39 (H)  19   ALT 12 - 78 U/L 27  26   Alk Phos 45 - 117 U/L 77  71   Bili, Total 0.2 - 1.0 MG/DL 0.8  0.4   Bili, Direct 0.0 - 0.2 MG/DL   0.1   Albumin 3.5 - 5.0 g/dL 3.5  3.9   BUN 6 - 20 MG/DL 15 10 12   Creat 0.70 - 1.30 MG/DL 0.89 0.75 0.68 (L)   Creat (iSTAT) 0.6 - 1.3 mg/dL      Na 136 - 145 mmol/L 138 137 138   K 3.5 - 5.1 mmol/L 3.9 3.9 4.3   Cl 97 - 108 mmol/L 103 104 107   CO2 21 - 32 mmol/L 32 29 26   Glucose 65 - 100 mg/dL 116 (H) 97 79     Cancer Screening Latest Ref Rng & Units 2022   AFP, Serum 0.0 - 8.4 ng/mL 2.6 1,070.1 (H) 7.1   AFP-L3% 0.0 - 9.9 % Comment 54.4 (H) 5.1   CA 19-9 0 - 35 U/mL  7 5   CEA 0.0 - 4.7 ng/mL  2.7 2.7       SEROLOGIES:  Serologies Latest Ref Rng 10/10/2014 9/1/2014 8/27/2014   Hep A Ab, Total Negative Positive (A)     Hep B Surface Ag Negative Negative     Hep B Core Ab, Total Negative Positive (A)     Hep C Genotype See Note 1a     HCV RT-PCR, Quant  [id number]  [id number]   HCV Log10    6.830   Ferritin 30 - 400 ng/mL 172     Iron % Saturation 15 - 55 % 22     C-ANCA Neg:<1:20 titer  <1:20    P-ANCA Neg:<1:20 titer  <1:20    ANCA Neg:<1:20 titer  <1:20      LIVER HISTOLOGY:  3/2015. Slides reviewed by MLS. Severe hepatitis with bridging fibrosis and possible cirrhosis. Knodell score (3,3,3,3), Bro score 4, Metavir score 3.  2/2017. FibroScan performed at [facility]. EkPa was 10.0. Suggested fibrosis level is F3.  2/2018. FibroScan performed at [facility]. EkPa was 11.7. Suggested fibrosis level is F3.  2/2019. FibroScan performed at [facility]. EkPa was 12.2. Suggested fibrosis level is F3. CAP score is 361, suggestive of significant hepatic steatosis. ENDOSCOPIC PROCEDURES:  10/2022. EGD performed by Dr Kristy Horton, reportedly normal.  Colon reportedly normal as well. RADIOLOGY:  12/2014. Ultrasound of liver. Echogenic consistent with chronic liver disease. 0.8x0.7 cm lesion right lobe. No dilated bile ducts. No ascites. 1/2015. Dynamic MRI liver. Changes consistent with chronic liver disease. No liver mass lesions. No dilated bile ducts. No bile duct strictures. No ascites. 11/2017. Ultrasound of liver. Cirrhotic morphology of the liver is again demonstrated. 12 mm echogenic nodule right hepatic lobe. This does not appear to reside in the same location as the prior smaller hyperechoic nodule. This may represent a hemangioma. 11/2018. Triple phase CT.  The central right lobe hepatic mass shown to be quite evident on ultrasound and echogenic but very subtle on MRI is very slightly hypoenhancing on CT and most evident on equilibrium phase. The margins are not clearly delineated so accurate measurement is difficult but the lesion is similarly sized compared to prior recent ultrasound measuring about 1.1 x 1.6 cm. No other hepatic lesions are seen in the liver redemonstrates subtle changes cirrhosis. 2019. Ultrasound of liver. Diffusely echogenic and heterogeneous. An echogenic lesion measures 1.5 x 2.0 x 1.9 cm, previously 1.6 x 1.1 cm on CT and 1.3 x 1.8 x 1.8 cm on ultrasound. 2021. CT abdomen with and without contrast. Known right lobe liver lesion is only faintly hypodense on the equilibrium phase and appears unchanged from prior imaging. No arterial hyperenhancement or other features to suggest hepatocellular carcinoma. 2021. Ultrasound of liver. Hepatic cirrhosis, with a focal echogenic mass in the right hepatic lobe. Its size has increased to 2.3 x 1.9 x 1.4 cm, compared to an older ultrasound of 0.8 x 0.7 x 0.7 cm. The most recent prior ultrasound in 2019 showed a size of 2.0 x 1.9 x 1.5 cm. If further evaluation of this slowly enlarging hepatic mass is planned, three-phase dedicated hepatic MRI would be recommended. 2022. MRI abdomen. Previously noted lesion now demonstrates arterial enhancement with patchy areas of washout. The lesion is likely increase in size although was more difficult to visualize on previous studies. LIRADS 5.  10/2022. MRI abdomen. Treatment related changes in segment 7. No definite residual tumor is identified. LIRADS TR nonviable. Small arterial enhancing foci scattered throughout the liver may be related to treatment as well. Attention on follow-up imaging. 2023. CT abdomen. ? Appendiceal thickening and fecolith? ?perinephritic stranding Left kidney. \"Mass\" in the right hepatic lobe. OTHER TESTIN2022. Bone scan. No evidence of bony metastases. 2022.  CT chest. No evidence for metastatic disease to the chest. Enhancing liver lesion in the right lobe of liver. Prominent calcified disc osteophyte complex at the T9-10. ASSESSMENT AND PLAN:  [facility] Patient was treated for 2.8 cm lesion in the right lobe with single course of Y-90 TARE in 7/2022. This lesion had been noted on imaging for some time, but had not had definitive characteristics of [address] until sharp rise in AFP. He has tolerated this course of therapy reasonably well and I have outlined for him the plan to monitor las today and to continue with imaging studies every 3 months by MRI. I have ordered additional imaging with 3 phase CT as he is unable to do the MRI at present due to pain in the side. I have confirmed with Dr Baron Chaparro that this would be acceptable to him and will schedule ASAP. I have given him an order to have AFP done on next labs for outside provider and reviewed with him the results of the labs from last week that were stable. Plan continued surveillance every 3 months through 7/2023 and then every 6 months through 5 years. 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 This degree of fibrosis was confirmed with past FibroScan 2/2019. He has preserved liver function. Will continue to monitor patient regularly and continue to monitor for complications of advanced fibrosis/cirrhosis. HCV treatment. Completed 12 weeks of Harvoni treatment in August 2015. He is cured. HIV co-infection. He has low levels of HIV RNA. He is not taking anti-HIV medications. He follows with PCP and ID intermittently, I have encouraged him to re-establish and we have rechecked viral titer, I am unable to recheck CD4 in the office. Prostate cancer history. He has had follow-up in [address] with PSA of 0.4. I have encouraged him to follow-up per urology. Abdominal pain/RLQ  Unclear etiology based on acute onset and location. Will obtain additional imaging and forward to surgery or other appropriate based on findings.   I have given him a short course of tramadol in the meantime. The patient was directed to continue all current medications at the current dosages. There are no contraindications for the patient to take any medications that are necessary for treatment of other medical issues. The patient was counseled regarding alcohol consumption. The patient was counseled regarding use of illicit drugs. Vaccination for viral hepatitis A and B is not required. The patient has serologic evidence of prior exposure or vaccination with immunity. FOLLOW-UP:  All of the issues listed above in the Assessment and Plan were discussed with the patient. All questions were answered. The patient expressed a clear understanding of the above. [facility] in 4 months with repeat CT now. Documentation reviewed and updated to reflect current, accurate patient information.     Aleta Cavazos PA-C  Liver [facility], [address]  [phone]  [address]

## 2023-02-27 NOTE — LETTER
3/1/2023    Patient: Horacio Tyler. YOB: 1950   Date of Visit: 2/27/2023     Staci Wright MD  [address]  [address] [id number]  Via In Allison marquez DO  Via In Basket    Dear MD Hayden Johnston DO,      Thank you for referring . Mark Grimaldo to [facility] [address] for evaluation. My notes for this consultation are attached. If you have questions, please do not hesitate to call me. I look forward to following your patient along with you.       Sincerely,    David Burks MD

## 2023-02-27 NOTE — PROGRESS NOTES
Identified pt with two pt identifiers(name and ). Reviewed record in preparation for visit and have obtained necessary documentation. Chief Complaint   Patient presents with    Other     Hepatocellular carcinoma ([address]   3 mo f/u      Vitals:    23 1135   BP: 110/64   Pulse: 75   Resp: 18   Temp: 97.9 °F (36.6 °C)   TempSrc: Temporal   SpO2: 98%   Weight: 195 lb (88.5 kg)   Height: 5' 11\" (1.803 m)   PainSc:   5   PainLoc: Abdomen       Health Maintenance Review: Patient reminded of \"due or due soon\" health maintenance. I have asked the patient to contact his/her primary care provider (PCP) for follow-up on his/her health maintenance. Coordination of Care Questionnaire:  :   1) Have you been to an emergency room, urgent care, or hospitalized since your last visit? If yes, where when, and reason for visit? Yes, [facility] pain on right side     2. Have seen or consulted   any other health care provider since your last visit? If yes, where when, and reason for visit? YES, Dr Kolby Jeffrey follow up 23      Patient is accompanied by sister I have received verbal consent from Stephen Stokes De LOnur Gifford. to discuss any/all medical information while they are present in the room.

## 2023-02-28 ENCOUNTER — HOSPITAL ENCOUNTER (OUTPATIENT)
Dept: CT IMAGING | Age: 73
Discharge: HOME | End: 2023-02-28
Attending: PHYSICIAN ASSISTANT
Payer: MEDICARE

## 2023-02-28 DIAGNOSIS — C22.0 HEPATOCELLULAR CARCINOMA (HCC): ICD-10-CM

## 2023-02-28 DIAGNOSIS — R10.9 ACUTE RIGHT FLANK PAIN: ICD-10-CM

## 2023-02-28 PROCEDURE — 74011000636 HC RX REV CODE- 636: Performed by: PHYSICIAN ASSISTANT

## 2023-02-28 PROCEDURE — 74170 CT ABD WO CNTRST FLWD CNTRST: CPT

## 2023-02-28 RX ADMIN — IOPAMIDOL 100 ML: 755 INJECTION, SOLUTION INTRAVENOUS at 16:32

## 2023-03-01 ENCOUNTER — PATIENT MESSAGE (OUTPATIENT)
Dept: SURGERY | Age: 73
End: 2023-03-01

## 2023-03-01 PROBLEM — R10.9 ACUTE RIGHT FLANK PAIN: Status: ACTIVE | Noted: 2023-03-01

## 2023-03-01 PROBLEM — R10.9 ACUTE RIGHT FLANK PAIN: Status: ACTIVE | Noted: 2023-01-01

## 2023-03-02 NOTE — PROGRESS NOTES
Pt (sister) notified of CT findings. No new liver mass/lesion, stable post-ablation changes. No evidence of acute appendicitis - will forward to Dr Fransisca Olivier for review. ? pyelonephritis as source for stranding and acute right flank pain. Will forward to established uro, Dr Madelyn Alvarez, and encouraged pt to call for assessment. He has just finished course of Abx.

## 2023-03-02 NOTE — PROGRESS NOTES
Subjective    Patient ID: Mo Mccauley is a 67 y.o. male. Chief Complaint   Patient presents with    Abdominal Pain     Eval Appendix, S/P ED 2/22/23     HPI Comments: Jb Arnold Sr. presents today for significant flank pain admitted to hospital was concern for appendicitis, however no real inflammatory changes around appendix. Instead most of inflammation was around kidney. Also has some liver issues and is being followed by Dr. Caden Castellanos. Still with flank pain, no pelvic pain or abdominal pain. No fevers or chills, and no significant malaise or other issues. No Known Allergies    Current Outpatient Medications:     LORazepam (ATIVAN) 0.5 mg tablet, Take 1 Tablet by mouth as needed for Anxiety. Max Daily Amount: 2 mg. Take 1 tab PO 1 hour prior to procedure, may repeat at procedure time if needed. (Patient not taking: Reported on 2/27/2023), Disp: 6 Tablet, Rfl: 0    aspirin delayed-release 81 mg tablet, Take 1 Tablet by mouth daily. , Disp: 90 Tablet, Rfl: 3    clopidogreL (PLAVIX) 75 mg tab, Take 1 Tablet by mouth daily. , Disp: 90 Tablet, Rfl: 3    rosuvastatin (CRESTOR) 10 mg tablet, Take 1 tablet by mouth nightly, Disp: 90 Tablet, Rfl: 3    lisinopriL (PRINIVIL, ZESTRIL) 20 mg tablet, Take 1 tablet by mouth once daily, Disp: 90 Tablet, Rfl: 3    Spiriva Respimat 2.5 mcg/actuation inhaler, INHALE 2 PUFFS BY MOUTH ONCE DAILY, Disp: , Rfl:     cholecalciferol (VITAMIN D3) 25 mcg (1,000 unit) cap, Take  by mouth daily. , Disp: , Rfl:     MULTIVITAMINS (MULTIVITAMIN PO), Take  by mouth daily. , Disp: , Rfl:     albuterol (PROVENTIL HFA, VENTOLIN HFA, PROAIR HFA) 90 mcg/actuation inhaler, INHALE 2 PUFFS BY MOUTH 4 TIMES DAILY AS NEEDED FOR WHEEZING OR SHORTNESS OF BREATH (Patient not taking: Reported on 2/27/2023), Disp: , Rfl:     traMADoL (ULTRAM) 50 mg tablet, Take 1 Tablet by mouth every eight (8) hours as needed for Pain for up to 3 days.  Max Daily Amount: 150 mg., Disp: 14 Tablet, Rfl: 0 cyanocobalamin (VITAMIN B12) 500 mcg tablet, Take 500 mcg by mouth daily. , Disp: , Rfl:     GARLIC PO, Take  by mouth., Disp: , Rfl:     omega 3-dha-epa-fish oil 100-160-1,000 mg cap, Take  by mouth daily. (Patient not taking: No sig reported), Disp: , Rfl:     ascorbic acid, vitamin C, (VITAMIN C) 500 mg tablet, Take 1,000 mg by mouth daily. , Disp: , Rfl:   Past Medical History:   Diagnosis Date    BPH     Cancer ([facility] 11/2009    prostate biopsy revealed cancer    Erectile dysfunction     Essential hypertension, benign     Hepatitis C 6/30/2009    HIV positive ([facility] 6/30/2009    Hypercholesterolemia     Ill-defined condition 9-2014    PNEUMONIA/bronchitis hx    Memory disorder     Prostate CA ([facility] 2/22/2010     Past Surgical History:   Procedure Laterality Date    COLONOSCOPY,REMV LESN,SNARE  08/31/2015         HX CAROTID STENT  02/13/2023    [facility]    HX HEENT      gum surgery-for denture    HX HERNIA REPAIR       Inguinal Hernia Repair    HX HERNIA REPAIR  05/02/2017    Davinci recurrent RIHR with mesh    HX SKIN BIOPSY  11/16/2015    left forearm/ upper arm biopsy     IR OCCL TXCATH ORGAN W SI  07/15/2022     Social History     Tobacco Use    Smoking status: Every Day     Packs/day: 0.50     Years: 45.00     Pack years: 22.50     Types: Cigarettes    Smokeless tobacco: Never   Vaping Use    Vaping Use: Former    Substances: Nicotine   Substance Use Topics    Alcohol use: No    Drug use: No     Types: Heroin, Prescription, Cocaine     Comment: none in past 20 years per pt on 5/2/17     Family History   Problem Relation Age of Onset    Hypertension Mother     Mental Retardation Mother     Depression Mother     Anxiety Mother     Liver Disease Father     Lung Disease Father     Diabetes Maternal Grandmother     Hypertension Other     Stroke Other     Anxiety Other     Depression Other         Review of Systems   Constitutional:  Negative for chills and fever. HENT:  Negative for congestion and sore throat. Respiratory:  Negative for cough, shortness of breath and wheezing. Cardiovascular:  Negative for chest pain and palpitations. Gastrointestinal:  Positive for abdominal pain. Negative for blood in stool, constipation, diarrhea, nausea and vomiting. Musculoskeletal:  Negative for joint pain and myalgias. Neurological:  Negative for weakness. Endo/Heme/Allergies:  Does not bruise/bleed easily. Psychiatric/Behavioral:  Negative for depression and suicidal ideas. Objective    Visit Vitals  /70 (BP 1 Location: Left upper arm, BP Patient Position: Sitting, BP Cuff Size: Large adult)   Pulse 72   Temp 97.9 °F (36.6 °C) (Oral)   Resp 18   Ht 5' 11\" (1.803 m)   Wt 192 lb (87.1 kg)   SpO2 97%   BMI 26.78 kg/m²      Physical Exam  Vitals and nursing note reviewed. Constitutional:       General: He is not in acute distress. Appearance: Normal appearance. He is not ill-appearing or toxic-appearing. HENT:      Head: Normocephalic and atraumatic. Right Ear: External ear normal.      Left Ear: External ear normal.      Mouth/Throat:      Mouth: Mucous membranes are moist.      Pharynx: Oropharynx is clear. No posterior oropharyngeal erythema. Eyes:      Extraocular Movements: Extraocular movements intact. Cardiovascular:      Rate and Rhythm: Normal rate and regular rhythm. Pulses: Normal pulses. Pulmonary:      Effort: Pulmonary effort is normal. No respiratory distress. Abdominal:      General: Abdomen is flat. There is no distension. Palpations: Abdomen is soft. Tenderness: There is abdominal tenderness. There is no rebound. Hernia: No hernia is present. Comments: Tenderness in right posterior flank. No rlq pain    Musculoskeletal:      Cervical back: Neck supple. Skin:     General: Skin is warm and dry. Neurological:      General: No focal deficit present. Mental Status: He is alert and oriented to person, place, and time.    Psychiatric: Mood and Affect: Mood normal.         Behavior: Behavior normal.        Problem List Items Addressed This Visit          Digestive    Liver disease, chronic, with cirrhosis (HCC) - Primary     Complicating factor, has appt with Dr. Marilyn Cedeño, will work with him to get appropriate CT scan. Spoke with his office and coordinated the CT. Other    Acute right flank pain     Had significant flank pain admitted to hospital was concern for appendicitis, however no real inflammatory changes around appendix. Instead most of inflammation was around kidney. Also has some live issues and is being followed by Dr. Marilyn Cedeño. Still with flank pain, no pelvic pain or abdominal pain. No fevers or chills, and no significant malaise or other issues. Given this don't believe the appendix is the most likely source will coordinate with Dr. Marilyn Cedeño to get appropriate scan to eval liver as well as flank and appendix. Yareli Perez MD personally performed the services described in this document. This documentation was facilitated by voice recognition software and may contain inadvertent typographical errors. If there are substantial concerns about the content of this note that may affect patient care, please contact me for clarification.

## 2023-03-02 NOTE — ASSESSMENT & PLAN NOTE
Complicating factor, has appt with Dr. Selene Frankel, will work with him to get appropriate CT scan. Spoke with his office and coordinated the CT.

## 2023-03-02 NOTE — ASSESSMENT & PLAN NOTE
Had significant flank pain admitted to hospital was concern for appendicitis, however no real inflammatory changes around appendix. Instead most of inflammation was around kidney. Also has some live issues and is being followed by Dr. Ardeth Castleman. Still with flank pain, no pelvic pain or abdominal pain. No fevers or chills, and no significant malaise or other issues. Given this don't believe the appendix is the most likely source will coordinate with Dr. Ardeth Castleman to get appropriate scan to eval liver as well as flank and appendix.

## 2023-03-06 ENCOUNTER — TELEPHONE (OUTPATIENT)
Dept: HEMATOLOGY | Age: 73
End: 2023-03-06

## 2023-03-06 DIAGNOSIS — K74.60 CIRRHOSIS OF LIVER WITHOUT ASCITES, UNSPECIFIED HEPATIC CIRRHOSIS TYPE (HCC): ICD-10-CM

## 2023-03-06 DIAGNOSIS — C22.0 HEPATOCELLULAR CARCINOMA (HCC): Primary | ICD-10-CM

## 2023-03-06 NOTE — TELEPHONE ENCOUNTER
Spoke with patient's sister, Gil Juárez, as she helps to coordinate and assist patient with his appointments. I schedule MRI of Abd w and w/o contrast at [facility] on 3/14/23 at 5:30pm - patient to arrive at 5pm to outpatient registration, do not wear anything metal. She verbalized understanding. Also scheduled patient's bone scan and Chest CT of 3/30/2023 at [facility] - patient is to arrive at 10am to register at outpatient registration, receive injection at 10:30am, then have the CT of Chest at 12:00pm, followed by completion of bone scan at 1:30pm. Gil Juárez states that she believes that he recently had a CT of the Chest with his Pulmonary doctor - Dr. Cynthia Casiano that goes to [facility]. Let her know that I would check with his office to see if I can get the report and FU.

## 2023-03-08 NOTE — TELEPHONE ENCOUNTER
Attempt to call patient to give update from Dr. Tyrel Villanueva. Family member picked up the phone and stated everything is straight.

## 2023-03-08 NOTE — TELEPHONE ENCOUNTER
From: Madie Cardona. To: Tyler Navarrete MD  Sent: 3/1/2023 3:37 PM EST  Subject: Tello Garcia's CT scan    Did you see any problems with his CT scan and why his side might cause him so much pain.

## 2023-03-13 ENCOUNTER — PATIENT MESSAGE (OUTPATIENT)
Dept: HEMATOLOGY | Age: 73
End: 2023-03-13

## 2023-03-13 NOTE — TELEPHONE ENCOUNTER
Called patient insurance, [employer] takes care of authorization for this patient's insurance - called at [phone], spoke with representative and nurse to determine clinicals outstanding for approval of MRI - they state that all clinicals have been received and just waiting review by MD. However nurse states that patient already has an authorization on file for MRI of the Abd between the dates of 2/24/2023 - 4/10/23, [address] #[id number]. Provided the above information to central scheduling authorization department and to the patient via [employer] message.

## 2023-03-14 ENCOUNTER — HOSPITAL ENCOUNTER (OUTPATIENT)
Dept: MRI IMAGING | Age: 73
Discharge: HOME | End: 2023-03-14
Attending: PHYSICIAN ASSISTANT

## 2023-03-14 DIAGNOSIS — K74.60 CIRRHOSIS OF LIVER WITHOUT ASCITES, UNSPECIFIED HEPATIC CIRRHOSIS TYPE (HCC): ICD-10-CM

## 2023-03-14 DIAGNOSIS — C22.0 HEPATOCELLULAR CARCINOMA (HCC): ICD-10-CM

## 2023-03-15 ENCOUNTER — TELEPHONE (OUTPATIENT)
Dept: HEMATOLOGY | Age: 73
End: 2023-03-15

## 2023-03-15 DIAGNOSIS — C22.0 HEPATOCELLULAR CARCINOMA (HCC): ICD-10-CM

## 2023-03-15 RX ORDER — TRAMADOL HYDROCHLORIDE 50 MG/1
50 TABLET ORAL
Qty: 21 TABLET | Refills: 0 | Status: SHIPPED | OUTPATIENT
Start: 2023-03-15 | End: 2023-03-29

## 2023-03-15 NOTE — TELEPHONE ENCOUNTER
Patient's sister called stating that her brother could not have the MRI last night - they requested that he wait 2 more weeks since he recently had a coronary stent placed. The MRI is rescheduled for 3/28/23. Bone Scan and CT of Chest is scheduled 2 days later on 3/30/23. Kalani Figueroa for letting me know and I will FU with Ms. Ke Jamarcus to send the pre-test medication he takes. Pepe Hernandez verified that he is still having some lower back pain so having a few more of the tramadol would be helpful.

## 2023-03-24 ENCOUNTER — DOCUMENTATION ONLY (OUTPATIENT)
Dept: HEMATOLOGY | Age: 73
End: 2023-03-24

## 2023-03-24 NOTE — PROGRESS NOTES
Per Garcia Villanueva, Chest CT w/o cont has been approved. Authorization# [id number] (3/30/23-5/14/23)    Auth placed in provider's box to review.

## 2023-03-25 NOTE — PROGRESS NOTES
HISTORY OF PRESENT ILLNESS  Arnaldo Simental. is a 67 y.o. male. HPI  Hx HTN and HLD, hx prostate cancer s/p XRT with low level PSA , hep c treated, liver fibrosis-cirrhosis, HIV coinfection and  PAD 4mm right lung nodule , smoker , hx HCC s/p TARE ( radioembolization)  Gets MRI and labs q3 mos from [facility] for [facility] right lobe lesion  Recent CT neg for recurrent HCC but AFP severely elevated 13,722--MRI ordered but delayed on 328 due to recent cardiac stent placement. Will get chest CT too  Some right side pain-perinephric stranding on CT-improving some mild now--does not marilu pain medication  Could not lay for MRI weeks ago due to pain but now can do it  Saw URO-no kidney infection  CARD Dr Jeremias Hinojosa  Mid RCA lesion angioplasty and stent last month-Dr Negrete-no chest pain  Last PSA 0.4  5/2022  Recent LDL 92  Request ativan prior to MRI today    Last OV  Had recent Chest CT for HCC-no chest mets or nodules  S/p recent radioembolization of right lobe HCC-followed by liver inst-will need fu MRI q3 months for 1 yr per pt report  CARD for PAD -Dr Santino Saini about 10 lbs this summer but working a lot-contractor and painting  America Hawkins MD   Not seeing ID MD now-saw Dr Basil Nixon in 2019     Some chills last night.   Wsa dizzy labor -better after resting most of the day in bed    Patient Active Problem List    Diagnosis Date Noted    Acute right flank pain 03/01/2023    ASHD (arteriosclerotic heart disease) 02/13/2023    Liver disease, chronic, with cirrhosis ([facility] 03/09/2021    Lung nodule 10/28/2019    Tobacco use 12/06/2016    PAD (peripheral artery disease) ([address] 12/06/2016    Dyslipidemia 12/06/2016    Advanced care planning/counseling discussion 09/13/2016    Colon polyp 09/18/2015    HTN (hypertension) 01/27/2011    Prostate cancer ([address] 02/22/2010    Chronic hepatitis C ([address] 06/30/2009    HIV positive ([facility] [address] 06/30/2009    Weight loss, non-intentional 06/30/2009     Current Outpatient Medications   Medication Sig Dispense Refill    traMADoL (ULTRAM) 50 mg tablet Take 1 Tablet by mouth every eight (8) hours as needed for Pain for up to 14 days. Max Daily Amount: 150 mg. 21 Tablet 0    aspirin delayed-release 81 mg tablet Take 1 Tablet by mouth daily. 90 Tablet 3    clopidogreL (PLAVIX) 75 mg tab Take 1 Tablet by mouth daily. 90 Tablet 3    rosuvastatin (CRESTOR) 10 mg tablet Take 1 tablet by mouth nightly 90 Tablet 3    lisinopriL (PRINIVIL, ZESTRIL) 20 mg tablet Take 1 tablet by mouth once daily 90 Tablet 3    Spiriva Respimat 2.5 mcg/actuation inhaler INHALE 2 PUFFS BY MOUTH ONCE DAILY      cholecalciferol (VITAMIN D3) 25 mcg (1,000 unit) cap Take  by mouth daily. MULTIVITAMINS (MULTIVITAMIN PO) Take  by mouth daily.        No Known Allergies   Lab Results   Component Value Date/Time    WBC 9.5 02/22/2023 01:14 PM    HGB 13.2 02/22/2023 01:14 PM    Hemoglobin (POC) 16.0 05/02/2017 07:50 AM    HCT 38.6 02/22/2023 01:14 PM    Hematocrit (POC) 47 05/02/2017 07:50 AM    PLATELET 668 23/31/7951 01:14 PM    MCV 94.6 02/22/2023 01:14 PM     Lab Results   Component Value Date/Time    Hemoglobin A1c 5.4 12/05/2019 12:00 AM    Glucose 116 (H) 02/22/2023 01:14 PM    Glucose (POC) 98 05/02/2017 07:50 AM    Glucose (POC) 95 09/04/2014 12:18 PM    LDL, calculated 92.4 02/13/2023 11:12 AM    Creatinine (POC) 0.90 06/04/2021 09:03 AM    Creatinine 0.89 02/22/2023 01:14 PM      Lab Results   Component Value Date/Time    Cholesterol, total 157 02/13/2023 11:12 AM    HDL Cholesterol 52 02/13/2023 11:12 AM    LDL, calculated 92.4 02/13/2023 11:12 AM    Triglyceride 63 02/13/2023 11:12 AM    CHOL/HDL Ratio 3.0 02/13/2023 11:12 AM     Lab Results   Component Value Date/Time    GFR est non-AA >60 08/02/2022 09:09 AM    GFRNA, POC >60 06/04/2021 09:03 AM    GFR est AA >60 08/02/2022 09:09 AM    GFRAA, POC >60 06/04/2021 09:03 AM    Creatinine 0.89 02/22/2023 01:14 PM    Creatinine (POC) 0.90 06/04/2021 09:03 AM    BUN 15 02/22/2023 01:14 PM    BUN (POC) 14 05/02/2017 07:50 AM    Sodium 138 02/22/2023 01:14 PM    Sodium (POC) 141 05/02/2017 07:50 AM    Potassium 3.9 02/22/2023 01:14 PM    Potassium (POC) 4.0 05/02/2017 07:50 AM    Chloride 103 02/22/2023 01:14 PM    Chloride (POC) 102 05/02/2017 07:50 AM    CO2 32 02/22/2023 01:14 PM    Magnesium 1.7 09/07/2014 12:32 AM    Phosphorus 3.3 09/03/2014 04:47 AM     Lab Results   Component Value Date/Time    TSH 0.999 05/02/2019 09:06 AM      Lab Results   Component Value Date/Time    Glucose 116 (H) 02/22/2023 01:14 PM    Glucose (POC) 98 05/02/2017 07:50 AM    Glucose (POC) 95 09/04/2014 12:18 PM         ROS    Physical Exam  Vitals and nursing note reviewed. Constitutional:       General: He is not in acute distress. Appearance: Normal appearance. He is well-developed. HENT:      Head: Normocephalic and atraumatic. Right Ear: Tympanic membrane normal.      Left Ear: Tympanic membrane normal.      Nose: Nose normal.      Mouth/Throat:      Mouth: Mucous membranes are moist.   Eyes:      Pupils: Pupils are equal, round, and reactive to light. Neck:      Vascular: No carotid bruit. Cardiovascular:      Rate and Rhythm: Normal rate and regular rhythm. Pulses: Normal pulses. Heart sounds: Normal heart sounds. No murmur heard. No friction rub. No gallop. Pulmonary:      Effort: Pulmonary effort is normal.      Breath sounds: Normal breath sounds. Abdominal:      General: Bowel sounds are normal.      Palpations: Abdomen is soft. Musculoskeletal:      Cervical back: Neck supple. Right lower leg: No edema. Left lower leg: No edema. Comments: Very mild TTP Right lower side area below the ribs   Lymphadenopathy:      Cervical: No cervical adenopathy. Skin:     General: Skin is warm. Neurological:      General: No focal deficit present. Mental Status: He is alert. Psychiatric:         Mood and Affect: Mood normal.         Thought Content:  Thought content normal.         Judgment: Judgment normal.     ASSESSMENT and PLAN    ICD-10-CM ICD-9-CM    1. Chronic obstructive pulmonary disease, unspecified COPD type ([facility]  J44.9 496 stable      2. Recurrent major depressive disorder, remission status unspecified (HCC)  F33.9 296.30 Appears stable      3. HIV infection, unspecified symptom status ([facility]  B20 V08 Fu ID MD      4. PAD (peripheral artery disease) (HCC)  I73.9 443.9       5. Prostate cancer (HCC)  C61 185 PSA, DIAGNOSTIC (PROSTATE SPECIFIC AG)      PSA, DIAGNOSTIC (PROSTATE SPECIFIC AG)  Fu URO      6. Weight loss  R63.4 783.21 TSH 3RD GENERATION      TSH 3RD GENERATION  Encourage oral intake      7. Cancer, hepatocellular ([facility]  C22.0 155.0 LORazepam (ATIVAN) 0.5 mg tablet for MRI today  Fu Hepatologist   CT and bone scan also were ordered      8. Right sided abdominal pain  R10.9 789.09 C/w strain but will get further imaging.   Less pain now per pt   Rtc 6 months wellness

## 2023-03-28 ENCOUNTER — HOSPITAL ENCOUNTER (OUTPATIENT)
Dept: MRI IMAGING | Age: 73
Discharge: HOME | End: 2023-03-28
Attending: PHYSICIAN ASSISTANT

## 2023-03-28 ENCOUNTER — OFFICE VISIT (OUTPATIENT)
Dept: INTERNAL MEDICINE CLINIC | Age: 73
End: 2023-03-28
Payer: MEDICARE

## 2023-03-28 VITALS
SYSTOLIC BLOOD PRESSURE: 116 MMHG | DIASTOLIC BLOOD PRESSURE: 72 MMHG | HEIGHT: 71 IN | WEIGHT: 190.6 LBS | HEART RATE: 77 BPM | OXYGEN SATURATION: 98 % | BODY MASS INDEX: 26.68 KG/M2 | TEMPERATURE: 97.2 F | RESPIRATION RATE: 16 BRPM

## 2023-03-28 DIAGNOSIS — C61 PROSTATE CANCER (HCC): ICD-10-CM

## 2023-03-28 DIAGNOSIS — J44.9 CHRONIC OBSTRUCTIVE PULMONARY DISEASE, UNSPECIFIED COPD TYPE (HCC): Primary | ICD-10-CM

## 2023-03-28 DIAGNOSIS — I73.9 PAD (PERIPHERAL ARTERY DISEASE) (HCC): ICD-10-CM

## 2023-03-28 DIAGNOSIS — B20 HIV INFECTION, UNSPECIFIED SYMPTOM STATUS (HCC): ICD-10-CM

## 2023-03-28 DIAGNOSIS — R10.9 RIGHT SIDED ABDOMINAL PAIN: ICD-10-CM

## 2023-03-28 DIAGNOSIS — C22.0 CANCER, HEPATOCELLULAR (HCC): ICD-10-CM

## 2023-03-28 DIAGNOSIS — F33.9 RECURRENT MAJOR DEPRESSIVE DISORDER, REMISSION STATUS UNSPECIFIED (HCC): ICD-10-CM

## 2023-03-28 DIAGNOSIS — R63.4 WEIGHT LOSS: ICD-10-CM

## 2023-03-28 PROCEDURE — 99213 OFFICE O/P EST LOW 20 MIN: CPT | Performed by: INTERNAL MEDICINE

## 2023-03-28 PROCEDURE — G8427 DOCREV CUR MEDS BY ELIG CLIN: HCPCS | Performed by: INTERNAL MEDICINE

## 2023-03-28 PROCEDURE — G8536 NO DOC ELDER MAL SCRN: HCPCS | Performed by: INTERNAL MEDICINE

## 2023-03-28 PROCEDURE — 3017F COLORECTAL CA SCREEN DOC REV: CPT | Performed by: INTERNAL MEDICINE

## 2023-03-28 PROCEDURE — 1101F PT FALLS ASSESS-DOCD LE1/YR: CPT | Performed by: INTERNAL MEDICINE

## 2023-03-28 PROCEDURE — G8417 CALC BMI ABV UP PARAM F/U: HCPCS | Performed by: INTERNAL MEDICINE

## 2023-03-28 PROCEDURE — G8432 DEP SCR NOT DOC, RNG: HCPCS | Performed by: INTERNAL MEDICINE

## 2023-03-28 RX ORDER — LORAZEPAM 0.5 MG/1
0.5 TABLET ORAL
Qty: 6 TABLET | Refills: 0 | Status: SHIPPED | OUTPATIENT
Start: 2023-03-28

## 2023-03-28 NOTE — PROGRESS NOTES
1. Have you been to the ER, urgent care clinic since your last visit? Hospitalized since your last visit? ED for abdominal pain on 2/22    2. Have you seen or consulted any other health care providers outside of the [address] [place] [address] since your last visit? Include any pap smears or colon screening.  No

## 2023-03-30 ENCOUNTER — HOSPITAL ENCOUNTER (OUTPATIENT)
Dept: NUCLEAR MEDICINE | Age: 73
Discharge: HOME | End: 2023-03-30
Attending: PHYSICIAN ASSISTANT
Payer: MEDICARE

## 2023-03-30 ENCOUNTER — HOSPITAL ENCOUNTER (OUTPATIENT)
Dept: CT IMAGING | Age: 73
Discharge: HOME | End: 2023-03-30
Attending: PHYSICIAN ASSISTANT
Payer: MEDICARE

## 2023-03-30 DIAGNOSIS — C22.0 HEPATOCELLULAR CARCINOMA (HCC): ICD-10-CM

## 2023-03-30 DIAGNOSIS — K74.60 CIRRHOSIS OF LIVER WITHOUT ASCITES, UNSPECIFIED HEPATIC CIRRHOSIS TYPE (HCC): ICD-10-CM

## 2023-03-30 PROCEDURE — 78306 BONE IMAGING WHOLE BODY: CPT

## 2023-03-30 PROCEDURE — 71250 CT THORAX DX C-: CPT

## 2023-04-05 NOTE — PROGRESS NOTES
Pt notified via [facility] letter of negative CT chest and bone scan for signs of metastatic disease. Will proceed with MRI as scheduled on 4/14/23.

## 2023-04-14 ENCOUNTER — HOSPITAL ENCOUNTER (OUTPATIENT)
Dept: MRI IMAGING | Age: 73
Discharge: HOME | End: 2023-04-14
Attending: PHYSICIAN ASSISTANT
Payer: MEDICARE

## 2023-04-14 VITALS — BODY MASS INDEX: 26.78 KG/M2 | WEIGHT: 192 LBS

## 2023-04-14 PROCEDURE — 74011000250 HC RX REV CODE- 250: Performed by: PHYSICIAN ASSISTANT

## 2023-04-14 PROCEDURE — 74011250636 HC RX REV CODE- 250/636: Performed by: PHYSICIAN ASSISTANT

## 2023-04-14 PROCEDURE — A9576 INJ PROHANCE MULTIPACK: HCPCS | Performed by: PHYSICIAN ASSISTANT

## 2023-04-14 PROCEDURE — 74011000258 HC RX REV CODE- 258: Performed by: PHYSICIAN ASSISTANT

## 2023-04-14 PROCEDURE — 74183 MRI ABD W/O CNTR FLWD CNTR: CPT

## 2023-04-14 RX ORDER — SODIUM CHLORIDE 0.9 % (FLUSH) 0.9 %
10 SYRINGE (ML) INJECTION
Status: COMPLETED | OUTPATIENT
Start: 2023-04-14 | End: 2023-04-14

## 2023-04-14 RX ADMIN — SODIUM CHLORIDE 100 ML: 900 INJECTION, SOLUTION INTRAVENOUS at 11:54

## 2023-04-14 RX ADMIN — GADOTERIDOL 18 ML: 279.3 INJECTION, SOLUTION INTRAVENOUS at 11:54

## 2023-04-14 RX ADMIN — SODIUM CHLORIDE, PRESERVATIVE FREE 10 ML: 5 INJECTION INTRAVENOUS at 11:55

## 2023-04-18 ENCOUNTER — TELEPHONE (OUTPATIENT)
Dept: HEMATOLOGY | Age: 73
End: 2023-04-18

## 2023-04-18 NOTE — TELEPHONE ENCOUNTER
Received message from Ms. Familia Belle that she had spoken with patient and his sister, Vielka Sims re: MRI results and treatment option of Immunotherapy. She has requested that I contact [facility] to schedule teaching. Called patient's sister, Vielka Sims, she states patient would like to come in on Friday, April 28th at 3pm for teaching - appt noted.

## 2023-05-02 DIAGNOSIS — K74.60 LIVER DISEASE, CHRONIC, WITH CIRRHOSIS (HCC): Primary | ICD-10-CM

## 2023-05-02 DIAGNOSIS — K76.9 LIVER DISEASE, CHRONIC, WITH CIRRHOSIS (HCC): Primary | ICD-10-CM

## 2023-05-02 RX ORDER — SODIUM CHLORIDE 0.9 % (FLUSH) 0.9 %
5-40 SYRINGE (ML) INJECTION PRN
OUTPATIENT
Start: 2023-05-08

## 2023-05-02 RX ORDER — SODIUM CHLORIDE 9 MG/ML
INJECTION, SOLUTION INTRAVENOUS CONTINUOUS
OUTPATIENT
Start: 2023-05-08

## 2023-05-02 RX ORDER — DIPHENHYDRAMINE HYDROCHLORIDE 50 MG/ML
50 INJECTION INTRAMUSCULAR; INTRAVENOUS
OUTPATIENT
Start: 2023-05-08

## 2023-05-02 RX ORDER — FAMOTIDINE 10 MG/ML
20 INJECTION, SOLUTION INTRAVENOUS
OUTPATIENT
Start: 2023-05-08

## 2023-05-02 RX ORDER — ACETAMINOPHEN 325 MG/1
650 TABLET ORAL
OUTPATIENT
Start: 2023-05-08

## 2023-05-02 RX ORDER — EPINEPHRINE 1 MG/ML
0.3 INJECTION, SOLUTION, CONCENTRATE INTRAVENOUS PRN
OUTPATIENT
Start: 2023-05-08

## 2023-05-02 RX ORDER — MEPERIDINE HYDROCHLORIDE 50 MG/ML
12.5 INJECTION INTRAMUSCULAR; INTRAVENOUS; SUBCUTANEOUS PRN
OUTPATIENT
Start: 2023-05-08

## 2023-05-02 RX ORDER — ONDANSETRON 2 MG/ML
8 INJECTION INTRAMUSCULAR; INTRAVENOUS
OUTPATIENT
Start: 2023-05-08

## 2023-05-02 RX ORDER — SODIUM CHLORIDE 9 MG/ML
5-250 INJECTION, SOLUTION INTRAVENOUS PRN
OUTPATIENT
Start: 2023-05-08

## 2023-05-02 RX ORDER — ALBUTEROL SULFATE 90 UG/1
4 AEROSOL, METERED RESPIRATORY (INHALATION) PRN
OUTPATIENT
Start: 2023-05-08

## 2023-05-02 RX ORDER — HEPARIN SODIUM (PORCINE) LOCK FLUSH IV SOLN 100 UNIT/ML 100 UNIT/ML
500 SOLUTION INTRAVENOUS PRN
OUTPATIENT
Start: 2023-05-08

## 2023-05-03 ENCOUNTER — TELEPHONE (OUTPATIENT)
Age: 73
End: 2023-05-03

## 2023-05-03 NOTE — TELEPHONE ENCOUNTER
Received call from Ms. Thi Chahal yesterday to ask if patient's infusion appt could be moved up sooner than 5/19. Contacted [address] and this is the first available that works with patient's schedule and as Ms. Mendoza's request I called [address] [place] [address] - the soonest they could possibly get him in is 5/15/23. Returned call to Ms. Thi Chahal to let her know. She states that she will talk to her brother about it and call me back if he wants to change the date to [address] [place] [address].

## 2023-05-19 ENCOUNTER — HOSPITAL ENCOUNTER (OUTPATIENT)
Facility: HOSPITAL | Age: 73
Setting detail: INFUSION SERIES
End: 2023-05-19
Payer: MEDICAID

## 2023-05-19 VITALS
DIASTOLIC BLOOD PRESSURE: 72 MMHG | WEIGHT: 186.8 LBS | OXYGEN SATURATION: 98 % | RESPIRATION RATE: 18 BRPM | HEIGHT: 71 IN | SYSTOLIC BLOOD PRESSURE: 123 MMHG | BODY MASS INDEX: 26.15 KG/M2 | HEART RATE: 74 BPM | TEMPERATURE: 98.1 F

## 2023-05-19 DIAGNOSIS — K74.60 LIVER DISEASE, CHRONIC, WITH CIRRHOSIS (HCC): Primary | ICD-10-CM

## 2023-05-19 DIAGNOSIS — K76.9 LIVER DISEASE, CHRONIC, WITH CIRRHOSIS (HCC): Primary | ICD-10-CM

## 2023-05-19 LAB
ALBUMIN SERPL-MCNC: 2.8 G/DL (ref 3.5–5)
ALBUMIN SERPL-MCNC: 3 G/DL (ref 3.5–5)
ALBUMIN/GLOB SERPL: 0.5 (ref 1.1–2.2)
ALBUMIN/GLOB SERPL: 0.5 (ref 1.1–2.2)
ALP SERPL-CCNC: 152 U/L (ref 45–117)
ALP SERPL-CCNC: 162 U/L (ref 45–117)
ALT SERPL-CCNC: 40 U/L (ref 12–78)
ALT SERPL-CCNC: 43 U/L (ref 12–78)
ANION GAP SERPL CALC-SCNC: 0 MMOL/L (ref 5–15)
AST SERPL-CCNC: 129 U/L (ref 15–37)
AST SERPL-CCNC: 151 U/L (ref 15–37)
BASOPHILS # BLD: 0 K/UL (ref 0–0.1)
BASOPHILS NFR BLD: 0 % (ref 0–1)
BILIRUB DIRECT SERPL-MCNC: 0.1 MG/DL (ref 0–0.2)
BILIRUB SERPL-MCNC: 0.4 MG/DL (ref 0.2–1)
BILIRUB SERPL-MCNC: 0.6 MG/DL (ref 0.2–1)
BUN SERPL-MCNC: 13 MG/DL (ref 6–20)
BUN/CREAT SERPL: 20 (ref 12–20)
CALCIUM SERPL-MCNC: 9.5 MG/DL (ref 8.5–10.1)
CHLORIDE SERPL-SCNC: 107 MMOL/L (ref 97–108)
CO2 SERPL-SCNC: 28 MMOL/L (ref 21–32)
COMMENT:: NORMAL
CREAT SERPL-MCNC: 0.65 MG/DL (ref 0.7–1.3)
DIFFERENTIAL METHOD BLD: ABNORMAL
EOSINOPHIL # BLD: 0.1 K/UL (ref 0–0.4)
EOSINOPHIL NFR BLD: 1 % (ref 0–7)
ERYTHROCYTE [DISTWIDTH] IN BLOOD BY AUTOMATED COUNT: 13.7 % (ref 11.5–14.5)
GLOBULIN SER CALC-MCNC: 5.2 G/DL (ref 2–4)
GLOBULIN SER CALC-MCNC: 5.6 G/DL (ref 2–4)
GLUCOSE SERPL-MCNC: 97 MG/DL (ref 65–100)
HCT VFR BLD AUTO: 35 % (ref 36.6–50.3)
HGB BLD-MCNC: 11.9 G/DL (ref 12.1–17)
IMM GRANULOCYTES # BLD AUTO: 0 K/UL (ref 0–0.04)
IMM GRANULOCYTES NFR BLD AUTO: 1 % (ref 0–0.5)
LYMPHOCYTES # BLD: 0.9 K/UL (ref 0.8–3.5)
LYMPHOCYTES NFR BLD: 12 % (ref 12–49)
MCH RBC QN AUTO: 30.1 PG (ref 26–34)
MCHC RBC AUTO-ENTMCNC: 34 G/DL (ref 30–36.5)
MCV RBC AUTO: 88.4 FL (ref 80–99)
MONOCYTES # BLD: 0.6 K/UL (ref 0–1)
MONOCYTES NFR BLD: 9 % (ref 5–13)
NEUTS SEG # BLD: 5.6 K/UL (ref 1.8–8)
NEUTS SEG NFR BLD: 77 % (ref 32–75)
NRBC # BLD: 0 K/UL (ref 0–0.01)
NRBC BLD-RTO: 0 PER 100 WBC
PLATELET # BLD AUTO: 391 K/UL (ref 150–400)
PMV BLD AUTO: 9 FL (ref 8.9–12.9)
POTASSIUM SERPL-SCNC: 4 MMOL/L (ref 3.5–5.1)
POTASSIUM SERPL-SCNC: 5.2 MMOL/L (ref 3.5–5.1)
PROT SERPL-MCNC: 8 G/DL (ref 6.4–8.2)
PROT SERPL-MCNC: 8.6 G/DL (ref 6.4–8.2)
RBC # BLD AUTO: 3.96 M/UL (ref 4.1–5.7)
SODIUM SERPL-SCNC: 135 MMOL/L (ref 136–145)
SPECIMEN HOLD: NORMAL
TSH SERPL DL<=0.05 MIU/L-ACNC: 1.21 UIU/ML (ref 0.36–3.74)
WBC # BLD AUTO: 7.3 K/UL (ref 4.1–11.1)

## 2023-05-19 PROCEDURE — 80053 COMPREHEN METABOLIC PANEL: CPT

## 2023-05-19 PROCEDURE — 84132 ASSAY OF SERUM POTASSIUM: CPT

## 2023-05-19 PROCEDURE — 85025 COMPLETE CBC W/AUTO DIFF WBC: CPT

## 2023-05-19 PROCEDURE — 96417 CHEMO IV INFUS EACH ADDL SEQ: CPT

## 2023-05-19 PROCEDURE — 84443 ASSAY THYROID STIM HORMONE: CPT

## 2023-05-19 PROCEDURE — 96413 CHEMO IV INFUSION 1 HR: CPT

## 2023-05-19 PROCEDURE — 82107 ALPHA-FETOPROTEIN L3: CPT

## 2023-05-19 PROCEDURE — C9147 HC RX W HCPCS: HCPCS | Performed by: INTERNAL MEDICINE

## 2023-05-19 PROCEDURE — 87340 HEPATITIS B SURFACE AG IA: CPT

## 2023-05-19 PROCEDURE — 80076 HEPATIC FUNCTION PANEL: CPT

## 2023-05-19 PROCEDURE — 2580000003 HC RX 258: Performed by: INTERNAL MEDICINE

## 2023-05-19 PROCEDURE — 86704 HEP B CORE ANTIBODY TOTAL: CPT

## 2023-05-19 PROCEDURE — 86706 HEP B SURFACE ANTIBODY: CPT

## 2023-05-19 PROCEDURE — 36415 COLL VENOUS BLD VENIPUNCTURE: CPT

## 2023-05-19 PROCEDURE — 6360000002 HC RX W HCPCS: Performed by: INTERNAL MEDICINE

## 2023-05-19 RX ORDER — ALBUTEROL SULFATE 90 UG/1
4 AEROSOL, METERED RESPIRATORY (INHALATION) PRN
Status: DISCONTINUED | OUTPATIENT
Start: 2023-05-19 | End: 2023-05-20 | Stop reason: HOSPADM

## 2023-05-19 RX ORDER — SODIUM CHLORIDE 9 MG/ML
5-250 INJECTION, SOLUTION INTRAVENOUS PRN
Status: DISCONTINUED | OUTPATIENT
Start: 2023-05-19 | End: 2023-05-20 | Stop reason: HOSPADM

## 2023-05-19 RX ORDER — ONDANSETRON 2 MG/ML
8 INJECTION INTRAMUSCULAR; INTRAVENOUS
Status: DISCONTINUED | OUTPATIENT
Start: 2023-05-19 | End: 2023-05-20 | Stop reason: HOSPADM

## 2023-05-19 RX ORDER — MEPERIDINE HYDROCHLORIDE 25 MG/ML
12.5 INJECTION INTRAMUSCULAR; INTRAVENOUS; SUBCUTANEOUS PRN
Status: DISCONTINUED | OUTPATIENT
Start: 2023-05-19 | End: 2023-10-26 | Stop reason: HOSPADM

## 2023-05-19 RX ORDER — AMOXICILLIN 500 MG
CAPSULE ORAL
COMMUNITY
End: 2023-07-19

## 2023-05-19 RX ORDER — ACETAMINOPHEN 325 MG/1
650 TABLET ORAL
Status: DISCONTINUED | OUTPATIENT
Start: 2023-05-19 | End: 2023-05-20 | Stop reason: HOSPADM

## 2023-05-19 RX ORDER — HEPARIN SODIUM (PORCINE) LOCK FLUSH IV SOLN 100 UNIT/ML 100 UNIT/ML
500 SOLUTION INTRAVENOUS PRN
Status: DISCONTINUED | OUTPATIENT
Start: 2023-05-19 | End: 2023-05-20 | Stop reason: HOSPADM

## 2023-05-19 RX ORDER — SODIUM CHLORIDE 0.9 % (FLUSH) 0.9 %
5-40 SYRINGE (ML) INJECTION PRN
Status: DISCONTINUED | OUTPATIENT
Start: 2023-05-19 | End: 2023-05-20 | Stop reason: HOSPADM

## 2023-05-19 RX ORDER — SODIUM CHLORIDE 9 MG/ML
INJECTION, SOLUTION INTRAVENOUS CONTINUOUS
Status: DISCONTINUED | OUTPATIENT
Start: 2023-05-19 | End: 2023-05-20 | Stop reason: HOSPADM

## 2023-05-19 RX ORDER — EPINEPHRINE 1 MG/ML
0.3 INJECTION, SOLUTION, CONCENTRATE INTRAVENOUS PRN
Status: DISCONTINUED | OUTPATIENT
Start: 2023-05-19 | End: 2023-10-26 | Stop reason: HOSPADM

## 2023-05-19 RX ORDER — DIPHENHYDRAMINE HYDROCHLORIDE 50 MG/ML
50 INJECTION INTRAMUSCULAR; INTRAVENOUS
Status: DISCONTINUED | OUTPATIENT
Start: 2023-05-19 | End: 2023-05-20 | Stop reason: HOSPADM

## 2023-05-19 RX ADMIN — SODIUM CHLORIDE 25 ML/HR: 9 INJECTION, SOLUTION INTRAVENOUS at 11:10

## 2023-05-19 RX ADMIN — SODIUM CHLORIDE 300 MG: 900 INJECTION, SOLUTION INTRAVENOUS at 11:14

## 2023-05-19 RX ADMIN — SODIUM CHLORIDE 1500 MG: 9 INJECTION, SOLUTION INTRAVENOUS at 12:32

## 2023-05-19 NOTE — PROGRESS NOTES
[facility] Progress Note    Date: May 19, 2023    Name: Jomar Woody Sr. MRN: [id number]         : 1950    Mr. Alfredo Castaneda Arrived ambulatory and in no distress for C1D1 of Imjudo + Imfinzi Regimen. Assessment was completed, patient reports constipation and fatigue. 24 G PIV established to [facility]  by CHAR Guillermo RN. Labs sent for processing, results hemolyzed. Labs redrawn from [facility] per MD office. Patient is a difficult stick, discussed access options but patient declined interest in a central line, MD office notified. Advised water to increase fluid intake before chemotherapy treatments. Mr. Mendoza's vitals were reviewed. /72   Pulse 74   Temp 98.1 °F (36.7 °C) (Temporal)   Resp 18   Ht 1.803 m (5' 11\")   Wt 84.7 kg (186 lb 12.8 oz)   SpO2 98%   BMI 26.05 kg/m²       Lab results were obtained and reviewed.   Recent Results (from the past 12 hour(s))   CBC With Auto Differential    Collection Time: 23  8:12 AM   Result Value Ref Range    WBC 7.3 4.1 - 11.1 K/uL    RBC 3.96 (L) 4.10 - 5.70 M/uL    Hemoglobin 11.9 (L) 12.1 - 17.0 g/dL    Hematocrit 35.0 (L) 36.6 - 50.3 %    MCV 88.4 80.0 - 99.0 FL    MCH 30.1 26.0 - 34.0 PG    MCHC 34.0 30.0 - 36.5 g/dL    RDW 13.7 11.5 - 14.5 %    Platelets 342 890 - 697 K/uL    MPV 9.0 8.9 - 12.9 FL    Nucleated RBCs 0.0 0  WBC    nRBC 0.00 0.00 - 0.01 K/uL    Neutrophils % 77 (H) 32 - 75 %    Lymphocytes % 12 12 - 49 %    Monocytes % 9 5 - 13 %    Eosinophils % 1 0 - 7 %    Basophils % 0 0 - 1 %    Immature Granulocytes 1 (H) 0.0 - 0.5 %    Neutrophils Absolute 5.6 1.8 - 8.0 K/UL    Lymphocytes Absolute 0.9 0.8 - 3.5 K/UL    Monocytes Absolute 0.6 0.0 - 1.0 K/UL    Eosinophils Absolute 0.1 0.0 - 0.4 K/UL    Basophils Absolute 0.0 0.0 - 0.1 K/UL    Absolute Immature Granulocyte 0.0 0.00 - 0.04 K/UL    Differential Type AUTOMATED         Medications:    Medications Administered         0.9 % sodium chloride infusion Admin Date  2023 Action  New

## 2023-05-20 LAB
HBV CORE AB SERPL QL IA: NEGATIVE
HBV SURFACE AB SER QL: REACTIVE
HBV SURFACE AB SER-ACNC: >1000 MIU/ML
HBV SURFACE AG SER QL: <0.1 INDEX
HBV SURFACE AG SER QL: NEGATIVE

## 2023-05-22 ENCOUNTER — TELEPHONE (OUTPATIENT)
Age: 73
End: 2023-05-22

## 2023-05-22 DIAGNOSIS — C22.0 LIVER CELL CARCINOMA (HCC): Primary | ICD-10-CM

## 2023-05-22 RX ORDER — ONDANSETRON 8 MG/1
8 TABLET, ORALLY DISINTEGRATING ORAL EVERY 12 HOURS PRN
Qty: 30 TABLET | Refills: 2 | Status: SHIPPED | OUTPATIENT
Start: 2023-05-22

## 2023-05-22 NOTE — TELEPHONE ENCOUNTER
Received VM from patient's sister, Cesar - patient did fine with infusion on Friday but did not slow down to rest on Saturday and Sunday - today he is very tired and achy all over. 9:07am - returned call to Cesar, advised her that patient can take tylenol for aches and pain, she states he also has not eaten much and feel nauseated. Let her know I would shared with Kimberlynico Jolley to see if she will order something for nausea. Reviewed this symptoms with Ms. Miky Meyer, she ordered Zofran for nausea. Let Cesar know the medication was ordered and that she could pick it up today. Advised to call me with any additional concerns or questions. Patient will need to rest to regain his strength after each infusion. She verbalized understanding.

## 2023-05-24 LAB
AFP L3 MFR SERPL: 73 % (ref 0–9.9)
AFP SERPL-MCNC: ABNORMAL NG/ML (ref 0–8.4)

## 2023-05-30 ENCOUNTER — TELEPHONE (OUTPATIENT)
Age: 73
End: 2023-05-30

## 2023-05-30 NOTE — TELEPHONE ENCOUNTER
Return call placed to sister, Raymundo Lopez. She reports patient has a cough - he's a smoker so she wasn't sure if it's related to the infusion. Ok'd starting antibiotic and steroid. Sister also reports patient doesn't feel like eating, says he feels bloated. She hasn't noticed any edema - ? abdominal fullness due to constipation. I recommended Miralax to see if patient's symptoms improve with regular bowel movements. I told sister that Hyacinth Weber will be back in the office tomorrow and asked that she reach out to her or myself with questions/concerns.

## 2023-05-30 NOTE — TELEPHONE ENCOUNTER
Sister called to say patient was in Infusion on May 19 and started having issues a few days later with bloatiness in the stomach, congestion and coughing up phlegm. He went [facility] and was ordered Doxycycine 100 mg twice a day and Prednisone 20 mg as directed. Sister wants to know if it is ok for the patient to take this medicine. Please call sister to discuss medicine.

## 2023-05-31 ENCOUNTER — TELEPHONE (OUTPATIENT)
Age: 73
End: 2023-05-31

## 2023-05-31 NOTE — TELEPHONE ENCOUNTER
Returned call to Ms. Garneriakarlos Zhu to check in on patient, she states that he started taking the medication [facility] last night - this morning he says he feels better. He is taking doxycycline and prednisone. Reviewed that he should increase his fluid intake and call to let me know if he doesn't feel better over the next few days. She verbalized understanding.

## 2023-06-09 DIAGNOSIS — K76.9 LIVER DISEASE, CHRONIC, WITH CIRRHOSIS (HCC): Primary | ICD-10-CM

## 2023-06-09 DIAGNOSIS — K74.60 LIVER DISEASE, CHRONIC, WITH CIRRHOSIS (HCC): Primary | ICD-10-CM

## 2023-06-09 RX ORDER — ONDANSETRON 2 MG/ML
8 INJECTION INTRAMUSCULAR; INTRAVENOUS
OUTPATIENT
Start: 2023-06-16

## 2023-06-09 RX ORDER — DIPHENHYDRAMINE HYDROCHLORIDE 50 MG/ML
50 INJECTION INTRAMUSCULAR; INTRAVENOUS
OUTPATIENT
Start: 2023-06-16

## 2023-06-09 RX ORDER — SODIUM CHLORIDE 9 MG/ML
5-250 INJECTION, SOLUTION INTRAVENOUS PRN
OUTPATIENT
Start: 2023-06-16

## 2023-06-09 RX ORDER — ALBUTEROL SULFATE 90 UG/1
4 AEROSOL, METERED RESPIRATORY (INHALATION) PRN
OUTPATIENT
Start: 2023-06-16

## 2023-06-09 RX ORDER — FAMOTIDINE 10 MG/ML
20 INJECTION, SOLUTION INTRAVENOUS
OUTPATIENT
Start: 2023-06-16

## 2023-06-09 RX ORDER — ACETAMINOPHEN 325 MG/1
650 TABLET ORAL
OUTPATIENT
Start: 2023-06-16

## 2023-06-09 RX ORDER — SODIUM CHLORIDE 0.9 % (FLUSH) 0.9 %
5-40 SYRINGE (ML) INJECTION PRN
OUTPATIENT
Start: 2023-06-16

## 2023-06-09 RX ORDER — SODIUM CHLORIDE 9 MG/ML
INJECTION, SOLUTION INTRAVENOUS CONTINUOUS
OUTPATIENT
Start: 2023-06-16

## 2023-06-09 RX ORDER — MEPERIDINE HYDROCHLORIDE 50 MG/ML
12.5 INJECTION INTRAMUSCULAR; INTRAVENOUS; SUBCUTANEOUS PRN
OUTPATIENT
Start: 2023-06-16

## 2023-06-09 RX ORDER — HEPARIN SODIUM (PORCINE) LOCK FLUSH IV SOLN 100 UNIT/ML 100 UNIT/ML
500 SOLUTION INTRAVENOUS PRN
OUTPATIENT
Start: 2023-06-16

## 2023-06-09 RX ORDER — EPINEPHRINE 1 MG/ML
0.3 INJECTION, SOLUTION, CONCENTRATE INTRAVENOUS PRN
OUTPATIENT
Start: 2023-06-16

## 2023-06-16 ENCOUNTER — HOSPITAL ENCOUNTER (OUTPATIENT)
Facility: HOSPITAL | Age: 73
Setting detail: INFUSION SERIES
End: 2023-06-16
Payer: MEDICARE

## 2023-06-16 VITALS
DIASTOLIC BLOOD PRESSURE: 76 MMHG | SYSTOLIC BLOOD PRESSURE: 145 MMHG | HEART RATE: 86 BPM | HEIGHT: 71 IN | WEIGHT: 179.4 LBS | BODY MASS INDEX: 25.11 KG/M2 | OXYGEN SATURATION: 98 % | TEMPERATURE: 98.9 F

## 2023-06-16 DIAGNOSIS — K76.9 LIVER DISEASE, CHRONIC, WITH CIRRHOSIS (HCC): Primary | ICD-10-CM

## 2023-06-16 DIAGNOSIS — K74.60 LIVER DISEASE, CHRONIC, WITH CIRRHOSIS (HCC): Primary | ICD-10-CM

## 2023-06-16 LAB
ALBUMIN SERPL-MCNC: 2.6 G/DL (ref 3.5–5)
ALBUMIN/GLOB SERPL: 0.5 (ref 1.1–2.2)
ALP SERPL-CCNC: 362 U/L (ref 45–117)
ALT SERPL-CCNC: 48 U/L (ref 12–78)
ANION GAP SERPL CALC-SCNC: 3 MMOL/L (ref 5–15)
AST SERPL-CCNC: 217 U/L (ref 15–37)
BASOPHILS # BLD: 0 K/UL (ref 0–0.1)
BASOPHILS NFR BLD: 0 % (ref 0–1)
BILIRUB SERPL-MCNC: 1 MG/DL (ref 0.2–1)
BUN SERPL-MCNC: 12 MG/DL (ref 6–20)
BUN/CREAT SERPL: 19 (ref 12–20)
CALCIUM SERPL-MCNC: 10.3 MG/DL (ref 8.5–10.1)
CHLORIDE SERPL-SCNC: 104 MMOL/L (ref 97–108)
CO2 SERPL-SCNC: 27 MMOL/L (ref 21–32)
CREAT SERPL-MCNC: 0.64 MG/DL (ref 0.7–1.3)
DIFFERENTIAL METHOD BLD: ABNORMAL
EOSINOPHIL # BLD: 0.1 K/UL (ref 0–0.4)
EOSINOPHIL NFR BLD: 1 % (ref 0–7)
ERYTHROCYTE [DISTWIDTH] IN BLOOD BY AUTOMATED COUNT: 16 % (ref 11.5–14.5)
GLOBULIN SER CALC-MCNC: 5.7 G/DL (ref 2–4)
GLUCOSE SERPL-MCNC: 118 MG/DL (ref 65–100)
HCT VFR BLD AUTO: 31.5 % (ref 36.6–50.3)
HGB BLD-MCNC: 10.7 G/DL (ref 12.1–17)
IMM GRANULOCYTES # BLD AUTO: 0.1 K/UL (ref 0–0.04)
IMM GRANULOCYTES NFR BLD AUTO: 1 % (ref 0–0.5)
LYMPHOCYTES # BLD: 0.9 K/UL (ref 0.8–3.5)
LYMPHOCYTES NFR BLD: 9 % (ref 12–49)
MCH RBC QN AUTO: 29.7 PG (ref 26–34)
MCHC RBC AUTO-ENTMCNC: 34 G/DL (ref 30–36.5)
MCV RBC AUTO: 87.5 FL (ref 80–99)
MONOCYTES # BLD: 1.1 K/UL (ref 0–1)
MONOCYTES NFR BLD: 10 % (ref 5–13)
NEUTS SEG # BLD: 8 K/UL (ref 1.8–8)
NEUTS SEG NFR BLD: 79 % (ref 32–75)
NRBC # BLD: 0 K/UL (ref 0–0.01)
NRBC BLD-RTO: 0 PER 100 WBC
PLATELET # BLD AUTO: 427 K/UL (ref 150–400)
PMV BLD AUTO: 10.4 FL (ref 8.9–12.9)
POTASSIUM SERPL-SCNC: 4.2 MMOL/L (ref 3.5–5.1)
PROT SERPL-MCNC: 8.3 G/DL (ref 6.4–8.2)
RBC # BLD AUTO: 3.6 M/UL (ref 4.1–5.7)
SODIUM SERPL-SCNC: 134 MMOL/L (ref 136–145)
WBC # BLD AUTO: 10.1 K/UL (ref 4.1–11.1)

## 2023-06-16 PROCEDURE — 96413 CHEMO IV INFUSION 1 HR: CPT

## 2023-06-16 PROCEDURE — 36415 COLL VENOUS BLD VENIPUNCTURE: CPT

## 2023-06-16 PROCEDURE — 2580000003 HC RX 258: Performed by: INTERNAL MEDICINE

## 2023-06-16 PROCEDURE — 6360000002 HC RX W HCPCS: Performed by: INTERNAL MEDICINE

## 2023-06-16 PROCEDURE — 82107 ALPHA-FETOPROTEIN L3: CPT

## 2023-06-16 PROCEDURE — 85025 COMPLETE CBC W/AUTO DIFF WBC: CPT

## 2023-06-16 PROCEDURE — 80053 COMPREHEN METABOLIC PANEL: CPT

## 2023-06-16 RX ORDER — SODIUM CHLORIDE 9 MG/ML
5-250 INJECTION, SOLUTION INTRAVENOUS PRN
Status: DISCONTINUED | OUTPATIENT
Start: 2023-06-16 | End: 2023-06-17 | Stop reason: HOSPADM

## 2023-06-16 RX ORDER — SODIUM CHLORIDE 0.9 % (FLUSH) 0.9 %
5-40 SYRINGE (ML) INJECTION PRN
Status: DISCONTINUED | OUTPATIENT
Start: 2023-06-16 | End: 2023-06-17 | Stop reason: HOSPADM

## 2023-06-16 RX ADMIN — SODIUM CHLORIDE 25 ML/HR: 900 INJECTION, SOLUTION INTRAVENOUS at 11:10

## 2023-06-16 RX ADMIN — SODIUM CHLORIDE 1500 MG: 9 INJECTION, SOLUTION INTRAVENOUS at 11:18

## 2023-06-16 ASSESSMENT — PAIN SCALES - GENERAL: PAINLEVEL_OUTOF10: 0

## 2023-06-16 NOTE — PROGRESS NOTES
[facility] Progress Note    Date: 2023    Name: Kelsey Aviels Sr. MRN: [id number]         : 1950    Mr. Ivone Pantoja Arrived ambulatory and in no distress for [facility]. Assessment was completed by Micheline Yan RN, no acute issues at this time, no new complaints voiced. 24 G PIV established to left arm, + blood return. Mr. Mendoza's vitals were reviewed. Vitals:    23 1223   BP: (!) 145/76   Pulse:    Temp:    SpO2:        Lab results were obtained and reviewed.   Recent Results (from the past 12 hour(s))   Comprehensive metabolic panel    Collection Time: 23  7:54 AM   Result Value Ref Range    Sodium 134 (L) 136 - 145 mmol/L    Potassium 4.2 3.5 - 5.1 mmol/L    Chloride 104 97 - 108 mmol/L    CO2 27 21 - 32 mmol/L    Anion Gap 3 (L) 5 - 15 mmol/L    Glucose 118 (H) 65 - 100 mg/dL    BUN 12 6 - 20 MG/DL    Creatinine 0.64 (L) 0.70 - 1.30 MG/DL    Bun/Cre Ratio 19 12 - 20      Est, Glom Filt Rate >60 >60 ml/min/1.73m2    Calcium 10.3 (H) 8.5 - 10.1 MG/DL    Total Bilirubin 1.0 0.2 - 1.0 MG/DL    ALT 48 12 - 78 U/L     (H) 15 - 37 U/L    Alk Phosphatase 362 (H) 45 - 117 U/L    Total Protein 8.3 (H) 6.4 - 8.2 g/dL    Albumin 2.6 (L) 3.5 - 5.0 g/dL    Globulin 5.7 (H) 2.0 - 4.0 g/dL    Albumin/Globulin Ratio 0.5 (L) 1.1 - 2.2     CBC with Auto Differential    Collection Time: 23  8:35 AM   Result Value Ref Range    WBC 10.1 4.1 - 11.1 K/uL    RBC 3.60 (L) 4.10 - 5.70 M/uL    Hemoglobin 10.7 (L) 12.1 - 17.0 g/dL    Hematocrit 31.5 (L) 36.6 - 50.3 %    MCV 87.5 80.0 - 99.0 FL    MCH 29.7 26.0 - 34.0 PG    MCHC 34.0 30.0 - 36.5 g/dL    RDW 16.0 (H) 11.5 - 14.5 %    Platelets 175 (H) 139 - 400 K/uL    MPV 10.4 8.9 - 12.9 FL    Nucleated RBCs 0.0 0  WBC    nRBC 0.00 0.00 - 0.01 K/uL    Neutrophils % 79 (H) 32 - 75 %    Lymphocytes % 9 (L) 12 - 49 %    Monocytes % 10 5 - 13 %    Eosinophils % 1 0 - 7 %    Basophils % 0 0 - 1 %    Immature Granulocytes 1 (H) 0.0 - 0.5 %

## 2023-06-17 ENCOUNTER — APPOINTMENT (OUTPATIENT)
Facility: HOSPITAL | Age: 73
End: 2023-06-17
Payer: MEDICARE

## 2023-06-17 ENCOUNTER — HOSPITAL ENCOUNTER (EMERGENCY)
Facility: HOSPITAL | Age: 73
Discharge: HOME | End: 2023-06-17
Attending: STUDENT IN AN ORGANIZED HEALTH CARE EDUCATION/TRAINING PROGRAM
Payer: MEDICARE

## 2023-06-17 VITALS
SYSTOLIC BLOOD PRESSURE: 138 MMHG | RESPIRATION RATE: 16 BRPM | TEMPERATURE: 98 F | BODY MASS INDEX: 25.06 KG/M2 | OXYGEN SATURATION: 98 % | HEIGHT: 71 IN | HEART RATE: 98 BPM | DIASTOLIC BLOOD PRESSURE: 81 MMHG | WEIGHT: 179 LBS

## 2023-06-17 DIAGNOSIS — C22.0 HEPATOCELLULAR CARCINOMA (HCC): ICD-10-CM

## 2023-06-17 DIAGNOSIS — R06.6 HICCUPS: Primary | ICD-10-CM

## 2023-06-17 LAB
ALBUMIN SERPL-MCNC: 2.3 G/DL (ref 3.5–5)
ALBUMIN/GLOB SERPL: 0.4 (ref 1.1–2.2)
ALP SERPL-CCNC: 371 U/L (ref 45–117)
ALT SERPL-CCNC: 55 U/L (ref 12–78)
ANION GAP SERPL CALC-SCNC: 4 MMOL/L (ref 5–15)
APPEARANCE UR: CLEAR
AST SERPL-CCNC: 232 U/L (ref 15–37)
BACTERIA URNS QL MICRO: NEGATIVE /HPF
BASOPHILS # BLD: 0 K/UL (ref 0–0.1)
BASOPHILS NFR BLD: 0 % (ref 0–1)
BILIRUB SERPL-MCNC: 0.7 MG/DL (ref 0.2–1)
BILIRUB UR QL: NEGATIVE
BUN SERPL-MCNC: 9 MG/DL (ref 6–20)
BUN/CREAT SERPL: 14 (ref 12–20)
CALCIUM SERPL-MCNC: 9.8 MG/DL (ref 8.5–10.1)
CHLORIDE SERPL-SCNC: 105 MMOL/L (ref 97–108)
CO2 SERPL-SCNC: 26 MMOL/L (ref 21–32)
COLOR UR: YELLOW
CREAT SERPL-MCNC: 0.65 MG/DL (ref 0.7–1.3)
DIFFERENTIAL METHOD BLD: ABNORMAL
EOSINOPHIL # BLD: 0.1 K/UL (ref 0–0.4)
EOSINOPHIL NFR BLD: 2 % (ref 0–7)
EPITH CASTS URNS QL MICRO: NORMAL /LPF
ERYTHROCYTE [DISTWIDTH] IN BLOOD BY AUTOMATED COUNT: 16 % (ref 11.5–14.5)
GLOBULIN SER CALC-MCNC: 5.6 G/DL (ref 2–4)
GLUCOSE SERPL-MCNC: 108 MG/DL (ref 65–100)
GLUCOSE UR STRIP.AUTO-MCNC: NEGATIVE MG/DL
HCT VFR BLD AUTO: 31.7 % (ref 36.6–50.3)
HGB BLD-MCNC: 10.6 G/DL (ref 12.1–17)
HGB UR QL STRIP: NEGATIVE
HYALINE CASTS URNS QL MICRO: NORMAL /LPF (ref 0–2)
IMM GRANULOCYTES # BLD AUTO: 0 K/UL (ref 0–0.04)
IMM GRANULOCYTES NFR BLD AUTO: 0 % (ref 0–0.5)
KETONES UR QL STRIP.AUTO: NEGATIVE MG/DL
LEUKOCYTE ESTERASE UR QL STRIP.AUTO: NEGATIVE
LIPASE SERPL-CCNC: 122 U/L (ref 73–393)
LYMPHOCYTES # BLD: 1 K/UL (ref 0.8–3.5)
LYMPHOCYTES NFR BLD: 12 % (ref 12–49)
MAGNESIUM SERPL-MCNC: 1.9 MG/DL (ref 1.6–2.4)
MCH RBC QN AUTO: 29.3 PG (ref 26–34)
MCHC RBC AUTO-ENTMCNC: 33.4 G/DL (ref 30–36.5)
MCV RBC AUTO: 87.6 FL (ref 80–99)
MONOCYTES # BLD: 0.9 K/UL (ref 0–1)
MONOCYTES NFR BLD: 12 % (ref 5–13)
NEUTS SEG # BLD: 6 K/UL (ref 1.8–8)
NEUTS SEG NFR BLD: 74 % (ref 32–75)
NITRITE UR QL STRIP.AUTO: NEGATIVE
NRBC # BLD: 0 K/UL (ref 0–0.01)
NRBC BLD-RTO: 0 PER 100 WBC
NT PRO BNP: 173 PG/ML
PH UR STRIP: 7 (ref 5–8)
PLATELET # BLD AUTO: 407 K/UL (ref 150–400)
PMV BLD AUTO: 9.4 FL (ref 8.9–12.9)
POTASSIUM SERPL-SCNC: 4.6 MMOL/L (ref 3.5–5.1)
PROT SERPL-MCNC: 7.9 G/DL (ref 6.4–8.2)
PROT UR STRIP-MCNC: NEGATIVE MG/DL
RBC # BLD AUTO: 3.62 M/UL (ref 4.1–5.7)
RBC #/AREA URNS HPF: NORMAL /HPF (ref 0–5)
SODIUM SERPL-SCNC: 135 MMOL/L (ref 136–145)
SP GR UR REFRACTOMETRY: 1.02 (ref 1–1.03)
TROPONIN I SERPL HS-MCNC: 4 NG/L (ref 0–76)
UROBILINOGEN UR QL STRIP.AUTO: 1 EU/DL (ref 0.2–1)
WBC # BLD AUTO: 8.1 K/UL (ref 4.1–11.1)
WBC URNS QL MICRO: NORMAL /HPF (ref 0–4)

## 2023-06-17 PROCEDURE — 36415 COLL VENOUS BLD VENIPUNCTURE: CPT

## 2023-06-17 PROCEDURE — 99285 EMERGENCY DEPT VISIT HI MDM: CPT

## 2023-06-17 PROCEDURE — 74177 CT ABD & PELVIS W/CONTRAST: CPT

## 2023-06-17 PROCEDURE — 6360000002 HC RX W HCPCS: Performed by: STUDENT IN AN ORGANIZED HEALTH CARE EDUCATION/TRAINING PROGRAM

## 2023-06-17 PROCEDURE — 85025 COMPLETE CBC W/AUTO DIFF WBC: CPT

## 2023-06-17 PROCEDURE — 80053 COMPREHEN METABOLIC PANEL: CPT

## 2023-06-17 PROCEDURE — 81001 URINALYSIS AUTO W/SCOPE: CPT

## 2023-06-17 PROCEDURE — 84484 ASSAY OF TROPONIN QUANT: CPT

## 2023-06-17 PROCEDURE — 6360000004 HC RX CONTRAST MEDICATION: Performed by: EMERGENCY MEDICINE

## 2023-06-17 PROCEDURE — 83880 ASSAY OF NATRIURETIC PEPTIDE: CPT

## 2023-06-17 PROCEDURE — 96372 THER/PROPH/DIAG INJ SC/IM: CPT

## 2023-06-17 PROCEDURE — 83735 ASSAY OF MAGNESIUM: CPT

## 2023-06-17 PROCEDURE — 83690 ASSAY OF LIPASE: CPT

## 2023-06-17 PROCEDURE — 71046 X-RAY EXAM CHEST 2 VIEWS: CPT

## 2023-06-17 RX ORDER — CHLORPROMAZINE HYDROCHLORIDE 25 MG/ML
25 INJECTION INTRAMUSCULAR ONCE
Status: COMPLETED | OUTPATIENT
Start: 2023-06-17 | End: 2023-06-17

## 2023-06-17 RX ORDER — METOCLOPRAMIDE 10 MG/1
10 TABLET ORAL EVERY 6 HOURS PRN
Qty: 20 TABLET | Refills: 0 | Status: SHIPPED | OUTPATIENT
Start: 2023-06-17 | End: 2023-06-22

## 2023-06-17 RX ORDER — PANTOPRAZOLE SODIUM 40 MG/1
40 TABLET, DELAYED RELEASE ORAL
Qty: 30 TABLET | Refills: 0 | Status: SHIPPED | OUTPATIENT
Start: 2023-06-17 | End: 2023-07-17

## 2023-06-17 RX ADMIN — IOPAMIDOL 100 ML: 755 INJECTION, SOLUTION INTRAVENOUS at 17:09

## 2023-06-17 RX ADMIN — CHLORPROMAZINE HYDROCHLORIDE 25 MG: 25 INJECTION INTRAMUSCULAR at 16:41

## 2023-06-17 ASSESSMENT — PAIN - FUNCTIONAL ASSESSMENT: PAIN_FUNCTIONAL_ASSESSMENT: 0-10

## 2023-06-17 ASSESSMENT — PAIN SCALES - GENERAL: PAINLEVEL_OUTOF10: 6

## 2023-06-17 ASSESSMENT — PAIN DESCRIPTION - LOCATION: LOCATION: ABDOMEN

## 2023-06-17 NOTE — ED TRIAGE NOTES
Pt ambulatory to ED c/o hiccups x 3 days. States hiccups are starting to cause chest pain and difficulty breathing. States he is on immunotherapy (last dose 1 day PTA) for liver cancer. Also c/o constipation 1 week.

## 2023-06-17 NOTE — DISCHARGE INSTRUCTIONS
CT shows a new lesion at L1 in the low back. LOWER THORAX: No significant abnormality in the incidentally imaged lower chest.  LIVER: There are numeral mass lesions predominantly within the right hepatic  lobe which is enlarged. Representative mass replacing the inferior right hepatic  lobe measures 12.3 x 12.9 cm. Portal vein is patent  BILIARY TREE: Gallbladder is within normal limits. CBD is not dilated. SPLEEN: within normal limits. PANCREAS: No mass or ductal dilatation. ADRENALS: Unremarkable. KIDNEYS: No mass, calculus, or hydronephrosis. Subcentimeter hypodensities too  small to characterize  STOMACH: Unremarkable. SMALL BOWEL: No dilatation or wall thickening. COLON: No dilatation or wall thickening. APPENDIX: Nonenlarged  PERITONEUM: No ascites or pneumoperitoneum. RETROPERITONEUM: No lymphadenopathy or aortic aneurysm. Extensive vascular  calcifications  REPRODUCTIVE ORGANS: Metallic markers within the prostate  URINARY BLADDER: No mass or calculus. BONES: Small lytic lesion within the anterior superior L1 vertebral body. Is new  ABDOMINAL WALL: No mass or hernia. ADDITIONAL COMMENTS: N/A     IMPRESSION:     1. Enlarged liver with innumerable mass lesions   2. New lytic lesion within L1.

## 2023-06-19 ENCOUNTER — TELEPHONE (OUTPATIENT)
Age: 73
End: 2023-06-19

## 2023-06-19 ASSESSMENT — ENCOUNTER SYMPTOMS
DIARRHEA: 0
EYE REDNESS: 0
EYE PAIN: 0
NAUSEA: 0
ABDOMINAL PAIN: 1
COUGH: 0
CONSTIPATION: 1
SHORTNESS OF BREATH: 1

## 2023-06-19 NOTE — ED PROVIDER NOTES
Abdomen is soft. Tenderness: There is no abdominal tenderness. Comments: Frequent hiccups noted   Musculoskeletal:         General: Normal range of motion. Cervical back: Normal range of motion and neck supple. No rigidity. Right lower leg: No edema. Left lower leg: No edema. Skin:     General: Skin is warm and dry. Neurological:      General: No focal deficit present. Mental Status: He is alert and oriented to person, place, and time. Cranial Nerves: No cranial nerve deficit. Psychiatric:         Mood and Affect: Mood normal.         Behavior: Behavior normal.       DIAGNOSTIC RESULTS     EKG: All EKG's are interpreted by the Emergency Department Physician who either signs or Co-signs this chart in the absence of a cardiologist.        RADIOLOGY:   Interpretation per the Radiologist below, if available at the time of this note:    CT ABDOMEN PELVIS W IV CONTRAST Additional Contrast? None   Final Result      1. Enlarged liver with innumerable mass lesions    2. New lytic lesion within L1. XR CHEST (2 VW)   Final Result      Normal PA and lateral chest views.               LABS:  Labs Reviewed   CBC WITH AUTO DIFFERENTIAL - Abnormal; Notable for the following components:       Result Value    RBC 3.62 (*)     Hemoglobin 10.6 (*)     Hematocrit 31.7 (*)     RDW 16.0 (*)     Platelets 111 (*)     All other components within normal limits   COMPREHENSIVE METABOLIC PANEL - Abnormal; Notable for the following components:    Sodium 135 (*)     Anion Gap 4 (*)     Glucose 108 (*)     Creatinine 0.65 (*)      (*)     Alk Phosphatase 371 (*)     Albumin 2.3 (*)     Globulin 5.6 (*)     Albumin/Globulin Ratio 0.4 (*)     All other components within normal limits   BRAIN NATRIURETIC PEPTIDE - Abnormal; Notable for the following components:    NT Pro- (*)     All other components within normal limits   LIPASE   MAGNESIUM   URINALYSIS WITH MICROSCOPIC   TROPONIN       All

## 2023-06-19 NOTE — TELEPHONE ENCOUNTER
Received call from patient's sister, Neelima Harman - reviewed that patient went to the ER over the weekend d/t hiccups, CT of Abd was completed and Reglan was given to help with hiccups but patient hasn't started the medication as she has read that it may cause anxiety. Neelima Harman states that he has a lot of anxiety already d/t diagnosis and treatment and requests to know if Ragini Rivas would give him Xanax for anxiety - he has taken this medication before. Reassured her that we had seen patient was in the ER over the weekend and reviewed the noted and CT scan report, we discussed the new L1 lesion identified on CT - was not seen on prior bone scan, awaiting AFP result from Friday's labs. I reassured Neelima Harman that I would talk with Ms. Angelic Barraza about patient's anxiety and see what she feels comfortable with giving him, may wish to move up his FU appt from 7/20/23 to sooner. I'll let her know.

## 2023-06-20 ENCOUNTER — TELEPHONE (OUTPATIENT)
Age: 73
End: 2023-06-20

## 2023-06-20 LAB
AFP L3 MFR SERPL: 76.1 % (ref 0–9.9)
AFP SERPL-MCNC: ABNORMAL NG/ML (ref 0–8.4)

## 2023-06-20 NOTE — TELEPHONE ENCOUNTER
----- Message from Sofi Perez, [id number] sent at 6/20/2023  1:57 PM EDT -----  I do think that would be best so that we can discuss palliation care plan.    Can you see if he is available to come in Tues/thurs next week?    ----- Message -----  From: Izabela Hilton RN  Sent: 6/20/2023   1:15 PM EDT  To: MARCUS Amaro    Even with immunotherapy his AFP has gone up - and that new lesion on T1. :-(  Do you want me to bring him in to see Dr. Leticia Thornton and discuss?  ----- Message -----  From: Rigoberto Lauren Incoming [facility]  Sent: 6/16/2023   9:20 AM EDT  To: Izabela Hilton RN

## 2023-06-27 ENCOUNTER — OFFICE VISIT (OUTPATIENT)
Age: 73
End: 2023-06-27
Payer: MEDICARE

## 2023-06-27 VITALS
BODY MASS INDEX: 24.08 KG/M2 | HEART RATE: 96 BPM | OXYGEN SATURATION: 98 % | SYSTOLIC BLOOD PRESSURE: 114 MMHG | DIASTOLIC BLOOD PRESSURE: 73 MMHG | WEIGHT: 172 LBS | HEIGHT: 71 IN | TEMPERATURE: 98.2 F

## 2023-06-27 DIAGNOSIS — C22.0 LIVER CELL CARCINOMA (HCC): Primary | ICD-10-CM

## 2023-06-27 PROCEDURE — G8420 CALC BMI NORM PARAMETERS: HCPCS | Performed by: PHYSICIAN ASSISTANT

## 2023-06-27 PROCEDURE — 3017F COLORECTAL CA SCREEN DOC REV: CPT | Performed by: PHYSICIAN ASSISTANT

## 2023-06-27 PROCEDURE — 3074F SYST BP LT 130 MM HG: CPT | Performed by: PHYSICIAN ASSISTANT

## 2023-06-27 PROCEDURE — G8427 DOCREV CUR MEDS BY ELIG CLIN: HCPCS | Performed by: PHYSICIAN ASSISTANT

## 2023-06-27 PROCEDURE — 4004F PT TOBACCO SCREEN RCVD TLK: CPT | Performed by: PHYSICIAN ASSISTANT

## 2023-06-27 PROCEDURE — 1123F ACP DISCUSS/DSCN MKR DOCD: CPT | Performed by: PHYSICIAN ASSISTANT

## 2023-06-27 PROCEDURE — 3078F DIAST BP <80 MM HG: CPT | Performed by: PHYSICIAN ASSISTANT

## 2023-06-27 PROCEDURE — 99215 OFFICE O/P EST HI 40 MIN: CPT | Performed by: PHYSICIAN ASSISTANT

## 2023-06-27 RX ORDER — MIRTAZAPINE 15 MG/1
15 TABLET, FILM COATED ORAL NIGHTLY
Qty: 30 TABLET | Refills: 3 | Status: SHIPPED | OUTPATIENT
Start: 2023-06-27

## 2023-06-27 ASSESSMENT — PATIENT HEALTH QUESTIONNAIRE - PHQ9
SUM OF ALL RESPONSES TO PHQ QUESTIONS 1-9: 0
1. LITTLE INTEREST OR PLEASURE IN DOING THINGS: 0
SUM OF ALL RESPONSES TO PHQ9 QUESTIONS 1 & 2: 0
SUM OF ALL RESPONSES TO PHQ QUESTIONS 1-9: 0
2. FEELING DOWN, DEPRESSED OR HOPELESS: 0

## 2023-06-29 ENCOUNTER — TELEPHONE (OUTPATIENT)
Age: 73
End: 2023-06-29

## 2023-06-29 DIAGNOSIS — C22.0 LIVER CELL CARCINOMA (HCC): Primary | ICD-10-CM

## 2023-06-29 DIAGNOSIS — Z29.8 IMMUNOTHERAPY: ICD-10-CM

## 2023-07-01 ENCOUNTER — APPOINTMENT (OUTPATIENT)
Facility: HOSPITAL | Age: 73
End: 2023-07-01
Payer: MEDICARE

## 2023-07-01 ENCOUNTER — HOSPITAL ENCOUNTER (EMERGENCY)
Facility: HOSPITAL | Age: 73
Discharge: HOME | End: 2023-07-01
Attending: EMERGENCY MEDICINE
Payer: MEDICARE

## 2023-07-01 VITALS
WEIGHT: 173 LBS | OXYGEN SATURATION: 95 % | TEMPERATURE: 98.4 F | DIASTOLIC BLOOD PRESSURE: 64 MMHG | BODY MASS INDEX: 24.22 KG/M2 | SYSTOLIC BLOOD PRESSURE: 113 MMHG | HEART RATE: 92 BPM | RESPIRATION RATE: 22 BRPM | HEIGHT: 71 IN

## 2023-07-01 DIAGNOSIS — C22.0 HEPATOCELLULAR CARCINOMA (HCC): Primary | ICD-10-CM

## 2023-07-01 DIAGNOSIS — R53.83 OTHER FATIGUE: ICD-10-CM

## 2023-07-01 DIAGNOSIS — K52.9 COLITIS: ICD-10-CM

## 2023-07-01 LAB
ALBUMIN SERPL-MCNC: 2.2 G/DL (ref 3.5–5)
ALBUMIN/GLOB SERPL: 0.4 (ref 1.1–2.2)
ALP SERPL-CCNC: 523 U/L (ref 45–117)
ALT SERPL-CCNC: 68 U/L (ref 12–78)
ANION GAP SERPL CALC-SCNC: 5 MMOL/L (ref 5–15)
APPEARANCE UR: CLEAR
AST SERPL-CCNC: 497 U/L (ref 15–37)
BACTERIA URNS QL MICRO: NEGATIVE /HPF
BASOPHILS # BLD: 0 K/UL (ref 0–0.1)
BASOPHILS NFR BLD: 0 % (ref 0–1)
BILIRUB SERPL-MCNC: 1.4 MG/DL (ref 0.2–1)
BILIRUB UR QL CFM: NEGATIVE
BUN SERPL-MCNC: 11 MG/DL (ref 6–20)
BUN/CREAT SERPL: 17 (ref 12–20)
CALCIUM SERPL-MCNC: 9.3 MG/DL (ref 8.5–10.1)
CHLORIDE SERPL-SCNC: 104 MMOL/L (ref 97–108)
CO2 SERPL-SCNC: 26 MMOL/L (ref 21–32)
COLOR UR: YELLOW
COMMENT:: NORMAL
CREAT SERPL-MCNC: 0.63 MG/DL (ref 0.7–1.3)
DIFFERENTIAL METHOD BLD: ABNORMAL
EOSINOPHIL # BLD: 0 K/UL (ref 0–0.4)
EOSINOPHIL NFR BLD: 0 % (ref 0–7)
EPITH CASTS URNS QL MICRO: ABNORMAL /LPF
ERYTHROCYTE [DISTWIDTH] IN BLOOD BY AUTOMATED COUNT: 17.1 % (ref 11.5–14.5)
GLOBULIN SER CALC-MCNC: 5.7 G/DL (ref 2–4)
GLUCOSE SERPL-MCNC: 111 MG/DL (ref 65–100)
GLUCOSE UR STRIP.AUTO-MCNC: NEGATIVE MG/DL
HCT VFR BLD AUTO: 30.6 % (ref 36.6–50.3)
HGB BLD-MCNC: 10.2 G/DL (ref 12.1–17)
HGB UR QL STRIP: NEGATIVE
IMM GRANULOCYTES # BLD AUTO: 0.2 K/UL (ref 0–0.04)
IMM GRANULOCYTES NFR BLD AUTO: 1 % (ref 0–0.5)
KETONES UR QL STRIP.AUTO: NEGATIVE MG/DL
LEUKOCYTE ESTERASE UR QL STRIP.AUTO: NEGATIVE
LYMPHOCYTES # BLD: 0.9 K/UL (ref 0.8–3.5)
LYMPHOCYTES NFR BLD: 5 % (ref 12–49)
MCH RBC QN AUTO: 28.6 PG (ref 26–34)
MCHC RBC AUTO-ENTMCNC: 33.3 G/DL (ref 30–36.5)
MCV RBC AUTO: 85.7 FL (ref 80–99)
MONOCYTES # BLD: 1.4 K/UL (ref 0–1)
MONOCYTES NFR BLD: 7 % (ref 5–13)
NEUTS SEG # BLD: 18 K/UL (ref 1.8–8)
NEUTS SEG NFR BLD: 87 % (ref 32–75)
NITRITE UR QL STRIP.AUTO: NEGATIVE
NRBC # BLD: 0 K/UL (ref 0–0.01)
NRBC BLD-RTO: 0 PER 100 WBC
PH UR STRIP: 6 (ref 5–8)
PLATELET # BLD AUTO: 495 K/UL (ref 150–400)
PMV BLD AUTO: 9.2 FL (ref 8.9–12.9)
POTASSIUM SERPL-SCNC: 3.8 MMOL/L (ref 3.5–5.1)
PROCALCITONIN SERPL-MCNC: 0.75 NG/ML
PROT SERPL-MCNC: 7.9 G/DL (ref 6.4–8.2)
PROT UR STRIP-MCNC: ABNORMAL MG/DL
RBC # BLD AUTO: 3.57 M/UL (ref 4.1–5.7)
RBC #/AREA URNS HPF: ABNORMAL /HPF (ref 0–5)
SODIUM SERPL-SCNC: 135 MMOL/L (ref 136–145)
SP GR UR REFRACTOMETRY: 1.01 (ref 1–1.03)
SPECIMEN HOLD: NORMAL
URINE CULTURE IF INDICATED: ABNORMAL
UROBILINOGEN UR QL STRIP.AUTO: 4 EU/DL (ref 0.2–1)
WBC # BLD AUTO: 20.5 K/UL (ref 4.1–11.1)
WBC URNS QL MICRO: ABNORMAL /HPF (ref 0–4)

## 2023-07-01 PROCEDURE — 6360000004 HC RX CONTRAST MEDICATION: Performed by: INTERNAL MEDICINE

## 2023-07-01 PROCEDURE — 6360000002 HC RX W HCPCS: Performed by: EMERGENCY MEDICINE

## 2023-07-01 PROCEDURE — 2580000003 HC RX 258: Performed by: EMERGENCY MEDICINE

## 2023-07-01 PROCEDURE — 71046 X-RAY EXAM CHEST 2 VIEWS: CPT

## 2023-07-01 PROCEDURE — 99285 EMERGENCY DEPT VISIT HI MDM: CPT

## 2023-07-01 PROCEDURE — 6370000000 HC RX 637 (ALT 250 FOR IP): Performed by: EMERGENCY MEDICINE

## 2023-07-01 PROCEDURE — 81001 URINALYSIS AUTO W/SCOPE: CPT

## 2023-07-01 PROCEDURE — 87040 BLOOD CULTURE FOR BACTERIA: CPT

## 2023-07-01 PROCEDURE — 36415 COLL VENOUS BLD VENIPUNCTURE: CPT

## 2023-07-01 PROCEDURE — 96375 TX/PRO/DX INJ NEW DRUG ADDON: CPT

## 2023-07-01 PROCEDURE — 85025 COMPLETE CBC W/AUTO DIFF WBC: CPT

## 2023-07-01 PROCEDURE — 80053 COMPREHEN METABOLIC PANEL: CPT

## 2023-07-01 PROCEDURE — 74177 CT ABD & PELVIS W/CONTRAST: CPT

## 2023-07-01 PROCEDURE — 96365 THER/PROPH/DIAG IV INF INIT: CPT

## 2023-07-01 PROCEDURE — 84145 PROCALCITONIN (PCT): CPT

## 2023-07-01 RX ORDER — HYDROCODONE BITARTRATE AND ACETAMINOPHEN 5; 325 MG/1; MG/1
1 TABLET ORAL EVERY 6 HOURS PRN
Qty: 23 TABLET | Refills: 0 | Status: SHIPPED | OUTPATIENT
Start: 2023-07-01 | End: 2023-07-08

## 2023-07-01 RX ORDER — METRONIDAZOLE 500 MG/100ML
500 INJECTION, SOLUTION INTRAVENOUS
Status: COMPLETED | OUTPATIENT
Start: 2023-07-01 | End: 2023-07-01

## 2023-07-01 RX ORDER — DOCUSATE SODIUM 100 MG/1
CAPSULE, LIQUID FILLED ORAL
Qty: 20 CAPSULE | Refills: 0 | Status: SHIPPED | OUTPATIENT
Start: 2023-07-01

## 2023-07-01 RX ORDER — AMOXICILLIN AND CLAVULANATE POTASSIUM 875; 125 MG/1; MG/1
1 TABLET, FILM COATED ORAL 2 TIMES DAILY
Qty: 14 TABLET | Refills: 0 | Status: SHIPPED | OUTPATIENT
Start: 2023-07-01 | End: 2023-07-08

## 2023-07-01 RX ORDER — KETOROLAC TROMETHAMINE 30 MG/ML
15 INJECTION, SOLUTION INTRAMUSCULAR; INTRAVENOUS ONCE
Status: COMPLETED | OUTPATIENT
Start: 2023-07-01 | End: 2023-07-01

## 2023-07-01 RX ORDER — HYDROCODONE BITARTRATE AND ACETAMINOPHEN 5; 325 MG/1; MG/1
1 TABLET ORAL
Status: COMPLETED | OUTPATIENT
Start: 2023-07-01 | End: 2023-07-01

## 2023-07-01 RX ADMIN — KETOROLAC TROMETHAMINE 15 MG: 30 INJECTION, SOLUTION INTRAMUSCULAR; INTRAVENOUS at 11:25

## 2023-07-01 RX ADMIN — METRONIDAZOLE 500 MG: 500 INJECTION, SOLUTION INTRAVENOUS at 13:43

## 2023-07-01 RX ADMIN — HYDROCODONE BITARTRATE AND ACETAMINOPHEN 1 TABLET: 5; 325 TABLET ORAL at 11:25

## 2023-07-01 RX ADMIN — WATER 1000 MG: 1 INJECTION INTRAMUSCULAR; INTRAVENOUS; SUBCUTANEOUS at 13:43

## 2023-07-01 RX ADMIN — IOPAMIDOL 100 ML: 755 INJECTION, SOLUTION INTRAVENOUS at 12:02

## 2023-07-01 ASSESSMENT — PAIN - FUNCTIONAL ASSESSMENT: PAIN_FUNCTIONAL_ASSESSMENT: 0-10

## 2023-07-01 ASSESSMENT — PAIN DESCRIPTION - LOCATION: LOCATION: GENERALIZED

## 2023-07-01 ASSESSMENT — PAIN SCALES - GENERAL: PAINLEVEL_OUTOF10: 6

## 2023-07-01 ASSESSMENT — PAIN DESCRIPTION - DESCRIPTORS: DESCRIPTORS: ACHING

## 2023-07-02 LAB
BACTERIA SPEC CULT: NORMAL
BACTERIA SPEC CULT: NORMAL
SERVICE CMNT-IMP: NORMAL
SERVICE CMNT-IMP: NORMAL

## 2023-07-05 ENCOUNTER — TELEPHONE (OUTPATIENT)
Age: 73
End: 2023-07-05

## 2023-07-05 ENCOUNTER — PATIENT MESSAGE (OUTPATIENT)
Age: 73
End: 2023-07-05

## 2023-07-05 DIAGNOSIS — G89.29 OTHER CHRONIC PAIN: Primary | ICD-10-CM

## 2023-07-05 RX ORDER — TRAMADOL HYDROCHLORIDE 50 MG/1
50 TABLET ORAL EVERY 4 HOURS PRN
Qty: 42 TABLET | Refills: 0 | Status: SHIPPED | OUTPATIENT
Start: 2023-07-05 | End: 2023-07-19

## 2023-07-05 NOTE — TELEPHONE ENCOUNTER
Patient's sister called, she states that patient came into ER this weekend and was diagnosed with colitis - he is on abtx for one week. She states that the patient and his wife wish to discontinue immunotherapy and try the po chemo pill. Discussed patient's second opinion appt - she states that it is scheduled for September at [address] and in the meantime they wish to try the po chemo pill. Also he is c/o pain and the current medications are not helping - requested to try tramadol again. Reviewed that Raffy Sanders is out of the office today will review with another provider in the office for the script. She verbalized understanding - states that the Remerol that Raffy Sanders gave patient to help with sleep and appetite certainly helped his appetite and sleep but made patient do crazy things and he stopped it - wants to know if he can have the order for trazadone back to help with sleep, let her know that I would review with Dr. Mao Hunt and get back to her. In the meantime while patient is recovering from colitis, will send information about po chemo medications to review and be familiar with before discussion with Provider. Emailed information on Lenvatinib and Cabometyx to patient and his sister for review. Advised to call me with any questions or concerns in the meantime. Reviewed the above with MsJavan Sandra - she will order tramadol for patient, requests to review patient's case with Dr. Mao Hunt when he is in the office tomorrow to guide treatment and timing to start po chemo medication.

## 2023-07-07 ENCOUNTER — TELEPHONE (OUTPATIENT)
Age: 73
End: 2023-07-07

## 2023-07-07 DIAGNOSIS — C22.0 LIVER CELL CARCINOMA (HCC): Primary | ICD-10-CM

## 2023-07-07 DIAGNOSIS — R63.0 LOSS OF APPETITE FOR MORE THAN 2 WEEKS: ICD-10-CM

## 2023-07-07 RX ORDER — MEGESTROL ACETATE 40 MG/1
40 TABLET ORAL 2 TIMES DAILY
Qty: 60 TABLET | Refills: 1 | Status: SHIPPED | OUTPATIENT
Start: 2023-07-07

## 2023-07-07 NOTE — TELEPHONE ENCOUNTER
Left message for Prerna Amador, medication for her brother's appetite stimulation was sent to [facility] as requested, will update Prakash Louise on Monday and see if she would like to bring him in earlier than the 24th to discuss TKI start, call with any questions or concern to me at [phone].

## 2023-07-07 NOTE — PROGRESS NOTES
Reviewed with Dr. Tom Santana - patient's and wife's request to consider starting po chemo medication since patient currently has colitis, second opinion appt at [facility] isn't scheduled until September, Per Dr. Tom Santana isn't ok to give patient Megace for appetite - start at 40mg BID and may increase up to 4 times a day if needed. Updated Gibran Asher on Monday so she can have patient come in for discussion about starting a TKI for liver cancer.

## 2023-07-10 ENCOUNTER — TELEPHONE (OUTPATIENT)
Dept: HEMATOLOGY | Age: 73
End: 2023-07-10

## 2023-07-10 ENCOUNTER — TELEPHONE (OUTPATIENT)
Age: 73
End: 2023-07-10

## 2023-07-10 NOTE — TELEPHONE ENCOUNTER
Received call from patient's sister - reviewed that [facility]'s appt [facility] has been moved up to next week, plan is to cancel MRI for that day - can reschedule if it's needed. Piero Lane states that the colitis seems to be cleared but now patient has the hiccups again with constipation even though he is taking Miralax and stool softener every day. She also thinks that he may have some blood in his urine - the color is dark, he is unable to eat because he stomach feels so full but doesn't feel like it's fluid. Requests to know if patient can have a refill of medication for the hiccups. Sent message to ms. Lani Whatley with the above information and request for medication refill.

## 2023-07-11 ENCOUNTER — TELEPHONE (OUTPATIENT)
Age: 73
End: 2023-07-11

## 2023-07-11 DIAGNOSIS — K59.00 CONSTIPATION, UNSPECIFIED CONSTIPATION TYPE: ICD-10-CM

## 2023-07-11 DIAGNOSIS — C22.0 LIVER CELL CARCINOMA (HCC): Primary | ICD-10-CM

## 2023-07-11 DIAGNOSIS — R06.6 CHRONIC HICCOUGHS: ICD-10-CM

## 2023-07-11 RX ORDER — METOCLOPRAMIDE 10 MG/1
10 TABLET ORAL EVERY 6 HOURS PRN
Qty: 20 TABLET | Refills: 0 | Status: SHIPPED | OUTPATIENT
Start: 2023-07-11 | End: 2023-07-16

## 2023-07-11 RX ORDER — LACTULOSE 10 G/15ML
10 SOLUTION ORAL 2 TIMES DAILY PRN
Qty: 946 ML | Refills: 1 | Status: SHIPPED | OUTPATIENT
Start: 2023-07-11

## 2023-07-11 NOTE — TELEPHONE ENCOUNTER
Received phone call from patient's sister, Flor Blackmon - she states pharmacy did not have a refill for reglan to help with hiccups or for lactulose to help with constipation. Checked with Ms. Barrios and order received to send in prescriptions.  Sent to [facility].

## 2023-07-14 ENCOUNTER — HOSPITAL ENCOUNTER (OUTPATIENT)
Facility: HOSPITAL | Age: 73
Setting detail: INFUSION SERIES
End: 2023-07-14

## 2023-07-18 ENCOUNTER — TELEPHONE (OUTPATIENT)
Age: 73
End: 2023-07-18

## 2023-07-18 DIAGNOSIS — Z21 ASYMPTOMATIC HUMAN IMMUNODEFICIENCY VIRUS (HIV) INFECTION STATUS (HCC): ICD-10-CM

## 2023-07-18 DIAGNOSIS — C22.0 LIVER CELL CARCINOMA (HCC): Primary | ICD-10-CM

## 2023-07-18 DIAGNOSIS — R53.1 WEAKNESS GENERALIZED: ICD-10-CM

## 2023-07-18 NOTE — TELEPHONE ENCOUNTER
The patient's sister Spring Matthews called and states that they are ready to have the conversation with her brother re: hospice. She states that [facility] and [facility] need a referral to schedule a date to come out and talk to the family. Spring Matthews states that they are still going to go to the appt at [facility] today but she doesn't hold much hope for additional treatment. Referral sent in as requested.

## 2023-07-19 ENCOUNTER — HOME CARE VISIT (OUTPATIENT)
Facility: HOME HEALTH | Age: 73
End: 2023-07-19
Payer: MEDICARE

## 2023-07-19 VITALS
DIASTOLIC BLOOD PRESSURE: 51 MMHG | SYSTOLIC BLOOD PRESSURE: 130 MMHG | HEART RATE: 83 BPM | OXYGEN SATURATION: 96 % | TEMPERATURE: 97.4 F | RESPIRATION RATE: 18 BRPM

## 2023-07-19 PROCEDURE — G0299 HHS/HOSPICE OF RN EA 15 MIN: HCPCS

## 2023-07-19 ASSESSMENT — ENCOUNTER SYMPTOMS
ABDOMINAL PAIN: 1
INDIGESTION: 1
NAUSEA: 1
BOWEL INCONTINENCE: 1

## 2023-07-20 ENCOUNTER — HOME CARE VISIT (OUTPATIENT)
Facility: HOME HEALTH | Age: 73
End: 2023-07-20
Payer: MEDICARE

## 2023-07-20 PROCEDURE — G0155 HHCP-SVS OF CSW,EA 15 MIN: HCPCS

## 2023-07-20 PROCEDURE — G0299 HHS/HOSPICE OF RN EA 15 MIN: HCPCS

## 2023-07-21 VITALS
SYSTOLIC BLOOD PRESSURE: 110 MMHG | HEART RATE: 81 BPM | DIASTOLIC BLOOD PRESSURE: 50 MMHG | RESPIRATION RATE: 16 BRPM | OXYGEN SATURATION: 97 %

## 2023-07-21 NOTE — HOSPICE
LMSW arrived at the patient's home to complete an Initial Psychosocial Assessment for [facility]. The LMSW was greeted at the front door by the patient's sister Manjeet Pool. The home was clean and clutter free. The patient is a 74 y/o [place] American male with a [facility] Dx. Liver cell carcinoma. The patient was received sitting on a wheelchair. Patient was alert and oriented to self and sister only. Patient demonstrated poor impulse control as he attempted to get off his wheelchair multiple times and had to be refocused. 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 Patient was  to [facility] for 10 years that ended in a divorce. They had three children; Veverly Courser who lives in [place], Jeovany Adler who had passed, and Nathan Romanjoe. who lives in [facility]. Patient  a second time to [facility] for two years who is . Patient does not belong to a [employer] but identifies as a [address] [facility]. Manjeet Pool stated that she is coping better now that the patient is living with Manjeet Pool, and she does not have to commute to [place]. LMSW provided emotional support and supportive counseling related to caregiving and home safety.

## 2023-07-21 NOTE — HOSPICE
Patient rec'd in bed. Sister present  Patient will open his eyes, but no meaningful interactions,  Medications reviewed.   Educated sister on changing patient and repositioning in bed when alone  No medications or refills needed

## 2023-07-24 NOTE — HOSPICE
Routine SNV made. Patient's sister, Kevin Grant, present for visit. Patient resting in bed; nonverbal, unable to assess orientation, lethargic, neutral facial expression, still able to move around in bed; respirations even and unlabored, abdomen soft, nonTTP, active bowel sounds; trace BLE edema. Sister stated that patient has been having more trouble swallowing and eating less. She stated he has been having daily BMS. She stated she has used morphine and lorazepam on occasion due to pain and restlessness. Email sent to triage RN about ordering OBT and HHA 2x week. Supplies ordered via [employer]. No medications needed. Reminded sister to call hospice number with any needs or concners.

## 2023-07-27 ENCOUNTER — HOME CARE VISIT (OUTPATIENT)
Age: 73
End: 2023-07-27
Payer: MEDICARE

## 2023-07-27 VITALS
OXYGEN SATURATION: 87 % | DIASTOLIC BLOOD PRESSURE: 76 MMHG | SYSTOLIC BLOOD PRESSURE: 142 MMHG | RESPIRATION RATE: 36 BRPM | TEMPERATURE: 97.9 F | HEART RATE: 124 BPM

## 2023-07-27 ASSESSMENT — ENCOUNTER SYMPTOMS
CONSTIPATION: 1
ABDOMINAL PAIN: 1

## 2023-07-27 NOTE — HOSPICE
PRN skilled nursing visit for sudden and dramatic decline. Patient found unresponsive, with eyes open and facial expression indicating pain and anxiety. Breathing is rapid and shallow, heart rate is elevated. His sister, Dayo Sanchez, has not administered any comfort medications since last night. He was alert, with some ambulation and speech yesterday. Lorazepam administered with some improvement, followed by Dilaudid with some improvement, then another administration of Dilaudid. Breathing rate decreased slightly, while patient's facial indications of pain/discomfort faded. Later in visit, he closed his eyes and they remained closed. Patient observed to be much more comfortble by visit end. Reviewed comfort medications with Dayo Sanchez, recommending that she give both when needed as evidenced by observble signs of pain, dicomfort and anxiety. Dayo Sanchez verbalized understanding of all. Placed patient on 0-7 list and requested a check-in call. Triage ordered compounded Dilaudid for delivery today. Encouraged Dayo Sanchez to call with any changes or needs.

## 2023-07-27 NOTE — HOSPICE
Pronouncement of death completed by: Gregory Zamora RN(first/last name, title). Agency staff was not present at the time of death. At the time of death the [facility] death pronouncement  apneic, pulseless and blood pressure absent. The pt  within his residence(location). The following were notified of the patient's death: [facility] staff.   Medications were disposed of per hospice protocol

## (undated) DEVICE — SUTURE MCRYL SZ 4-0 L27IN ABSRB UD L19MM PS-2 1/2 CIR PRIM Y426H

## (undated) DEVICE — TR BAND RADIAL ARTERY COMPRESSION DEVICE: Brand: TR BAND

## (undated) DEVICE — MEGA NEEDLE DRIVER: Brand: ENDOWRIST

## (undated) DEVICE — COVER,MAYO STAND,STERILE: Brand: MEDLINE

## (undated) DEVICE — SURGICAL PROCEDURE KIT GEN LAPAROSCOPY LF

## (undated) DEVICE — TUBING PRSS MON L6IN PVC M FEM CONN

## (undated) DEVICE — Device

## (undated) DEVICE — TIP COVER ACCESSORY

## (undated) DEVICE — RADIFOCUS OPTITORQUE ANGIOGRAPHIC CATHETER: Brand: OPTITORQUE

## (undated) DEVICE — VISUALIZATION SYSTEM: Brand: CLEARIFY

## (undated) DEVICE — PRESSURE GUIDEWIRE: Brand: COMET

## (undated) DEVICE — CATHETER ETER ANGIO L110CM OD5FR ID046IN L75CM 038IN 145DEG CARD

## (undated) DEVICE — SOLUTION IV 1000ML 0.9% SOD CHL

## (undated) DEVICE — COLUMN DRAPE

## (undated) DEVICE — SUTURE STRATAFIX SPRL PDS + SZ 2-0 L6IN ABSRB VLT L36MM SXPP1B409

## (undated) DEVICE — INFECTION CONTROL KIT SYS

## (undated) DEVICE — CATH GUID COR JR4.0 6FR 100CM -- LAUNCHER

## (undated) DEVICE — 3M™ TEGADERM™ TRANSPARENT FILM DRESSING FRAME STYLE, 1626W, 4 IN X 4-3/4 IN (10 CM X 12 CM), 50/CT 4CT/CASE: Brand: 3M™ TEGADERM™

## (undated) DEVICE — SEAL UNIV 5-8MM DISP BX/10 -- DA VINCI XI - SNGL USE

## (undated) DEVICE — (D)PREP SKN CHLRAPRP APPL 26ML -- CONVERT TO ITEM 371833

## (undated) DEVICE — DRAPE,REIN 53X77,STERILE: Brand: MEDLINE

## (undated) DEVICE — GUIDEWIRE VASC L260CM 0.035IN J TIP L3MM PTFE FIX COR NAMIC

## (undated) DEVICE — Device: Brand: ASAHI SION BLUE

## (undated) DEVICE — HANDLE LT SNAP ON ULT DURABLE LENS FOR TRUMPF ALC DISPOSABLE

## (undated) DEVICE — ELECTRO LUBE IS A SINGLE PATIENT USE DEVICE THAT IS INTENDED TO BE USED ON ELECTROSURGICAL ELECTRODES TO REDUCE STICKING.: Brand: KEY SURGICAL ELECTRO LUBE

## (undated) DEVICE — 3M™ IOBAN™ 2 ANTIMICROBIAL INCISE DRAPE 6650EZ: Brand: IOBAN™ 2

## (undated) DEVICE — SCISSORS SURG DIA8MM MPLR CRV ENDOWRIST

## (undated) DEVICE — TOWEL SURG W17XL27IN STD BLU COT NONFENESTRATED PREWASHED

## (undated) DEVICE — TUBE KT ENDFLTN INSUFFLTN SVL --

## (undated) DEVICE — BLADE ASSEMB CLP HAIR FINE --

## (undated) DEVICE — NEEDLE HYPO 22GA L1.5IN BLK S STL HUB POLYPR SHLD REG BVL

## (undated) DEVICE — ARM DRAPE

## (undated) DEVICE — KENDALL SCD EXPRESS SLEEVES, KNEE LENGTH, MEDIUM: Brand: KENDALL SCD

## (undated) DEVICE — SPLINT WR POS F/ARTERIAL ACC -- BX/10

## (undated) DEVICE — REM POLYHESIVE ADULT PATIENT RETURN ELECTRODE: Brand: VALLEYLAB

## (undated) DEVICE — BLADELESS OBTURATOR: Brand: WECK VISTA

## (undated) DEVICE — HI-TORQUE VERSACORE FLOPPY GUIDE WIRE SYSTEM 145 CM: Brand: HI-TORQUE VERSACORE

## (undated) DEVICE — DEVON™ KNEE AND BODY STRAP 60" X 3" (1.5 M X 7.6 CM): Brand: DEVON

## (undated) DEVICE — DERMABOND SKIN ADH 0.7ML -- DERMABOND ADVANCED 12/BX

## (undated) DEVICE — GLIDESHEATH SLENDER ACCESS KIT: Brand: GLIDESHEATH SLENDER